# Patient Record
Sex: MALE | Race: WHITE | NOT HISPANIC OR LATINO | Employment: OTHER | URBAN - METROPOLITAN AREA
[De-identification: names, ages, dates, MRNs, and addresses within clinical notes are randomized per-mention and may not be internally consistent; named-entity substitution may affect disease eponyms.]

---

## 2014-11-04 LAB — SEE NOTE: NORMAL

## 2017-02-06 ENCOUNTER — COMMUNICATION - HEALTHEAST (OUTPATIENT)
Dept: FAMILY MEDICINE | Facility: CLINIC | Age: 62
End: 2017-02-06

## 2017-03-07 ENCOUNTER — OFFICE VISIT - HEALTHEAST (OUTPATIENT)
Dept: FAMILY MEDICINE | Facility: CLINIC | Age: 62
End: 2017-03-07

## 2017-03-07 DIAGNOSIS — Z82.49 FAMILY HISTORY OF HEART DISEASE: ICD-10-CM

## 2017-03-07 DIAGNOSIS — E66.01 MORBID OBESITY WITH BMI OF 40.0-44.9, ADULT (H): ICD-10-CM

## 2017-03-07 DIAGNOSIS — M54.9 BACK PAIN: ICD-10-CM

## 2017-03-07 DIAGNOSIS — Z00.00 ROUTINE GENERAL MEDICAL EXAMINATION AT A HEALTH CARE FACILITY: ICD-10-CM

## 2017-03-07 DIAGNOSIS — E78.00 HYPERCHOLESTEREMIA: ICD-10-CM

## 2017-03-07 DIAGNOSIS — I10 ESSENTIAL HYPERTENSION WITH GOAL BLOOD PRESSURE LESS THAN 140/90: ICD-10-CM

## 2017-03-07 LAB
CHOLEST SERPL-MCNC: 153 MG/DL
FASTING STATUS PATIENT QL REPORTED: NO
HBA1C MFR BLD: 5.6 % (ref 3.5–6)
HDLC SERPL-MCNC: 40 MG/DL
LDLC SERPL CALC-MCNC: 82 MG/DL
PSA SERPL-MCNC: 1.2 NG/ML (ref 0–4.5)
TRIGL SERPL-MCNC: 156 MG/DL

## 2017-03-07 ASSESSMENT — MIFFLIN-ST. JEOR: SCORE: 2240.03

## 2017-03-23 ENCOUNTER — RECORDS - HEALTHEAST (OUTPATIENT)
Dept: ADMINISTRATIVE | Facility: OTHER | Age: 62
End: 2017-03-23

## 2017-03-28 ENCOUNTER — RECORDS - HEALTHEAST (OUTPATIENT)
Dept: ADMINISTRATIVE | Facility: OTHER | Age: 62
End: 2017-03-28

## 2017-03-28 ENCOUNTER — TRANSFERRED RECORDS (OUTPATIENT)
Dept: HEALTH INFORMATION MANAGEMENT | Facility: CLINIC | Age: 62
End: 2017-03-28

## 2017-03-29 ENCOUNTER — TRANSFERRED RECORDS (OUTPATIENT)
Dept: HEALTH INFORMATION MANAGEMENT | Facility: CLINIC | Age: 62
End: 2017-03-29

## 2017-03-29 ENCOUNTER — HOSPITAL ENCOUNTER (OUTPATIENT)
Dept: MRI IMAGING | Facility: CLINIC | Age: 62
Discharge: HOME | End: 2017-03-29
Attending: PHYSICIAN ASSISTANT

## 2017-03-29 ENCOUNTER — COMMUNICATION - HEALTHEAST (OUTPATIENT)
Dept: TELEHEALTH | Facility: CLINIC | Age: 62
End: 2017-03-29

## 2017-03-29 DIAGNOSIS — R20.0 LOWER EXTREMITY NUMBNESS: ICD-10-CM

## 2017-03-29 DIAGNOSIS — M54.50 LUMBAGO: ICD-10-CM

## 2017-03-29 DIAGNOSIS — M79.606 PAIN OF LOWER EXTREMITY: ICD-10-CM

## 2017-04-03 ENCOUNTER — COMMUNICATION - HEALTHEAST (OUTPATIENT)
Dept: FAMILY MEDICINE | Facility: CLINIC | Age: 62
End: 2017-04-03

## 2017-04-03 DIAGNOSIS — E78.5 HYPERLIPEMIA: ICD-10-CM

## 2017-04-03 DIAGNOSIS — I10 ESSENTIAL HYPERTENSION: ICD-10-CM

## 2017-04-06 ENCOUNTER — RECORDS - HEALTHEAST (OUTPATIENT)
Dept: ADMINISTRATIVE | Facility: OTHER | Age: 62
End: 2017-04-06

## 2017-04-07 ENCOUNTER — HOSPITAL ENCOUNTER (OUTPATIENT)
Dept: PHYSICAL THERAPY | Facility: CLINIC | Age: 62
Setting detail: THERAPIES SERIES
End: 2017-04-07
Attending: ORTHOPAEDIC SURGERY
Payer: MEDICARE

## 2017-04-07 PROCEDURE — 97140 MANUAL THERAPY 1/> REGIONS: CPT | Mod: GP | Performed by: PHYSICAL THERAPIST

## 2017-04-07 PROCEDURE — 97162 PT EVAL MOD COMPLEX 30 MIN: CPT | Mod: GP | Performed by: PHYSICAL THERAPIST

## 2017-04-07 PROCEDURE — 40000718 ZZHC STATISTIC PT DEPARTMENT ORTHO VISIT: Performed by: PHYSICAL THERAPIST

## 2017-04-07 PROCEDURE — G8979 MOBILITY GOAL STATUS: HCPCS | Mod: GP,CI | Performed by: PHYSICAL THERAPIST

## 2017-04-07 PROCEDURE — 97535 SELF CARE MNGMENT TRAINING: CPT | Mod: GP | Performed by: PHYSICAL THERAPIST

## 2017-04-07 PROCEDURE — 97110 THERAPEUTIC EXERCISES: CPT | Mod: GP | Performed by: PHYSICAL THERAPIST

## 2017-04-07 PROCEDURE — G8978 MOBILITY CURRENT STATUS: HCPCS | Mod: GP,CI | Performed by: PHYSICAL THERAPIST

## 2017-04-07 NOTE — PROGRESS NOTES
[facility]          OUTPATIENT PHYSICAL THERAPY ORTHOPEDIC EVALUATION  PLAN OF TREATMENT FOR OUTPATIENT REHABILITATION  (COMPLETE FOR INITIAL CLAIMS ONLY)  Patient's Last Name, First Name, M.I.  YOB: 1955  Tommy De La O       Provider s Name:  [facility]   Medical Record No.  [id number]   Start of Care Date:  04/07/17   Onset Date:  01/01/13   Type:     _X__PT   ___OT   ___SLP Medical Diagnosis:  lumbago     PT Diagnosis:  LBP associated w mechanical dysfunction   Visits from SOC:  1      _________________________________________________________________________________  Plan of Treatment/Functional Goals:  balance training, gait training, joint mobilization, manual therapy, neuromuscular re-education, ROM, strengthening, stretching     Cryotherapy, Electrical stimulation, Hot packs, TENS, Traction     Goals  Goal Identifier: ROM  Goal Description: Pt will be able to demonstrate full lumbar ROM in order to be able to  object off of the ground without pain or radicular symptoms.  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   Target Date: 05/19/17          Therapy Frequency:  1 time/week  Predicted Duration of Therapy Intervention:  6 weeks    Venus Shin, PT                 I CERTIFY THE NEED FOR THESE SERVICES FURNISHED UNDER        THIS PLAN OF TREATMENT AND WHILE UNDER MY CARE .              Physician Signature               Date    X_____________________________________________________                             Certification Date From:  04/07/17   Certification Date To:  05/19/17    Referring Provider:  MD Cortney Hatfield    Initial Assessment        See Epic Evaluation Start of Care Date: 04/07/17

## 2017-04-07 NOTE — PROGRESS NOTES
04/07/17 1000   General Information   Type of Visit Initial OP Ortho PT Evaluation   Start of Care Date 04/07/17   Referring Physician Aurelio Delgadillo MD - [place]   Patient/Family Goals Statement reduce pain   Orders Evaluate and Treat   Orders Comment trunk stabilization conditioning modalities, home exercise program   Date of Order 04/06/17   Insurance Type Medicare   Medical Diagnosis lumbago    Surgical/Medical history reviewed Yes   Precautions/Limitations no known precautions/limitations   Body Part(s)   Body Part(s) Lumbar Spine/SI   Presentation and Etiology   Pertinent history of current problem (include personal factors and/or comorbidities that impact the POC) Pt reports back pain started when he fell down the steps in 2013. From that fall he needed bi TKA. Then due to being less active his back started to give him more problems. . Pt reports he has numbness in both feet and will.  Pt had an MRI, he has DDD and arthritis in the back. Pt reports standing and sitting on the couch is bad at the end of the day. Pt reports he does have swelling in knees from bi TKA. He has pain going up to mid pain. Pt denies changes in bowel and bladder function.  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 PMH: depression, high BP, arthritis, smoking, 2 TKA in 2015, RTC 2003, Achilles 1982 Pt reports he may need an injection but would not need surgery.   Impairments A. Pain;L. Tingling;K. Numbness   Functional Limitations perform activities of daily living;perform required work activities;perform desired leisure / sports activities   Symptom Location back and R leg   How/Where did it occur From insidious onset   Onset date of current episode/exacerbation 01/01/13   Chronicity Chronic   Pain rating (0-10 point scale) Best (/10);Worst (/10)   Best (/10) 1   Worst (/10) 6   Pain quality A. Sharp;C. Aching   Frequency of pain/symptoms B. Intermittent   Pain/symptoms exacerbated by A. Sitting;C. Lifting;D. Carrying;G.  Certain positions;M. Other   Pain exacerbation comment standing long periods   Pain/symptoms eased by C. Rest;E. Changing positions;K. Other   Pain eased by comment stretching   Progression of symptoms since onset: Worsened   Current Level of Function   Current Community Support Family/friend caregiver   Patient role/employment history F. Retired   Living environment [place]   Fall Risk Screen   Fall screen completed by PT   Per patient - Fall 2 or more times in past year? No   Per patient - Fall with injury in past year? No   Is patient a fall risk? No   System Outcome Measures   Outcome Measures Low Back Pain (see Oswestry and Denys)   Lumbar Spine/SI Objective Findings   Posture increased lumbar lordosis   Gait/Locomotion [place]    Balance/Proprioception (Single Leg Stance) WNL    Flexion ROM to floor - min pain   Extension ROM 25% w pain in middle   Right Side Bending ROM to knee - pain on R    Left Side Bending ROM to knee - pain on R    Pelvic Screen L post innominate rotation    Hip Flexion (L2) Strength 5/5   Hip Abduction Strength R: 3/5    Knee Flexion Strength 5/5   Knee Extension (L3) Strength 5/5   Ankle Dorsiflexion (L4) Strength 5/5   Ankle Plantar Flexion (S1) Strength 5/5   SLR R: 70 L: 80    Slump Test neg   Palpation numbness in bi knees from TKA, R flex: 108, L flex: 104  - denies TTP to pirifdormis, TFL, TTP: bi SI    Planned Therapy Interventions   Planned Therapy Interventions balance training;gait training;joint mobilization;manual therapy;neuromuscular re-education;ROM;strengthening;stretching   Planned Modality Interventions   Planned Modality Interventions Cryotherapy;Electrical stimulation;Hot packs;TENS;Traction   Clinical Impression   Criteria for Skilled Therapeutic Interventions Met yes, treatment indicated   PT Diagnosis LBP associated w mechanical dysfunction   Influenced by the following impairments pain, weakness, ROM, gait, balance   Functional limitations due to  impairments standing, sitting   Clinical Presentation Evolving/Changing   Clinical Presentation Rationale Pt is pleasant 62 yr old man w c/o of LBP w bi feet numbness. Pt is mod complexity due to incosnsitent neuro exam findings and TKA  may limit rehab   Clinical Decision Making (Complexity) Moderate complexity   Therapy Frequency 1 time/week   Predicted Duration of Therapy Intervention (days/wks) 6 weeks   Risk & Benefits of therapy have been explained Yes   Patient, Family & other staff in agreement with plan of care Yes   Education Assessment   Preferred Learning Style Listening;Reading;Pictures/video;Demonstration   Barriers to Learning No barriers   ORTHO GOALS   PT Ortho Eval Goals 1;2;3;4   Ortho Goal 1   Goal Identifier ROM   Goal Description Pt will be able to demonstrate full lumbar ROM in order to be able to  object off of the ground without pain or radicular symptoms.   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    Target Date 05/19/17   Total Evaluation Time   Total Evaluation Time 70 min ( 30 eval, 15 TE, 15 MT, 10 ADL)    Therapy Certification   Certification date from 04/07/17   Certification date to 05/19/17   Medical Diagnosis lumbago       Venus Shin  Physical Therapist  [address] [address]  [email or web address]   [email or web address]   Office:  [phone] Fax: [phone]

## 2017-04-12 ENCOUNTER — HOSPITAL ENCOUNTER (OUTPATIENT)
Dept: PHYSICAL THERAPY | Facility: CLINIC | Age: 62
Setting detail: THERAPIES SERIES
End: 2017-04-12
Attending: ORTHOPAEDIC SURGERY
Payer: MEDICARE

## 2017-04-12 PROCEDURE — 97535 SELF CARE MNGMENT TRAINING: CPT | Mod: GP | Performed by: PHYSICAL THERAPIST

## 2017-04-12 PROCEDURE — 97140 MANUAL THERAPY 1/> REGIONS: CPT | Mod: GP | Performed by: PHYSICAL THERAPIST

## 2017-04-12 PROCEDURE — 40000718 ZZHC STATISTIC PT DEPARTMENT ORTHO VISIT: Performed by: PHYSICAL THERAPIST

## 2017-04-19 ENCOUNTER — HOSPITAL ENCOUNTER (OUTPATIENT)
Dept: PHYSICAL THERAPY | Facility: CLINIC | Age: 62
Setting detail: THERAPIES SERIES
End: 2017-04-19
Attending: ORTHOPAEDIC SURGERY
Payer: MEDICARE

## 2017-04-19 PROCEDURE — 97140 MANUAL THERAPY 1/> REGIONS: CPT | Mod: GP | Performed by: PHYSICAL THERAPIST

## 2017-04-19 PROCEDURE — 40000718 ZZHC STATISTIC PT DEPARTMENT ORTHO VISIT: Performed by: PHYSICAL THERAPIST

## 2017-04-19 PROCEDURE — 97110 THERAPEUTIC EXERCISES: CPT | Mod: GP | Performed by: PHYSICAL THERAPIST

## 2017-04-28 ENCOUNTER — HOSPITAL ENCOUNTER (OUTPATIENT)
Dept: PHYSICAL THERAPY | Facility: CLINIC | Age: 62
Setting detail: THERAPIES SERIES
End: 2017-04-28
Attending: ORTHOPAEDIC SURGERY
Payer: MEDICARE

## 2017-04-28 PROCEDURE — 40000718 ZZHC STATISTIC PT DEPARTMENT ORTHO VISIT: Performed by: PHYSICAL THERAPIST

## 2017-04-28 PROCEDURE — 97140 MANUAL THERAPY 1/> REGIONS: CPT | Mod: GP | Performed by: PHYSICAL THERAPIST

## 2017-04-28 PROCEDURE — 97110 THERAPEUTIC EXERCISES: CPT | Mod: GP | Performed by: PHYSICAL THERAPIST

## 2017-05-02 ENCOUNTER — COMMUNICATION - HEALTHEAST (OUTPATIENT)
Dept: FAMILY MEDICINE | Facility: CLINIC | Age: 62
End: 2017-05-02

## 2017-05-03 ENCOUNTER — HOSPITAL ENCOUNTER (OUTPATIENT)
Dept: PHYSICAL THERAPY | Facility: CLINIC | Age: 62
Setting detail: THERAPIES SERIES
End: 2017-05-03
Attending: ORTHOPAEDIC SURGERY
Payer: MEDICARE

## 2017-05-03 PROCEDURE — 40000718 ZZHC STATISTIC PT DEPARTMENT ORTHO VISIT: Performed by: PHYSICAL THERAPIST

## 2017-05-03 PROCEDURE — 97110 THERAPEUTIC EXERCISES: CPT | Mod: GP | Performed by: PHYSICAL THERAPIST

## 2017-05-08 ENCOUNTER — AMBULATORY - HEALTHEAST (OUTPATIENT)
Dept: VASCULAR SURGERY | Facility: CLINIC | Age: 62
End: 2017-05-08

## 2017-05-08 ENCOUNTER — APPOINTMENT (OUTPATIENT)
Dept: GENERAL RADIOLOGY | Facility: CLINIC | Age: 62
End: 2017-05-08
Attending: FAMILY MEDICINE
Payer: MEDICARE

## 2017-05-08 ENCOUNTER — APPOINTMENT (OUTPATIENT)
Dept: CT IMAGING | Facility: CLINIC | Age: 62
End: 2017-05-08
Attending: FAMILY MEDICINE
Payer: MEDICARE

## 2017-05-08 ENCOUNTER — HOSPITAL ENCOUNTER (EMERGENCY)
Facility: CLINIC | Age: 62
Discharge: HOME | End: 2017-05-08
Attending: FAMILY MEDICINE | Admitting: FAMILY MEDICINE
Payer: MEDICARE

## 2017-05-08 VITALS — OXYGEN SATURATION: 97 % | DIASTOLIC BLOOD PRESSURE: 96 MMHG | TEMPERATURE: 98 F | SYSTOLIC BLOOD PRESSURE: 155 MMHG

## 2017-05-08 DIAGNOSIS — R31.0 GROSS HEMATURIA: ICD-10-CM

## 2017-05-08 DIAGNOSIS — R42 LIGHT HEADEDNESS: ICD-10-CM

## 2017-05-08 DIAGNOSIS — E87.6 HYPOKALEMIA: ICD-10-CM

## 2017-05-08 DIAGNOSIS — I72.3 ILIAC ARTERY ANEURYSM, LEFT (H): ICD-10-CM

## 2017-05-08 LAB
ALBUMIN SERPL-MCNC: 3.6 G/DL (ref 3.4–5)
ALBUMIN UR-MCNC: NEGATIVE MG/DL
ALP SERPL-CCNC: 86 U/L (ref 40–150)
ALT SERPL W P-5'-P-CCNC: 43 U/L (ref 0–70)
ANION GAP SERPL CALCULATED.3IONS-SCNC: 9 MMOL/L (ref 3–14)
APPEARANCE UR: CLEAR
AST SERPL W P-5'-P-CCNC: 28 U/L (ref 0–45)
BASOPHILS # BLD AUTO: 0 10E9/L (ref 0–0.2)
BASOPHILS NFR BLD AUTO: 0.6 %
BILIRUB SERPL-MCNC: 0.6 MG/DL (ref 0.2–1.3)
BILIRUB UR QL STRIP: NEGATIVE
BUN SERPL-MCNC: 19 MG/DL (ref 7–30)
CALCIUM SERPL-MCNC: 9.2 MG/DL (ref 8.5–10.1)
CHLORIDE SERPL-SCNC: 103 MMOL/L (ref 94–109)
CO2 SERPL-SCNC: 27 MMOL/L (ref 20–32)
COLOR UR AUTO: YELLOW
CREAT SERPL-MCNC: 0.89 MG/DL (ref 0.66–1.25)
DIFFERENTIAL METHOD BLD: NORMAL
EOSINOPHIL # BLD AUTO: 0.3 10E9/L (ref 0–0.7)
EOSINOPHIL NFR BLD AUTO: 4.7 %
ERYTHROCYTE [DISTWIDTH] IN BLOOD BY AUTOMATED COUNT: 13 % (ref 10–15)
GFR SERPL CREATININE-BSD FRML MDRD: 86 ML/MIN/1.7M2
GLUCOSE SERPL-MCNC: 97 MG/DL (ref 70–99)
GLUCOSE UR STRIP-MCNC: NEGATIVE MG/DL
HCT VFR BLD AUTO: 43.1 % (ref 40–53)
HGB BLD-MCNC: 14.9 G/DL (ref 13.3–17.7)
HGB UR QL STRIP: NEGATIVE
IMM GRANULOCYTES # BLD: 0 10E9/L (ref 0–0.4)
IMM GRANULOCYTES NFR BLD: 0.3 %
INR PPP: 0.96 (ref 0.86–1.14)
KETONES UR STRIP-MCNC: NEGATIVE MG/DL
LEUKOCYTE ESTERASE UR QL STRIP: NEGATIVE
LYMPHOCYTES # BLD AUTO: 1.5 10E9/L (ref 0.8–5.3)
LYMPHOCYTES NFR BLD AUTO: 23.2 %
MCH RBC QN AUTO: 30 PG (ref 26.5–33)
MCHC RBC AUTO-ENTMCNC: 34.6 G/DL (ref 31.5–36.5)
MCV RBC AUTO: 87 FL (ref 78–100)
MONOCYTES # BLD AUTO: 0.4 10E9/L (ref 0–1.3)
MONOCYTES NFR BLD AUTO: 5.6 %
NEUTROPHILS # BLD AUTO: 4.2 10E9/L (ref 1.6–8.3)
NEUTROPHILS NFR BLD AUTO: 65.6 %
NITRATE UR QL: NEGATIVE
PH UR STRIP: 6.5 PH (ref 5–7)
PLATELET # BLD AUTO: 175 10E9/L (ref 150–450)
POTASSIUM SERPL-SCNC: 3.5 MMOL/L (ref 3.4–5.3)
PROT SERPL-MCNC: 7.4 G/DL (ref 6.8–8.8)
RBC # BLD AUTO: 4.97 10E12/L (ref 4.4–5.9)
SODIUM SERPL-SCNC: 139 MMOL/L (ref 133–144)
SP GR UR STRIP: 1.01 (ref 1–1.03)
TROPONIN I SERPL-MCNC: NORMAL UG/L (ref 0–0.04)
URN SPEC COLLECT METH UR: NORMAL
UROBILINOGEN UR STRIP-MCNC: NORMAL MG/DL (ref 0–2)
WBC # BLD AUTO: 6.4 10E9/L (ref 4–11)

## 2017-05-08 PROCEDURE — 81003 URINALYSIS AUTO W/O SCOPE: CPT | Performed by: FAMILY MEDICINE

## 2017-05-08 PROCEDURE — 99285 EMERGENCY DEPT VISIT HI MDM: CPT | Mod: 25 | Performed by: FAMILY MEDICINE

## 2017-05-08 PROCEDURE — 85610 PROTHROMBIN TIME: CPT | Performed by: FAMILY MEDICINE

## 2017-05-08 PROCEDURE — 85025 COMPLETE CBC W/AUTO DIFF WBC: CPT | Performed by: FAMILY MEDICINE

## 2017-05-08 PROCEDURE — 84484 ASSAY OF TROPONIN QUANT: CPT | Performed by: FAMILY MEDICINE

## 2017-05-08 PROCEDURE — 74176 CT ABD & PELVIS W/O CONTRAST: CPT

## 2017-05-08 PROCEDURE — 80053 COMPREHEN METABOLIC PANEL: CPT | Performed by: FAMILY MEDICINE

## 2017-05-08 PROCEDURE — 99285 EMERGENCY DEPT VISIT HI MDM: CPT | Mod: 25

## 2017-05-08 PROCEDURE — 93010 ELECTROCARDIOGRAM REPORT: CPT | Performed by: FAMILY MEDICINE

## 2017-05-08 PROCEDURE — 36415 COLL VENOUS BLD VENIPUNCTURE: CPT

## 2017-05-08 PROCEDURE — 93005 ELECTROCARDIOGRAM TRACING: CPT

## 2017-05-08 PROCEDURE — 71020 XR CHEST 2 VW: CPT

## 2017-05-08 RX ORDER — CLOBETASOL PROPIONATE 0.5 MG/G
1 OINTMENT TOPICAL DAILY PRN
COMMUNITY
End: 2018-06-28

## 2017-05-08 RX ORDER — CLINDAMYCIN HCL 300 MG
600 CAPSULE ORAL
COMMUNITY
Start: 2016-05-10 | End: 2019-09-16

## 2017-05-08 NOTE — ED AVS SNAPSHOT
[facility] Emergency Department    [address] [address]    Phone:  [phone]    Fax:  [phone]                                       Tommy De La O   MRN: [id number]    Department:  [facility] Emergency Department   Date of Visit:  5/8/2017           After Visit Summary Signature Page     I have received my discharge instructions, and my questions have been answered. I have discussed any challenges I see with this plan with the nurse or doctor.    ..........................................................................................................................................  Patient/Patient Representative Signature      ..........................................................................................................................................  Patient Representative Print Name and Relationship to Patient    ..................................................               ................................................  Date                                            Time    ..........................................................................................................................................  Reviewed by Signature/Title    ...................................................              ..............................................  Date                                                            Time

## 2017-05-08 NOTE — DISCHARGE INSTRUCTIONS
ICD-10-CM    1. Hypokalemia E87.6     borderline potassium.  Keep the potassium high in your diet.  See NIH DASH diet.  For now supllement potassium witnh 1/2 to 1 banana every day.   2. Iliac artery aneurysm, left (H) I72.3     Aneurysmal dilatation of the left common iliac artery up to 2.3 cm in maximal diameter. Additionally, focally ectatic infrarenal abdominal aorta measuring up to 2.7 cm in diameter.  Follow-up vascular.   3. Gross hematuria R31.0 UROLOGY ADULT REFERRAL    This was not seen on urinalysis,  could be coming from the bladder or urethra and this is not well seen on CT.  follow-up urology.   4. Light headedness R42     No serious findings on testing. stay hydrated.  follow-up,.         Hematuria: Possible Causes     Many things can lead to blood in the urine (hematuria). The blood may be seen with the eye. Or it may only be seen when the urine is looked at under a microscope. Some of the most common causes of blood in the urine are listed below. Often, no cause for the blood can be found. This is called idiopathic hematuria.    Kidney or bladder stones are collections of crystals. They form in the urine. Stones may be found anywhere in the urinary tract. But they form most often in the kidneys or bladder. In addition to blood in the urine, they can cause severe pain.   BPH stands for benign prostatic hyperplasia. It is enlargement of the prostate gland. It happens as men age. BPH often causes problems with urination. It sometimes causes blood in the urine.   A urinary tract infection (UTI) is due to bacteria growing in the urinary tract. It can cause blood in the urine. Other symptoms include burning or pain with urination. You may need to urinate often or urgently. You may also have a fever.     Damage to the urinary tract may cause blood in the urine. This damage may be due to a blow or accident. It may also result from the use of a urinary catheter. Very hard exercise may sometimes irritate  the urinary tract and cause bleeding.   Cancer may occur anywhere in the urinary tract. A tumor may sometimes cause no symptoms other than bleeding. Other possible causes of bleeding include:    Prostatitis (infection of the prostate gland)    Taking anticoagulant medications    Blockage in the urinary tract    Disease or inflammation of the kidney    Cystic diseases of the kidneys    Sickle cell anemia    Tumors of the urinary tract     [id number] [employer]. [address] [place] [address]. All rights reserved. This information is not intended as a substitute for professional medical care. Always follow your healthcare professional's instructions.          Hypokalemia  Hypokalemia means a low level of potassium in the blood. This most often occurs in patients who take diuretics (water pills). It can also occur due to severe vomiting or diarrhea.  It is also seen in people who take laxatives for long periods of time.  A mild case usually causes no symptoms. It is only found with blood testing. More severe potassium loss causes generalized weakness, muscle or abdominal cramping, heart palpitations (rapid or irregular heartbeats) and low blood pressure.  Home care    Take any potassium supplements prescribed.    Eat foods rich in potassium. The highest amount is found in artichoke, baked potatoes, spinach, cantaloupe, honeydew melon, cod, halibut, salmon, and scallops. White, red, or elizondo beans are also very good sources. A modest amount is found in orange juice, bananas, carrots, and tomato juice.    If you take certain types of diuretics, you will also need to take potassium supplements. If take a diuretic, discuss potassium supplements with your doctor.  Follow-up care  Follow up with your healthcare provider for a repeat blood test within the next week or as advised by our staff.  When to seek medical advice  Call your healthcare provider if any of the following occur:    Increased  weakness, fatigue, muscle cramps    Dizziness  Call 911  Call 911 if any of the following occur:    Irregular heartbeat, extra beats or very fast heart rate    Loss of consciousness    [id number] [employer]. [address] [place] [address], [place] [address]. All rights reserved. This information is not intended as a substitute for professional medical care. Always follow your healthcare professional's instructions.

## 2017-05-08 NOTE — ED AVS SNAPSHOT
[facility]    [address] [facility] [address]    Phone:  [phone]    Fax:  [phone]                                       Tommy De La O   MRN: [id number]    Department:  [facility]   Date of Visit:  5/8/2017           Patient Information     Date Of Birth          1955        Your diagnoses for this visit were:     Hypokalemia borderline potassium.  Keep the potassium high in your diet.  See [facility] DASH diet.  For now supllement potassium witnh 1/2 to 1 banana every day.    Iliac artery aneurysm, left (H) Aneurysmal dilatation of the left common iliac artery up to 2.3 cm in maximal diameter. Additionally, focally ectatic infrarenal abdominal aorta measuring up to 2.7 cm in diameter.  Follow-up vascular.    Gross hematuria This was not seen on urinalysis,  could be coming from the bladder or urethra and this is not well seen on CT.  follow-up urology.    Light headedness No serious findings on testing. stay hydrated.  follow-up,.       You were seen by Abdulaziz Murrieta MD.      Follow-up Information     Follow up with [facility].    Specialty:  EMERGENCY MEDICINE    Why:  As needed, If symptoms worsen    Contact information:    [facility] [address] [phone]    Additional information:    The medical center is located at   [address] [place] [address]. (between [address] and   [address] in [place], [place] north   of [place]).        Schedule an appointment as soon as possible for a visit with primary care.        Discharge Instructions         ICD-10-CM    1. Hypokalemia E87.6     borderline potassium.  Keep the potassium high in your diet.  See [facility] DASH diet.  For now supllement potassium witnh 1/2 to 1 banana every day.   2. Iliac artery aneurysm, left (H) I72.3     Aneurysmal dilatation of the left common iliac artery up to 2.3 cm in maximal diameter. Additionally, focally ectatic infrarenal  abdominal aorta measuring up to 2.7 cm in diameter.  Follow-up vascular.   3. Gross hematuria R31.0 UROLOGY ADULT REFERRAL    This was not seen on urinalysis,  could be coming from the bladder or urethra and this is not well seen on CT.  follow-up urology.   4. Light headedness R42     No serious findings on testing. stay hydrated.  follow-up,.         Hematuria: Possible Causes     Many things can lead to blood in the urine (hematuria). The blood may be seen with the eye. Or it may only be seen when the urine is looked at under a microscope. Some of the most common causes of blood in the urine are listed below. Often, no cause for the blood can be found. This is called idiopathic hematuria.    Kidney or bladder stones are collections of crystals. They form in the urine. Stones may be found anywhere in the urinary tract. But they form most often in the kidneys or bladder. In addition to blood in the urine, they can cause severe pain.   BPH stands for benign prostatic hyperplasia. It is enlargement of the prostate gland. It happens as men age. BPH often causes problems with urination. It sometimes causes blood in the urine.   A urinary tract infection (UTI) is due to bacteria growing in the urinary tract. It can cause blood in the urine. Other symptoms include burning or pain with urination. You may need to urinate often or urgently. You may also have a fever.     Damage to the urinary tract may cause blood in the urine. This damage may be due to a blow or accident. It may also result from the use of a urinary catheter. Very hard exercise may sometimes irritate the urinary tract and cause bleeding.   Cancer may occur anywhere in the urinary tract. A tumor may sometimes cause no symptoms other than bleeding. Other possible causes of bleeding include:    Prostatitis (infection of the prostate gland)    Taking anticoagulant medications    Blockage in the urinary tract    Disease or inflammation of the kidney    Cystic  diseases of the kidneys    Sickle cell anemia    Tumors of the urinary tract     6497-4632 [employer]. [address] [place] [address]176. All rights reserved. This information is not intended as a substitute for professional medical care. Always follow your healthcare professional's instructions.          Hypokalemia  Hypokalemia means a low level of potassium in the blood. This most often occurs in patients who take diuretics (water pills). It can also occur due to severe vomiting or diarrhea.  It is also seen in people who take laxatives for long periods of time.  A mild case usually causes no symptoms. It is only found with blood testing. More severe potassium loss causes generalized weakness, muscle or abdominal cramping, heart palpitations (rapid or irregular heartbeats) and low blood pressure.  Home care    Take any potassium supplements prescribed.    Eat foods rich in potassium. The highest amount is found in artichoke, baked potatoes, spinach, cantaloupe, honeydew melon, cod, halibut, salmon, and scallops. White, red, or elizondo beans are also very good sources. A modest amount is found in orange juice, bananas, carrots, and tomato juice.    If you take certain types of diuretics, you will also need to take potassium supplements. If take a diuretic, discuss potassium supplements with your doctor.  Follow-up care  Follow up with your healthcare provider for a repeat blood test within the next week or as advised by our staff.  When to seek medical advice  Call your healthcare provider if any of the following occur:    Increased weakness, fatigue, muscle cramps    Dizziness  Call 911  Call 911 if any of the following occur:    Irregular heartbeat, extra beats or very fast heart rate    Loss of consciousness    2640-2820 [employer]. [address] [place] [address]. All rights reserved. This information is not intended as a substitute for professional medical care.  Always follow your healthcare professional's instructions.          Future Appointments        Provider Department Dept Phone Center    5/10/2017 10:00 AM Venus Shin, PT [facility] [phone] [place] [facility]    5/17/2017 10:00 AM Venus Shin PT [facility] [phone] [facility]      24 Hour Appointment Hotline       To make an appointment at any [facility], call [phone] ([phone]). If you don't have a family doctor or clinic, we will help you find one. [place] clinics are conveniently located to serve the needs of you and your family.          ED Discharge Orders     UROLOGY ADULT REFERRAL       Your provider has referred you to: [facility]: [facility] [place] [phone]   [email or web address]    Please be aware that coverage of these services is subject to the terms and limitations of your health insurance plan.  Call member services at your health plan with any benefit or coverage questions.      Please bring the following with you to your appointment:    (1) Any X-Rays, CTs or MRIs which have been performed.  Contact the facility where they were done to arrange for  prior to your scheduled appointment.    (2) List of current medications  (3) This referral request   (4) Any documents/labs given to you for this referral                     Review of your medicines      Our records show that you are taking the medicines listed below. If these are incorrect, please call your family doctor or clinic.        Dose / Directions Last dose taken    AMLODIPINE BESYLATE PO   Dose:  5 mg        Take 5 mg by mouth daily   Refills:  0        aspirin 81 MG EC tablet   Dose:  81 mg        Take 81 mg by mouth daily   Refills:  0        CHLORTHALIDONE PO   Dose:  25 mg        Take 25 mg by mouth daily   Refills:  0        clindamycin 300 MG capsule   Commonly known as:  CLEOCIN   Dose:  600 mg         Take 600 mg by mouth 1 HOUR PRIOR TO DENTAL APPOINTMENT   Refills:  0        clobetasol 0.05 % ointment   Commonly known as:  TEMOVATE   Dose:  1 Application        Apply 1 Application topically daily as needed For itching   Refills:  0        OMEPRAZOLE PO   Dose:  40 mg        Take 40 mg by mouth every morning   Refills:  0        SIMVASTATIN PO   Dose:  20 mg        Take 20 mg by mouth At Bedtime   Refills:  0                Procedures and tests performed during your visit     Abd/pelvis CT - no contrast - Stone Protocol    CBC with platelets differential    Comprehensive metabolic panel    EKG 12-lead, tracing only    INR    Orthostatic blood pressure and pulse    Troponin I    UA reflex to Microscopic    XR Chest 2 Views      Orders Needing Specimen Collection     None      Pending Results     No orders found from 5/6/2017 to 5/9/2017.            Pending Culture Results     No orders found from 5/6/2017 to 5/9/2017.            Pending Results Instructions     If you had any lab results that were not finalized at the time of your Discharge, you can call the ED Lab Result RN at [phone]. You will be contacted by this team for any positive Lab results or changes in treatment. The nurses are available 7 days a week from 10A to 6:30P.  You can leave a message 24 hours per day and they will return your call.        Test Results From Your Hospital Stay        5/8/2017  9:48 AM      Component Results     Component Value Ref Range & Units Status    Color Urine Yellow  Final    Appearance Urine Clear  Final    Glucose Urine Negative NEG mg/dL Final    Bilirubin Urine Negative NEG Final    Ketones Urine Negative NEG mg/dL Final    Specific Gravity Urine 1.009 1.003 - 1.035 Final    Blood Urine Negative NEG Final    pH Urine 6.5 5.0 - 7.0 pH Final    Protein Albumin Urine Negative NEG mg/dL Final    Urobilinogen mg/dL Normal 0.0 - 2.0 mg/dL Final    Nitrite Urine Negative NEG Final    Leukocyte Esterase Urine Negative  NEG Final    Source Midstream Urine  Final         5/8/2017 11:05 AM      Component Results     Component Value Ref Range & Units Status    WBC 6.4 4.0 - 11.0 10e9/L Final    RBC Count 4.97 4.4 - 5.9 10e12/L Final    Hemoglobin 14.9 13.3 - 17.7 g/dL Final    Hematocrit 43.1 40.0 - 53.0 % Final    MCV 87 78 - 100 fl Final    MCH 30.0 26.5 - 33.0 pg Final    MCHC 34.6 31.5 - 36.5 g/dL Final    RDW 13.0 10.0 - 15.0 % Final    Platelet Count 175 150 - 450 10e9/L Final    Diff Method Automated Method  Final    % Neutrophils 65.6 % Final    % Lymphocytes 23.2 % Final    % Monocytes 5.6 % Final    % Eosinophils 4.7 % Final    % Basophils 0.6 % Final    % Immature Granulocytes 0.3 % Final    Absolute Neutrophil 4.2 1.6 - 8.3 10e9/L Final    Absolute Lymphocytes 1.5 0.8 - 5.3 10e9/L Final    Absolute Monocytes 0.4 0.0 - 1.3 10e9/L Final    Absolute Eosinophils 0.3 0.0 - 0.7 10e9/L Final    Absolute Basophils 0.0 0.0 - 0.2 10e9/L Final    Abs Immature Granulocytes 0.0 0 - 0.4 10e9/L Final         5/8/2017 11:22 AM      Component Results     Component Value Ref Range & Units Status    Sodium 139 133 - 144 mmol/L Final    Potassium 3.5 3.4 - 5.3 mmol/L Final    Chloride 103 94 - 109 mmol/L Final    Carbon Dioxide 27 20 - 32 mmol/L Final    Anion Gap 9 3 - 14 mmol/L Final    Glucose 97 70 - 99 mg/dL Final    Urea Nitrogen 19 7 - 30 mg/dL Final    Creatinine 0.89 0.66 - 1.25 mg/dL Final    GFR Estimate 86 >60 mL/min/1.7m2 Final    Non  GFR Calc    GFR Estimate If Black >90   GFR Calc   >60 mL/min/1.7m2 Final    Calcium 9.2 8.5 - 10.1 mg/dL Final    Bilirubin Total 0.6 0.2 - 1.3 mg/dL Final    Albumin 3.6 3.4 - 5.0 g/dL Final    Protein Total 7.4 6.8 - 8.8 g/dL Final    Alkaline Phosphatase 86 40 - 150 U/L Final    ALT 43 0 - 70 U/L Final    AST 28 0 - 45 U/L Final         5/8/2017 11:12 AM      Component Results     Component Value Ref Range & Units Status    INR 0.96 0.86 - 1.14 Final          5/8/2017 11:22 AM      Component Results     Component Value Ref Range & Units Status    Troponin I ES  0.000 - 0.045 ug/L Final    <0.015  The 99th percentile for upper reference range is 0.045 ug/L.  Troponin values in   the range of 0.045 - 0.120 ug/L may be associated with risks of adverse   clinical events.           5/8/2017 11:42 AM      Narrative     XR CHEST 2 VW 5/8/2017 11:17 AM    HISTORY: Light headed with ambulation.    COMPARISON: None.    FINDINGS: No airspace consolidation, effusion or pneumothorax. Normal  heart size.        Impression     IMPRESSION: No acute cardiopulmonary abnormality.    DELANEY PETERS MD         5/8/2017 11:44 AM      Narrative     Exam: CT ABDOMEN PELVIS W/O CONTRAST  5/8/2017 11:11 AM    History: Hematuria    Comparison: None    Technique: Volumetric acquisition with reconstruction in the axial,  coronal planes through the abdomen and pelvis without contrast.  Radiation dose for this scan was reduced using automated exposure  control, adjustment of the mA and/or kV according to patient size, or  iterative reconstruction technique.    Contrast: None    Findings:   Lung Bases: Tiny hiatal hernia. Lung bases otherwise clear. No pleural  or pericardial effusion.    Abdomen: Hepatic steatosis. Unenhanced liver is otherwise normal.  Unenhanced spleen, gallbladder, pancreas and adrenal glands are  normal.    Normal appearance of the kidneys. Specifically, no renal or ureteral  calculi, no hydronephrosis or hydroureter.    No areas of bowel wall thickening or bowel dilatation. Normal  appendix. No abdominal or pelvic lymphadenopathy. No free fluid.    Focal ectasia of the infrarenal abdominal aorta, measuring 2.7 cm in  maximal diameter (series 2 image 52 and series 3 image 91).  Additionally, there is aneurysmal dilatation of the left common iliac  artery measuring 2.3 cm in maximal diameter (series 3 image 99 and  series 2 image 61).    Bones: No concerning lytic or sclerotic  "lesions.        Impression     Impression:   1. Normal appearance of the kidneys without hydronephrosis,  hydroureter or stones.  2. Hepatic steatosis.  3. Aneurysmal dilatation of the left common iliac artery measuring up  to 2.3 cm in maximal diameter. Additionally, focally ectatic  infrarenal abdominal aorta measuring up to 2.7 cm in diameter.  4. Small hiatal hernia.    STUART PRAKASH                Thank you for choosing [facility]       Thank you for choosing [facility] for your care. Our goal is always to provide you with excellent care. Hearing back from our patients is one way we can continue to improve our services. Please take a few minutes to complete the written survey that you may receive in the mail after you visit with us. Thank you!        [employer] Information     [employer] lets you send messages to your doctor, view your test results, renew your prescriptions, schedule appointments and more. To sign up, go to [email or web address] . Click on \"Log in\" on the left side of the screen, which will take you to the Welcome page. Then click on \"Sign up Now\" on the right side of the page.     You will be asked to enter the access code listed below, as well as some personal information. Please follow the directions to create your username and password.     Your access code is: [email or web address]  Expires: 2017 12:46 PM     Your access code will  in 90 days. If you need help or a new code, please call your [facility] clinic or [phone].        Care EveryWhere ID     This is your Care EveryWhere ID. This could be used by other organizations to access your [facility] medical records  [id number]        After Visit Summary       This is your record. Keep this with you and show to your community pharmacist(s) and doctor(s) at your next visit.                  "

## 2017-05-08 NOTE — ED PROVIDER NOTES
"  History     Chief Complaint   Patient presents with     Dizziness     since this AM, blood in urine and burning since Friday     HPI  Tommy De La O is a 62 year old male with a history of GERD and prior GI bleed who presents to the ED for concerns of hematuria and light headedness. The patient reports he first noticed hematuria 3 days ago, which appeared to be a single small clot. He passed another small clot this morning and a second one while in the ED. He feels some stinging at the very beginning of his stream but denies any other dysuria. No frequency or urgency. No testicular pain, scrotal pain, or groin pain. No history of kidney stones. The patient started to feel light headed this morning, so he came to the ED for assessment. He describes his light headedness as \"feeling woozy.\" No room-spinning dizziness. It was worse while he was at home this morning, but is milder now. He admits to feeling anxious as well. No associated chest pain, palpitations, or shortness of breath. No sweats, abdominal pain, nausea, vomiting, arm pain, jaw pain, or new back pain. He has felt a similar light headedness in the past, about 3 years ago, when he was having rectal bleeding. He was admitted to [facility] at that time and EGD and colonoscopy were completed. EGD was normal with exception of erythematous duodenopathy, which was biopsied. Colonoscopy showed mucosal ulceration at IC valve, inflamed mucosa in the rectum, and non bleeding internal hemorrhoids, which were banded. The patient does note occasional bright red blood when wiping after bowel movements, but no recent bloody stools or black tarry stools. No history of ulcers. He does not use ibuprofen. Patient does report heavy alcohol use, about 5 beers at a time two nights per week. No recent prolonged travel. No history of DVT or PE. Denies one-sided leg swelling. No recent injuries, surgeries, or hospitalizations. No history of cancer. No personal history of " bleeding disorders, heart disease, or lung disease. No fevers, chills, bowel abnormalities, headache, or vision change. Former smoker, quit 5 years ago.    There is no problem list on file for this patient.    No current outpatient prescriptions on file.     Allergies   Allergen Reactions     Cefzil [Cefprozil] Hives     Darvocet [Propoxyphene N-Apap] Hives       I have reviewed the Medications, Allergies, Past Medical and Surgical History, and Social History in the Epic system.    Review of Systems  ROS:  No fever chills or sweats  No cough, sore throat or ear pain  No chest pain or shortness of breath  No abdominal pain, nausea, vomiting, diarrhea, constipation or blood in the stool or black tarry stools  No frequency. Positive hematuria and mild dysuria.   No new rashes  No headaches or vision change  Positive light headedness. No dizziness.   All other systems are reviewed and are negative.    Physical Exam   BP: (!) 146/92  Heart Rate: 55  Temp: 98  F (36.7  C)  SpO2: 97 %  Physical Exam   Neck: No thyromegaly present.   Cardiovascular: Normal rate and regular rhythm.    No murmur heard.  Pulmonary/Chest: Effort normal and breath sounds normal. No respiratory distress.   Abdominal: Soft. Bowel sounds are normal. He exhibits no distension. There is no hepatosplenomegaly. There is tenderness (mild RUQ). There is no rebound and no guarding. Hernia confirmed negative in the right inguinal area and confirmed negative in the left inguinal area.   Genitourinary: Testes normal. Rectal exam shows guaiac negative stool. Right testis shows no mass and no tenderness. Left testis shows no mass and no tenderness.   Musculoskeletal: He exhibits no edema.   Lymphadenopathy:        Right: No inguinal adenopathy present.        Left: No inguinal adenopathy present.   Skin: No rash noted.     Eyes: Pupils are 2 mm  Back: No CVA tenderness  : No urethral lesions seen.   Neuro: Cranial nerves 2-12 intact. Coordination intact with  finger nose finger. Motor exam 5/5. Sensory intact to light touch.     ED Course     ED Course     Procedures        EKG done at 1030 hrs. demonstrates a sinus rhythm 51 bpm leftward axis no ST-T change.  No T wave changes.  Normal R progression and no Q waves.  Normal intervals.  Normal conduction.  No ectopy.  Impression sinus rhythm 51 bpm an old EKG to compare.      Critical Care time:  none               Labs Ordered and Resulted from Time of ED Arrival Up to the Time of Departure from the ED   URINE MACROSCOPIC WITH REFLEX TO MICRO     Results for orders placed or performed during the hospital encounter of 05/08/17 (from the past 24 hour(s))   UA reflex to Microscopic   Result Value Ref Range    Color Urine Yellow     Appearance Urine Clear     Glucose Urine Negative NEG mg/dL    Bilirubin Urine Negative NEG    Ketones Urine Negative NEG mg/dL    Specific Gravity Urine 1.009 1.003 - 1.035    Blood Urine Negative NEG    pH Urine 6.5 5.0 - 7.0 pH    Protein Albumin Urine Negative NEG mg/dL    Urobilinogen mg/dL Normal 0.0 - 2.0 mg/dL    Nitrite Urine Negative NEG    Leukocyte Esterase Urine Negative NEG    Source Midstream Urine    Orthostatic blood pressure and pulse    Narrative    Lying:  /82 HR 56  Standing:  /92 HR 58   CBC with platelets differential   Result Value Ref Range    WBC 6.4 4.0 - 11.0 10e9/L    RBC Count 4.97 4.4 - 5.9 10e12/L    Hemoglobin 14.9 13.3 - 17.7 g/dL    Hematocrit 43.1 40.0 - 53.0 %    MCV 87 78 - 100 fl    MCH 30.0 26.5 - 33.0 pg    MCHC 34.6 31.5 - 36.5 g/dL    RDW 13.0 10.0 - 15.0 %    Platelet Count 175 150 - 450 10e9/L    Diff Method Automated Method     % Neutrophils 65.6 %    % Lymphocytes 23.2 %    % Monocytes 5.6 %    % Eosinophils 4.7 %    % Basophils 0.6 %    % Immature Granulocytes 0.3 %    Absolute Neutrophil 4.2 1.6 - 8.3 10e9/L    Absolute Lymphocytes 1.5 0.8 - 5.3 10e9/L    Absolute Monocytes 0.4 0.0 - 1.3 10e9/L    Absolute Eosinophils 0.3 0.0 - 0.7  10e9/L    Absolute Basophils 0.0 0.0 - 0.2 10e9/L    Abs Immature Granulocytes 0.0 0 - 0.4 10e9/L   Comprehensive metabolic panel   Result Value Ref Range    Sodium 139 133 - 144 mmol/L    Potassium 3.5 3.4 - 5.3 mmol/L    Chloride 103 94 - 109 mmol/L    Carbon Dioxide 27 20 - 32 mmol/L    Anion Gap 9 3 - 14 mmol/L    Glucose 97 70 - 99 mg/dL    Urea Nitrogen 19 7 - 30 mg/dL    Creatinine 0.89 0.66 - 1.25 mg/dL    GFR Estimate 86 >60 mL/min/1.7m2    GFR Estimate If Black >90   GFR Calc   >60 mL/min/1.7m2    Calcium 9.2 8.5 - 10.1 mg/dL    Bilirubin Total 0.6 0.2 - 1.3 mg/dL    Albumin 3.6 3.4 - 5.0 g/dL    Protein Total 7.4 6.8 - 8.8 g/dL    Alkaline Phosphatase 86 40 - 150 U/L    ALT 43 0 - 70 U/L    AST 28 0 - 45 U/L   INR   Result Value Ref Range    INR 0.96 0.86 - 1.14   Troponin I   Result Value Ref Range    Troponin I ES  0.000 - 0.045 ug/L     <0.015  The 99th percentile for upper reference range is 0.045 ug/L.  Troponin values in   the range of 0.045 - 0.120 ug/L may be associated with risks of adverse   clinical events.     Abd/pelvis CT - no contrast - Stone Protocol    Narrative    Exam: CT ABDOMEN PELVIS W/O CONTRAST  5/8/2017 11:11 AM    History: Hematuria    Comparison: None    Technique: Volumetric acquisition with reconstruction in the axial,  coronal planes through the abdomen and pelvis without contrast.  Radiation dose for this scan was reduced using automated exposure  control, adjustment of the mA and/or kV according to patient size, or  iterative reconstruction technique.    Contrast: None    Findings:   Lung Bases: Tiny hiatal hernia. Lung bases otherwise clear. No pleural  or pericardial effusion.    Abdomen: Hepatic steatosis. Unenhanced liver is otherwise normal.  Unenhanced spleen, gallbladder, pancreas and adrenal glands are  normal.    Normal appearance of the kidneys. Specifically, no renal or ureteral  calculi, no hydronephrosis or hydroureter.    No areas of bowel wall  thickening or bowel dilatation. Normal  appendix. No abdominal or pelvic lymphadenopathy. No free fluid.    Focal ectasia of the infrarenal abdominal aorta, measuring 2.7 cm in  maximal diameter (series 2 image 52 and series 3 image 91).  Additionally, there is aneurysmal dilatation of the left common iliac  artery measuring 2.3 cm in maximal diameter (series 3 image 99 and  series 2 image 61).    Bones: No concerning lytic or sclerotic lesions.      Impression    Impression:   1. Normal appearance of the kidneys without hydronephrosis,  hydroureter or stones.  2. Hepatic steatosis.  3. Aneurysmal dilatation of the left common iliac artery measuring up  to 2.3 cm in maximal diameter. Additionally, focally ectatic  infrarenal abdominal aorta measuring up to 2.7 cm in diameter.  4. Small hiatal hernia.    STUART PRAKASH   XR Chest 2 Views    Narrative    XR CHEST 2 VW 5/8/2017 11:17 AM    HISTORY: Light headed with ambulation.    COMPARISON: None.    FINDINGS: No airspace consolidation, effusion or pneumothorax. Normal  heart size.      Impression    IMPRESSION: No acute cardiopulmonary abnormality.    DELANEY PETERS MD       Medications - No data to display    10:11 AM Patient assessed.    Assessments & Plan (with Medical Decision Making)     MDM: Tomym De La O is a 62 year old male Who presents with a constellation of symptoms of both gross hematuria - which appears to been more a urethral mucus with blood that he saw on urinating that retracted into the urethra but his urinalysis was negative in a CT of the abdomen pelvis without contrast demonstrated no obvious stone or  hydronephrosis. He does have a history of tobacco abuse. His genitourinary exam was normal and we discussed following up with urology for possible cystoscopy.  He also described a sense of lightheadedness earlier today and prior history of gastrointestinal bleeding.  His rectal exam was without blood, and his hemoglobin is normal.  He was not  orthostatic in his laboratory testing was normal. He does use chlorthalidone and although his potassium is theoretically within the normal range, it is running a 3.5 and that could potentially cause nonspecific symptoms. We did discuss supplementing this to get closer to 4.0.      On his CT imaging there was an incidental aneurysmal swelling of his iliac artery. He has seen vascular surgery in the past for venous disorders and asked him to set up a follow-up to discuss this incidental finding.  Distal pulses were normal.    I have reviewed the nursing notes.    I have reviewed the findings, diagnosis, plan and need for follow up with the patient.    New Prescriptions    No medications on file       Final diagnoses:   Hypokalemia - borderline potassium.  Keep the potassium high in your diet.  See NIH DASH diet.  For now supllement potassium witnh 1/2 to 1 banana every day.   Iliac artery aneurysm, left (H) - Aneurysmal dilatation of the left common iliac artery up to 2.3 cm in maximal diameter. Additionally, focally ectatic infrarenal abdominal aorta measuring up to 2.7 cm in diameter.  Follow-up vascular.   Gross hematuria - This was not seen on urinalysis,  could be coming from the bladder or urethra and this is not well seen on CT.  follow-up urology.   Light headedness - No serious findings on testing. stay hydrated.  follow-up,.     This document serves as a record of the services and decisions personally performed and made by Abdulaziz Murrieta MD. It was created on HIS/HER behalf by Brynn Sutton, a trained medical scribe. The creation of this document is based the provider's statements to the medical scribe.  Brynn Sutton 10:11 AM 5/8/2017    Provider:   The information in this document, created by the medical scribe for me, accurately reflects the services I personally performed and the decisions made by me. I have reviewed and approved this document for accuracy prior to leaving the patient care area.  Lit  Abdulaziz NG MD 10:11 AM 5/8/2017 5/8/2017   [facility] EMERGENCY DEPARTMENT     Abdulaziz Murrieta MD  05/08/17 2016

## 2017-05-08 NOTE — ED NOTES
Hematuria since Friday - no frequency but burning - lightheaded and weak today low back and pelvic pain,urine stream is weak  soa at times - uses c pap at night - denies dizziness

## 2017-05-16 ENCOUNTER — RECORDS - HEALTHEAST (OUTPATIENT)
Dept: ADMINISTRATIVE | Facility: OTHER | Age: 62
End: 2017-05-16

## 2017-05-18 ENCOUNTER — HOSPITAL ENCOUNTER (OUTPATIENT)
Dept: PHYSICAL THERAPY | Facility: CLINIC | Age: 62
Setting detail: THERAPIES SERIES
End: 2017-05-18
Attending: ORTHOPAEDIC SURGERY
Payer: MEDICARE

## 2017-05-18 PROCEDURE — 97110 THERAPEUTIC EXERCISES: CPT | Mod: GP | Performed by: PHYSICAL THERAPIST

## 2017-05-18 PROCEDURE — 97535 SELF CARE MNGMENT TRAINING: CPT | Mod: GP | Performed by: PHYSICAL THERAPIST

## 2017-05-18 PROCEDURE — 40000718 ZZHC STATISTIC PT DEPARTMENT ORTHO VISIT: Performed by: PHYSICAL THERAPIST

## 2017-05-18 NOTE — PROGRESS NOTES
Outpatient Physical Therapy Discharge Note     Patient: Tommy De La O  : 1955    Beginning/End Dates of Reporting Period:  17 to 2017    Referring Provider: Aurelio Delgadillo MD - [place]    Therapy Diagnosis: LBP associated w mechanical dysfunction     Client Self Report: Pt relates pain is based on what he does at home. However he still cannot sit for more than 30 mins. Pt reports he would like to d/c therapy at this time or at least try HEP on his own. He also may get an injection but he wants to think about it.     Objective Measurements:  Objective Measure: lumbar ROM  Details: Flexion=fingertips to toes without pain; extension=75% with T11 low back pain    Objective Measure: hip abd  Details: R: 45 L: 4/5         Outcome Measures (most recent score):  CECILY: 14%        Goals:  Goal Identifier ROM   Goal Description Pt will be able to demonstrate full lumbar ROM in order to be able to  object off of the ground without pain or radicular symptoms.   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    Target Date 17   Date Met      Progress:not met - however improved to 14% impaired       Progress Toward Goals:   Progress this reporting period: Pt has been seen for 6 visits thus far in his POC. In that time he has made small gains  in strengthening and ROM. CECILY score also decreased. 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 Due to pt request pt will trial HEP for 4 weeks to continue strengthening. If pt is unable to manage symptoms he is to return to PT within 4 weeks or return to MD for injection.      Update 6/30/17: Pt did not return thus is being d/c to HEP      Plan:  Discharge from therapy.    Discharge:    Reason for Discharge: Patient chooses to discontinue therapy.    Equipment Issued: NA    Discharge Plan: Patient to continue home program.    Venus Shin  Physical Therapist  [facility]  [address] [place] [id number]  [email or web address]   [email or web address]   Office: [phone] Fax: [phone]

## 2017-05-24 ENCOUNTER — RECORDS - HEALTHEAST (OUTPATIENT)
Dept: ADMINISTRATIVE | Facility: OTHER | Age: 62
End: 2017-05-24

## 2017-05-24 ENCOUNTER — TRANSFERRED RECORDS (OUTPATIENT)
Dept: HEALTH INFORMATION MANAGEMENT | Facility: CLINIC | Age: 62
End: 2017-05-24

## 2017-05-26 ENCOUNTER — OFFICE VISIT (OUTPATIENT)
Dept: UROLOGY | Facility: CLINIC | Age: 62
End: 2017-05-26
Payer: COMMERCIAL

## 2017-05-26 VITALS — HEART RATE: 52 BPM | RESPIRATION RATE: 16 BRPM | DIASTOLIC BLOOD PRESSURE: 77 MMHG | SYSTOLIC BLOOD PRESSURE: 126 MMHG

## 2017-05-26 DIAGNOSIS — R31.0 GROSS HEMATURIA: Primary | ICD-10-CM

## 2017-05-26 PROCEDURE — 88112 CYTOPATH CELL ENHANCE TECH: CPT | Performed by: UROLOGY

## 2017-05-26 PROCEDURE — 99203 OFFICE O/P NEW LOW 30 MIN: CPT | Performed by: UROLOGY

## 2017-05-26 NOTE — MR AVS SNAPSHOT
After Visit Summary   5/26/2017    Tommy De La O    MRN: [id number]           Patient Information     Date Of Birth          1955        Visit Information        Provider Department      5/26/2017 10:45 AM Patricio Domingo MD [facility]        Today's Diagnoses     Gross hematuria    -  1      Care Instructions    Your cystoscopy is scheduled 6/26/2017 @ 10:45am. No preparation necessary. Please call  if you need to reschedule this appointment. Please complete your CT Scan sometime prior to your appointment for cystoscopy.   Please call [facility] to scheduled your CT scan sometime prior to your return appointment.             Follow-ups after your visit        Your next 10 appointments already scheduled     Jun 26, 2017 10:45 AM CDT   Return Visit with Patricio Domingo MD, [facility] CYSTO PROC ROOM   [facility] ([address] [id number] [phone]              Who to contact     If you have questions or need follow up information about today's clinic visit or your schedule please contact [facility] directly at [phone].  Normal or non-critical lab and imaging results will be communicated to you by MyChart, letter or phone within 4 business days after the clinic has received the results. If you do not hear from us within 7 days, please contact the clinic through MyChart or phone. If you have a critical or abnormal lab result, we will notify you by phone as soon as possible.  Submit refill requests through [employer] or call your pharmacy and they will forward the refill request to us. Please allow 3 business days for your refill to be completed.          Additional Information About Your Visit        MyChart Information     [employer] lets you send messages to your doctor, view your test results, renew your prescriptions, schedule appointments and more. To sign up, go to  [email or web address] . Click on \"Log in\" on the left side of the screen, which will take you to the Welcome page. Then click on \"Sign up Now\" on the right side of the page.     You will be asked to enter the access code listed below, as well as some personal information. Please follow the directions to create your username and password.     Your access code is: [email or web address]  Expires: 2017 12:46 PM     Your access code will  in 90 days. If you need help or a new code, please call your [place] clinic or [phone].        Care EveryWhere ID     This is your Care EveryWhere ID. This could be used by other organizations to access your [place] medical records  [id number]        Your Vitals Were     Pulse Respirations                52 16           Blood Pressure from Last 3 Encounters:   17 126/77   17 (!) 155/96    Weight from Last 3 Encounters:   No data found for Wt              Today, you had the following     No orders found for display       Primary Care Provider    None Specified       No primary provider on file.        Thank you!     Thank you for choosing [facility]  for your care. Our goal is always to provide you with excellent care. Hearing back from our patients is one way we can continue to improve our services. Please take a few minutes to complete the written survey that you may receive in the mail after your visit with us. Thank you!             Your Updated Medication List - Protect others around you: Learn how to safely use, store and throw away your medicines at www.disposemymeds.org.          This list is accurate as of: 17 10:54 AM.  Always use your most recent med list.                   Brand Name Dispense Instructions for use    AMLODIPINE BESYLATE PO      Take 5 mg by mouth daily       aspirin 81 MG EC tablet      Take 81 mg by mouth daily       CHLORTHALIDONE PO      Take 25 mg by mouth daily       clindamycin 300 MG capsule    CLEOCIN     Take " 600 mg by mouth 1 HOUR PRIOR TO DENTAL APPOINTMENT       clobetasol 0.05 % ointment    TEMOVATE     Apply 1 Application topically daily as needed For itching       OMEPRAZOLE PO      Take 40 mg by mouth every morning       SIMVASTATIN PO      Take 20 mg by mouth At Bedtime

## 2017-05-26 NOTE — PATIENT INSTRUCTIONS
Your cystoscopy is scheduled 6/26/2017 @ 10:45am. No preparation necessary. Please call  if you need to reschedule this appointment. Please complete your CT Scan sometime prior to your appointment for cystoscopy.   Please call [facility] to scheduled your CT scan sometime prior to your return appointment.

## 2017-05-26 NOTE — PROGRESS NOTES
Urology Note            S:  Tommy De La O is a 62 year old male who was seen in a consultation at the request of Dr. Abdulaziz Simons for hematuria.   Patient has gross  Hematuria recent twice.  He has no other voiding symptoms in addition to hematuria.  He has no flank pain.  He has no history of kidney stone.  He denies any trauma.  Recent UA showed 0-2 rbc/hpf.  CT scan without contrast was normal.  He is a former smoker.  No past medical history on file.  Current Outpatient Prescriptions   Medication Sig Dispense Refill     SIMVASTATIN PO Take 20 mg by mouth At Bedtime        CHLORTHALIDONE PO Take 25 mg by mouth daily        AMLODIPINE BESYLATE PO Take 5 mg by mouth daily        aspirin 81 MG EC tablet Take 81 mg by mouth daily       OMEPRAZOLE PO Take 40 mg by mouth every morning       clindamycin (CLEOCIN) 300 MG capsule Take 600 mg by mouth 1 HOUR PRIOR TO DENTAL APPOINTMENT       clobetasol (TEMOVATE) 0.05 % ointment Apply 1 Application topically daily as needed For itching       No past surgical history on file.   Social History     Social History     Marital status: Single     Spouse name: N/A     Number of children: N/A     Years of education: N/A     Occupational History     Not on file.     Social History Main Topics     Smoking status: Former Smoker     Types: Cigarettes     Quit date: 2/1/2011     Smokeless tobacco: Never Used     Alcohol use Not on file     Drug use: Not on file     Sexual activity: Not on file     Other Topics Concern     Not on file     Social History Narrative     No narrative on file     No family history on file.       REVIEW OF SYSTEMS  =================  C: NEGATIVE for fever, chills, change in weight  I: NEGATIVE for worrisome rashes, moles or lesions  E/M: NEGATIVE for ear, mouth and throat problems  R: NEGATIVE for significant cough or SHORTNESS OF BREATH  CV:  NEGATIVE for chest pain, palpitations or peripheral edema  GI: NEGATIVE for nausea, abdominal pain, heartburn, or  change in bowel habits  NEURO: NEGATIVE numbness/weakness  : see HPI  PSYCH: NEGATIVE depression/anxiety  LYmph: no new enlarged lymph nodes  Ortho: no new trauma/movements           O: Exam:  Constitutional: healthy, alert and no distress  Head: Normocephalic. No masses, lesions, tenderness or abnormalities  ENT: ENT exam normal, no neck nodes or sinus tenderness  Cardiovascular: negative, PMI normal.   Respiratory: negative, no evidence of respiratory distress  Gastrointestinal: Abdomen soft, non-tender. BS normal. No masses, organomegaly  : penis no d/c.  Testis no masses.  No scrotal skin lesion.  No cva tenderness.  Musculoskeletal: extremities normal- no gross deformities noted, gait normal and normal muscle tone  Skin: no suspicious lesions or rashes  Neurologic: Alert and oriented  Psychiatric: mentation appears normal. and affect normal/bright  Hematologic/Lymphatic/Immunologic: normal ant/post cervical, axillary, supraclavicular and inguinal nodes    Assessment:  Hematuria, gross with h/o smoking    Recommendation: Schedule patient for CT with IV contrast, cysto, urine cytology next.

## 2017-05-26 NOTE — NURSING NOTE
Chief Complaint   Patient presents with     Consult     gross hematuria       Initial /77 (BP Location: Right arm, Patient Position: Chair, Cuff Size: Adult Large)  Pulse 52  Resp 16 There is no height or weight on file to calculate BMI.  Medication Reconciliation: complete   Tashia Jenkins, CMA

## 2017-05-30 ENCOUNTER — AMBULATORY - HEALTHEAST (OUTPATIENT)
Dept: VASCULAR SURGERY | Facility: CLINIC | Age: 62
End: 2017-05-30

## 2017-05-30 LAB — COPATH REPORT: NORMAL

## 2017-06-01 ENCOUNTER — OFFICE VISIT - HEALTHEAST (OUTPATIENT)
Dept: VASCULAR SURGERY | Facility: CLINIC | Age: 62
End: 2017-06-01

## 2017-06-01 DIAGNOSIS — I72.3 ILIAC ARTERY ANEURYSM (H): ICD-10-CM

## 2017-06-01 ASSESSMENT — MIFFLIN-ST. JEOR: SCORE: 2208.28

## 2017-06-14 RX ORDER — IOPAMIDOL 755 MG/ML
100 INJECTION, SOLUTION INTRAVASCULAR ONCE
Status: COMPLETED | OUTPATIENT
Start: 2017-06-15 | End: 2017-06-15

## 2017-06-15 ENCOUNTER — HOSPITAL ENCOUNTER (OUTPATIENT)
Dept: CT IMAGING | Facility: CLINIC | Age: 62
Discharge: HOME | End: 2017-06-15
Attending: UROLOGY | Admitting: UROLOGY
Payer: MEDICARE

## 2017-06-15 DIAGNOSIS — R31.0 GROSS HEMATURIA: ICD-10-CM

## 2017-06-15 PROCEDURE — 25000125 ZZHC RX 250: Performed by: UROLOGY

## 2017-06-15 PROCEDURE — 74177 CT ABD & PELVIS W/CONTRAST: CPT

## 2017-06-15 PROCEDURE — 25000128 H RX IP 250 OP 636: Performed by: UROLOGY

## 2017-06-15 RX ADMIN — SODIUM CHLORIDE 80 ML: 9 INJECTION, SOLUTION INTRAVENOUS at 13:31

## 2017-06-15 RX ADMIN — IOPAMIDOL 100 ML: 755 INJECTION, SOLUTION INTRAVENOUS at 13:31

## 2017-06-26 ENCOUNTER — OFFICE VISIT (OUTPATIENT)
Dept: UROLOGY | Facility: CLINIC | Age: 62
End: 2017-06-26
Payer: COMMERCIAL

## 2017-06-26 VITALS — HEART RATE: 56 BPM | OXYGEN SATURATION: 96 % | SYSTOLIC BLOOD PRESSURE: 135 MMHG | DIASTOLIC BLOOD PRESSURE: 80 MMHG

## 2017-06-26 DIAGNOSIS — R39.14 BENIGN PROSTATIC HYPERPLASIA WITH INCOMPLETE BLADDER EMPTYING: ICD-10-CM

## 2017-06-26 DIAGNOSIS — N40.1 BENIGN PROSTATIC HYPERPLASIA WITH INCOMPLETE BLADDER EMPTYING: ICD-10-CM

## 2017-06-26 DIAGNOSIS — R31.0 GROSS HEMATURIA: Primary | ICD-10-CM

## 2017-06-26 PROCEDURE — 99207 ZZC DROP WITH A PROCEDURE: CPT | Mod: 25 | Performed by: UROLOGY

## 2017-06-26 PROCEDURE — 52000 CYSTOURETHROSCOPY: CPT | Performed by: UROLOGY

## 2017-06-26 RX ORDER — TAMSULOSIN HYDROCHLORIDE 0.4 MG/1
0.4 CAPSULE ORAL DAILY
Qty: 30 CAPSULE | Refills: 1 | Status: SHIPPED | OUTPATIENT
Start: 2017-06-26 | End: 2017-06-26

## 2017-06-26 RX ORDER — TAMSULOSIN HYDROCHLORIDE 0.4 MG/1
0.4 CAPSULE ORAL DAILY
Qty: 30 CAPSULE | Refills: 1 | Status: SHIPPED | OUTPATIENT
Start: 2017-06-26 | End: 2017-07-25

## 2017-06-26 NOTE — NURSING NOTE
Chief Complaint   Patient presents with     Cystoscopy       Initial /80 (BP Location: Right arm, Patient Position: Chair, Cuff Size: Adult Regular)  Pulse 56  SpO2 96% There is no height or weight on file to calculate BMI.  Medication Reconciliation: complete   Kim Dempsey RN

## 2017-06-26 NOTE — PROGRESS NOTES
S: Tommy De La O is a 62 year old male returns for hematuria.    Patient is draped and prepped.  Flexible cystoscopy placed under direct vision.      The anterior urethra is normal   The prostatic urethra showed bilateral lobe enlargement.     The length is 2cm,  the coaptation is 2 cm.     In the bladder there is trabeculation grade 2.              Bladder looked stretched out.    Assessment/Plan:  (R31.0) Gross hematuria  (primary encounter diagnosis)  Comment: most likely prostatic bleeding  Plan: reassurance.  Recheck UA/cytology in 3-4 months    BPH: bladder scan 307 ml            Trial of flomax           See in one month           Check psa in near future

## 2017-06-26 NOTE — PROGRESS NOTES
Bladder Scan performed. 307 maximum residual urine detected after 3 scans. MD camelia Dempsey RN

## 2017-06-26 NOTE — MR AVS SNAPSHOT
"              After Visit Summary   6/26/2017    Tommy De La O    MRN: [id number]           Patient Information     Date Of Birth          1955        Visit Information        Provider Department      6/26/2017 10:45 AM Patricio Domingo MD; [facility] CYSTO PROC ROOM [facility]        Today's Diagnoses     Gross hematuria    -  1    Benign prostatic hyperplasia with incomplete bladder emptying          Care Instructions    Please follow up with Dr. Domingo in one month. Please arrange a lab appointment for a PSA test approx. 3-5 days prior to your return appointment. You may stop at the  to schedule the appointment.  Please call our office if you have questions, [phone].            Follow-ups after your visit        Future tests that were ordered for you today     Open Future Orders        Priority Expected Expires Ordered    PSA tumor marker Routine 7/12/2017 6/27/2018 6/26/2017            Who to contact     If you have questions or need follow up information about today's clinic visit or your schedule please contact [facility] directly at [phone].  Normal or non-critical lab and imaging results will be communicated to you by MyChart, letter or phone within 4 business days after the clinic has received the results. If you do not hear from us within 7 days, please contact the clinic through Hygloshart or phone. If you have a critical or abnormal lab result, we will notify you by phone as soon as possible.  Submit refill requests through Zwipe or call your pharmacy and they will forward the refill request to us. Please allow 3 business days for your refill to be completed.          Additional Information About Your Visit        Hygloshart Information     Zwipe lets you send messages to your doctor, view your test results, renew your prescriptions, schedule appointments and more. To sign up, go to [email or web address] . Click on \"Log in\" on the left side of the " "screen, which will take you to the Welcome page. Then click on \"Sign up Now\" on the right side of the page.     You will be asked to enter the access code listed below, as well as some personal information. Please follow the directions to create your username and password.     Your access code is: [email or web address]  Expires: 2017 12:46 PM     Your access code will  in 90 days. If you need help or a new code, please call your [facility] or [phone].        Care EveryWhere ID     This is your Care EveryWhere ID. This could be used by other organizations to access your [place] medical records  [id number]        Your Vitals Were     Pulse Pulse Oximetry                56 96%           Blood Pressure from Last 3 Encounters:   17 135/80   17 126/77   17 (!) 155/96    Weight from Last 3 Encounters:   No data found for Wt              We Performed the Following     CYSTOURETHROSCOPY          Today's Medication Changes          These changes are accurate as of: 17 11:09 AM.  If you have any questions, ask your nurse or doctor.               Start taking these medicines.        Dose/Directions    tamsulosin 0.4 MG capsule   Commonly known as:  FLOMAX   Used for:  Benign prostatic hyperplasia with incomplete bladder emptying   Started by:  Patricio Domingo MD        Dose:  0.4 mg   Take 1 capsule (0.4 mg) by mouth daily   Quantity:  30 capsule   Refills:  1            Where to get your medicines      These medications were sent to [facility] [address] [place] [address]     Phone:  [phone]     tamsulosin 0.4 MG capsule                Primary Care Provider Office Phone # Fax #    Venus COLÓN Bernardo [phone]       [facility] [address] [place] [address]        Equal Access to Services     JANETT ARAYA AH: Kym Mireles, wajessda cesia, qaybta kaaldavid stevens, bernarda jarvis " tony leyvastephnikolai haddadlexdee dee ah. So [facility] [phone].    ATENCIÓN: Si habla marcell, tiene a calix disposición servicios gratuitos de asistencia lingüística. Zina al [phone].    We comply with applicable federal civil rights laws and [place] laws. We do not discriminate on the basis of race, color, national origin, age, disability sex, sexual orientation or gender identity.            Thank you!     Thank you for choosing [facility]  for your care. Our goal is always to provide you with excellent care. Hearing back from our patients is one way we can continue to improve our services. Please take a few minutes to complete the written survey that you may receive in the mail after your visit with us. Thank you!             Your Updated Medication List - Protect others around you: Learn how to safely use, store and throw away your medicines at www.disposemymeds.org.          This list is accurate as of: 6/26/17 11:09 AM.  Always use your most recent med list.                   Brand Name Dispense Instructions for use Diagnosis    AMLODIPINE BESYLATE PO      Take 5 mg by mouth daily        aspirin 81 MG EC tablet      Take 81 mg by mouth daily        CHLORTHALIDONE PO      Take 25 mg by mouth daily        clindamycin 300 MG capsule    CLEOCIN     Take 600 mg by mouth 1 HOUR PRIOR TO DENTAL APPOINTMENT        clobetasol 0.05 % ointment    TEMOVATE     Apply 1 Application topically daily as needed For itching        OMEPRAZOLE PO      Take 40 mg by mouth every morning        SIMVASTATIN PO      Take 20 mg by mouth At Bedtime        tamsulosin 0.4 MG capsule    FLOMAX    30 capsule    Take 1 capsule (0.4 mg) by mouth daily    Benign prostatic hyperplasia with incomplete bladder emptying

## 2017-07-21 DIAGNOSIS — N40.1 BENIGN PROSTATIC HYPERPLASIA WITH INCOMPLETE BLADDER EMPTYING: ICD-10-CM

## 2017-07-21 DIAGNOSIS — R39.14 BENIGN PROSTATIC HYPERPLASIA WITH INCOMPLETE BLADDER EMPTYING: ICD-10-CM

## 2017-07-21 LAB — PSA SERPL-MCNC: 1.2 UG/L (ref 0–4)

## 2017-07-21 PROCEDURE — 36415 COLL VENOUS BLD VENIPUNCTURE: CPT | Performed by: UROLOGY

## 2017-07-21 PROCEDURE — 84153 ASSAY OF PSA TOTAL: CPT | Performed by: UROLOGY

## 2017-07-25 ENCOUNTER — OFFICE VISIT (OUTPATIENT)
Dept: UROLOGY | Facility: CLINIC | Age: 62
End: 2017-07-25
Payer: COMMERCIAL

## 2017-07-25 VITALS — HEART RATE: 62 BPM | SYSTOLIC BLOOD PRESSURE: 132 MMHG | DIASTOLIC BLOOD PRESSURE: 88 MMHG | RESPIRATION RATE: 16 BRPM

## 2017-07-25 DIAGNOSIS — R39.14 BENIGN PROSTATIC HYPERPLASIA WITH INCOMPLETE BLADDER EMPTYING: Primary | ICD-10-CM

## 2017-07-25 DIAGNOSIS — N40.1 BENIGN PROSTATIC HYPERPLASIA WITH INCOMPLETE BLADDER EMPTYING: Primary | ICD-10-CM

## 2017-07-25 PROCEDURE — 51798 US URINE CAPACITY MEASURE: CPT | Performed by: UROLOGY

## 2017-07-25 PROCEDURE — 99213 OFFICE O/P EST LOW 20 MIN: CPT | Mod: 25 | Performed by: UROLOGY

## 2017-07-25 RX ORDER — TAMSULOSIN HYDROCHLORIDE 0.4 MG/1
0.4 CAPSULE ORAL DAILY
Qty: 90 CAPSULE | Refills: 3 | Status: SHIPPED | OUTPATIENT
Start: 2017-07-25 | End: 2017-08-11

## 2017-07-25 NOTE — PROGRESS NOTES
Chief Complaint   Patient presents with     RECHECK     for PSA       Tommy De La O is a 62 year old male who presents today for follow up of   Chief Complaint   Patient presents with     RECHECK     for PSA    f/u for benign prostatic hyperplasia.  He is on flomax now.  He has not noticed any significant improvements.    Current Outpatient Prescriptions   Medication Sig Dispense Refill     tamsulosin (FLOMAX) 0.4 MG capsule Take 1 capsule (0.4 mg) by mouth daily 90 capsule 3     SIMVASTATIN PO Take 20 mg by mouth At Bedtime        CHLORTHALIDONE PO Take 25 mg by mouth daily        AMLODIPINE BESYLATE PO Take 5 mg by mouth daily        aspirin 81 MG EC tablet Take 81 mg by mouth daily       OMEPRAZOLE PO Take 40 mg by mouth every morning       clindamycin (CLEOCIN) 300 MG capsule Take 600 mg by mouth 1 HOUR PRIOR TO DENTAL APPOINTMENT       clobetasol (TEMOVATE) 0.05 % ointment Apply 1 Application topically daily as needed For itching       Allergies   Allergen Reactions     Cefzil [Cefprozil] Hives and Itching     Darvocet [Propoxyphene N-Apap] Hives      No past medical history on file.  No past surgical history on file.  No family history on file.  Social History     Social History     Marital status: Single     Spouse name: N/A     Number of children: N/A     Years of education: N/A     Social History Main Topics     Smoking status: Former Smoker     Types: Cigarettes     Quit date: 2/1/2011     Smokeless tobacco: Never Used     Alcohol use None     Drug use: None     Sexual activity: Not Asked     Other Topics Concern     None     Social History Narrative       REVIEW OF SYSTEMS  =================  C: NEGATIVE for fever, chills, change in weight  I: NEGATIVE for worrisome rashes, moles or lesions  E/M: NEGATIVE for ear, mouth and throat problems  R: NEGATIVE for significant cough or SHORTNESS OF BREATH,   CV: NEGATIVE for chest pain, palpitations or peripheral edema  GI: NEGATIVE for nausea, abdominal pain,  heartburn, or change in bowel habits  NEURO: NEGATIVE any motor/sensory changes  PSYCH: NEGATIVE for recent mood disorder    Physical Exam:  /88 (BP Location: Right arm, Patient Position: Chair, Cuff Size: Adult Regular)  Pulse 62  Resp 16   Patient is pleasant, in no acute distress, good general condition.  Lung: no evidence of respiratory distress    Abdomen: Soft, nondistended, non tender. No masses. No rebound or guarding.   Exam: bladder scan 161 ml.  No cva tenderness  Skin: Warm and dry.  No redness.  Psych: normal mood and affect  Neuro: alert and oriented    Assessment/Plan:   (N40.1,  R39.14) Benign prostatic hyperplasia with incomplete bladder emptying  (primary encounter diagnosis)  Comment: discussed TURP  Plan: he wants to wait six months and recheck           Cont with flomax           See in six months with PVR.

## 2017-07-25 NOTE — MR AVS SNAPSHOT
"              After Visit Summary   2017    Tommy De La O    MRN: [id number]           Patient Information     Date Of Birth          1955        Visit Information        Provider Department      2017 9:45 AM Patricio Domingo MD [facility]        Today's Diagnoses     Benign prostatic hyperplasia with incomplete bladder emptying    -  1       Follow-ups after your visit        Who to contact     If you have questions or need follow up information about today's clinic visit or your schedule please contact [facility] directly at [phone].  Normal or non-critical lab and imaging results will be communicated to you by Mayi Zhaopinhart, letter or phone within 4 business days after the clinic has received the results. If you do not hear from us within 7 days, please contact the clinic through Mayi Zhaopinhart or phone. If you have a critical or abnormal lab result, we will notify you by phone as soon as possible.  Submit refill requests through Impressto or call your pharmacy and they will forward the refill request to us. Please allow 3 business days for your refill to be completed.          Additional Information About Your Visit        MyChart Information     Impressto lets you send messages to your doctor, view your test results, renew your prescriptions, schedule appointments and more. To sign up, go to [email or web address] . Click on \"Log in\" on the left side of the screen, which will take you to the Welcome page. Then click on \"Sign up Now\" on the right side of the page.     You will be asked to enter the access code listed below, as well as some personal information. Please follow the directions to create your username and password.     Your access code is: [email or web address]  Expires: 2017 12:46 PM     Your access code will  in 90 days. If you need help or a new code, please call your [facility] or [phone].        Care EveryWhere ID     This is your Care EveryWhere ID. " This could be used by other organizations to access your [place] medical records  [id number]        Your Vitals Were     Pulse Respirations                62 16           Blood Pressure from Last 3 Encounters:   07/25/17 132/88   06/26/17 135/80   05/26/17 126/77    Weight from Last 3 Encounters:   No data found for Wt              We Performed the Following     MEASURE POST-VOID RESIDUAL URINE/BLADDER CAPACITY, US NON-IMAGING          Where to get your medicines      [facility] [address] [place] [address] - [address]   [address] [facility] [address]     Phone:  [phone]     tamsulosin 0.4 MG capsule          Primary Care Provider Office Phone # Fax #    Venus COLÓN Bernardo [phone]       [facility] [address] [address] [facility] [address]  [facility] [address]        Equal Access to Services     JANETT ARAYA : Hadii imani gamboa hadasho Soomaali, waaxda luqadaha, qaybta kaalmada adeegyada, bernarda childers. So [facility] [phone].    ATENCIÓN: Si habla español, tiene a calix disposición servicios gratuitos de asistencia lingüística. Zina al [phone].    We comply with applicable federal civil rights laws and [place] laws. We do not discriminate on the basis of race, color, national origin, age, disability sex, sexual orientation or gender identity.            Thank you!     Thank you for choosing [facility]  for your care. Our goal is always to provide you with excellent care. Hearing back from our patients is one way we can continue to improve our services. Please take a few minutes to complete the written survey that you may receive in the mail after your visit with us. Thank you!             Your Updated Medication List - Protect others around you: Learn how to safely use, store and throw away your medicines at www.disposemymeds.org.          This list is accurate as of: 7/25/17 10:01 AM.  Always use your most recent med  list.                   Brand Name Dispense Instructions for use Diagnosis    AMLODIPINE BESYLATE PO      Take 5 mg by mouth daily        aspirin 81 MG EC tablet      Take 81 mg by mouth daily        CHLORTHALIDONE PO      Take 25 mg by mouth daily        clindamycin 300 MG capsule    CLEOCIN     Take 600 mg by mouth 1 HOUR PRIOR TO DENTAL APPOINTMENT        clobetasol 0.05 % ointment    TEMOVATE     Apply 1 Application topically daily as needed For itching        OMEPRAZOLE PO      Take 40 mg by mouth every morning        SIMVASTATIN PO      Take 20 mg by mouth At Bedtime        tamsulosin 0.4 MG capsule    FLOMAX    90 capsule    Take 1 capsule (0.4 mg) by mouth daily    Benign prostatic hyperplasia with incomplete bladder emptying

## 2017-07-25 NOTE — PROGRESS NOTES
RU performed. 161 ml was detected. Patient states had used bathroom 5 minutes before scan.   Jacklyn Bejarano CMA 7/25/2017 9:51 AM

## 2017-08-09 ENCOUNTER — COMMUNICATION - HEALTHEAST (OUTPATIENT)
Dept: FAMILY MEDICINE | Facility: CLINIC | Age: 62
End: 2017-08-09

## 2017-08-11 DIAGNOSIS — R39.14 BENIGN PROSTATIC HYPERPLASIA WITH INCOMPLETE BLADDER EMPTYING: ICD-10-CM

## 2017-08-11 DIAGNOSIS — N40.1 BENIGN PROSTATIC HYPERPLASIA WITH INCOMPLETE BLADDER EMPTYING: ICD-10-CM

## 2017-08-11 RX ORDER — TAMSULOSIN HYDROCHLORIDE 0.4 MG/1
0.4 CAPSULE ORAL DAILY
Qty: 90 CAPSULE | Refills: 3 | Status: SHIPPED | OUTPATIENT
Start: 2017-08-11 | End: 2018-06-15

## 2017-08-21 ENCOUNTER — TELEPHONE (OUTPATIENT)
Dept: UROLOGY | Facility: CLINIC | Age: 62
End: 2017-08-21

## 2017-08-21 NOTE — TELEPHONE ENCOUNTER
Reason for Call:  Other appointment and returning call    Detailed comments:  Patient returning the call. Please call him back.     Phone Number Patient can be reached at: Cell number on file:    Telephone Information:   Mobile [phone]       Best Time:  Any     Can we leave a detailed message on this number? YES    Call taken on 8/21/2017 at 4:35 PM by Betzy Vaca

## 2017-08-21 NOTE — TELEPHONE ENCOUNTER
Reason for call:  Patient reporting a symptom    Symptom or request: patient calling and stating he started taking the medication Tamsulosin two months ago. Patient states that he gets nausea, diarrhea, and headaches. Patient states its not constant but it has happened a couple of times since he started taking the medication. Patient would also like a call back in regards to the procedure patient is supposed to have. he has a couple of questions answered. Please contact patient    Duration (how long have symptoms been present): over the time frame of 2 months, it has happened twice in a 4 day span    Have you been treated for this before? No    Additional comments: NA    Phone Number patient can be reached at:  Cell number on file:    Telephone Information:   Mobile [phone]       Best Time:  Any time    Can we leave a detailed message on this number:  YES    Call taken on 8/21/2017 at 10:52 AM by Netta Goyal

## 2017-08-22 NOTE — TELEPHONE ENCOUNTER
Patient would like call back to discuss surgery dates/locations for TURP procedure. Would like it done end of October.    Patient reports that he has had 2 episodes of stomach cramps/diarrhea, chills over the past couple of weeks and wondering if it could be due to Flomax.  Advised him that these are listed side effect of Flomax but usually side effects would be constant and not so sporadic.     Diane Mccoy RN

## 2017-08-23 NOTE — TELEPHONE ENCOUNTER
Attempted to contact patient to discuss surgery dates. No answer, no machine x 15 rings. Will try again later.  Tashia Jenkins, CMA

## 2017-08-25 NOTE — TELEPHONE ENCOUNTER
Patient given possible dates at the end of October, early November. He will call back to schedule if he decides to go ahead. Otherwise he will see Dr. Domingo in January. Per Dr. Domingo patient advised the his stomach issue is not likely related to Flomax, but that he can stop it if he wants to. Patient agrees. He will call our office if he has any more or worsening symptoms with his urination.  Tashia Jenkins, CMA

## 2017-09-19 DIAGNOSIS — R39.14 BENIGN PROSTATIC HYPERPLASIA WITH INCOMPLETE BLADDER EMPTYING: ICD-10-CM

## 2017-09-19 DIAGNOSIS — N40.1 BENIGN PROSTATIC HYPERPLASIA WITH INCOMPLETE BLADDER EMPTYING: ICD-10-CM

## 2017-11-13 ENCOUNTER — COMMUNICATION - HEALTHEAST (OUTPATIENT)
Dept: FAMILY MEDICINE | Facility: CLINIC | Age: 62
End: 2017-11-13

## 2017-11-13 DIAGNOSIS — E78.5 HYPERLIPEMIA: ICD-10-CM

## 2017-11-13 DIAGNOSIS — K21.9 GERD (GASTROESOPHAGEAL REFLUX DISEASE): ICD-10-CM

## 2018-02-01 ENCOUNTER — RADIANT APPOINTMENT (OUTPATIENT)
Dept: GENERAL RADIOLOGY | Facility: CLINIC | Age: 63
End: 2018-02-01
Attending: NURSE PRACTITIONER
Payer: COMMERCIAL

## 2018-02-01 ENCOUNTER — OFFICE VISIT (OUTPATIENT)
Dept: FAMILY MEDICINE | Facility: CLINIC | Age: 63
End: 2018-02-01
Payer: COMMERCIAL

## 2018-02-01 VITALS
SYSTOLIC BLOOD PRESSURE: 118 MMHG | TEMPERATURE: 97.5 F | OXYGEN SATURATION: 96 % | DIASTOLIC BLOOD PRESSURE: 70 MMHG | HEART RATE: 60 BPM | WEIGHT: 309 LBS

## 2018-02-01 DIAGNOSIS — R05.9 COUGH: ICD-10-CM

## 2018-02-01 DIAGNOSIS — J20.9 ACUTE BRONCHITIS, UNSPECIFIED ORGANISM: Primary | ICD-10-CM

## 2018-02-01 PROCEDURE — 71046 X-RAY EXAM CHEST 2 VIEWS: CPT | Mod: FY

## 2018-02-01 PROCEDURE — 99213 OFFICE O/P EST LOW 20 MIN: CPT | Performed by: NURSE PRACTITIONER

## 2018-02-01 RX ORDER — BENZONATATE 100 MG/1
100 CAPSULE ORAL 3 TIMES DAILY PRN
Qty: 42 CAPSULE | Refills: 0 | Status: SHIPPED | OUTPATIENT
Start: 2018-02-01 | End: 2018-06-15

## 2018-02-01 RX ORDER — CODEINE PHOSPHATE AND GUAIFENESIN 10; 100 MG/5ML; MG/5ML
1-2 SOLUTION ORAL EVERY 4 HOURS PRN
Qty: 120 ML | Refills: 0 | Status: SHIPPED | OUTPATIENT
Start: 2018-02-01 | End: 2018-02-01

## 2018-02-01 RX ORDER — CODEINE PHOSPHATE AND GUAIFENESIN 10; 100 MG/5ML; MG/5ML
1 SOLUTION ORAL EVERY 6 HOURS PRN
Qty: 120 ML | Refills: 0 | Status: SHIPPED | OUTPATIENT
Start: 2018-02-01 | End: 2018-06-15

## 2018-02-01 NOTE — PATIENT INSTRUCTIONS
Use Medication as directed    Hydrate with fluids, rest, cool humidifier.  May use acetaminophen, ibuprofen prn.    For your Cough   The Buckwheat Honey Dose: Given   hour Prior to Bedtime  For children age under 1 year -Do not use due to botulism risk     2-5 years -  tsp (2.5 mL)    6-11 years -1 tsp (5 mL)    12-18 years -2 tsp (10 mL)     Guaifenesin     Adult dose -Not to exceed 2.4 g (2400mg) per day    Child age 6-12 years -100 mg every 4 hr, not to exceed 1.2 g (1200mg) per day     Pediatric, 2-6 years -50 mg every 4 hr as needed, not to exceed 600 mg per day    Cough medications is not recommended in children under 2 years.  With use of cough medications have combination medications be aware of products in the cough medication you are using to avoid overdose    Follow up with PCP in 1 weeks.    Go to Emergency Room if sx worsen or change, Shortness of breath, chest pain, persistent fevers, or painful breathing occur.     Patient voiced understanding of instructions given.          Bronchitis, Viral (Adult)    You have a viral bronchitis. Bronchitis is inflammation and swelling of the lining of the lungs. This is often caused by an infection. Symptoms include a dry, hacking cough that is worse at night. The cough may bring up yellow-green mucus. You may also feel short of breath or wheeze. Other symptoms may include tiredness, chest discomfort, and chills.  Bronchitis that is caused by a virus is not treated with antibiotics. Instead, medicines may be given to help relieve symptoms. Symptoms can last up to 2 weeks, although the cough may last much longer.  This illness is contagious during the first few days and is spread through the air by coughing and sneezing, or by direct contact (touching the sick person and then touching your own eyes, nose, or mouth).  Most viral illnesses resolve within 10 to 14 days with rest and simple home remedies, although they may sometimes last for several weeks.  Home  care    If symptoms are severe, rest at home for the first 2 to 3 days. When you go back to your usual activities, don't let yourself get too tired.    Do not smoke. Also avoid being exposed to secondhand smoke.    You may use over-the-counter medicine to control fever or pain, unless another pain medicine was prescribed. (Note: If you have chronic liver or kidney disease or have ever had a stomach ulcer or gastrointestinal bleeding, talk with your healthcare provider before using these medicines. Also talk to your provider if you are taking medicine to prevent blood clots.) Aspirin should never be given to anyone younger than 18 years of age who is ill with a viral infection or fever. It may cause severe liver or brain damage.    Your appetite may be poor, so a light diet is fine. Avoid dehydration by drinking 6 to 8 glasses of fluids per day (such as water, soft drinks, sports drinks, juices, tea, or soup). Extra fluids will help loosen secretions in the nose and lungs.    Over-the-counter cough, cold, and sore-throat medicines will not shorten the length of the illness, but they may help to reduce symptoms. (Note: Do not use decongestants if you have high blood pressure.)  Follow-up care  Follow up with your healthcare provider, or as advised. If you had an X-ray or ECG (electrocardiogram), a specialist will review it. You will be notified of any new findings that may affect your care.  Note: If you are age 65 or older, or if you have a chronic lung disease or condition that affects your immune system, or you smoke, talk to your healthcare provider about having pneumococcal vaccinations and a yearly influenza vaccination (flu shot).  When to seek medical advice  Call your healthcare provider right away if any of these occur:    Fever of 100.4 F (38 C) or higher    Coughing up increased amounts of colored sputum    Weakness, drowsiness, headache, facial pain, ear pain, or a stiff neck  Call 911, or get immediate  medical care  Contact emergency services right away if any of these occur:    Coughing up blood    Worsening weakness, drowsiness, headache, or stiff neck    Trouble breathing, wheezing, or pain with breathing  Date Last Reviewed: 9/13/2015 2000-2017 [employer]. [address] [place] [address]. All rights reserved. This information is not intended as a substitute for professional medical care. Always follow your healthcare professional's instructions.

## 2018-02-01 NOTE — MR AVS SNAPSHOT
After Visit Summary   2/1/2018    Tommy De La O    MRN: [id number]           Patient Information     Date Of Birth          1955        Visit Information        Provider Department      2/1/2018 10:00 AM Georgina Dodson APRN [facility]        Today's Diagnoses     Cough    -  1    Acute bronchitis, unspecified organism          Care Instructions    Use Medication as directed    Hydrate with fluids, rest, cool humidifier.  May use acetaminophen, ibuprofen prn.    For your Cough   The Buckwheat Honey Dose: Given   hour Prior to Bedtime  For children age under 1 year -Do not use due to botulism risk     2-5 years -  tsp (2.5 mL)    6-11 years -1 tsp (5 mL)    12-18 years -2 tsp (10 mL)     Guaifenesin     Adult dose -Not to exceed 2.4 g (2400mg) per day    Child age 6-12 years -100 mg every 4 hr, not to exceed 1.2 g (1200mg) per day     Pediatric, 2-6 years -50 mg every 4 hr as needed, not to exceed 600 mg per day    Cough medications is not recommended in children under 2 years.  With use of cough medications have combination medications be aware of products in the cough medication you are using to avoid overdose    Follow up with PCP in 1 weeks.    Go to Emergency Room if sx worsen or change, Shortness of breath, chest pain, persistent fevers, or painful breathing occur.     Patient voiced understanding of instructions given.          Bronchitis, Viral (Adult)    You have a viral bronchitis. Bronchitis is inflammation and swelling of the lining of the lungs. This is often caused by an infection. Symptoms include a dry, hacking cough that is worse at night. The cough may bring up yellow-green mucus. You may also feel short of breath or wheeze. Other symptoms may include tiredness, chest discomfort, and chills.  Bronchitis that is caused by a virus is not treated with antibiotics. Instead, medicines may be given to help relieve symptoms. Symptoms can last up to 2 weeks,  although the cough may last much longer.  This illness is contagious during the first few days and is spread through the air by coughing and sneezing, or by direct contact (touching the sick person and then touching your own eyes, nose, or mouth).  Most viral illnesses resolve within 10 to 14 days with rest and simple home remedies, although they may sometimes last for several weeks.  Home care    If symptoms are severe, rest at home for the first 2 to 3 days. When you go back to your usual activities, don't let yourself get too tired.    Do not smoke. Also avoid being exposed to secondhand smoke.    You may use over-the-counter medicine to control fever or pain, unless another pain medicine was prescribed. (Note: If you have chronic liver or kidney disease or have ever had a stomach ulcer or gastrointestinal bleeding, talk with your healthcare provider before using these medicines. Also talk to your provider if you are taking medicine to prevent blood clots.) Aspirin should never be given to anyone younger than 18 years of age who is ill with a viral infection or fever. It may cause severe liver or brain damage.    Your appetite may be poor, so a light diet is fine. Avoid dehydration by drinking 6 to 8 glasses of fluids per day (such as water, soft drinks, sports drinks, juices, tea, or soup). Extra fluids will help loosen secretions in the nose and lungs.    Over-the-counter cough, cold, and sore-throat medicines will not shorten the length of the illness, but they may help to reduce symptoms. (Note: Do not use decongestants if you have high blood pressure.)  Follow-up care  Follow up with your healthcare provider, or as advised. If you had an X-ray or ECG (electrocardiogram), a specialist will review it. You will be notified of any new findings that may affect your care.  Note: If you are age 65 or older, or if you have a chronic lung disease or condition that affects your immune system, or you smoke, talk to  "your healthcare provider about having pneumococcal vaccinations and a yearly influenza vaccination (flu shot).  When to seek medical advice  Call your healthcare provider right away if any of these occur:    Fever of 100.4 F (38 C) or higher    Coughing up increased amounts of colored sputum    Weakness, drowsiness, headache, facial pain, ear pain, or a stiff neck  Call 911, or get immediate medical care  Contact emergency services right away if any of these occur:    Coughing up blood    Worsening weakness, drowsiness, headache, or stiff neck    Trouble breathing, wheezing, or pain with breathing  Date Last Reviewed: 9/13/2015 2000-2017 [employer]. [address] [place] [address]. All rights reserved. This information is not intended as a substitute for professional medical care. Always follow your healthcare professional's instructions.                Follow-ups after your visit        Who to contact     If you have questions or need follow up information about today's clinic visit or your schedule please contact [facility] directly at [phone].  Normal or non-critical lab and imaging results will be communicated to you by [employer], letter or phone within 4 business days after the clinic has received the results. If you do not hear from us within 7 days, please contact the clinic through [employer] or phone. If you have a critical or abnormal lab result, we will notify you by phone as soon as possible.  Submit refill requests through [employer] or call your pharmacy and they will forward the refill request to us. Please allow 3 business days for your refill to be completed.          Additional Information About Your Visit        [employer] Information     [employer] lets you send messages to your doctor, view your test results, renew your prescriptions, schedule appointments and more. To sign up, go to [email or web address] . Click on \"Log in\" on the left side of the " "screen, which will take you to the Welcome page. Then click on \"Sign up Now\" on the right side of the page.     You will be asked to enter the access code listed below, as well as some personal information. Please follow the directions to create your username and password.     Your access code is: [email or web address]  Expires: 2018 10:50 AM     Your access code will  in 90 days. If you need help or a new code, please call your [facility] clinic or [phone].        Care EveryWhere ID     This is your Care EveryWhere ID. This could be used by other organizations to access your [facility] medical records  [id number]        Your Vitals Were     Pulse Temperature Pulse Oximetry             60 97.5  F (36.4  C) (Tympanic) 96%          Blood Pressure from Last 3 Encounters:   18 118/70   17 132/88   17 135/80    Weight from Last 3 Encounters:   18 (!) 309 lb (140.2 kg)                 Today's Medication Changes          These changes are accurate as of 18 10:50 AM.  If you have any questions, ask your nurse or doctor.               Start taking these medicines.        Dose/Directions    benzonatate 100 MG capsule   Commonly known as:  TESSALON PERLFRANTZ   Used for:  Acute bronchitis, unspecified organism   Started by:  Georgina Dodson APRN CNP        Dose:  100 mg   Take 1 capsule (100 mg) by mouth 3 times daily as needed for cough   Quantity:  42 capsule   Refills:  0       guaiFENesin-codeine 100-10 MG/5ML Soln solution   Commonly known as:  ROBITUSSIN AC   Used for:  Acute bronchitis, unspecified organism   Started by:  Georgina Dodson APRN CNP        Dose:  1 tsp.   Take 5 mLs by mouth every 6 hours as needed for cough   Quantity:  120 mL   Refills:  0            Where to get your medicines      These medications were sent to [facility] [address] [place] [address] - [address] [facility]  [address] [facility] [address]     Phone:  [phone]     benzonatate 100 MG capsule    "      Some of these will need a paper prescription and others can be bought over the counter.  Ask your nurse if you have questions.     Bring a paper prescription for each of these medications     guaiFENesin-codeine 100-10 MG/5ML Soln solution                Primary Care Provider Office Phone # Fax #    Venus Chang [phone]       [facility] [address] [address] [address]        Equal Access to Services     JANETT ARAYA : Hadii aad ku hadasho Soomaali, waaxda luqadaha, qaybta kaalmada adeegyada, waxay idiin hayaan adeeg khstephsh lacameron . So [facility] [phone].    ATENCIÓN: Si habla español, tiene a calix disposición servicios gratuitos de asistencia lingüística. Marquiseame al [phone].    We comply with applicable federal civil rights laws and [place] laws. We do not discriminate on the basis of race, color, national origin, age, disability, sex, sexual orientation, or gender identity.            Thank you!     Thank you for choosing [facility]  for your care. Our goal is always to provide you with excellent care. Hearing back from our patients is one way we can continue to improve our services. Please take a few minutes to complete the written survey that you may receive in the mail after your visit with us. Thank you!             Your Updated Medication List - Protect others around you: Learn how to safely use, store and throw away your medicines at www.disposemymeds.org.          This list is accurate as of 2/1/18 10:50 AM.  Always use your most recent med list.                   Brand Name Dispense Instructions for use Diagnosis    AMLODIPINE BESYLATE PO      Take 5 mg by mouth daily        aspirin 81 MG EC tablet      Take 81 mg by mouth daily        benzonatate 100 MG capsule    TESSALON PERLES    42 capsule    Take 1 capsule (100 mg) by mouth 3 times daily as needed for cough    Acute bronchitis, unspecified organism       CHLORTHALIDONE PO      Take 25 mg by  mouth daily        clindamycin 300 MG capsule    CLEOCIN     Take 600 mg by mouth 1 HOUR PRIOR TO DENTAL APPOINTMENT        clobetasol 0.05 % ointment    TEMOVATE     Apply 1 Application topically daily as needed For itching        guaiFENesin-codeine 100-10 MG/5ML Soln solution    ROBITUSSIN AC    120 mL    Take 5 mLs by mouth every 6 hours as needed for cough    Acute bronchitis, unspecified organism       OMEPRAZOLE PO      Take 40 mg by mouth every morning        SIMVASTATIN PO      Take 20 mg by mouth At Bedtime        tamsulosin 0.4 MG capsule    FLOMAX    90 capsule    Take 1 capsule (0.4 mg) by mouth daily    Benign prostatic hyperplasia with incomplete bladder emptying

## 2018-02-01 NOTE — NURSING NOTE
Chief Complaint   Patient presents with     Cold Symptoms       Initial /70 (BP Location: Right arm, Cuff Size: Adult Large)  Pulse 60  Temp 97.5  F (36.4  C) (Tympanic)  Wt (!) 309 lb (140.2 kg)  SpO2 96% There is no height or weight on file to calculate BMI.  Medication Reconciliation: complete    Health Maintenance that is potentially due pending provider review:  Colonoscopy/FIT    Patient will discuss colonoscopy with his PCP.    Is there anyone who you would like to be able to receive your results? No  If yes have patient fill out DEREK

## 2018-02-01 NOTE — PROGRESS NOTES
SUBJECTIVE:   Tommy De La O is a 62 year old male who presents to clinic today for the following health issues:    ENT Symptoms             Symptoms: cc Present Absent Comment   Fever/Chills   x    Fatigue   x    Muscle Aches  x     Eye Irritation   x    Sneezing  x     Nasal Adriel/Drg  x     Sinus Pressure/Pain  x     Loss of smell   x    Dental pain   x    Sore Throat  x     Swollen Glands   x    Ear Pain/Fullness   x    Cough  x     Wheeze   x    Chest Pain  x  Chest congestion   Shortness of breath  x  Patient has COPD   Rash   x    Other  x  Vomiting, headache     Symptom duration:  five days   Symptom severity:  moderate   Treatments tried:  Vicks, tylenol, advil   Contacts: grandson has had RSV, influenza A, pnuemonia, and stomach flu over the last five weeks or so         Problem list and histories reviewed & adjusted, as indicated.  Additional history: as documented    Patient Active Problem List   Diagnosis     Osteoarthritis of knee     Gastrointestinal hemorrhage     Gastroesophageal reflux disease     Hyperlipidemia     Obstructive sleep apnea syndrome     History reviewed. No pertinent surgical history.    Social History   Substance Use Topics     Smoking status: Former Smoker     Types: Cigarettes     Quit date: 2/1/2011     Smokeless tobacco: Never Used     Alcohol use Yes     History reviewed. No pertinent family history.      Current Outpatient Prescriptions   Medication Sig Dispense Refill     tamsulosin (FLOMAX) 0.4 MG capsule Take 1 capsule (0.4 mg) by mouth daily 90 capsule 3     SIMVASTATIN PO Take 20 mg by mouth At Bedtime        CHLORTHALIDONE PO Take 25 mg by mouth daily        AMLODIPINE BESYLATE PO Take 5 mg by mouth daily        aspirin 81 MG EC tablet Take 81 mg by mouth daily       OMEPRAZOLE PO Take 40 mg by mouth every morning       clindamycin (CLEOCIN) 300 MG capsule Take 600 mg by mouth 1 HOUR PRIOR TO DENTAL APPOINTMENT       clobetasol (TEMOVATE) 0.05 % ointment Apply 1  Application topically daily as needed For itching       Allergies   Allergen Reactions     Cefzil [Cefprozil] Hives and Itching     Darvocet [Propoxyphene N-Apap] Hives       Reviewed and updated as needed this visit by clinical staff       Reviewed and updated as needed this visit by Provider         ROS:  Constitutional, HEENT, cardiovascular, pulmonary, gi and gu systems are negative, except as otherwise noted.    OBJECTIVE:     /70 (BP Location: Right arm, Cuff Size: Adult Large)  Pulse 60  Temp 97.5  F (36.4  C) (Tympanic)  Wt (!) 309 lb (140.2 kg)  SpO2 96%  There is no height or weight on file to calculate BMI.  GENERAL: healthy, alert and no distress  EYES: Eyes grossly normal to inspection, PERRL and conjunctivae and sclerae normal  HENT: ear canals and TM's normal, nose and mouth without ulcers or lesions  NECK: no adenopathy, no asymmetry, masses, or scars and thyroid normal to palpation  RESP: lungs clear to auscultation - no rales, rhonchi or wheezes  CV: regular rate and rhythm, normal S1 S2, no S3 or S4, no murmur, click or rub, no peripheral edema and peripheral pulses strong  MS: no gross musculoskeletal defects noted, no edema  SKIN: no suspicious lesions or rashes  NEURO: Normal strength and tone, mentation intact and speech normal  PSYCH: mentation appears normal, affect normal/bright    XR CHEST 2 VW 2/1/2018 10:43 AM     HISTORY: Cough.     COMPARISON: May 8, 2017.         IMPRESSION: The lungs are clear. No focal pulmonary opacities. Heart  and mediastinum are unremarkable. No acute cardiopulmonary  abnormalities.     ASSESSMENT/PLAN:       ICD-10-CM    1. Acute bronchitis, unspecified organism J20.9 benzonatate (TESSALON PERLES) 100 MG capsule     guaiFENesin-codeine (ROBITUSSIN AC) 100-10 MG/5ML SOLN solution     DISCONTINUED: guaiFENesin-codeine (ROBITUSSIN AC) 100-10 MG/5ML SOLN solution   2. Cough R05 XR Chest 2 Views     CANCELED: XR Chest 2 Views     Patient Instructions   Use  Medication as directed    Hydrate with fluids, rest, cool humidifier.  May use acetaminophen, ibuprofen prn.    For your Cough   The Buckwheat Honey Dose: Given   hour Prior to Bedtime  For children age under 1 year -Do not use due to botulism risk     2-5 years -  tsp (2.5 mL)    6-11 years -1 tsp (5 mL)    12-18 years -2 tsp (10 mL)     Guaifenesin     Adult dose -Not to exceed 2.4 g (2400mg) per day    Child age 6-12 years -100 mg every 4 hr, not to exceed 1.2 g (1200mg) per day     Pediatric, 2-6 years -50 mg every 4 hr as needed, not to exceed 600 mg per day    Cough medications is not recommended in children under 2 years.  With use of cough medications have combination medications be aware of products in the cough medication you are using to avoid overdose    Follow up with PCP in 1 weeks.    Go to Emergency Room if sx worsen or change, Shortness of breath, chest pain, persistent fevers, or painful breathing occur.     Patient voiced understanding of instructions given.          Bronchitis, Viral (Adult)    You have a viral bronchitis. Bronchitis is inflammation and swelling of the lining of the lungs. This is often caused by an infection. Symptoms include a dry, hacking cough that is worse at night. The cough may bring up yellow-green mucus. You may also feel short of breath or wheeze. Other symptoms may include tiredness, chest discomfort, and chills.  Bronchitis that is caused by a virus is not treated with antibiotics. Instead, medicines may be given to help relieve symptoms. Symptoms can last up to 2 weeks, although the cough may last much longer.  This illness is contagious during the first few days and is spread through the air by coughing and sneezing, or by direct contact (touching the sick person and then touching your own eyes, nose, or mouth).  Most viral illnesses resolve within 10 to 14 days with rest and simple home remedies, although they may sometimes last for several weeks.  Home care    If  symptoms are severe, rest at home for the first 2 to 3 days. When you go back to your usual activities, don't let yourself get too tired.    Do not smoke. Also avoid being exposed to secondhand smoke.    You may use over-the-counter medicine to control fever or pain, unless another pain medicine was prescribed. (Note: If you have chronic liver or kidney disease or have ever had a stomach ulcer or gastrointestinal bleeding, talk with your healthcare provider before using these medicines. Also talk to your provider if you are taking medicine to prevent blood clots.) Aspirin should never be given to anyone younger than 18 years of age who is ill with a viral infection or fever. It may cause severe liver or brain damage.    Your appetite may be poor, so a light diet is fine. Avoid dehydration by drinking 6 to 8 glasses of fluids per day (such as water, soft drinks, sports drinks, juices, tea, or soup). Extra fluids will help loosen secretions in the nose and lungs.    Over-the-counter cough, cold, and sore-throat medicines will not shorten the length of the illness, but they may help to reduce symptoms. (Note: Do not use decongestants if you have high blood pressure.)  Follow-up care  Follow up with your healthcare provider, or as advised. If you had an X-ray or ECG (electrocardiogram), a specialist will review it. You will be notified of any new findings that may affect your care.  Note: If you are age 65 or older, or if you have a chronic lung disease or condition that affects your immune system, or you smoke, talk to your healthcare provider about having pneumococcal vaccinations and a yearly influenza vaccination (flu shot).  When to seek medical advice  Call your healthcare provider right away if any of these occur:    Fever of 100.4 F (38 C) or higher    Coughing up increased amounts of colored sputum    Weakness, drowsiness, headache, facial pain, ear pain, or a stiff neck  Call 911, or get immediate medical  care  Contact emergency services right away if any of these occur:    Coughing up blood    Worsening weakness, drowsiness, headache, or stiff neck    Trouble breathing, wheezing, or pain with breathing  Date Last Reviewed: 9/13/2015 2000-2017 [employer]. [address] [place] [address]. All rights reserved. This information is not intended as a substitute for professional medical care. Always follow your healthcare professional's instructions.            JUAN LUIS [facility]

## 2018-02-16 ENCOUNTER — OFFICE VISIT (OUTPATIENT)
Dept: FAMILY MEDICINE | Facility: CLINIC | Age: 63
End: 2018-02-16
Payer: COMMERCIAL

## 2018-02-16 VITALS
RESPIRATION RATE: 16 BRPM | DIASTOLIC BLOOD PRESSURE: 73 MMHG | OXYGEN SATURATION: 96 % | SYSTOLIC BLOOD PRESSURE: 125 MMHG | WEIGHT: 313.2 LBS | HEART RATE: 53 BPM | TEMPERATURE: 96.6 F

## 2018-02-16 DIAGNOSIS — J20.9 ACUTE BRONCHITIS WITH COEXISTING CONDITION REQUIRING PROPHYLACTIC TREATMENT: Primary | ICD-10-CM

## 2018-02-16 PROCEDURE — 99213 OFFICE O/P EST LOW 20 MIN: CPT | Performed by: NURSE PRACTITIONER

## 2018-02-16 RX ORDER — AZITHROMYCIN 250 MG/1
TABLET, FILM COATED ORAL
Qty: 6 TABLET | Refills: 0 | Status: SHIPPED | OUTPATIENT
Start: 2018-02-16 | End: 2018-06-15

## 2018-02-16 NOTE — PROGRESS NOTES
SUBJECTIVE:   Tommy De La O is a 62 year old male who presents to clinic today for the following health issues:    ENT Symptoms             Symptoms: cc Present Absent Comment   Fever/Chills       Fatigue  x     Muscle Aches  x     Eye Irritation       Sneezing       Nasal Adriel/Drg       Sinus Pressure/Pain       Loss of smell       Dental pain       Sore Throat       Swollen Glands       Ear Pain/Fullness       Cough  x  Slightly maybe 6-15 times a day   Wheeze  x     Chest Pain  x  Tightness, rib pain    Shortness of breath  x     Rash       Other         Symptom duration:  about 2 weeks    Symptom severity:  worse since Tuesday    Treatments tried:  Tessalon capsules, and Codeine cough syrup, and aspirin last night and advil    Contacts: NA              Problem list and histories reviewed & adjusted, as indicated.  Additional history: as documented    Patient Active Problem List   Diagnosis     Osteoarthritis of knee     Gastrointestinal hemorrhage     Gastroesophageal reflux disease     Hyperlipidemia     Obstructive sleep apnea syndrome     History reviewed. No pertinent surgical history.    Social History   Substance Use Topics     Smoking status: Former Smoker     Types: Cigarettes     Quit date: 2/1/2011     Smokeless tobacco: Never Used     Alcohol use Yes     History reviewed. No pertinent family history.      Current Outpatient Prescriptions   Medication Sig Dispense Refill     azithromycin (ZITHROMAX Z-LEIA) 250 MG tablet Take 2 tablets on day 1 and then take 1 tablet days 2-5 6 tablet 0     benzonatate (TESSALON PERLES) 100 MG capsule Take 1 capsule (100 mg) by mouth 3 times daily as needed for cough 42 capsule 0     guaiFENesin-codeine (ROBITUSSIN AC) 100-10 MG/5ML SOLN solution Take 5 mLs by mouth every 6 hours as needed for cough 120 mL 0     tamsulosin (FLOMAX) 0.4 MG capsule Take 1 capsule (0.4 mg) by mouth daily 90 capsule 3     SIMVASTATIN PO Take 20 mg by mouth At Bedtime        CHLORTHALIDONE  PO Take 25 mg by mouth daily        AMLODIPINE BESYLATE PO Take 5 mg by mouth daily        aspirin 81 MG EC tablet Take 81 mg by mouth daily       OMEPRAZOLE PO Take 40 mg by mouth every morning       clindamycin (CLEOCIN) 300 MG capsule Take 600 mg by mouth 1 HOUR PRIOR TO DENTAL APPOINTMENT       clobetasol (TEMOVATE) 0.05 % ointment Apply 1 Application topically daily as needed For itching       Allergies   Allergen Reactions     Cefzil [Cefprozil] Hives and Itching     Darvocet [Propoxyphene N-Apap] Hives     Labs reviewed in EPIC    Reviewed and updated as needed this visit by clinical staff  Tobacco  Allergies  Meds  Problems  Med Hx  Surg Hx  Fam Hx  Soc Hx        Reviewed and updated as needed this visit by Provider  Allergies  Meds  Problems         ROS:  Constitutional, HEENT, cardiovascular, pulmonary, GI, , musculoskeletal, neuro, skin, endocrine and psych systems are negative, except as otherwise noted.    OBJECTIVE:     /73 (BP Location: Right arm, Cuff Size: Adult Large)  Pulse 53  Temp 96.6  F (35.9  C) (Tympanic)  Resp 16  Wt (!) 313 lb 3.2 oz (142.1 kg)  SpO2 96%  There is no height or weight on file to calculate BMI.   GENERAL: healthy, alert and no distress, nontoxic in appearance  EYES: Eyes grossly normal to inspection, PERRL and conjunctivae and sclerae normal  HENT: ear canals and TM's normal, nose and mouth without ulcers or lesions  NECK: no adenopathy, supple with full ROM  RESP: lungs clear to auscultation - no rales, rhonchi or wheezes  CV: regular rate and rhythm, normal S1 S2, no S3 or S4, no murmur, click or rub, no peripheral edema   ABDOMEN: soft, nontender, no hepatosplenomegaly, no masses and bowel sounds normal  No rash    Diagnostic Test Results:  No results found for this or any previous visit (from the past 24 hour(s)).    ASSESSMENT/PLAN:     Problem List Items Addressed This Visit     None      Visit Diagnoses     Acute bronchitis with coexisting  condition requiring prophylactic treatment    -  Primary    Relevant Medications    azithromycin (ZITHROMAX Z-LEIA) 250 MG tablet               Patient Instructions   Increase rest and fluids. Tylenol and/or Ibuprofen for comfort. Cool mist vaporizer. If your symptoms worsen or do not resolve follow up with your primary care provider in 1 week and sooner if needed.     Mucinex 600 mg 12 hour formula for ear, head and chest congestion.  It can also thin post nasal drip which can cause a cough and sore throat.       Indications for emergent return to emergency department discussed with patient, who verbalized good understanding and agreement.  Patient understands the limitations of today's evaluation.           Bronchitis, Antibiotic Treatment (Adult)    Bronchitis is an infection of the air passages (bronchial tubes) in your lungs. It often occurs when you have a cold. This illness is contagious during the first few days and is spread through the air by coughing and sneezing, or by direct contact (touching the sick person and then touching your own eyes, nose, or mouth).  Symptoms of bronchitis include cough with mucus (phlegm) and low-grade fever. Bronchitis usually lasts 7 to 14 days. Mild cases can be treated with simple home remedies. More severe infection is treated with an antibiotic.  Home care  Follow these guidelines when caring for yourself at home:    If your symptoms are severe, rest at home for the first 2 to 3 days. When you go back to your usual activities, don't let yourself get too tired.    Do not smoke. Also avoid being exposed to secondhand smoke.    You may use over-the-counter medicines to control fever or pain, unless another medicine was prescribed. (Note: If you have chronic liver or kidney disease or have ever had a stomach ulcer or gastrointestinal bleeding, talk with your healthcare provider before using these medicines. Also talk to your provider if you are taking medicine to prevent blood  clots.) Aspirin should never be given to anyone younger than 18 years of age who is ill with a viral infection or fever. It may cause severe liver or brain damage.    Your appetite may be poor, so a light diet is fine. Avoid dehydration by drinking 6 to 8 glasses of fluids per day (such as water, soft drinks, sports drinks, juices, tea, or soup). Extra fluids will help loosen secretions in the nose and lungs.    Over-the-counter cough, cold, and sore-throat medicines will not shorten the length of the illness, but they may be helpful to reduce symptoms. (Note: Do not use decongestants if you have high blood pressure.)    Finish all antibiotic medicine. Do this even if you are feeling better after only a few days.  Follow-up care  Follow up with your healthcare provider, or as advised. If you had an X-ray or ECG (electrocardiogram), a specialist will review it. You will be notified of any new findings that may affect your care.  Note: If you are age 65 or older, or if you have a chronic lung disease or condition that affects your immune system, or you smoke, talk to your healthcare provider about having pneumococcal vaccinations and a yearly influenza vaccination (flu shot).  When to seek medical advice  Call your healthcare provider right away if any of these occur:    Fever of 100.4 F (38 C) or higher    Coughing up increased amounts of colored sputum    Weakness, drowsiness, headache, facial pain, ear pain, or a stiff neck  Call 911, or get immediate medical care  Contact emergency services right away if any of these occur.    Coughing up blood    Worsening weakness, drowsiness, headache, or stiff neck    Trouble breathing, wheezing, or pain with breathing  Date Last Reviewed: 9/13/2015 2000-2017 [employer]. [address] [place] [address], [place] [address]. All rights reserved. This information is not intended as a substitute for professional medical care. Always follow your healthcare professional's  instructions.            JUAN LUIS Mckeon CNP  [facility]

## 2018-02-16 NOTE — MR AVS SNAPSHOT
After Visit Summary   2/16/2018    Tommy De La O    MRN: [id number]           Patient Information     Date Of Birth          1955        Visit Information        Provider Department      2/16/2018 1:40 PM Scarlett Upton APRN [facility]        Today's Diagnoses     Acute bronchitis with coexisting condition requiring prophylactic treatment    -  1      Care Instructions    Increase rest and fluids. Tylenol and/or Ibuprofen for comfort. Cool mist vaporizer. If your symptoms worsen or do not resolve follow up with your primary care provider in 1 week and sooner if needed.     Mucinex 600 mg 12 hour formula for ear, head and chest congestion.  It can also thin post nasal drip which can cause a cough and sore throat.       Indications for emergent return to emergency department discussed with patient, who verbalized good understanding and agreement.  Patient understands the limitations of today's evaluation.           Bronchitis, Antibiotic Treatment (Adult)    Bronchitis is an infection of the air passages (bronchial tubes) in your lungs. It often occurs when you have a cold. This illness is contagious during the first few days and is spread through the air by coughing and sneezing, or by direct contact (touching the sick person and then touching your own eyes, nose, or mouth).  Symptoms of bronchitis include cough with mucus (phlegm) and low-grade fever. Bronchitis usually lasts 7 to 14 days. Mild cases can be treated with simple home remedies. More severe infection is treated with an antibiotic.  Home care  Follow these guidelines when caring for yourself at home:    If your symptoms are severe, rest at home for the first 2 to 3 days. When you go back to your usual activities, don't let yourself get too tired.    Do not smoke. Also avoid being exposed to secondhand smoke.    You may use over-the-counter medicines to control fever or pain, unless another medicine was  prescribed. (Note: If you have chronic liver or kidney disease or have ever had a stomach ulcer or gastrointestinal bleeding, talk with your healthcare provider before using these medicines. Also talk to your provider if you are taking medicine to prevent blood clots.) Aspirin should never be given to anyone younger than 18 years of age who is ill with a viral infection or fever. It may cause severe liver or brain damage.    Your appetite may be poor, so a light diet is fine. Avoid dehydration by drinking 6 to 8 glasses of fluids per day (such as water, soft drinks, sports drinks, juices, tea, or soup). Extra fluids will help loosen secretions in the nose and lungs.    Over-the-counter cough, cold, and sore-throat medicines will not shorten the length of the illness, but they may be helpful to reduce symptoms. (Note: Do not use decongestants if you have high blood pressure.)    Finish all antibiotic medicine. Do this even if you are feeling better after only a few days.  Follow-up care  Follow up with your healthcare provider, or as advised. If you had an X-ray or ECG (electrocardiogram), a specialist will review it. You will be notified of any new findings that may affect your care.  Note: If you are age 65 or older, or if you have a chronic lung disease or condition that affects your immune system, or you smoke, talk to your healthcare provider about having pneumococcal vaccinations and a yearly influenza vaccination (flu shot).  When to seek medical advice  Call your healthcare provider right away if any of these occur:    Fever of 100.4 F (38 C) or higher    Coughing up increased amounts of colored sputum    Weakness, drowsiness, headache, facial pain, ear pain, or a stiff neck  Call 911, or get immediate medical care  Contact emergency services right away if any of these occur.    Coughing up blood    Worsening weakness, drowsiness, headache, or stiff neck    Trouble breathing, wheezing, or pain with  "breathing  Date Last Reviewed: 2015-2017 [employer]. [address] [place] [address], [place] [address]. All rights reserved. This information is not intended as a substitute for professional medical care. Always follow your healthcare professional's instructions.                Follow-ups after your visit        Follow-up notes from your care team     See patient instructions section of the AVS Return if symptoms worsen or fail to improve, for Follow up with your primary care provider.      Who to contact     If you have questions or need follow up information about today's clinic visit or your schedule please contact [facility] directly at [phone].  Normal or non-critical lab and imaging results will be communicated to you by MyChart, letter or phone within 4 business days after the clinic has received the results. If you do not hear from us within 7 days, please contact the clinic through [employer] or phone. If you have a critical or abnormal lab result, we will notify you by phone as soon as possible.  Submit refill requests through [employer] or call your pharmacy and they will forward the refill request to us. Please allow 3 business days for your refill to be completed.          Additional Information About Your Visit        [employer] Information     [employer] lets you send messages to your doctor, view your test results, renew your prescriptions, schedule appointments and more. To sign up, go to [email or web address] . Click on \"Log in\" on the left side of the screen, which will take you to the Welcome page. Then click on \"Sign up Now\" on the right side of the page.     You will be asked to enter the access code listed below, as well as some personal information. Please follow the directions to create your username and password.     Your access code is: [email or web address]  Expires: 2018 10:50 AM     Your access code will  in 90 days. If you need help or a new code, " please call your [facility] clinic or [phone].        Care EveryWhere ID     This is your Care EveryWhere ID. This could be used by other organizations to access your [facility] medical records  [id number]        Your Vitals Were     Pulse Temperature Respirations Pulse Oximetry          53 96.6  F (35.9  C) (Tympanic) 16 96%         Blood Pressure from Last 3 Encounters:   02/16/18 125/73   02/01/18 118/70   07/25/17 132/88    Weight from Last 3 Encounters:   02/16/18 (!) 313 lb 3.2 oz (142.1 kg)   02/01/18 (!) 309 lb (140.2 kg)              Today, you had the following     No orders found for display         Today's Medication Changes          These changes are accurate as of 2/16/18  2:41 PM.  If you have any questions, ask your nurse or doctor.               Start taking these medicines.        Dose/Directions    azithromycin 250 MG tablet   Commonly known as:  ZITHROMAX Z-LEIA   Used for:  Acute bronchitis with coexisting condition requiring prophylactic treatment   Started by:  Scarlett Upton APRN CNP        Take 2 tablets on day 1 and then take 1 tablet days 2-5   Quantity:  6 tablet   Refills:  0            Where to get your medicines      These medications were sent to [facility] [address] [place] [address]  [address] [facility] [address]     Phone:  [phone]     azithromycin 250 MG tablet                Primary Care Provider Office Phone # Fax #    Venus COLÓN Bernardo [phone]       [address] [address]        Equal Access to Services     [facility] CenterRIZWANA AH: Hadii imani ku hadasho Sosdali, waaxda luqadaha, qaybta kaalmada adeegjerzyda, bernarda childers. So [facility] [phone].    ATENCIÓN: Si habla español, tiene a calix disposición servicios gratuitos de asistencia lingüística. Zina al [phone].    We comply with applicable federal civil rights laws and [place] laws. We do not discriminate on  the basis of race, color, national origin, age, disability, sex, sexual orientation, or gender identity.            Thank you!     Thank you for choosing [facility]  for your care. Our goal is always to provide you with excellent care. Hearing back from our patients is one way we can continue to improve our services. Please take a few minutes to complete the written survey that you may receive in the mail after your visit with us. Thank you!             Your Updated Medication List - Protect others around you: Learn how to safely use, store and throw away your medicines at www.disposemymeds.org.          This list is accurate as of 2/16/18  2:41 PM.  Always use your most recent med list.                   Brand Name Dispense Instructions for use Diagnosis    AMLODIPINE BESYLATE PO      Take 5 mg by mouth daily        aspirin 81 MG EC tablet      Take 81 mg by mouth daily        azithromycin 250 MG tablet    ZITHROMAX Z-LEIA    6 tablet    Take 2 tablets on day 1 and then take 1 tablet days 2-5    Acute bronchitis with coexisting condition requiring prophylactic treatment       benzonatate 100 MG capsule    TESSALON PERLES    42 capsule    Take 1 capsule (100 mg) by mouth 3 times daily as needed for cough    Acute bronchitis, unspecified organism       CHLORTHALIDONE PO      Take 25 mg by mouth daily        clindamycin 300 MG capsule    CLEOCIN     Take 600 mg by mouth 1 HOUR PRIOR TO DENTAL APPOINTMENT        clobetasol 0.05 % ointment    TEMOVATE     Apply 1 Application topically daily as needed For itching        guaiFENesin-codeine 100-10 MG/5ML Soln solution    ROBITUSSIN AC    120 mL    Take 5 mLs by mouth every 6 hours as needed for cough    Acute bronchitis, unspecified organism       OMEPRAZOLE PO      Take 40 mg by mouth every morning        SIMVASTATIN PO      Take 20 mg by mouth At Bedtime        tamsulosin 0.4 MG capsule    FLOMAX    90 capsule    Take 1 capsule (0.4 mg) by mouth daily     Benign prostatic hyperplasia with incomplete bladder emptying

## 2018-02-16 NOTE — NURSING NOTE
Chief Complaint   Patient presents with     Cough       Initial /73 (BP Location: Right arm, Cuff Size: Adult Large)  Pulse 53  Temp 96.6  F (35.9  C) (Tympanic)  Resp 16  Wt (!) 313 lb 3.2 oz (142.1 kg)  SpO2 96% There is no height or weight on file to calculate BMI.      Health Maintenance that is potentially due pending provider review:  NONE    n/a    Is there anyone who you would like to be able to receive your results? Not Applicable  If yes have patient fill out DEREK  Madi Grewal M.A.

## 2018-02-16 NOTE — PATIENT INSTRUCTIONS
Increase rest and fluids. Tylenol and/or Ibuprofen for comfort. Cool mist vaporizer. If your symptoms worsen or do not resolve follow up with your primary care provider in 1 week and sooner if needed.     Mucinex 600 mg 12 hour formula for ear, head and chest congestion.  It can also thin post nasal drip which can cause a cough and sore throat.       Indications for emergent return to emergency department discussed with patient, who verbalized good understanding and agreement.  Patient understands the limitations of today's evaluation.           Bronchitis, Antibiotic Treatment (Adult)    Bronchitis is an infection of the air passages (bronchial tubes) in your lungs. It often occurs when you have a cold. This illness is contagious during the first few days and is spread through the air by coughing and sneezing, or by direct contact (touching the sick person and then touching your own eyes, nose, or mouth).  Symptoms of bronchitis include cough with mucus (phlegm) and low-grade fever. Bronchitis usually lasts 7 to 14 days. Mild cases can be treated with simple home remedies. More severe infection is treated with an antibiotic.  Home care  Follow these guidelines when caring for yourself at home:    If your symptoms are severe, rest at home for the first 2 to 3 days. When you go back to your usual activities, don't let yourself get too tired.    Do not smoke. Also avoid being exposed to secondhand smoke.    You may use over-the-counter medicines to control fever or pain, unless another medicine was prescribed. (Note: If you have chronic liver or kidney disease or have ever had a stomach ulcer or gastrointestinal bleeding, talk with your healthcare provider before using these medicines. Also talk to your provider if you are taking medicine to prevent blood clots.) Aspirin should never be given to anyone younger than 18 years of age who is ill with a viral infection or fever. It may cause severe liver or brain  damage.    Your appetite may be poor, so a light diet is fine. Avoid dehydration by drinking 6 to 8 glasses of fluids per day (such as water, soft drinks, sports drinks, juices, tea, or soup). Extra fluids will help loosen secretions in the nose and lungs.    Over-the-counter cough, cold, and sore-throat medicines will not shorten the length of the illness, but they may be helpful to reduce symptoms. (Note: Do not use decongestants if you have high blood pressure.)    Finish all antibiotic medicine. Do this even if you are feeling better after only a few days.  Follow-up care  Follow up with your healthcare provider, or as advised. If you had an X-ray or ECG (electrocardiogram), a specialist will review it. You will be notified of any new findings that may affect your care.  Note: If you are age 65 or older, or if you have a chronic lung disease or condition that affects your immune system, or you smoke, talk to your healthcare provider about having pneumococcal vaccinations and a yearly influenza vaccination (flu shot).  When to seek medical advice  Call your healthcare provider right away if any of these occur:    Fever of 100.4 F (38 C) or higher    Coughing up increased amounts of colored sputum    Weakness, drowsiness, headache, facial pain, ear pain, or a stiff neck  Call 911, or get immediate medical care  Contact emergency services right away if any of these occur.    Coughing up blood    Worsening weakness, drowsiness, headache, or stiff neck    Trouble breathing, wheezing, or pain with breathing  Date Last Reviewed: 9/13/2015 2000-2017 [employer]. [address] [place] [address], [place] [address]. All rights reserved. This information is not intended as a substitute for professional medical care. Always follow your healthcare professional's instructions.

## 2018-03-25 ENCOUNTER — COMMUNICATION - HEALTHEAST (OUTPATIENT)
Dept: FAMILY MEDICINE | Facility: CLINIC | Age: 63
End: 2018-03-25

## 2018-03-25 DIAGNOSIS — E78.5 HYPERLIPEMIA: ICD-10-CM

## 2018-04-02 ENCOUNTER — COMMUNICATION - HEALTHEAST (OUTPATIENT)
Dept: FAMILY MEDICINE | Facility: CLINIC | Age: 63
End: 2018-04-02

## 2018-04-02 DIAGNOSIS — K21.9 GERD (GASTROESOPHAGEAL REFLUX DISEASE): ICD-10-CM

## 2018-04-20 ENCOUNTER — COMMUNICATION - HEALTHEAST (OUTPATIENT)
Dept: FAMILY MEDICINE | Facility: CLINIC | Age: 63
End: 2018-04-20

## 2018-04-20 DIAGNOSIS — I10 ESSENTIAL HYPERTENSION: ICD-10-CM

## 2018-05-17 ENCOUNTER — RECORDS - HEALTHEAST (OUTPATIENT)
Dept: ADMINISTRATIVE | Facility: OTHER | Age: 63
End: 2018-05-17

## 2018-06-04 ENCOUNTER — TELEPHONE (OUTPATIENT)
Dept: UROLOGY | Facility: CLINIC | Age: 63
End: 2018-06-04

## 2018-06-04 DIAGNOSIS — N40.0 BPH (BENIGN PROSTATIC HYPERPLASIA): Primary | ICD-10-CM

## 2018-06-04 NOTE — TELEPHONE ENCOUNTER
Called and spoke to patient, follow up scheduled.   Patient agrees to complete PSA lab prior to appointment.   Kim Dempsey RN

## 2018-06-04 NOTE — TELEPHONE ENCOUNTER
Reason for call:  Other   Patient called regarding Needs to make an appt but was wondering if should have labs done before hand would like a call to let him know will have labs done in [place] clinic lives far away does not want to make extra trips. Please call patient and let him know and schedule appts.    Additional comments:     Phone number to reach patient:  [phone]    Best Time:  any    Can we leave a detailed message on this number?  YES     Maria Guadalupe Reeder,

## 2018-06-07 ENCOUNTER — TELEPHONE (OUTPATIENT)
Dept: FAMILY MEDICINE | Facility: CLINIC | Age: 63
End: 2018-06-07

## 2018-06-07 DIAGNOSIS — Z11.4 SCREENING FOR HIV (HUMAN IMMUNODEFICIENCY VIRUS): ICD-10-CM

## 2018-06-07 DIAGNOSIS — E78.5 HYPERLIPIDEMIA, UNSPECIFIED HYPERLIPIDEMIA TYPE: ICD-10-CM

## 2018-06-07 DIAGNOSIS — K21.9 GASTROESOPHAGEAL REFLUX DISEASE: ICD-10-CM

## 2018-06-07 DIAGNOSIS — Z11.59 ENCOUNTER FOR HEPATITIS C SCREENING TEST FOR LOW RISK PATIENT: Primary | ICD-10-CM

## 2018-06-07 NOTE — TELEPHONE ENCOUNTER
Reason for Call: Request for an order or referral:    Order or referral being requested: Pt will see Dr Crawley 6/15 for physical. He is coming tomorrow to have a PSA drawn for Dr Domingo and is bringing an order for some labs from his Rheumatologist. He would like Dr Crawley to also order tests he would need for his PE since he has not had one for 2 yrs. He is on cholesterol meds and BP med and wants to be checked for diabetes because he says he has numbness in his feet.   Please put in orders.     Date needed: as soon as possible  Apt is tomorrow at 9:00 AM for labs and he will be fasting    Has the patient been seen by the PCP for this problem? NO  He will be new to Dr Crawley but has had his records transferred from [facility]    Additional comments: none    Phone number Patient can be reached at:  Home number on file [phone] (home)    Best Time:  anytime    Can we leave a detailed message on this number?  YES    Call taken on 6/7/2018 at 1:54 PM by Cynthia Cast

## 2018-06-08 DIAGNOSIS — E78.5 HYPERLIPIDEMIA, UNSPECIFIED HYPERLIPIDEMIA TYPE: ICD-10-CM

## 2018-06-08 DIAGNOSIS — Z11.59 ENCOUNTER FOR HEPATITIS C SCREENING TEST FOR LOW RISK PATIENT: ICD-10-CM

## 2018-06-08 DIAGNOSIS — Z11.4 SCREENING FOR HIV (HUMAN IMMUNODEFICIENCY VIRUS): ICD-10-CM

## 2018-06-08 DIAGNOSIS — R76.8 ANA POSITIVE: Primary | ICD-10-CM

## 2018-06-08 DIAGNOSIS — N40.0 BPH (BENIGN PROSTATIC HYPERPLASIA): ICD-10-CM

## 2018-06-08 LAB
ALBUMIN SERPL-MCNC: 3.9 G/DL (ref 3.4–5)
ALP SERPL-CCNC: 77 U/L (ref 40–150)
ALT SERPL W P-5'-P-CCNC: 45 U/L (ref 0–70)
ANION GAP SERPL CALCULATED.3IONS-SCNC: 5 MMOL/L (ref 3–14)
AST SERPL W P-5'-P-CCNC: 28 U/L (ref 0–45)
BILIRUB SERPL-MCNC: 0.7 MG/DL (ref 0.2–1.3)
BUN SERPL-MCNC: 15 MG/DL (ref 7–30)
CALCIUM SERPL-MCNC: 9.1 MG/DL (ref 8.5–10.1)
CHLORIDE SERPL-SCNC: 101 MMOL/L (ref 94–109)
CHOLEST SERPL-MCNC: 140 MG/DL
CO2 SERPL-SCNC: 32 MMOL/L (ref 20–32)
CREAT SERPL-MCNC: 0.87 MG/DL (ref 0.66–1.25)
GFR SERPL CREATININE-BSD FRML MDRD: 89 ML/MIN/1.7M2
GLUCOSE SERPL-MCNC: 103 MG/DL (ref 70–99)
HDLC SERPL-MCNC: 42 MG/DL
LDLC SERPL CALC-MCNC: 70 MG/DL
NONHDLC SERPL-MCNC: 98 MG/DL
POTASSIUM SERPL-SCNC: 3.6 MMOL/L (ref 3.4–5.3)
PROT SERPL-MCNC: 7.5 G/DL (ref 6.8–8.8)
PSA SERPL-MCNC: 1.18 UG/L (ref 0–4)
SODIUM SERPL-SCNC: 138 MMOL/L (ref 133–144)
TRIGL SERPL-MCNC: 142 MG/DL

## 2018-06-08 PROCEDURE — 80061 LIPID PANEL: CPT | Performed by: FAMILY MEDICINE

## 2018-06-08 PROCEDURE — 86038 ANTINUCLEAR ANTIBODIES: CPT | Performed by: INTERNAL MEDICINE

## 2018-06-08 PROCEDURE — 80053 COMPREHEN METABOLIC PANEL: CPT | Performed by: FAMILY MEDICINE

## 2018-06-08 PROCEDURE — 36415 COLL VENOUS BLD VENIPUNCTURE: CPT | Performed by: UROLOGY

## 2018-06-08 PROCEDURE — 86039 ANTINUCLEAR ANTIBODIES (ANA): CPT | Performed by: INTERNAL MEDICINE

## 2018-06-08 PROCEDURE — 84153 ASSAY OF PSA TOTAL: CPT | Performed by: UROLOGY

## 2018-06-08 PROCEDURE — 86803 HEPATITIS C AB TEST: CPT | Performed by: FAMILY MEDICINE

## 2018-06-08 PROCEDURE — 86235 NUCLEAR ANTIGEN ANTIBODY: CPT | Performed by: INTERNAL MEDICINE

## 2018-06-08 PROCEDURE — 87389 HIV-1 AG W/HIV-1&-2 AB AG IA: CPT | Performed by: FAMILY MEDICINE

## 2018-06-08 PROCEDURE — 86225 DNA ANTIBODY NATIVE: CPT | Performed by: INTERNAL MEDICINE

## 2018-06-11 LAB
ANA PAT SER IF-IMP: ABNORMAL
ANA SER QL IF: ABNORMAL
ANA TITR SER IF: ABNORMAL {TITER}
DSDNA AB SER-ACNC: 5 IU/ML
ENA RNP IGG SER IA-ACNC: <0.2 AI (ref 0–0.9)
ENA SCL70 IGG SER IA-ACNC: <0.2 AI (ref 0–0.9)
ENA SM IGG SER-ACNC: 0.2 AI (ref 0–0.9)
ENA SS-A IGG SER IA-ACNC: 0.2 AI (ref 0–0.9)
ENA SS-B IGG SER IA-ACNC: <0.2 AI (ref 0–0.9)
HCV AB SERPL QL IA: NONREACTIVE
HIV 1+2 AB+HIV1 P24 AG SERPL QL IA: NONREACTIVE

## 2018-06-15 ENCOUNTER — OFFICE VISIT (OUTPATIENT)
Dept: FAMILY MEDICINE | Facility: CLINIC | Age: 63
End: 2018-06-15
Payer: COMMERCIAL

## 2018-06-15 VITALS
RESPIRATION RATE: 16 BRPM | SYSTOLIC BLOOD PRESSURE: 125 MMHG | WEIGHT: 306 LBS | HEIGHT: 72 IN | DIASTOLIC BLOOD PRESSURE: 74 MMHG | BODY MASS INDEX: 41.45 KG/M2 | HEART RATE: 56 BPM

## 2018-06-15 DIAGNOSIS — E78.5 HYPERLIPIDEMIA, UNSPECIFIED HYPERLIPIDEMIA TYPE: ICD-10-CM

## 2018-06-15 DIAGNOSIS — N40.1 BENIGN PROSTATIC HYPERPLASIA WITH INCOMPLETE BLADDER EMPTYING: ICD-10-CM

## 2018-06-15 DIAGNOSIS — R20.0 LEG NUMBNESS: ICD-10-CM

## 2018-06-15 DIAGNOSIS — Z00.00 ROUTINE GENERAL MEDICAL EXAMINATION AT A HEALTH CARE FACILITY: Primary | ICD-10-CM

## 2018-06-15 DIAGNOSIS — I10 ESSENTIAL HYPERTENSION WITH GOAL BLOOD PRESSURE LESS THAN 140/90: ICD-10-CM

## 2018-06-15 DIAGNOSIS — R39.14 BENIGN PROSTATIC HYPERPLASIA WITH INCOMPLETE BLADDER EMPTYING: ICD-10-CM

## 2018-06-15 DIAGNOSIS — K21.9 GASTROESOPHAGEAL REFLUX DISEASE WITHOUT ESOPHAGITIS: ICD-10-CM

## 2018-06-15 PROCEDURE — 99396 PREV VISIT EST AGE 40-64: CPT | Performed by: FAMILY MEDICINE

## 2018-06-15 RX ORDER — OMEPRAZOLE 40 MG/1
40 CAPSULE, DELAYED RELEASE ORAL DAILY
Qty: 90 CAPSULE | Refills: 3 | Status: SHIPPED | OUTPATIENT
Start: 2018-06-15 | End: 2019-08-05

## 2018-06-15 RX ORDER — SIMVASTATIN 20 MG
20 TABLET ORAL AT BEDTIME
Qty: 90 TABLET | Refills: 3 | Status: SHIPPED | OUTPATIENT
Start: 2018-06-15 | End: 2019-05-18

## 2018-06-15 RX ORDER — TAMSULOSIN HYDROCHLORIDE 0.4 MG/1
0.4 CAPSULE ORAL DAILY
Qty: 90 CAPSULE | Refills: 3 | Status: SHIPPED | OUTPATIENT
Start: 2018-06-15 | End: 2019-01-14

## 2018-06-15 RX ORDER — CHLORTHALIDONE 25 MG/1
25 TABLET ORAL DAILY
Qty: 90 TABLET | Refills: 3 | Status: SHIPPED | OUTPATIENT
Start: 2018-06-15 | End: 2019-08-19

## 2018-06-15 RX ORDER — CLINDAMYCIN HCL 300 MG
600 CAPSULE ORAL
Status: CANCELLED | OUTPATIENT
Start: 2018-06-15

## 2018-06-15 RX ORDER — AMLODIPINE BESYLATE 5 MG/1
5 TABLET ORAL DAILY
Qty: 90 TABLET | Refills: 3 | Status: SHIPPED | OUTPATIENT
Start: 2018-06-15 | End: 2019-08-19

## 2018-06-15 ASSESSMENT — ANXIETY QUESTIONNAIRES
6. BECOMING EASILY ANNOYED OR IRRITABLE: NOT AT ALL
2. NOT BEING ABLE TO STOP OR CONTROL WORRYING: NOT AT ALL
7. FEELING AFRAID AS IF SOMETHING AWFUL MIGHT HAPPEN: NOT AT ALL
1. FEELING NERVOUS, ANXIOUS, OR ON EDGE: NOT AT ALL
GAD7 TOTAL SCORE: 0
5. BEING SO RESTLESS THAT IT IS HARD TO SIT STILL: NOT AT ALL
3. WORRYING TOO MUCH ABOUT DIFFERENT THINGS: NOT AT ALL

## 2018-06-15 ASSESSMENT — PATIENT HEALTH QUESTIONNAIRE - PHQ9: 5. POOR APPETITE OR OVEREATING: NOT AT ALL

## 2018-06-15 NOTE — PATIENT INSTRUCTIONS
Preventive Health Recommendations  Male Ages 50   64    Yearly exam:             See your health care provider every year in order to  o   Review health changes.   o   Discuss preventive care.    o   Review your medicines if your doctor has prescribed any.     Have a cholesterol test every 5 years, or more frequently if you are at risk for high cholesterol/heart disease.     Have a diabetes test (fasting glucose) every three years. If you are at risk for diabetes, you should have this test more often.     Have a colonoscopy at age 50, or have a yearly FIT test (stool test). These exams will check for colon cancer.      Talk with your health care provider about whether or not a prostate cancer screening test (PSA) is right for you.    You should be tested each year for STDs (sexually transmitted diseases), if you re at risk.     Shots: Get a flu shot each year. Get a tetanus shot every 10 years.     Nutrition:    Eat at least 5 servings of fruits and vegetables daily.     Eat whole-grain bread, whole-wheat pasta and brown rice instead of white grains and rice.     Talk to your provider about Calcium and Vitamin D.     Lifestyle    Exercise for at least 150 minutes a week (30 minutes a day, 5 days a week). This will help you control your weight and prevent disease.     Limit alcohol to one drink per day.     No smoking.     Wear sunscreen to prevent skin cancer.     See your dentist every six months for an exam and cleaning.     See your eye doctor every 1 to 2 years.  ASSESSMENT/PLAN:                                                    Lifestyle recommendations:regular exercise, at least 150 minutes per week (average 30 minutes 5 times a week)  keep working on losing weight (ideal BMI-body mass index is <25)  The following exams/tests were recommended and discussed for health maintenance:  Colon cancer screening recommended next year.     (Z00.00) Routine general medical examination at a health care facility   (primary encounter diagnosis)    (N40.1,  R39.14) Benign prostatic hyperplasia with incomplete bladder emptying  Comment: He has been seeing urology  Plan: tamsulosin (FLOMAX) 0.4 MG capsule        follow-up with urology-Dr. Domingo as you have been.     (K21.9) Gastroesophageal reflux disease without esophagitis  Comment: stable on omeprazole   Plan: omeprazole (PRILOSEC) 40 MG capsule        REfills    (R20.0) Leg numbness  Comment: Uncertain cause.  You have seen a lot of specialists.  I need to review some of the testing and evaluations you have had.     (E78.5) Hyperlipidemia, unspecified hyperlipidemia type  Comment: doing well on current medication  Plan: simvastatin (ZOCOR) 20 MG tablet        No change in current treatment plan.  Refills for a year.     (I10) Essential hypertension with goal blood pressure less than 140/90  Comment: doing well at this time  Plan: chlorthalidone (HYGROTON) 25 MG tablet,         amLODIPine (NORVASC) 5 MG tablet        No change in current treatment plan.  Refills for a year.    Monitor BP periodically.  This can be done at home, in clinic, at our pharmacy, at a store or by neighbor or relative with a blood pressure cuff.  You should be sitting and relaxed for several minutes when taking blood pressure.   Goal BP (most readings) should be less than 140/90    Normal blood pressure is 120/80.    If your blood pressure is consistently at or above the goal, some changes should be made with lifestyle or medication to keep blood pressure under good control.    High blood pressure over time can lead to eye and kidney damage and increase risk for heart attacks and strokes.   Let us know if readings are often high, so we can help get your blood pressure under good control.

## 2018-06-15 NOTE — PROGRESS NOTES
SUBJECTIVE:   CC: Tommy De La O is an 63 year old male who presents for preventative health visit.   Chief Complaint   Patient presents with     Establish [facility]     Physical      Planning to switch clinics.    Needs refills.   Will be seeing Dr. Domingo next week regarding hematuria.  Has been advised he has BPH.  Suggested surgery for BPH.   NocturiaX1 which has improved.  Stream similar and decreased.    * Establish Care    * Had two knee replacements 3-4 months apart.  Can't lose weight, now has numbness/tingling in feet.  Has tried compression stockings, can't wear due to the zapping sensations. Has seen a rheumatologist and neurologist.  He saw vascular surgery.   Numbness in feet and legs.      * Feels is depressed.    * Would like a referral for a nutritionist/dietician    BP has been doing well on current medications.     Takes omeprazole for GERD.     Annual:     Getting at least 3 servings of Calcium per day::  Yes    Bi-annual eye exam::  Yes    Dental care twice a year::  NO    Sleep apnea or symptoms of sleep apnea::  Sleep apnea    Diet::  Regular (no restrictions)    Frequency of exercise::  None    Taking medications regularly::  Yes    Medication side effects::  Other    Additional concerns today::  No      Today's PHQ-2 Score:   PHQ-2 ( 1999 [employer]) 6/15/2018   Q1: Little interest or pleasure in doing things 0   Q2: Feeling down, depressed or hopeless 1   PHQ-2 Score 1   Q1: Little interest or pleasure in doing things Not at all   Q2: Feeling down, depressed or hopeless Several days   PHQ-2 Score 1     Abuse: Current or Past(Physical, Sexual or Emotional)- No  Do you feel safe in your environment - Yes    Social History   Substance Use Topics     Smoking status: Former Smoker     Types: Cigarettes     Quit date: 2/1/2011     Smokeless tobacco: Never Used     Alcohol use Yes      If you drink alcohol do you typically have >3 drinks per day or >7 drinks per week? No  Drinks 4-6 beers a couple days a  week.                       Last PSA:   PSA   Date Value Ref Range Status   2018 1.18 0 - 4 ug/L Final     Comment:     Assay Method:  Chemiluminescence using Siemens Vista analyzer     Patient Active Problem List    Diagnosis Date Noted     Osteoarthritis of knee 2014     Priority: Medium     Gastrointestinal hemorrhage 2014     Priority: Medium     Gastroesophageal reflux disease 2014     Priority: Medium     Hyperlipidemia 2014     Priority: Medium     Obstructive sleep apnea syndrome 2014     Priority: Medium      Family history:  CV disease: father  Prostate cancer: no  Colon cancer: None known    Multivitamin or Vit D use: fish oil    Vaccines:current tetanus     Past Colon cancer screenin years ago at [place].  He has had two colonoscopy in the past.  Advised 5 year follow-up.     ROS:  General: POSITIVE for:, weight loss, has lost a little weight.    Uses CPAP at night.    Eyes: Negative for vision changes or eye problems  ENT: No problems with ears, nose or throat.  No difficulty swallowing.  Resp: No coughing, wheezing or shortness of breath  CV: No chest pains or palpitations  GI: No nausea, vomiting,  heartburn, abdominal pain, diarrhea, constipation or change in bowel habits  : POSITIVE for:, BPH symptoms, see above   Musculoskeletal: POSITIVE  for:, pain in shoulders.  He has had rotator cuff surgery on one side.   Neurologic: POSITIVE for:, numbness or tingling in legs.   Psychiatric: POSITIVE for:, depression, since divorce 10 years.   PHQ9 score today= 4  generalized anxiety disorder scale score today= 2  Heme/immune/allergy: No history of bleeding or clotting problems or anemia.  No allergies or immune system problems  Endocrine: No history of thyroid disease, diabetes or other endocrine disorders  Skin: No rashes,worrisome lesions or skin problems    OBJECTIVE:                                                    OBJECTIVE:Blood pressure 125/74, pulse 56,  "resp. rate 16, height 5' 11.5\" (1.816 m), weight 306 lb (138.8 kg). BMI=Body mass index is 42.08 kg/(m^2).  GENERAL APPEARANCE ADULT: Alert, no acute distress, morbidly obese  EYES: PERRL, EOM normal, conjunctiva and lids normal  HENT: Ears and TMs normal, oral mucosa and posterior oropharynx normal  NECK: No adenopathy,masses or thyromegaly  RESP: lungs clear to auscultation   CV: normal rate, regular rhythm, no murmur or gallop  ABDOMEN: soft, no organomegaly, masses or tenderness  MS: extremities normal, no peripheral edema    ASSESSMENT/PLAN:                                                    Lifestyle recommendations:regular exercise, at least 150 minutes per week (average 30 minutes 5 times a week)  keep working on losing weight (ideal BMI-body mass index is <25)  The following exams/tests were recommended and discussed for health maintenance:  Colon cancer screening recommended next year.     (Z00.00) Routine general medical examination at a health care facility  (primary encounter diagnosis)    (N40.1,  R39.14) Benign prostatic hyperplasia with incomplete bladder emptying  Comment: He has been seeing urology  Plan: tamsulosin (FLOMAX) 0.4 MG capsule        follow-up with urology-Dr. Domingo as you have been.     (K21.9) Gastroesophageal reflux disease without esophagitis  Comment: stable on omeprazole   Plan: omeprazole (PRILOSEC) 40 MG capsule        REfills    (R20.0) Leg numbness  Comment: Uncertain cause.  You have seen a lot of specialists.  I need to review some of the testing and evaluations you have had.     (E78.5) Hyperlipidemia, unspecified hyperlipidemia type  Comment: doing well on current medication  Plan: simvastatin (ZOCOR) 20 MG tablet        No change in current treatment plan.  Refills for a year.     (I10) Essential hypertension with goal blood pressure less than 140/90  Comment: doing well at this time  Plan: chlorthalidone (HYGROTON) 25 MG tablet,         amLODIPine (NORVASC) 5 MG tablet   "      No change in current treatment plan.  Refills for a year.    Monitor BP periodically.  This can be done at home, in clinic, at our pharmacy, at a store or by neighbor or relative with a blood pressure cuff.  You should be sitting and relaxed for several minutes when taking blood pressure.   Goal BP (most readings) should be less than 140/90    Normal blood pressure is 120/80.    If your blood pressure is consistently at or above the goal, some changes should be made with lifestyle or medication to keep blood pressure under good control.    High blood pressure over time can lead to eye and kidney damage and increase risk for heart attacks and strokes.   Let us know if readings are often high, so we can help get your blood pressure under good control.        COUNSELING:  Reviewed preventive health counseling, as reflected in patient instructions  Special attention given to:        Colon cancer screening       reports that he quit smoking about 7 years ago. His smoking use included Cigarettes. He has never used smokeless tobacco.    There is no height or weight on file to calculate BMI.   Weight management plan: Discussed healthy diet and exercise guidelines and patient will follow up in 12 months in clinic to re-evaluate.    Counseling Resources:  ATP IV Guidelines  Pooled Cohorts Equation Calculator  FRAX Risk Assessment  ICSI Preventive Guidelines  Dietary Guidelines for Americans, 2010  USDA's MyPlate  ASA Prophylaxis  Lung CA Screening    Tim Crawley MD  [facility]

## 2018-06-15 NOTE — NURSING NOTE
"Chief Complaint   Patient presents with     Physical       Initial /74 (BP Location: Right arm, Patient Position: Chair, Cuff Size: Adult Large)  Pulse 56  Resp 16  Ht 5' 11.5\" (1.816 m)  Wt 306 lb (138.8 kg)  BMI 42.08 kg/m2 Estimated body mass index is 42.08 kg/(m^2) as calculated from the following:    Height as of this encounter: 5' 11.5\" (1.816 m).    Weight as of this encounter: 306 lb (138.8 kg).      Health Maintenance that is potentially due pending provider review:  NONE        Is there anyone who you would like to be able to receive your results? No  If yes have patient fill out DEREK      "

## 2018-06-15 NOTE — MR AVS SNAPSHOT
After Visit Summary   6/15/2018    Tommy De La O    MRN: [id number]           Patient Information     Date Of Birth          1955        Visit Information        Provider Department      6/15/2018 1:00 PM Tim Crawley MD [facility]        Today's Diagnoses     Routine general medical examination at a health care facility    -  1    Benign prostatic hyperplasia with incomplete bladder emptying        Gastroesophageal reflux disease without esophagitis        Leg numbness        Hyperlipidemia, unspecified hyperlipidemia type        Essential hypertension with goal blood pressure less than 140/90          Care Instructions      Preventive Health Recommendations  Male Ages 50 - 64    Yearly exam:             See your health care provider every year in order to  o   Review health changes.   o   Discuss preventive care.    o   Review your medicines if your doctor has prescribed any.     Have a cholesterol test every 5 years, or more frequently if you are at risk for high cholesterol/heart disease.     Have a diabetes test (fasting glucose) every three years. If you are at risk for diabetes, you should have this test more often.     Have a colonoscopy at age 50, or have a yearly FIT test (stool test). These exams will check for colon cancer.      Talk with your health care provider about whether or not a prostate cancer screening test (PSA) is right for you.    You should be tested each year for STDs (sexually transmitted diseases), if you re at risk.     Shots: Get a flu shot each year. Get a tetanus shot every 10 years.     Nutrition:    Eat at least 5 servings of fruits and vegetables daily.     Eat whole-grain bread, whole-wheat pasta and brown rice instead of white grains and rice.     Talk to your provider about Calcium and Vitamin D.     Lifestyle    Exercise for at least 150 minutes a week (30 minutes a day, 5 days a week). This will help you control your weight and  prevent disease.     Limit alcohol to one drink per day.     No smoking.     Wear sunscreen to prevent skin cancer.     See your dentist every six months for an exam and cleaning.     See your eye doctor every 1 to 2 years.  ASSESSMENT/PLAN:                                                    Lifestyle recommendations:regular exercise, at least 150 minutes per week (average 30 minutes 5 times a week)  keep working on losing weight (ideal BMI-body mass index is <25)  The following exams/tests were recommended and discussed for health maintenance:  Colon cancer screening recommended next year.     (Z00.00) Routine general medical examination at a health care facility  (primary encounter diagnosis)    (N40.1,  R39.14) Benign prostatic hyperplasia with incomplete bladder emptying  Comment: He has been seeing urology  Plan: tamsulosin (FLOMAX) 0.4 MG capsule        follow-up with urology-Dr. Domingo as you have been.     (K21.9) Gastroesophageal reflux disease without esophagitis  Comment: stable on omeprazole   Plan: omeprazole (PRILOSEC) 40 MG capsule        REfills    (R20.0) Leg numbness  Comment: Uncertain cause.  You have seen a lot of specialists.  I need to review some of the testing and evaluations you have had.     (E78.5) Hyperlipidemia, unspecified hyperlipidemia type  Comment: doing well on current medication  Plan: simvastatin (ZOCOR) 20 MG tablet        No change in current treatment plan.  Refills for a year.     (I10) Essential hypertension with goal blood pressure less than 140/90  Comment: doing well at this time  Plan: chlorthalidone (HYGROTON) 25 MG tablet,         amLODIPine (NORVASC) 5 MG tablet        No change in current treatment plan.  Refills for a year.    Monitor BP periodically.  This can be done at home, in clinic, at our pharmacy, at a store or by neighbor or relative with a blood pressure cuff.  You should be sitting and relaxed for several minutes when taking blood pressure.   Goal BP (most  readings) should be less than 140/90    Normal blood pressure is 120/80.    If your blood pressure is consistently at or above the goal, some changes should be made with lifestyle or medication to keep blood pressure under good control.    High blood pressure over time can lead to eye and kidney damage and increase risk for heart attacks and strokes.   Let us know if readings are often high, so we can help get your blood pressure under good control.            Follow-ups after your visit        Your next 10 appointments already scheduled     Jun 26, 2018 10:00 AM CDT   Return Visit with Patricio Domingo MD   [facility]    [address] [place] [address]  [place] [address] [phone]              Who to contact     If you have questions or need follow up information about today's clinic visit or your schedule please contact [facility] directly at [phone].  Normal or non-critical lab and imaging results will be communicated to you by MyChart, letter or phone within 4 business days after the clinic has received the results. If you do not hear from us within 7 days, please contact the clinic through [employer] or phone. If you have a critical or abnormal lab result, we will notify you by phone as soon as possible.  Submit refill requests through [employer] or call your pharmacy and they will forward the refill request to us. Please allow 3 business days for your refill to be completed.          Additional Information About Your Visit        MyChart Information     [employer] gives you secure access to your electronic health record. If you see a primary care provider, you can also send messages to your care team and make appointments. If you have questions, please call your primary care clinic.  If you do not have a primary care provider, please call [phone] and they will assist you.        Care EveryWhere ID     This is your Care EveryWhere ID. This could be  "used by other organizations to access your [facility] medical records  [id number]        Your Vitals Were     Pulse Respirations Height BMI (Body Mass Index)          56 16 5' 11.5\" (1.816 m) 42.08 kg/m2         Blood Pressure from Last 3 Encounters:   06/15/18 125/74   02/16/18 125/73   02/01/18 118/70    Weight from Last 3 Encounters:   06/15/18 306 lb (138.8 kg)   02/16/18 (!) 313 lb 3.2 oz (142.1 kg)   02/01/18 (!) 309 lb (140.2 kg)              Today, you had the following     No orders found for display         Today's Medication Changes          These changes are accurate as of 6/15/18  2:17 PM.  If you have any questions, ask your nurse or doctor.               These medicines have changed or have updated prescriptions.        Dose/Directions    * AMLODIPINE BESYLATE PO   This may have changed:  Another medication with the same name was added. Make sure you understand how and when to take each.   Changed by:  Tim Crawley MD        Dose:  5 mg   Take 5 mg by mouth daily   Refills:  0       * amLODIPine 5 MG tablet   Commonly known as:  NORVASC   This may have changed:  You were already taking a medication with the same name, and this prescription was added. Make sure you understand how and when to take each.   Used for:  Essential hypertension with goal blood pressure less than 140/90   Changed by:  Tim Crawley MD        Dose:  5 mg   Take 1 tablet (5 mg) by mouth daily   Quantity:  90 tablet   Refills:  3       chlorthalidone 25 MG tablet   Commonly known as:  HYGROTON   This may have changed:  medication strength   Used for:  Essential hypertension with goal blood pressure less than 140/90   Changed by:  Tim Crawley MD        Dose:  25 mg   Take 1 tablet (25 mg) by mouth daily   Quantity:  90 tablet   Refills:  3       * OMEPRAZOLE PO   This may have changed:  Another medication with the same name was added. Make sure you understand how and when to take each.   Changed by:  Tim Crawley" MD [facility]        Dose:  40 mg   Take 40 mg by mouth every morning   Refills:  0       * omeprazole 40 MG capsule   Commonly known as:  priLOSEC   This may have changed:  You were already taking a medication with the same name, and this prescription was added. Make sure you understand how and when to take each.   Used for:  Gastroesophageal reflux disease without esophagitis   Changed by:  Tim Crawley MD        Dose:  40 mg   Take 1 capsule (40 mg) by mouth daily Take 30-60 minutes before a meal.   Quantity:  90 capsule   Refills:  3       * SIMVASTATIN PO   This may have changed:  Another medication with the same name was added. Make sure you understand how and when to take each.   Changed by:  Tim Crawley MD        Dose:  20 mg   Take 20 mg by mouth At Bedtime   Refills:  0       * simvastatin 20 MG tablet   Commonly known as:  ZOCOR   This may have changed:  You were already taking a medication with the same name, and this prescription was added. Make sure you understand how and when to take each.   Used for:  Hyperlipidemia, unspecified hyperlipidemia type   Changed by:  Tim Crawley MD        Dose:  20 mg   Take 1 tablet (20 mg) by mouth At Bedtime   Quantity:  90 tablet   Refills:  3       * Notice:  This list has 6 medication(s) that are the same as other medications prescribed for you. Read the directions carefully, and ask your doctor or other care provider to review them with you.      Stop taking these medicines if you haven't already. Please contact your care team if you have questions.     azithromycin 250 MG tablet   Commonly known as:  ZITHROMAX Z-LEIA   Stopped by:  Tim Crawley MD           benzonatate 100 MG capsule   Commonly known as:  TESSALON PERLES   Stopped by:  Tim Crawley MD           guaiFENesin-codeine 100-10 MG/5ML Soln solution   Commonly known as:  ROBITUSSIN AC   Stopped by:  Tim Crawley MD                Where to get your medicines      These  medications were sent to [facility] - [place], [place] - [address]  [address] [facility] [address]     Phone:  [phone]     amLODIPine 5 MG tablet    chlorthalidone 25 MG tablet    omeprazole 40 MG capsule    simvastatin 20 MG tablet    tamsulosin 0.4 MG capsule                Primary Care Provider Office Phone # Fax #    Tim Crawley [phone] [address] [place] [address]        Equal Access to Services     JANETT ARAYA : Hadii aad ku hadasho Soomaali, waaxda luqadaha, qaybta kaalmada adeegyada, waxay idiin hayaan adeeg autumnaranikolai hull . So [facility] [phone].    ATENCIÓN: Si habla español, tiene a calix disposición servicios gratuitos de asistencia lingüística. [facility] [phone].    We comply with applicable federal civil rights laws and [place] laws. We do not discriminate on the basis of race, color, national origin, age, disability, sex, sexual orientation, or gender identity.            Thank you!     Thank you for choosing [facility]  for your care. Our goal is always to provide you with excellent care. Hearing back from our patients is one way we can continue to improve our services. Please take a few minutes to complete the written survey that you may receive in the mail after your visit with us. Thank you!             Your Updated Medication List - Protect others around you: Learn how to safely use, store and throw away your medicines at www.disposemymeds.org.          This list is accurate as of 6/15/18  2:17 PM.  Always use your most recent med list.                   Brand Name Dispense Instructions for use Diagnosis    * AMLODIPINE BESYLATE PO      Take 5 mg by mouth daily        * amLODIPine 5 MG tablet    NORVASC    90 tablet    Take 1 tablet (5 mg) by mouth daily    Essential hypertension with goal blood pressure less than 140/90       aspirin 81 MG EC tablet      Take 81 mg by mouth daily         chlorthalidone 25 MG tablet    HYGROTON    90 tablet    Take 1 tablet (25 mg) by mouth daily    Essential hypertension with goal blood pressure less than 140/90       clindamycin 300 MG capsule    CLEOCIN     Take 600 mg by mouth 1 HOUR PRIOR TO DENTAL APPOINTMENT        clobetasol 0.05 % ointment    TEMOVATE     Apply 1 Application topically daily as needed For itching        * OMEPRAZOLE PO      Take 40 mg by mouth every morning        * omeprazole 40 MG capsule    priLOSEC    90 capsule    Take 1 capsule (40 mg) by mouth daily Take 30-60 minutes before a meal.    Gastroesophageal reflux disease without esophagitis       * SIMVASTATIN PO      Take 20 mg by mouth At Bedtime        * simvastatin 20 MG tablet    ZOCOR    90 tablet    Take 1 tablet (20 mg) by mouth At Bedtime    Hyperlipidemia, unspecified hyperlipidemia type       tamsulosin 0.4 MG capsule    FLOMAX    90 capsule    Take 1 capsule (0.4 mg) by mouth daily    Benign prostatic hyperplasia with incomplete bladder emptying       * Notice:  This list has 6 medication(s) that are the same as other medications prescribed for you. Read the directions carefully, and ask your doctor or other care provider to review them with you.

## 2018-06-16 ASSESSMENT — PATIENT HEALTH QUESTIONNAIRE - PHQ9: SUM OF ALL RESPONSES TO PHQ QUESTIONS 1-9: 4

## 2018-06-16 ASSESSMENT — ANXIETY QUESTIONNAIRES: GAD7 TOTAL SCORE: 0

## 2018-06-26 ENCOUNTER — OFFICE VISIT (OUTPATIENT)
Dept: UROLOGY | Facility: CLINIC | Age: 63
End: 2018-06-26
Payer: COMMERCIAL

## 2018-06-26 ENCOUNTER — HOSPITAL ENCOUNTER (OUTPATIENT)
Dept: MRI IMAGING | Facility: CLINIC | Age: 63
Discharge: HOME | End: 2018-06-26
Attending: ORTHOPAEDIC SURGERY | Admitting: ORTHOPAEDIC SURGERY
Payer: MEDICARE

## 2018-06-26 VITALS — OXYGEN SATURATION: 98 % | SYSTOLIC BLOOD PRESSURE: 125 MMHG | DIASTOLIC BLOOD PRESSURE: 81 MMHG | HEART RATE: 54 BPM

## 2018-06-26 DIAGNOSIS — R39.14 BENIGN PROSTATIC HYPERPLASIA WITH INCOMPLETE BLADDER EMPTYING: Primary | ICD-10-CM

## 2018-06-26 DIAGNOSIS — N40.1 BENIGN PROSTATIC HYPERPLASIA WITH INCOMPLETE BLADDER EMPTYING: Primary | ICD-10-CM

## 2018-06-26 DIAGNOSIS — M25.539 WRIST PAIN: ICD-10-CM

## 2018-06-26 PROCEDURE — 51798 US URINE CAPACITY MEASURE: CPT | Performed by: UROLOGY

## 2018-06-26 PROCEDURE — 73221 MRI JOINT UPR EXTREM W/O DYE: CPT | Mod: LT

## 2018-06-26 PROCEDURE — 99213 OFFICE O/P EST LOW 20 MIN: CPT | Mod: 25 | Performed by: UROLOGY

## 2018-06-26 NOTE — PATIENT INSTRUCTIONS
Laser Prostatectomy TURP (Transurethral resection of prostate)   You have an enlarged prostate gland. This is also called benign prostatic hyperplasia (BPH). BPH is not cancer, but it can cause problems with urination. To relieve your symptoms, your healthcare provider may recommend laser prostatectomy. This procedure uses a laser (concentrated light energy). The laser removes prostate tissue from around the urethra. The urethra is the tube that carries urine from the bladder out of the body. The surgery lets urine flow more freely. The laser destroys the prostate tissue. This means it can t be looked at for signs of cancer.     Types of prostate laser surgery  The type of laser surgery your healthcare provider will do may depend on your health, the size of your prostate, and the type of tools available. The different types of prostate laser surgery are:    Photoselective vaporization of the prostate (PVP). The laser is used to vaporize or melt away the extra prostate tissue.    Holmium laser ablation of the prostate (HoLAP). This type of surgery is similar to PVP, but uses a different kind of laser.    Holmium laser enucleation of the prostate (HoLEP). In this procedure the laser cuts and removes extra tissue. A tool cuts the tissue into small pieces. This makes them easier to remove.   Possible risks and side effects of laser prostatectomy    Bleeding    Infection    Pneumonia    Blood clots    Scarring or narrowing of the urethra. This can cause trouble urinating.    Only some relief of symptoms    Erectile dysfunction (rare)    Loss of bladder control (very rare)    Loss of ejaculation  Getting ready for the procedure  Your healthcare team will give you detailed instructions on how to get ready for the procedure. Be sure to follow them.    Tell your healthcare team about all medicines you take. This includes prescription and over-the-counter medicines, vitamins, herbs, and other supplements. It also includes  any blood thinners such as warfarin, clopidogrel, or daily aspirin. You may need to stop taking some or all of them before surgery.    You will probably be told not to eat or drink anything after midnight on the night before your procedure.    When you arrive for the procedure, you will have an IV (intravenous) line placed. This gives you fluids and medicines during the procedure.    You will be given medicine to keep you from feeling pain (anesthesia) before the procedure. You may have 1 or more of the following:  ? Local anesthesia. Your bladder and urethra are numbed.  ? Regional anesthesia. Your body below the waist is numbed.  ? General anesthesia. You are in a state like deep sleep.  During the procedure  Your healthcare provider can help explain the details of your surgery. These details depend on the type of laser surgery that will be done. In general, you can expect the following:    The procedure itself usually takes less than an hour.    A thin, tube-like telescope (cystoscope) is put through the opening in your penis and into your urethra. This tool lets your provider see your urethra and your prostate, either through the cystoscope or on a video screen.    The laser is put through the cystoscope. The laser is then used to destroy the extra prostate tissue.    You may get a urinary catheter. This helps your bladder drain for a short time after the procedure. It s removed when it s no longer needed.  After the procedure  You may go home the same day after your surgery. Or you may stay a night in the hospital. An adult friend or family member should drive you home. To get the best results from your laser prostatectomy, follow your healthcare provider's instructions. Keep your follow-up appointments.    Your prostate will likely be sore at first. This will get better as you heal. Here are some things you can expect:    You may be sent home with a catheter to drain urine from your bladder. If so, you may  wear a leg bag until it's no longer needed. The catheter will allow the area to heal and help you avoid painful urination.    Your provider may also prescribe antibiotics to prevent infection and pain medicine to ease any discomfort.    In about a week, you ll visit your provider to have your catheter removed. If swelling still makes urination difficult, the catheter may be left in longer. After the catheter is removed, you may need to urinate more often. This is normal and should get better with time.    For the first few weeks after your procedure, you may notice that your urine is cloudy. Or you may have blood or blood clots in your urine. This is normal while your body rids itself of the treated tissue. These symptoms may begin to get better during the first few weeks. But it may take a few months before they go away. Your provider can tell you when you can have sex again and when you can go back to work.    You also may be told to avoid lifting anything over 10 pounds or bending over to lift things from the ground.    You should drink plenty of fluids to help flush out your bladder.  Getting back to sex  You may be glad to know that BPH and its treatments rarely cause problems with sex. You may find that you have retrograde ejaculation. This happens when semen goes into the bladder instead of the urethra during ejaculation. Retrograde ejaculation is common after procedures for BPH. But even if this does occur, orgasm shouldn t feel any different than it did before the procedure. And it should not cause any health problems or affect your sexual function. If you notice any problems with sex, talk with your healthcare provider. Help may be available.  When to call your healthcare provider  Contact your healthcare provider right away if:    You have a fever of 100.4 F (38 C) or higher, or as directed by your provider    You have excessive bleeding    You have pain not relieved by medicines    You notice that no  urine is draining from the catheter or the catheter falls out    You have a frequent or very strong urge to urinate    You re not able to urinate, or notice a decrease in urine flow   Date Last Reviewed: 2/1/2017 2000-2017 [employer]. [address] [place] [address]. All rights reserved. This information is not intended as a substitute for professional medical care. Always follow your healthcare professional's instructions.  Surgery Preparation    You will receive a phone call from the [facility] within 1-2 days. If you do not receive a call within 2 days, contact [phone] to schedule the procedure. You can write the location/date/time of surgery in the space provided below for your records.    Location of Surgery:                                          Date of Surgery:                                                 Time of Arrival:                                                   Time of Surgery:                                                   Please arrange an appointment with a primary care provider for a pre-op physical. This is a mandatory appointment that needs to be completed within the two weeks prior to your surgery date. This gives clearance for you to receive anesthesia during your procedure. If you have had a pre-op physical within 30 days of your surgery date, you may not need another one. Check with your provider.    If you are having a Same Day Surgery, you must have a  to and from the procedure. Someone needs to stay with you post-operatively for 12 hours.     Do not eat or drink any solids, milk products, or pulpy juices after midnight the night before your surgery. You may have clear liquids up to 8 hours prior to the surgery. This would include water, soda, coffee (without cream), tea, broth, and jello.    Please stop all over the counter blood thinners such as Aspirin, Advil, Aleve, Ibuprofen, Motrin, and Excedrin one week prior to  surgery. Please discontinue prescription blood thinners 5-7 days prior to surgery, per your primary physician's orders.     Please check with your insurance company to confirm that your procedure is covered, and that the facility is in-network.     Call the Urology Department @ [phone] if you have questions about your surgery, or if you need to reschedule your procedure.

## 2018-06-26 NOTE — MR AVS SNAPSHOT
After Visit Summary   6/26/2018    Tommy De La O    MRN: [id number]           Patient Information     Date Of Birth          1955        Visit Information        Provider Department      6/26/2018 10:00 AM Patricio Domingo MD [facility]        Today's Diagnoses     Benign prostatic hyperplasia with incomplete bladder emptying    -  1      Care Instructions      Laser Prostatectomy TURP (Transurethral resection of prostate)   You have an enlarged prostate gland. This is also called benign prostatic hyperplasia (BPH). BPH is not cancer, but it can cause problems with urination. To relieve your symptoms, your healthcare provider may recommend laser prostatectomy. This procedure uses a laser (concentrated light energy). The laser removes prostate tissue from around the urethra. The urethra is the tube that carries urine from the bladder out of the body. The surgery lets urine flow more freely. The laser destroys the prostate tissue. This means it can t be looked at for signs of cancer.     Types of prostate laser surgery  The type of laser surgery your healthcare provider will do may depend on your health, the size of your prostate, and the type of tools available. The different types of prostate laser surgery are:    Photoselective vaporization of the prostate (PVP). The laser is used to vaporize or melt away the extra prostate tissue.    Holmium laser ablation of the prostate (HoLAP). This type of surgery is similar to PVP, but uses a different kind of laser.    Holmium laser enucleation of the prostate (HoLEP). In this procedure the laser cuts and removes extra tissue. A tool cuts the tissue into small pieces. This makes them easier to remove.   Possible risks and side effects of laser prostatectomy    Bleeding    Infection    Pneumonia    Blood clots    Scarring or narrowing of the urethra. This can cause trouble urinating.    Only some relief of symptoms    Erectile dysfunction  (rare)    Loss of bladder control (very rare)    Loss of ejaculation  Getting ready for the procedure  Your healthcare team will give you detailed instructions on how to get ready for the procedure. Be sure to follow them.    Tell your healthcare team about all medicines you take. This includes prescription and over-the-counter medicines, vitamins, herbs, and other supplements. It also includes any blood thinners such as warfarin, clopidogrel, or daily aspirin. You may need to stop taking some or all of them before surgery.    You will probably be told not to eat or drink anything after midnight on the night before your procedure.    When you arrive for the procedure, you will have an IV (intravenous) line placed. This gives you fluids and medicines during the procedure.    You will be given medicine to keep you from feeling pain (anesthesia) before the procedure. You may have 1 or more of the following:  ? Local anesthesia. Your bladder and urethra are numbed.  ? Regional anesthesia. Your body below the waist is numbed.  ? General anesthesia. You are in a state like deep sleep.  During the procedure  Your healthcare provider can help explain the details of your surgery. These details depend on the type of laser surgery that will be done. In general, you can expect the following:    The procedure itself usually takes less than an hour.    A thin, tube-like telescope (cystoscope) is put through the opening in your penis and into your urethra. This tool lets your provider see your urethra and your prostate, either through the cystoscope or on a video screen.    The laser is put through the cystoscope. The laser is then used to destroy the extra prostate tissue.    You may get a urinary catheter. This helps your bladder drain for a short time after the procedure. It s removed when it s no longer needed.  After the procedure  You may go home the same day after your surgery. Or you may stay a night in the hospital. An  adult friend or family member should drive you home. To get the best results from your laser prostatectomy, follow your healthcare provider's instructions. Keep your follow-up appointments.    Your prostate will likely be sore at first. This will get better as you heal. Here are some things you can expect:    You may be sent home with a catheter to drain urine from your bladder. If so, you may wear a leg bag until it's no longer needed. The catheter will allow the area to heal and help you avoid painful urination.    Your provider may also prescribe antibiotics to prevent infection and pain medicine to ease any discomfort.    In about a week, you ll visit your provider to have your catheter removed. If swelling still makes urination difficult, the catheter may be left in longer. After the catheter is removed, you may need to urinate more often. This is normal and should get better with time.    For the first few weeks after your procedure, you may notice that your urine is cloudy. Or you may have blood or blood clots in your urine. This is normal while your body rids itself of the treated tissue. These symptoms may begin to get better during the first few weeks. But it may take a few months before they go away. Your provider can tell you when you can have sex again and when you can go back to work.    You also may be told to avoid lifting anything over 10 pounds or bending over to lift things from the ground.    You should drink plenty of fluids to help flush out your bladder.  Getting back to sex  You may be glad to know that BPH and its treatments rarely cause problems with sex. You may find that you have retrograde ejaculation. This happens when semen goes into the bladder instead of the urethra during ejaculation. Retrograde ejaculation is common after procedures for BPH. But even if this does occur, orgasm shouldn t feel any different than it did before the procedure. And it should not cause any health problems  or affect your sexual function. If you notice any problems with sex, talk with your healthcare provider. Help may be available.  When to call your healthcare provider  Contact your healthcare provider right away if:    You have a fever of 100.4 F (38 C) or higher, or as directed by your provider    You have excessive bleeding    You have pain not relieved by medicines    You notice that no urine is draining from the catheter or the catheter falls out    You have a frequent or very strong urge to urinate    You re not able to urinate, or notice a decrease in urine flow   Date Last Reviewed: 2/1/2017 2000-2017 [employer]. [address] [place], [place] [address]. All rights reserved. This information is not intended as a substitute for professional medical care. Always follow your healthcare professional's instructions.  Surgery Preparation    You will receive a phone call from the [facility] within 1-2 days. If you do not receive a call within 2 days, contact [phone] to schedule the procedure. You can write the location/date/time of surgery in the space provided below for your records.    Location of Surgery:                                          Date of Surgery:                                                 Time of Arrival:                                                   Time of Surgery:                                                   Please arrange an appointment with a primary care provider for a pre-op physical. This is a mandatory appointment that needs to be completed within the two weeks prior to your surgery date. This gives clearance for you to receive anesthesia during your procedure. If you have had a pre-op physical within 30 days of your surgery date, you may not need another one. Check with your provider.    If you are having a Same Day Surgery, you must have a  to and from the procedure. Someone needs to stay with you post-operatively for 12  hours.     Do not eat or drink any solids, milk products, or pulpy juices after midnight the night before your surgery. You may have clear liquids up to 8 hours prior to the surgery. This would include water, soda, coffee (without cream), tea, broth, and jello.    Please stop all over the counter blood thinners such as Aspirin, Advil, Aleve, Ibuprofen, Motrin, and Excedrin one week prior to surgery. Please discontinue prescription blood thinners 5-7 days prior to surgery, per your primary physician's orders.     Please check with your insurance company to confirm that your procedure is covered, and that the facility is in-network.     Call the Urology Department @ [phone] if you have questions about your surgery, or if you need to reschedule your procedure.                 Follow-ups after your visit        Your next 10 appointments already scheduled     Jun 26, 2018  7:30 PM CDT   MR WRIST LEFT W/O CONTRAST with [address] MRI ([facility])    [address] [id number] [phone]           Take your medicines as usual, unless your doctor tells you not to. Bring a list of your current medicines to your exam (including vitamins, minerals and over-the-counter drugs). Also bring the results of similar scans you may have had.  Please remove any body piercings and hair extensions before you arrive.  Follow your doctor s orders. If you do not, we may have to postpone your exam.  You may or may not receive IV contrast for this exam pending the discretion of the Radiologist.  You do not need to do anything special to prepare.  The MRI machine uses a strong magnet. Please wear clothes without metal (snaps, zippers). A sweatsuit works well, or we may give you a hospital gown.   **IMPORTANT** THE INSTRUCTIONS BELOW ARE ONLY FOR THOSE PATIENTS WHO HAVE BEEN PRESCRIBED SEDATION OR GENERAL ANESTHESIA DURING THEIR MRI PROCEDURE:  IF YOUR DOCTOR PRESCRIBED ORAL SEDATION (take  medicine to help you relax during your exam):   You must get the medicine from your doctor (oral medication) before you arrive. Bring the medicine to the exam. Do not take it at home. You ll be told when to take it upon arriving for your exam.   Arrive one hour early. Bring someone who can take you home after the test. Your medicine will make you sleepy. After the exam, you may not drive, take a bus or take a taxi by yourself.  IF YOUR DOCTOR PRESCRIBED IV SEDATION:   Arrive one hour early. Bring someone who can take you home after the test. Your medicine will make you sleepy. After the exam, you may not drive, take a bus or take a taxi by yourself.   No eating 6 hours before your exam. You may have clear liquids up until 4 hours before your exam. (Clear liquids include water, clear tea, black coffee and fruit juice without pulp.)  IF YOUR DOCTOR PRESCRIBED ANESTHESIA (be asleep for your exam):   Arrive 1 1/2 hours early. Bring someone who can take you home after the test. You may not drive, take a bus or take a taxi by yourself.   No eating 8 hours before your exam. You may have clear liquids up until 4 hours before your exam. (Clear liquids include water, clear tea, black coffee and fruit juice without pulp.)   You will spend four to five hours in the recovery room.  Please call the Imaging Department at your exam site with any questions.            Jun 28, 2018 10:20 AM CDT   SHORT with Tim Crawley MD   [facility] ([facility])    [address] [place] [address] [phone]              Who to contact     If you have questions or need follow up information about today's clinic visit or your schedule please contact [facility] directly at [phone].  Normal or non-critical lab and imaging results will be communicated to you by MyChart, letter or phone within 4 business days after the clinic has received the results. If you do not hear from  us within 7 days, please contact the clinic through [employer] or phone. If you have a critical or abnormal lab result, we will notify you by phone as soon as possible.  Submit refill requests through [employer] or call your pharmacy and they will forward the refill request to us. Please allow 3 business days for your refill to be completed.          Additional Information About Your Visit        [employer] Information     [employer] gives you secure access to your electronic health record. If you see a primary care provider, you can also send messages to your care team and make appointments. If you have questions, please call your primary care clinic.  If you do not have a primary care provider, please call [phone] and they will assist you.        Care EveryWhere ID     This is your Care EveryWhere ID. This could be used by other organizations to access your [facility] medical records  [id number]        Your Vitals Were     Pulse Pulse Oximetry                54 98%           Blood Pressure from Last 3 Encounters:   06/26/18 125/81   06/15/18 125/74   02/16/18 125/73    Weight from Last 3 Encounters:   06/15/18 138.8 kg (306 lb)   02/16/18 (!) 142.1 kg (313 lb 3.2 oz)   02/01/18 (!) 140.2 kg (309 lb)              We Performed the Following     MEASURE POST-VOID RESIDUAL URINE/BLADDER CAPACITY, US NON-IMAGING (20970)        Primary Care Provider Office Phone # Fax #    Tim Crawley [phone] [address] [place] [address]        Equal Access to Services     JANETT ARAYA : Hadii aad ku hadasho Soomaali, waaxda luqadaha, qaybta kaalmada adeegyada, bernarda hull . So [facility] [phone].    ATENCIÓN: Si habla español, tiene a calix disposición servicios gratuitos de asistencia lingüística. Llame al [phone].    We comply with applicable federal civil rights laws and [place] laws. We do not discriminate on the basis of race, color, national origin, age,  disability, sex, sexual orientation, or gender identity.            Thank you!     Thank you for choosing [facility]  for your care. Our goal is always to provide you with excellent care. Hearing back from our patients is one way we can continue to improve our services. Please take a few minutes to complete the written survey that you may receive in the mail after your visit with us. Thank you!             Your Updated Medication List - Protect others around you: Learn how to safely use, store and throw away your medicines at www.disposemymeds.org.          This list is accurate as of 6/26/18 10:31 AM.  Always use your most recent med list.                   Brand Name Dispense Instructions for use Diagnosis    * AMLODIPINE BESYLATE PO      Take 5 mg by mouth daily        * amLODIPine 5 MG tablet    NORVASC    90 tablet    Take 1 tablet (5 mg) by mouth daily    Essential hypertension with goal blood pressure less than 140/90       aspirin 81 MG EC tablet      Take 81 mg by mouth daily        chlorthalidone 25 MG tablet    HYGROTON    90 tablet    Take 1 tablet (25 mg) by mouth daily    Essential hypertension with goal blood pressure less than 140/90       clindamycin 300 MG capsule    CLEOCIN     Take 600 mg by mouth 1 HOUR PRIOR TO DENTAL APPOINTMENT        clobetasol 0.05 % ointment    TEMOVATE     Apply 1 Application topically daily as needed For itching        * OMEPRAZOLE PO      Take 40 mg by mouth every morning        * omeprazole 40 MG capsule    priLOSEC    90 capsule    Take 1 capsule (40 mg) by mouth daily Take 30-60 minutes before a meal.    Gastroesophageal reflux disease without esophagitis       * SIMVASTATIN PO      Take 20 mg by mouth At Bedtime        * simvastatin 20 MG tablet    ZOCOR    90 tablet    Take 1 tablet (20 mg) by mouth At Bedtime    Hyperlipidemia, unspecified hyperlipidemia type       tamsulosin 0.4 MG capsule    FLOMAX    90 capsule    Take 1 capsule (0.4 mg) by mouth  daily    Benign prostatic hyperplasia with incomplete bladder emptying       * Notice:  This list has 6 medication(s) that are the same as other medications prescribed for you. Read the directions carefully, and ask your doctor or other care provider to review them with you.

## 2018-06-26 NOTE — PROGRESS NOTES
Chief Complaint   Patient presents with     RECHECK     PSA follow up       Tommy De La O is a 63 year old male who presents today for follow up of   Chief Complaint   Patient presents with     RECHECK     PSA follow up    f/u for benign prostatic hyperplasia.  He is on flomax now.  He has h/o partial urinary retention.  His AUA score is 9.      Current Outpatient Prescriptions   Medication Sig Dispense Refill     amLODIPine (NORVASC) 5 MG tablet Take 1 tablet (5 mg) by mouth daily 90 tablet 3     AMLODIPINE BESYLATE PO Take 5 mg by mouth daily        aspirin 81 MG EC tablet Take 81 mg by mouth daily       chlorthalidone (HYGROTON) 25 MG tablet Take 1 tablet (25 mg) by mouth daily 90 tablet 3     clobetasol (TEMOVATE) 0.05 % ointment Apply 1 Application topically daily as needed For itching       omeprazole (PRILOSEC) 40 MG capsule Take 1 capsule (40 mg) by mouth daily Take 30-60 minutes before a meal. 90 capsule 3     OMEPRAZOLE PO Take 40 mg by mouth every morning       simvastatin (ZOCOR) 20 MG tablet Take 1 tablet (20 mg) by mouth At Bedtime 90 tablet 3     SIMVASTATIN PO Take 20 mg by mouth At Bedtime        tamsulosin (FLOMAX) 0.4 MG capsule Take 1 capsule (0.4 mg) by mouth daily 90 capsule 3     clindamycin (CLEOCIN) 300 MG capsule Take 600 mg by mouth 1 HOUR PRIOR TO DENTAL APPOINTMENT       Allergies   Allergen Reactions     Cefzil [Cefprozil] Hives and Itching     Darvocet [Propoxyphene N-Apap] Hives      No past medical history on file.  No past surgical history on file.  Family History   Problem Relation Age of Onset     Coronary Artery Disease Father 56      at 56 MI     Prostate Cancer No family hx of      Social History     Social History     Marital status: Single     Spouse name: N/A     Number of children: N/A     Years of education: N/A     Social History Main Topics     Smoking status: Former Smoker     Types: Cigarettes     Quit date: 2011     Smokeless tobacco: Never Used     Alcohol use  None     Drug use: None     Sexual activity: Not Asked     Other Topics Concern     None     Social History Narrative       REVIEW OF SYSTEMS  =================  C: NEGATIVE for fever, chills, change in weight  I: NEGATIVE for worrisome rashes, moles or lesions  E/M: NEGATIVE for ear, mouth and throat problems  R: NEGATIVE for significant cough or SHORTNESS OF BREATH,   CV: NEGATIVE for chest pain, palpitations or peripheral edema  GI: NEGATIVE for nausea, abdominal pain, heartburn, or change in bowel habits  NEURO: NEGATIVE any motor/sensory changes  PSYCH: NEGATIVE for recent mood disorder    Physical Exam:  /81 (BP Location: Right arm, Patient Position: Sitting, Cuff Size: Adult Large)  Pulse 54  SpO2 98%   Patient is pleasant, in no acute distress, good general condition.  Lung: no evidence of respiratory distress    Abdomen: Soft, nondistended, non tender. No masses. No rebound or guarding.   Exam: bladder scan  Over 300 ml.  No cva tenderness  Skin: Warm and dry.  No redness.  Psych: normal mood and affect  Neuro: alert and oriented    Assessment/Plan:   (N40.1,  R39.14) Benign prostatic hyperplasia with incomplete bladder emptying  (primary encounter diagnosis)  Comment: discussed TURP  Plan: he wants to schedule this on November           Risks and benefits discussed

## 2018-06-26 NOTE — PROGRESS NOTES
Bladder Scan performed. 310 mL maximum residual urine detected after 3 scans. MD informed.    Festus Avila RN....6/26/2018 10:07 AM

## 2018-06-28 ENCOUNTER — OFFICE VISIT (OUTPATIENT)
Dept: FAMILY MEDICINE | Facility: CLINIC | Age: 63
End: 2018-06-28
Payer: COMMERCIAL

## 2018-06-28 VITALS
RESPIRATION RATE: 24 BRPM | HEART RATE: 56 BPM | SYSTOLIC BLOOD PRESSURE: 118 MMHG | DIASTOLIC BLOOD PRESSURE: 68 MMHG | OXYGEN SATURATION: 97 % | BODY MASS INDEX: 42.08 KG/M2 | TEMPERATURE: 98.6 F | WEIGHT: 306 LBS

## 2018-06-28 DIAGNOSIS — G62.9 PERIPHERAL POLYNEUROPATHY: Primary | ICD-10-CM

## 2018-06-28 DIAGNOSIS — L30.9 ECZEMA, UNSPECIFIED TYPE: ICD-10-CM

## 2018-06-28 PROCEDURE — 99214 OFFICE O/P EST MOD 30 MIN: CPT | Performed by: FAMILY MEDICINE

## 2018-06-28 RX ORDER — NEOMYCIN SULFATE, POLYMYXIN B SULFATE AND HYDROCORTISONE 10; 3.5; 1 MG/ML; MG/ML; [USP'U]/ML
3 SUSPENSION/ DROPS AURICULAR (OTIC) 4 TIMES DAILY PRN
Qty: 1 BOTTLE | Refills: 5 | Status: SHIPPED | OUTPATIENT
Start: 2018-06-28 | End: 2018-10-11

## 2018-06-28 RX ORDER — CLOBETASOL PROPIONATE 0.5 MG/G
OINTMENT TOPICAL DAILY PRN
Qty: 45 G | Refills: 1 | Status: SHIPPED | OUTPATIENT
Start: 2018-06-28 | End: 2019-12-02

## 2018-06-28 NOTE — NURSING NOTE
"Chief Complaint   Patient presents with     Numbness       Initial There were no vitals taken for this visit. Estimated body mass index is 42.08 kg/(m^2) as calculated from the following:    Height as of 6/15/18: 5' 11.5\" (1.816 m).    Weight as of 6/15/18: 306 lb (138.8 kg).      Health Maintenance that is potentially due pending provider review:  Colonoscopy/FIT            "

## 2018-06-28 NOTE — MR AVS SNAPSHOT
After Visit Summary   6/28/2018    Tommy De La O    MRN: [id number]           Patient Information     Date Of Birth          1955        Visit Information        Provider Department      6/28/2018 10:20 AM Tim Crawley MD [facility]        Today's Diagnoses     Peripheral polyneuropathy    -  1    Eczema, unspecified type          Care Instructions    ASSESSMENT:   (G62.9) Peripheral polyneuropathy  (primary encounter diagnosis)  Comment: This is causing numbness and the leg pains.  No cause has been identified.  There is no cure that is available.  I will send for the neurology consultations and EMG reports.   Plan: If pain or numbness more troublesome, there are medications that can help for the nerve symptoms if needed.     (L30.9) Eczema, unspecified type  Comment: You have sensitive skin.   Plan: neomycin-polymyxin-hydrocortisone (CORTISPORIN)        3.5-14327-8 otic suspension, clobetasol         (TEMOVATE) 0.05 % ointment        Eczema is a chronic skin sensitivity problem.  Some individuals seem to have more sensitive skin.  The skin gets irritated easily from various irritants like being dry, soaps, chemicals or frequent water exposure.    Treatment for eczema includes the following.    Limit water and chemical exposure.  Use mild soap for bathing like Ivory or Neutragena, and limit areas the soap is used for example groin and armpits.    Frequent use of moisturizing lotion or cream is important. There are lotions which are thin and work in quickly like Vaseline Intensive care or Erika lotion.  Creams are a litte thicker like Christina.  Some skin lubricants hold in moisture even better like Eucerin or Aquafor but these are also greasier.   Topical steroid medication can be applied to irritated areas twice daily.    1% hydrocortisone cream is mild and available over the counter.    There are stronger cortisone creams available medium and very potent preparations.      Longer tem use (weeks or months) of topical cortisone/steroid medications can cause skin atrophy or lighter skin color.  The more potent the preparation, the more risk for damaging the skin.      You can try Cortisporin solution for ear.  Use 3 drops four times daily as needed for the ear itching.  On the outer ear, you can use 1% hydrocortisone cream twice daily.     For hand rash, OK to use the Temovate cream.  Use sparingly as needed.           Follow-ups after your visit        Who to contact     If you have questions or need follow up information about today's clinic visit or your schedule please contact [facility] directly at [phone].  Normal or non-critical lab and imaging results will be communicated to you by Graduwayhart, letter or phone within 4 business days after the clinic has received the results. If you do not hear from us within 7 days, please contact the clinic through [employer] or phone. If you have a critical or abnormal lab result, we will notify you by phone as soon as possible.  Submit refill requests through [employer] or call your pharmacy and they will forward the refill request to us. Please allow 3 business days for your refill to be completed.          Additional Information About Your Visit        Graduwayhar"Shahab P. Tabatabai, Broker" Information     [employer] gives you secure access to your electronic health record. If you see a primary care provider, you can also send messages to your care team and make appointments. If you have questions, please call your primary care clinic.  If you do not have a primary care provider, please call [phone] and they will assist you.        Care EveryWhere ID     This is your Care EveryWhere ID. This could be used by other organizations to access your [facility] medical records  [id number]        Your Vitals Were     Pulse Temperature Respirations Pulse Oximetry BMI (Body Mass Index)       56 98.6  F (37  C) (Tympanic) 24 97% 42.08 kg/m2        Blood Pressure  from Last 3 Encounters:   06/28/18 118/68   06/26/18 125/81   06/15/18 125/74    Weight from Last 3 Encounters:   06/28/18 306 lb (138.8 kg)   06/15/18 306 lb (138.8 kg)   02/16/18 (!) 313 lb 3.2 oz (142.1 kg)              We Performed the Following     Colonoscopy - HIM Scan          Today's Medication Changes          These changes are accurate as of 6/28/18 11:31 AM.  If you have any questions, ask your nurse or doctor.               Start taking these medicines.        Dose/Directions    neomycin-polymyxin-hydrocortisone 3.5-88906-6 otic suspension   Commonly known as:  CORTISPORIN   Used for:  Eczema, unspecified type   Started by:  Tim Crawley MD        Dose:  3 drop   Place 3 drops Into the left ear 4 times daily as needed   Quantity:  1 Bottle   Refills:  5         These medicines have changed or have updated prescriptions.        Dose/Directions    clobetasol 0.05 % ointment   Commonly known as:  TEMOVATE   This may have changed:  how much to take   Used for:  Eczema, unspecified type   Changed by:  Tim Crawley MD        Apply topically daily as needed For itching   Quantity:  45 g   Refills:  1            Where to get your medicines      These medications were sent to [facility] - [facility] [address] [facility]  [address] [facility] [facility] [address]     Phone:  [phone]     clobetasol 0.05 % ointment         Some of these will need a paper prescription and others can be bought over the counter.  Ask your nurse if you have questions.     Bring a paper prescription for each of these medications     neomycin-polymyxin-hydrocortisone 3.5-31625-7 otic suspension                Primary Care Provider Office Phone # Fax #    Tim Crawley [phone] [address] [place] [address]        Equal Access to Services     JANETT ARAYA AH: Kym Mireles, anna callaway, qabernarda villanueva  lacameron childers. So [facility] [phone].    ATENCIÓN: Si habla marcell, tiene a calix disposición servicios gratuitos de asistencia lingüística. Zina ludwig [phone].    We comply with applicable federal civil rights laws and [place] laws. We do not discriminate on the basis of race, color, national origin, age, disability, sex, sexual orientation, or gender identity.            Thank you!     Thank you for choosing [facility]  for your care. Our goal is always to provide you with excellent care. Hearing back from our patients is one way we can continue to improve our services. Please take a few minutes to complete the written survey that you may receive in the mail after your visit with us. Thank you!             Your Updated Medication List - Protect others around you: Learn how to safely use, store and throw away your medicines at www.disposemymeds.org.          This list is accurate as of 6/28/18 11:32 AM.  Always use your most recent med list.                   Brand Name Dispense Instructions for use Diagnosis    * AMLODIPINE BESYLATE PO      Take 5 mg by mouth daily        * amLODIPine 5 MG tablet    NORVASC    90 tablet    Take 1 tablet (5 mg) by mouth daily    Essential hypertension with goal blood pressure less than 140/90       aspirin 81 MG EC tablet      Take 81 mg by mouth daily        chlorthalidone 25 MG tablet    HYGROTON    90 tablet    Take 1 tablet (25 mg) by mouth daily    Essential hypertension with goal blood pressure less than 140/90       clindamycin 300 MG capsule    CLEOCIN     Take 600 mg by mouth 1 HOUR PRIOR TO DENTAL APPOINTMENT        clobetasol 0.05 % ointment    TEMOVATE    45 g    Apply topically daily as needed For itching    Eczema, unspecified type       neomycin-polymyxin-hydrocortisone 3.5-14014-5 otic suspension    CORTISPORIN    1 Bottle    Place 3 drops Into the left ear 4 times daily as needed    Eczema, unspecified type       * OMEPRAZOLE PO      Take 40 mg by  mouth every morning        * omeprazole 40 MG capsule    priLOSEC    90 capsule    Take 1 capsule (40 mg) by mouth daily Take 30-60 minutes before a meal.    Gastroesophageal reflux disease without esophagitis       * SIMVASTATIN PO      Take 20 mg by mouth At Bedtime        * simvastatin 20 MG tablet    ZOCOR    90 tablet    Take 1 tablet (20 mg) by mouth At Bedtime    Hyperlipidemia, unspecified hyperlipidemia type       tamsulosin 0.4 MG capsule    FLOMAX    90 capsule    Take 1 capsule (0.4 mg) by mouth daily    Benign prostatic hyperplasia with incomplete bladder emptying       * Notice:  This list has 6 medication(s) that are the same as other medications prescribed for you. Read the directions carefully, and ask your doctor or other care provider to review them with you.

## 2018-06-28 NOTE — PROGRESS NOTES
SUBJECTIVE:   Tommy De La O is a 63 year old male who presents to clinic today for the following health issues:  Chief Complaint   Patient presents with     Numbness     Derm Problem     Ear Problem      Ear problem      Duration: with in 1 year    Description (location/character/radiation): left ear itching    Intensity:  mild    Accompanying signs and symptoms: ringing in ear but that came separate after the itching already started from a gun firing when hunting    History (similar episodes/previous evaluation): None    Precipitating or alleviating factors: None    Therapies tried and outcome: None   He has itching left ear.  Feels like inside ear. He has had wax in the past.       numbness      Duration: since 2014 prior to total knee replacements    Description (location/character/radiation): bilateral lower extremity - knee down    Intensity:  moderate    Accompanying signs and symptoms: 0    History (similar episodes/previous evaluation): history of knee replacement, has been seen by specialty, rheum, neuro, vacular    Precipitating or alleviating factors: None    Therapies tried and outcome: seeing Rheum at end of July    Vascular surgery 2016    Tried compression stockings made it worse    Has not tried medication for numbness   He reports seeing many physicians.   He has seen neurology at [facility].  He has seen vascular surgery  He has had several EMGs.  Dr. Jack.   ? 3/23/2017  He has seen ortho and had both knee replacements.   Saw Rheumatology.  Was told he needed to lose weight or he would be having legs cut off.     He has numbness around knees anterior bilateral at site of knee surgeries.  A little numbness lateral legs.  Both feet are numb.   Numbness in toes.  Can get throbbing at times, not that painful.   Can get annoying.   Tingling in toes worse when legs are up.  No weakness noted in legs.     EMG upper extremities 5/24/2017:Evidence for bilateral mild median neuropathy across the wrist as can  be seen with carpal tunnel syndrome.  Mild right ulnar sensory neuropathy.      Gets pains in legs. Lateral right leg is worse than left leg.  Worse at night.  No leg pain with walking.  Numbness is better when walking.   Not taking any medication for leg pain.     Some chronic pain right lower back.  He has seen spine surgeon, Dr. Aurelio Delgadillo.  Was told there is not a surgical fix.     Medication Followup of Temovate for rash on hand    Taking Medication as prescribed: yes prn    Side Effects:  None    Medication Helping Symptoms:  It is some help, uses prn only but would like to see if anything stronger   He gets itchy rash on hands.   Very itchy.  Uses cream in the winter more than summer.   Has been using potent steroid creams.  This helps but does not go away.     Patient Active Problem List   Diagnosis     Osteoarthritis of knee     Gastrointestinal hemorrhage     Gastroesophageal reflux disease     Hyperlipidemia     Obstructive sleep apnea syndrome     Essential hypertension with goal blood pressure less than 140/90      He has had surgery on leg veins in the past.     Occupation: retired.  [employer].   Has worked in [place] [employer].     OBJECTIVE:Blood pressure 118/68, pulse 56, temperature 98.6  F (37  C), temperature source Tympanic, resp. rate 24, weight 306 lb (138.8 kg), SpO2 97 %. BMI=Body mass index is 42.08 kg/(m^2).  GENERAL APPEARANCE ADULT: Alert, no acute distress, morbidly obese  MS: He has bilateral anterior knee scars from past total knees.   Some decreased flexion in both knees to 90 degrees left and 110 degrees right.  Monofilament he feels is decreased bilateral in plantar feet but only a couple toes left foot which he cannot feel.  He has intact monofilament in both legs.    I am unable to elicit reflexes and knees and ankles.   Good strength in both lower extremities.   Heel, toe, tandem gait all normal.   Normal rhomberg test.      ASSESSMENT:   (G62.9) Peripheral  polyneuropathy  (primary encounter diagnosis)  Comment: This is causing numbness and the leg pains.  No cause has been identified.  There is no cure that is available.  I will send for the neurology consultations and EMG reports.   Plan: If pain or numbness more troublesome, there are medications that can help for the nerve symptoms if needed.     (L30.9) Eczema, unspecified type  Comment: You have sensitive skin.   Plan: neomycin-polymyxin-hydrocortisone (CORTISPORIN)        3.5-63105-1 otic suspension, clobetasol         (TEMOVATE) 0.05 % ointment        Eczema is a chronic skin sensitivity problem.  Some individuals seem to have more sensitive skin.  The skin gets irritated easily from various irritants like being dry, soaps, chemicals or frequent water exposure.    Treatment for eczema includes the following.    Limit water and chemical exposure.  Use mild soap for bathing like Ivory or Neutragena, and limit areas the soap is used for example groin and armpits.    Frequent use of moisturizing lotion or cream is important. There are lotions which are thin and work in quickly like Vaseline Intensive care or Erika lotion.  Creams are a litte thicker like Christina.  Some skin lubricants hold in moisture even better like Eucerin or Aquafor but these are also greasier.   Topical steroid medication can be applied to irritated areas twice daily.    1% hydrocortisone cream is mild and available over the counter.    There are stronger cortisone creams available medium and very potent preparations.     Longer tem use (weeks or months) of topical cortisone/steroid medications can cause skin atrophy or lighter skin color.  The more potent the preparation, the more risk for damaging the skin.      You can try Cortisporin solution for ear.  Use 3 drops four times daily as needed for the ear itching.  On the outer ear, you can use 1% hydrocortisone cream twice daily.     For hand rash, OK to use the Temovate cream.  Use sparingly  as needed.           Problem list from [employer].  Active Problems  Reconcile with Patient's Chart    Problem Noted Date   Venous insufficiency of both lower extremities 04/20/2016   Leg pain, bilateral 04/20/2016   Bilateral leg paresthesia 03/31/2016   Venous hypertension of both lower extremities 03/31/2016   Morbid obesity with BMI of 40.0-44.9, adult (H) 03/31/2016   Weight gain 03/31/2016   Acute blood loss anemia 03/18/2015   S/P total knee arthroplasty 03/17/2015   Hypertension 03/17/2015   Hemorrhoids With Bleeding 01/25/2015   Red Blood In Bowel Movement (Hematochezia)  01/25/2015   Sebaceous cyst 12/04/2014   Pharyngeal pain 12/04/2014   Gastrointestinal hemorrhage 11/03/2014   Esophageal reflux     Overview:     Created by Conversion       Eczema     Overview:     Created by Conversion       Obstructive Sleep Apnea     Overview:     Created by Conversion       Chronic Obstructive Pulmonary Disease     Overview:     Created by Conversion       Lethargy     Overview:     Created by Conversion       Hyperglycemia     Overview:     Created by Conversion       Early Lyme Disease     Overview:     Created by Conversion       Onychomycosis Of The Toenails     Overview:     Created by Conversion       Hyperlipidemia     Overview:     Created by Conversion       Osteoarthrosis, unspecified whether generalized or localized, lower leg     Overview:     Created by Conversion    Replacement Utility updated for latest IMO load

## 2018-06-28 NOTE — PATIENT INSTRUCTIONS
ASSESSMENT:   (G62.9) Peripheral polyneuropathy  (primary encounter diagnosis)  Comment: This is causing numbness and the leg pains.  No cause has been identified.  There is no cure that is available.  I will send for the neurology consultations and EMG reports.   Plan: If pain or numbness more troublesome, there are medications that can help for the nerve symptoms if needed.     (L30.9) Eczema, unspecified type  Comment: You have sensitive skin.   Plan: neomycin-polymyxin-hydrocortisone (CORTISPORIN)        3.5-73961-7 otic suspension, clobetasol         (TEMOVATE) 0.05 % ointment        Eczema is a chronic skin sensitivity problem.  Some individuals seem to have more sensitive skin.  The skin gets irritated easily from various irritants like being dry, soaps, chemicals or frequent water exposure.    Treatment for eczema includes the following.    Limit water and chemical exposure.  Use mild soap for bathing like Ivory or Neutragena, and limit areas the soap is used for example groin and armpits.    Frequent use of moisturizing lotion or cream is important. There are lotions which are thin and work in quickly like Vaseline Intensive care or Erika lotion.  Creams are a litte thicker like Christina.  Some skin lubricants hold in moisture even better like Eucerin or Aquafor but these are also greasier.   Topical steroid medication can be applied to irritated areas twice daily.    1% hydrocortisone cream is mild and available over the counter.    There are stronger cortisone creams available medium and very potent preparations.     Longer tem use (weeks or months) of topical cortisone/steroid medications can cause skin atrophy or lighter skin color.  The more potent the preparation, the more risk for damaging the skin.      You can try Cortisporin solution for ear.  Use 3 drops four times daily as needed for the ear itching.  On the outer ear, you can use 1% hydrocortisone cream twice daily.     For hand rash, OK to use the  Temovate cream.  Use sparingly as needed.

## 2018-07-23 ENCOUNTER — RECORDS - HEALTHEAST (OUTPATIENT)
Dept: ADMINISTRATIVE | Facility: OTHER | Age: 63
End: 2018-07-23

## 2018-07-27 ENCOUNTER — TRANSFERRED RECORDS (OUTPATIENT)
Dept: HEALTH INFORMATION MANAGEMENT | Facility: CLINIC | Age: 63
End: 2018-07-27

## 2018-08-10 ENCOUNTER — TRANSFERRED RECORDS (OUTPATIENT)
Dept: HEALTH INFORMATION MANAGEMENT | Facility: CLINIC | Age: 63
End: 2018-08-10

## 2018-08-20 ENCOUNTER — TRANSFERRED RECORDS (OUTPATIENT)
Dept: HEALTH INFORMATION MANAGEMENT | Facility: CLINIC | Age: 63
End: 2018-08-20

## 2018-09-05 ENCOUNTER — OFFICE VISIT (OUTPATIENT)
Dept: FAMILY MEDICINE | Facility: CLINIC | Age: 63
End: 2018-09-05
Payer: COMMERCIAL

## 2018-09-05 VITALS
HEIGHT: 72 IN | OXYGEN SATURATION: 97 % | DIASTOLIC BLOOD PRESSURE: 76 MMHG | BODY MASS INDEX: 42.53 KG/M2 | SYSTOLIC BLOOD PRESSURE: 118 MMHG | HEART RATE: 66 BPM | WEIGHT: 314 LBS | TEMPERATURE: 97.8 F

## 2018-09-05 DIAGNOSIS — H66.90 ACUTE OTITIS MEDIA, UNSPECIFIED OTITIS MEDIA TYPE: ICD-10-CM

## 2018-09-05 DIAGNOSIS — R07.0 THROAT PAIN: ICD-10-CM

## 2018-09-05 DIAGNOSIS — B34.9 VIRAL SYNDROME: Primary | ICD-10-CM

## 2018-09-05 DIAGNOSIS — H69.91 DYSFUNCTION OF RIGHT EUSTACHIAN TUBE: ICD-10-CM

## 2018-09-05 LAB
DEPRECATED S PYO AG THROAT QL EIA: NORMAL
SPECIMEN SOURCE: NORMAL

## 2018-09-05 PROCEDURE — 99213 OFFICE O/P EST LOW 20 MIN: CPT | Performed by: NURSE PRACTITIONER

## 2018-09-05 PROCEDURE — 87880 STREP A ASSAY W/OPTIC: CPT | Performed by: NURSE PRACTITIONER

## 2018-09-05 PROCEDURE — 87081 CULTURE SCREEN ONLY: CPT | Performed by: NURSE PRACTITIONER

## 2018-09-05 NOTE — PATIENT INSTRUCTIONS
"Increase rest and fluids. Tylenol and/or Ibuprofen for comfort. Cool mist vaporizer. If your symptoms worsen or do not resolve follow up with your primary care provider in 1 week and sooner if needed.      Strep culture is pending will result in 24-48 hours.  If it is positive and change in treatment plan will contact you.      Mucinex 600 mg 12 hour formula for ear, head and chest congestion.  It can also thin post nasal drip which can cause a cough and sore throat.      Indications for emergent return to emergency department discussed with patient, who verbalized good understanding and agreement.  Patient understands the limitations of today's evaluation.           Viral Syndrome (Adult)  A viral illness may cause a number of symptoms. The symptoms depend on the part of the body that the virus affects. If it settles in your nose, throat, and lungs, it may cause cough, sore throat, congestion, and sometimes headache. If it settles in your stomach and intestinal tract, it may cause vomiting and diarrhea. Sometimes it causes vague symptoms like \"aching all over,\" feeling tired, loss of appetite, or fever.  A viral illness usually lasts 1 to 2 weeks, but sometimes it lasts longer. In some cases, a more serious infection can look like a viral syndrome in the first few days of the illness. You may need another exam and additional tests to know the difference. Watch for the warning signs listed below.  Home care  Follow these guidelines for taking care of yourself at home:    If symptoms are severe, rest at home for the first 2 to 3 days.    Stay away from cigarette smoke - both your smoke and the smoke from others.    You may use over-the-counter acetaminophen or ibuprofen for fever, muscle aching, and headache, unless another medicine was prescribed for this. If you have chronic liver or kidney disease or ever had a stomach ulcer or GI bleeding, talk with your doctor before using these medicines. No one who is younger " than 18 and ill with a fever should take aspirin. It may cause severe disease or death.    Your appetite may be poor, so a light diet is fine. Avoid dehydration by drinking 8 to 12 8-ounce glasses of fluids each day. This may include water; orange juice; lemonade; apple, grape, and cranberry juice; clear fruit drinks; electrolyte replacement and sports drinks; and decaffeinated teas and coffee. If you have been diagnosed with a kidney disease, ask your doctor how much and what types of fluids you should drink to prevent dehydration. If you have kidney disease, drinking too much fluid can cause it build up in the your body and be dangerous to your health.    Over-the-counter remedies won't shorten the length of the illness but may be helpful for cough, sore throat; and nasal and sinus congestion. Don't use decongestants if you have high blood pressure.  Follow-up care  Follow up with your healthcare provider if you do not improve over the next week.  Call 911  Call 911 if any of the following occur:    Convulsion    Feeling weak, dizzy, or like you are going to faint    Chest pain, shortness of breath, wheezing, or difficulty breathing  When to seek medical advice  Call your healthcare provider right away if any of these occur:    Cough with lots of colored sputum (mucus) or blood in your sputum    Chest pain, shortness of breath, wheezing, or difficulty breathing    Severe headache; face, neck, or ear pain    Severe, constant pain in the lower right side of your belly (abdominal)    Continued vomiting (can t keep liquids down)    Frequent diarrhea (more than 5 times a day); blood (red or black color) or mucus in diarrhea    Feeling weak, dizzy, or like you are going to faint    Extreme thirst    Fever of 100.4 F (38 C) or higher, or as directed by your healthcare provider  Date Last Reviewed: 9/25/2015 2000-2017 [employer]. [address] [facility], [place] [address]. All rights reserved. This  information is not intended as a substitute for professional medical care. Always follow your healthcare professional's instructions.        Self-Care for Sore Throats    Sore throats happen for many reasons, such as colds, allergies, and infections caused by viruses or bacteria. In any case, your throat becomes red and sore. Your goal for self-care is to reduce your discomfort while giving your throat a chance to heal.  Moisten and soothe your throat  Tips include the following:    Try a sip of water first thing after waking up.    Keep your throat moist by drinking 6 or more glasses of clear liquids every day.    Run a cool-air humidifier in your room overnight.    Avoid cigarette smoke.     Suck on throat lozenges, cough drops, hard candy, ice chips, or frozen fruit-juice bars. Use the sugar-free versions if your diet or medical condition requires them.  Gargle to ease irritation  Gargling every hour or 2 can ease irritation. Try gargling with 1 of these solutions:    1/4 teaspoon of salt in 1/2 cup of warm water    An over-the-counter anesthetic gargle  Use medicine for more relief  Over-the-counter medicine can reduce sore throat symptoms. Ask your pharmacist if you have questions about which medicine to use:    Ease pain with anesthetic sprays. Aspirin or an aspirin substitute also helps. Remember, never give aspirin to anyone 18 or younger, or if you are already taking blood thinners.     For sore throats caused by allergies, try antihistamines to block the allergic reaction.    Remember: unless a sore throat is caused by a bacterial infection, antibiotics won t help you.  Prevent future sore throats  Prevention tips include the following:    Stop smoking or reduce contact with secondhand smoke. Smoke irritates the tender throat lining.    Limit contact with pets and with allergy-causing substances, such as pollen and mold.    When you re around someone with a sore throat or cold, wash your hands often to  keep viruses or bacteria from spreading.    Don t strain your vocal cords.  Call your healthcare provider  Contact your healthcare provider if you have:    A temperature over 101 F (38.3 C)    White spots on the throat    Great difficulty swallowing    Trouble breathing    A skin rash    Recent exposure to someone else with strep bacteria    Severe hoarseness and swollen glands in the neck or jaw   Date Last Reviewed: 8/1/2016 2000-2017 [employer]. [address] [place] [address], [place] [address]. All rights reserved. This information is not intended as a substitute for professional medical care. Always follow your healthcare professional's instructions.        Earache, No Infection (Adult)  Earaches can happen without an infection. This occurs when air and fluid build up behind the eardrum causing a feeling of fullness and discomfort and reduced hearing. This is called otitis media with effusion (OME) or serous otitis media. It means there is fluid in the middle ear. It is not the same as acute otitis media, which is typically from infection.  OME can happen when you have a cold if congestion blocks the passage that drains the middle ear. This passage is called the eustachian tube. OME may also occur with nasal allergies or after a bacterial middle ear infection.    The pain or discomfort may come and go. You may hear clicking or popping sounds when you chew or swallow. You may feel that your balance is off. Or you may hear ringing in the ear.  It often takes from several weeks up to 3 months for the fluid to clear on its own. Oral pain relievers and ear drops help if there is pain. Decongestants and antihistamines sometimes help. Antibiotics don't help since there is no infection. Your doctor may prescribe a nasal spray to help reduce swelling in the nose and eustachian tube. This can allow the ear to drain.  If your OME doesn't improve after 3 months, surgery may be used to drain the fluid and insert a  small tube in the eardrum to allow continued drainage.  Because the middle ear fluid can become infected, it is important to watch for signs of an ear infection which may develop later. These signs include increased ear pain, fever, or drainage from the ear.  Home care  The following guidelines will help you care for yourself at home:    You may use over-the-counter medicine as directed to control pain, unless another medicine was prescribed. If you have chronic liver or kidney disease or ever had a stomach ulcer or GI bleeding, talk with your doctor before using these medicines. Aspirin should never be used in anyone under 18 years of age who is ill with a fever. It may cause severe liver damage.    You may use over-the-counter decongestants such as phenylephrine or pseudoephedrine. But they are not always helpful. Don't use nasal spray decongestants more than 3 days. Longer use can make congestion worse. Prescription nasal sprays from your doctor don't typically have those restrictions.    Antihistamines may help if you are also having allergy symptoms.    You may use medicines such as guaifenesin to thin mucus and promote drainage.  Follow-up care  Follow up with your healthcare provider or as advised if you are not feeling better after 3 days.  When to seek medical advice  Call your healthcare provider right away if any of the following occur:    Your ear pain gets worse or does not start to improve     Fever of 100.4 F (38 C) or higher, or as directed by your healthcare provider    Fluid or blood draining from the ear    Headache or sinus pain    Stiff neck    Unusual drowsiness or confusion  Date Last Reviewed: 10/1/2016    6325-9672 [employer]. [address] [place] [address], [place] [address]. All rights reserved. This information is not intended as a substitute for professional medical care. Always follow your healthcare professional's instructions.

## 2018-09-05 NOTE — LETTER
September 6, 2018      Tommy De La O  [address] [facility] [id number]        Dear Tommy,         This letter is to inform you that the results of your recent throat culture are negative.  If you have any questions please call or make an appointment.          Sincerely,        JUAN LUIS Mckeon CNP

## 2018-09-05 NOTE — PROGRESS NOTES
SUBJECTIVE:   Tommy De La O is a 63 year old male who presents to clinic today for the following health issues:      Sore throat       Duration:      Description (location/character/radiation): sore throat     Intensity:  moderate, severe    Accompanying signs and symptoms: severe sore throat, right ear pain     History (similar episodes/previous evaluation): exposed to hand, foot and mouth by grandson.     Precipitating or alleviating factors: None    Therapies tried and outcome: tylenol            Problem list and histories reviewed & adjusted, as indicated.  Additional history: as documented    Patient Active Problem List   Diagnosis     Osteoarthritis of knee     Gastrointestinal hemorrhage     Gastroesophageal reflux disease     Hyperlipidemia     Obstructive sleep apnea syndrome     Essential hypertension with goal blood pressure less than 140/90     History reviewed. No pertinent surgical history.    Social History   Substance Use Topics     Smoking status: Former Smoker     Packs/day: 1.00     Years: 30.00     Types: Cigarettes     Quit date: 2011     Smokeless tobacco: Never Used      Comment: started at age 22     Alcohol use Yes     Family History   Problem Relation Age of Onset     Coronary Artery Disease Father 56      at 56 MI     Prostate Cancer No family hx of          Current Outpatient Prescriptions   Medication Sig Dispense Refill     amLODIPine (NORVASC) 5 MG tablet Take 1 tablet (5 mg) by mouth daily 90 tablet 3     aspirin 81 MG EC tablet Take 81 mg by mouth daily       azithromycin (ZITHROMAX Z-LEIA) 250 MG tablet Take 2 tablets on day 1 and then take 1 tablet days 2-5 6 tablet 0     chlorthalidone (HYGROTON) 25 MG tablet Take 1 tablet (25 mg) by mouth daily 90 tablet 3     clobetasol (TEMOVATE) 0.05 % ointment Apply topically daily as needed For itching 45 g 1     GABAPENTIN PO Take by mouth 2 times daily       omeprazole (PRILOSEC) 40 MG capsule Take 1 capsule (40 mg) by mouth  "daily Take 30-60 minutes before a meal. 90 capsule 3     simvastatin (ZOCOR) 20 MG tablet Take 1 tablet (20 mg) by mouth At Bedtime 90 tablet 3     tamsulosin (FLOMAX) 0.4 MG capsule Take 1 capsule (0.4 mg) by mouth daily 90 capsule 3     AMLODIPINE BESYLATE PO Take 5 mg by mouth daily        clindamycin (CLEOCIN) 300 MG capsule Take 600 mg by mouth 1 HOUR PRIOR TO DENTAL APPOINTMENT       neomycin-polymyxin-hydrocortisone (CORTISPORIN) 3.5-84068-9 otic suspension Place 3 drops Into the left ear 4 times daily as needed (Patient not taking: Reported on 9/5/2018) 1 Bottle 5     OMEPRAZOLE PO Take 40 mg by mouth every morning       SIMVASTATIN PO Take 20 mg by mouth At Bedtime        Allergies   Allergen Reactions     Cefzil [Cefprozil] Hives and Itching     Darvocet [Propoxyphene N-Apap] Hives     Labs reviewed in EPIC    Reviewed and updated as needed this visit by clinical staff  Tobacco  Allergies  Meds  Problems  Med Hx  Surg Hx  Fam Hx  Soc Hx        Reviewed and updated as needed this visit by Provider  Allergies  Meds  Problems         ROS:  Constitutional, HEENT, cardiovascular, pulmonary, GI, , musculoskeletal, neuro, skin, endocrine and psych systems are negative, except as otherwise noted.    OBJECTIVE:     /76  Pulse 66  Temp 97.8  F (36.6  C) (Tympanic)  Ht 5' 11.5\" (1.816 m)  Wt 314 lb (142.4 kg)  SpO2 97%  BMI 43.18 kg/m2  Body mass index is 43.18 kg/(m^2).   GENERAL: healthy, alert and no distress, nontoxic in appearance  EYES: Eyes grossly normal to inspection, PERRL and conjunctivae and sclerae normal  HENT: ear canals and TM's mildly pink, nose and mouth without ulcers or lesions  NECK: no adenopathy, supple with full ROM  RESP: lungs clear to auscultation - no rales, rhonchi or wheezes  CV: regular rate and rhythm, normal S1 S2, no S3 or S4, no murmur, click or rub, no peripheral edema  ABDOMEN: soft, nontender, no hepatosplenomegaly, no masses and bowel sounds normal  MS: " "no gross musculoskeletal defects noted, no edema  No rash    Diagnostic Test Results:  No results found for this or any previous visit (from the past 24 hour(s)).    ASSESSMENT/PLAN:     Problem List Items Addressed This Visit     None      Visit Diagnoses     Viral syndrome    -  Primary    Throat pain        Relevant Orders    Strep, Rapid Screen (Completed)    Beta strep group A culture (Completed)    Dysfunction of right eustachian tube        Acute otitis media, unspecified otitis media type        Relevant Medications    azithromycin (ZITHROMAX Z-LEIA) 250 MG tablet               Patient Instructions     Increase rest and fluids. Tylenol and/or Ibuprofen for comfort. Cool mist vaporizer. If your symptoms worsen or do not resolve follow up with your primary care provider in 1 week and sooner if needed.      Strep culture is pending will result in 24-48 hours.  If it is positive and change in treatment plan will contact you.      Mucinex 600 mg 12 hour formula for ear, head and chest congestion.  It can also thin post nasal drip which can cause a cough and sore throat.      Indications for emergent return to emergency department discussed with patient, who verbalized good understanding and agreement.  Patient understands the limitations of today's evaluation.           Viral Syndrome (Adult)  A viral illness may cause a number of symptoms. The symptoms depend on the part of the body that the virus affects. If it settles in your nose, throat, and lungs, it may cause cough, sore throat, congestion, and sometimes headache. If it settles in your stomach and intestinal tract, it may cause vomiting and diarrhea. Sometimes it causes vague symptoms like \"aching all over,\" feeling tired, loss of appetite, or fever.  A viral illness usually lasts 1 to 2 weeks, but sometimes it lasts longer. In some cases, a more serious infection can look like a viral syndrome in the first few days of the illness. You may need another exam " and additional tests to know the difference. Watch for the warning signs listed below.  Home care  Follow these guidelines for taking care of yourself at home:    If symptoms are severe, rest at home for the first 2 to 3 days.    Stay away from cigarette smoke - both your smoke and the smoke from others.    You may use over-the-counter acetaminophen or ibuprofen for fever, muscle aching, and headache, unless another medicine was prescribed for this. If you have chronic liver or kidney disease or ever had a stomach ulcer or GI bleeding, talk with your doctor before using these medicines. No one who is younger than 18 and ill with a fever should take aspirin. It may cause severe disease or death.    Your appetite may be poor, so a light diet is fine. Avoid dehydration by drinking 8 to 12 8-ounce glasses of fluids each day. This may include water; orange juice; lemonade; apple, grape, and cranberry juice; clear fruit drinks; electrolyte replacement and sports drinks; and decaffeinated teas and coffee. If you have been diagnosed with a kidney disease, ask your doctor how much and what types of fluids you should drink to prevent dehydration. If you have kidney disease, drinking too much fluid can cause it build up in the your body and be dangerous to your health.    Over-the-counter remedies won't shorten the length of the illness but may be helpful for cough, sore throat; and nasal and sinus congestion. Don't use decongestants if you have high blood pressure.  Follow-up care  Follow up with your healthcare provider if you do not improve over the next week.  Call 911  Call 911 if any of the following occur:    Convulsion    Feeling weak, dizzy, or like you are going to faint    Chest pain, shortness of breath, wheezing, or difficulty breathing  When to seek medical advice  Call your healthcare provider right away if any of these occur:    Cough with lots of colored sputum (mucus) or blood in your sputum    Chest pain,  shortness of breath, wheezing, or difficulty breathing    Severe headache; face, neck, or ear pain    Severe, constant pain in the lower right side of your belly (abdominal)    Continued vomiting (can t keep liquids down)    Frequent diarrhea (more than 5 times a day); blood (red or black color) or mucus in diarrhea    Feeling weak, dizzy, or like you are going to faint    Extreme thirst    Fever of 100.4 F (38 C) or higher, or as directed by your healthcare provider  Date Last Reviewed: 9/25/2015 2000-2017 [employer]. [address] [place] [id number]. All rights reserved. This information is not intended as a substitute for professional medical care. Always follow your healthcare professional's instructions.        Self-Care for Sore Throats    Sore throats happen for many reasons, such as colds, allergies, and infections caused by viruses or bacteria. In any case, your throat becomes red and sore. Your goal for self-care is to reduce your discomfort while giving your throat a chance to heal.  Moisten and soothe your throat  Tips include the following:    Try a sip of water first thing after waking up.    Keep your throat moist by drinking 6 or more glasses of clear liquids every day.    Run a cool-air humidifier in your room overnight.    Avoid cigarette smoke.     Suck on throat lozenges, cough drops, hard candy, ice chips, or frozen fruit-juice bars. Use the sugar-free versions if your diet or medical condition requires them.  Gargle to ease irritation  Gargling every hour or 2 can ease irritation. Try gargling with 1 of these solutions:    1/4 teaspoon of salt in 1/2 cup of warm water    An over-the-counter anesthetic gargle  Use medicine for more relief  Over-the-counter medicine can reduce sore throat symptoms. Ask your pharmacist if you have questions about which medicine to use:    Ease pain with anesthetic sprays. Aspirin or an aspirin substitute also helps. Remember, never give  aspirin to anyone 18 or younger, or if you are already taking blood thinners.     For sore throats caused by allergies, try antihistamines to block the allergic reaction.    Remember: unless a sore throat is caused by a bacterial infection, antibiotics won t help you.  Prevent future sore throats  Prevention tips include the following:    Stop smoking or reduce contact with secondhand smoke. Smoke irritates the tender throat lining.    Limit contact with pets and with allergy-causing substances, such as pollen and mold.    When you re around someone with a sore throat or cold, wash your hands often to keep viruses or bacteria from spreading.    Don t strain your vocal cords.  Call your healthcare provider  Contact your healthcare provider if you have:    A temperature over 101 F (38.3 C)    White spots on the throat    Great difficulty swallowing    Trouble breathing    A skin rash    Recent exposure to someone else with strep bacteria    Severe hoarseness and swollen glands in the neck or jaw   Date Last Reviewed: 8/1/2016 2000-2017 [employer]. [address] [place] [address]. All rights reserved. This information is not intended as a substitute for professional medical care. Always follow your healthcare professional's instructions.        Earache, No Infection (Adult)  Earaches can happen without an infection. This occurs when air and fluid build up behind the eardrum causing a feeling of fullness and discomfort and reduced hearing. This is called otitis media with effusion (OME) or serous otitis media. It means there is fluid in the middle ear. It is not the same as acute otitis media, which is typically from infection.  OME can happen when you have a cold if congestion blocks the passage that drains the middle ear. This passage is called the eustachian tube. OME may also occur with nasal allergies or after a bacterial middle ear infection.    The pain or discomfort may come and go.  You may hear clicking or popping sounds when you chew or swallow. You may feel that your balance is off. Or you may hear ringing in the ear.  It often takes from several weeks up to 3 months for the fluid to clear on its own. Oral pain relievers and ear drops help if there is pain. Decongestants and antihistamines sometimes help. Antibiotics don't help since there is no infection. Your doctor may prescribe a nasal spray to help reduce swelling in the nose and eustachian tube. This can allow the ear to drain.  If your OME doesn't improve after 3 months, surgery may be used to drain the fluid and insert a small tube in the eardrum to allow continued drainage.  Because the middle ear fluid can become infected, it is important to watch for signs of an ear infection which may develop later. These signs include increased ear pain, fever, or drainage from the ear.  Home care  The following guidelines will help you care for yourself at home:    You may use over-the-counter medicine as directed to control pain, unless another medicine was prescribed. If you have chronic liver or kidney disease or ever had a stomach ulcer or GI bleeding, talk with your doctor before using these medicines. Aspirin should never be used in anyone under 18 years of age who is ill with a fever. It may cause severe liver damage.    You may use over-the-counter decongestants such as phenylephrine or pseudoephedrine. But they are not always helpful. Don't use nasal spray decongestants more than 3 days. Longer use can make congestion worse. Prescription nasal sprays from your doctor don't typically have those restrictions.    Antihistamines may help if you are also having allergy symptoms.    You may use medicines such as guaifenesin to thin mucus and promote drainage.  Follow-up care  Follow up with your healthcare provider or as advised if you are not feeling better after 3 days.  When to seek medical advice  Call your healthcare provider right away  if any of the following occur:    Your ear pain gets worse or does not start to improve     Fever of 100.4 F (38 C) or higher, or as directed by your healthcare provider    Fluid or blood draining from the ear    Headache or sinus pain    Stiff neck    Unusual drowsiness or confusion  Date Last Reviewed: 10/1/2016    [id number] [employer]. [address] [place] [address]. All rights reserved. This information is not intended as a substitute for professional medical care. Always follow your healthcare professional's instructions.            JUAN LUIS Mcke[facility]      9-6-2018  Pt called as directed at yesterday's visit and throat is worse and ears are worse. Will send Zithromax to pharmacy ([facility]) as we discussed after trying mucinex.    DUY Peters

## 2018-09-05 NOTE — NURSING NOTE
"Chief Complaint   Patient presents with     Pharyngitis       Initial /76  Pulse 66  Temp 97.8  F (36.6  C) (Tympanic)  Ht 5' 11.5\" (1.816 m)  Wt 314 lb (142.4 kg)  SpO2 97%  BMI 43.18 kg/m2 Estimated body mass index is 43.18 kg/(m^2) as calculated from the following:    Height as of this encounter: 5' 11.5\" (1.816 m).    Weight as of this encounter: 314 lb (142.4 kg).      Health Maintenance that is potentially due pending provider review:  NONE    n/a    Is there anyone who you would like to be able to receive your results? No  If yes have patient fill out DEREK      "

## 2018-09-05 NOTE — MR AVS SNAPSHOT
"              After Visit Summary   9/5/2018    Tommy De La O    MRN: [id number]           Patient Information     Date Of Birth          1955        Visit Information        Provider Department      9/5/2018 11:20 AM Scarlett Upton APRN [facility]        Today's Diagnoses     Viral syndrome    -  1    Throat pain        Dysfunction of right eustachian tube          Care Instructions    Increase rest and fluids. Tylenol and/or Ibuprofen for comfort. Cool mist vaporizer. If your symptoms worsen or do not resolve follow up with your primary care provider in 1 week and sooner if needed.      Strep culture is pending will result in 24-48 hours.  If it is positive and change in treatment plan will contact you.      Mucinex 600 mg 12 hour formula for ear, head and chest congestion.  It can also thin post nasal drip which can cause a cough and sore throat.      Indications for emergent return to emergency department discussed with patient, who verbalized good understanding and agreement.  Patient understands the limitations of today's evaluation.           Viral Syndrome (Adult)  A viral illness may cause a number of symptoms. The symptoms depend on the part of the body that the virus affects. If it settles in your nose, throat, and lungs, it may cause cough, sore throat, congestion, and sometimes headache. If it settles in your stomach and intestinal tract, it may cause vomiting and diarrhea. Sometimes it causes vague symptoms like \"aching all over,\" feeling tired, loss of appetite, or fever.  A viral illness usually lasts 1 to 2 weeks, but sometimes it lasts longer. In some cases, a more serious infection can look like a viral syndrome in the first few days of the illness. You may need another exam and additional tests to know the difference. Watch for the warning signs listed below.  Home care  Follow these guidelines for taking care of yourself at home:    If symptoms are severe, rest " at home for the first 2 to 3 days.    Stay away from cigarette smoke - both your smoke and the smoke from others.    You may use over-the-counter acetaminophen or ibuprofen for fever, muscle aching, and headache, unless another medicine was prescribed for this. If you have chronic liver or kidney disease or ever had a stomach ulcer or GI bleeding, talk with your doctor before using these medicines. No one who is younger than 18 and ill with a fever should take aspirin. It may cause severe disease or death.    Your appetite may be poor, so a light diet is fine. Avoid dehydration by drinking 8 to 12 8-ounce glasses of fluids each day. This may include water; orange juice; lemonade; apple, grape, and cranberry juice; clear fruit drinks; electrolyte replacement and sports drinks; and decaffeinated teas and coffee. If you have been diagnosed with a kidney disease, ask your doctor how much and what types of fluids you should drink to prevent dehydration. If you have kidney disease, drinking too much fluid can cause it build up in the your body and be dangerous to your health.    Over-the-counter remedies won't shorten the length of the illness but may be helpful for cough, sore throat; and nasal and sinus congestion. Don't use decongestants if you have high blood pressure.  Follow-up care  Follow up with your healthcare provider if you do not improve over the next week.  Call 911  Call 911 if any of the following occur:    Convulsion    Feeling weak, dizzy, or like you are going to faint    Chest pain, shortness of breath, wheezing, or difficulty breathing  When to seek medical advice  Call your healthcare provider right away if any of these occur:    Cough with lots of colored sputum (mucus) or blood in your sputum    Chest pain, shortness of breath, wheezing, or difficulty breathing    Severe headache; face, neck, or ear pain    Severe, constant pain in the lower right side of your belly (abdominal)    Continued  vomiting (can t keep liquids down)    Frequent diarrhea (more than 5 times a day); blood (red or black color) or mucus in diarrhea    Feeling weak, dizzy, or like you are going to faint    Extreme thirst    Fever of 100.4 F (38 C) or higher, or as directed by your healthcare provider  Date Last Reviewed: 9/25/2015 2000-2017 [employer]. [address] [place] [address], [place] [address]. All rights reserved. This information is not intended as a substitute for professional medical care. Always follow your healthcare professional's instructions.        Self-Care for Sore Throats    Sore throats happen for many reasons, such as colds, allergies, and infections caused by viruses or bacteria. In any case, your throat becomes red and sore. Your goal for self-care is to reduce your discomfort while giving your throat a chance to heal.  Moisten and soothe your throat  Tips include the following:    Try a sip of water first thing after waking up.    Keep your throat moist by drinking 6 or more glasses of clear liquids every day.    Run a cool-air humidifier in your room overnight.    Avoid cigarette smoke.     Suck on throat lozenges, cough drops, hard candy, ice chips, or frozen fruit-juice bars. Use the sugar-free versions if your diet or medical condition requires them.  Gargle to ease irritation  Gargling every hour or 2 can ease irritation. Try gargling with 1 of these solutions:    1/4 teaspoon of salt in 1/2 cup of warm water    An over-the-counter anesthetic gargle  Use medicine for more relief  Over-the-counter medicine can reduce sore throat symptoms. Ask your pharmacist if you have questions about which medicine to use:    Ease pain with anesthetic sprays. Aspirin or an aspirin substitute also helps. Remember, never give aspirin to anyone 18 or younger, or if you are already taking blood thinners.     For sore throats caused by allergies, try antihistamines to block the allergic reaction.    Remember:  unless a sore throat is caused by a bacterial infection, antibiotics won t help you.  Prevent future sore throats  Prevention tips include the following:    Stop smoking or reduce contact with secondhand smoke. Smoke irritates the tender throat lining.    Limit contact with pets and with allergy-causing substances, such as pollen and mold.    When you re around someone with a sore throat or cold, wash your hands often to keep viruses or bacteria from spreading.    Don t strain your vocal cords.  Call your healthcare provider  Contact your healthcare provider if you have:    A temperature over 101 F (38.3 C)    White spots on the throat    Great difficulty swallowing    Trouble breathing    A skin rash    Recent exposure to someone else with strep bacteria    Severe hoarseness and swollen glands in the neck or jaw   Date Last Reviewed: 8/1/2016 2000-2017 [employer]. [address] [place], [place] [id number]. All rights reserved. This information is not intended as a substitute for professional medical care. Always follow your healthcare professional's instructions.        Earache, No Infection (Adult)  Earaches can happen without an infection. This occurs when air and fluid build up behind the eardrum causing a feeling of fullness and discomfort and reduced hearing. This is called otitis media with effusion (OME) or serous otitis media. It means there is fluid in the middle ear. It is not the same as acute otitis media, which is typically from infection.  OME can happen when you have a cold if congestion blocks the passage that drains the middle ear. This passage is called the eustachian tube. OME may also occur with nasal allergies or after a bacterial middle ear infection.    The pain or discomfort may come and go. You may hear clicking or popping sounds when you chew or swallow. You may feel that your balance is off. Or you may hear ringing in the ear.  It often takes from several weeks up to 3  months for the fluid to clear on its own. Oral pain relievers and ear drops help if there is pain. Decongestants and antihistamines sometimes help. Antibiotics don't help since there is no infection. Your doctor may prescribe a nasal spray to help reduce swelling in the nose and eustachian tube. This can allow the ear to drain.  If your OME doesn't improve after 3 months, surgery may be used to drain the fluid and insert a small tube in the eardrum to allow continued drainage.  Because the middle ear fluid can become infected, it is important to watch for signs of an ear infection which may develop later. These signs include increased ear pain, fever, or drainage from the ear.  Home care  The following guidelines will help you care for yourself at home:    You may use over-the-counter medicine as directed to control pain, unless another medicine was prescribed. If you have chronic liver or kidney disease or ever had a stomach ulcer or GI bleeding, talk with your doctor before using these medicines. Aspirin should never be used in anyone under 18 years of age who is ill with a fever. It may cause severe liver damage.    You may use over-the-counter decongestants such as phenylephrine or pseudoephedrine. But they are not always helpful. Don't use nasal spray decongestants more than 3 days. Longer use can make congestion worse. Prescription nasal sprays from your doctor don't typically have those restrictions.    Antihistamines may help if you are also having allergy symptoms.    You may use medicines such as guaifenesin to thin mucus and promote drainage.  Follow-up care  Follow up with your healthcare provider or as advised if you are not feeling better after 3 days.  When to seek medical advice  Call your healthcare provider right away if any of the following occur:    Your ear pain gets worse or does not start to improve     Fever of 100.4 F (38 C) or higher, or as directed by your healthcare provider    Fluid or  blood draining from the ear    Headache or sinus pain    Stiff neck    Unusual drowsiness or confusion  Date Last Reviewed: 10/1/2016    [id number] [employer]. [address] [place] [address], [place] [address]. All rights reserved. This information is not intended as a substitute for professional medical care. Always follow your healthcare professional's instructions.                Follow-ups after your visit        Follow-up notes from your care team     See patient instructions section of the AVS Return if symptoms worsen or fail to improve, for Follow up with your primary care provider.      Who to contact     If you have questions or need follow up information about today's clinic visit or your schedule please contact [facility] directly at [phone].  Normal or non-critical lab and imaging results will be communicated to you by MyChart, letter or phone within 4 business days after the clinic has received the results. If you do not hear from us within 7 days, please contact the clinic through [employer] or phone. If you have a critical or abnormal lab result, we will notify you by phone as soon as possible.  Submit refill requests through [employer] or call your pharmacy and they will forward the refill request to us. Please allow 3 business days for your refill to be completed.          Additional Information About Your Visit        [employer] Information     [employer] gives you secure access to your electronic health record. If you see a primary care provider, you can also send messages to your care team and make appointments. If you have questions, please call your primary care clinic.  If you do not have a primary care provider, please call [phone] and they will assist you.        Care EveryWhere ID     This is your Care EveryWhere ID. This could be used by other organizations to access your [place] medical records  [id number]        Your Vitals Were     Pulse Temperature Height  "Pulse Oximetry BMI (Body Mass Index)       66 97.8  F (36.6  C) (Tympanic) 5' 11.5\" (1.816 m) 97% 43.18 kg/m2        Blood Pressure from Last 3 Encounters:   09/05/18 118/76   06/28/18 118/68   06/26/18 125/81    Weight from Last 3 Encounters:   09/05/18 314 lb (142.4 kg)   06/28/18 306 lb (138.8 kg)   06/15/18 306 lb (138.8 kg)              We Performed the Following     Beta strep group A culture     Strep, Rapid Screen        Primary Care Provider Office Phone # Fax #    Tim Crawley [phone] [address] [place] [id number]        Equal Access to Services     JANETT ARAYA : Hadbita elizaldeo Sozach, waaxda luqadaha, qaybta kaalmada adeegyada, bernarda hull . So [facility] [phone].    ATENCIÓN: Si habla español, tiene a calix disposición servicios gratuitos de asistencia lingüística. Llame al [phone].    We comply with applicable federal civil rights laws and [place] laws. We do not discriminate on the basis of race, color, national origin, age, disability, sex, sexual orientation, or gender identity.            Thank you!     Thank you for choosing [facility]  for your care. Our goal is always to provide you with excellent care. Hearing back from our patients is one way we can continue to improve our services. Please take a few minutes to complete the written survey that you may receive in the mail after your visit with us. Thank you!             Your Updated Medication List - Protect others around you: Learn how to safely use, store and throw away your medicines at www.disposemymeds.org.          This list is accurate as of 9/5/18 11:54 AM.  Always use your most recent med list.                   Brand Name Dispense Instructions for use Diagnosis    * AMLODIPINE BESYLATE PO      Take 5 mg by mouth daily        * amLODIPine 5 MG tablet    NORVASC    90 tablet    Take 1 tablet (5 mg) by mouth daily    Essential hypertension " with goal blood pressure less than 140/90       aspirin 81 MG EC tablet      Take 81 mg by mouth daily        chlorthalidone 25 MG tablet    HYGROTON    90 tablet    Take 1 tablet (25 mg) by mouth daily    Essential hypertension with goal blood pressure less than 140/90       clindamycin 300 MG capsule    CLEOCIN     Take 600 mg by mouth 1 HOUR PRIOR TO DENTAL APPOINTMENT        clobetasol 0.05 % ointment    TEMOVATE    45 g    Apply topically daily as needed For itching    Eczema, unspecified type       GABAPENTIN PO      Take by mouth 2 times daily        neomycin-polymyxin-hydrocortisone [id number] otic suspension    CORTISPORIN    1 Bottle    Place 3 drops Into the left ear 4 times daily as needed    Eczema, unspecified type       * OMEPRAZOLE PO      Take 40 mg by mouth every morning        * omeprazole 40 MG capsule    priLOSEC    90 capsule    Take 1 capsule (40 mg) by mouth daily Take 30-60 minutes before a meal.    Gastroesophageal reflux disease without esophagitis       * SIMVASTATIN PO      Take 20 mg by mouth At Bedtime        * simvastatin 20 MG tablet    ZOCOR    90 tablet    Take 1 tablet (20 mg) by mouth At Bedtime    Hyperlipidemia, unspecified hyperlipidemia type       tamsulosin 0.4 MG capsule    FLOMAX    90 capsule    Take 1 capsule (0.4 mg) by mouth daily    Benign prostatic hyperplasia with incomplete bladder emptying       * Notice:  This list has 6 medication(s) that are the same as other medications prescribed for you. Read the directions carefully, and ask your doctor or other care provider to review them with you.

## 2018-09-06 LAB
BACTERIA SPEC CULT: NORMAL
SPECIMEN SOURCE: NORMAL

## 2018-09-06 RX ORDER — AZITHROMYCIN 250 MG/1
TABLET, FILM COATED ORAL
Qty: 6 TABLET | Refills: 0 | Status: SHIPPED | OUTPATIENT
Start: 2018-09-06 | End: 2018-09-19

## 2018-09-10 ENCOUNTER — TELEPHONE (OUTPATIENT)
Dept: UROLOGY | Facility: CLINIC | Age: 63
End: 2018-09-10

## 2018-09-10 NOTE — TELEPHONE ENCOUNTER
Type of surgery: xps laser turp CPT code 91891  Benign prostatic hyperplasia with incomplete bladder emptying [N40.1, R39.14]  - Primary   Location of surgery: [facility]  Date and time of surgery: 11-28-18  12:30pm  Surgeon: Dr Domingo  Pre-Op Appt Date: 11-15-18  Post-Op Appt Date: 12-28-18   Packet sent out: Yes  Pre-cert/Authorization completed:  No prior auth required per [employer] website.   Date: 09/10/2018    Sent to  and was received.     Insurance valid.

## 2018-09-19 ENCOUNTER — OFFICE VISIT (OUTPATIENT)
Dept: FAMILY MEDICINE | Facility: CLINIC | Age: 63
End: 2018-09-19
Payer: COMMERCIAL

## 2018-09-19 VITALS
RESPIRATION RATE: 12 BRPM | BODY MASS INDEX: 42.29 KG/M2 | DIASTOLIC BLOOD PRESSURE: 76 MMHG | OXYGEN SATURATION: 94 % | WEIGHT: 312.2 LBS | SYSTOLIC BLOOD PRESSURE: 132 MMHG | HEIGHT: 72 IN | HEART RATE: 67 BPM | TEMPERATURE: 97.6 F

## 2018-09-19 DIAGNOSIS — E66.01 MORBID OBESITY (H): ICD-10-CM

## 2018-09-19 DIAGNOSIS — B08.4 HAND, FOOT AND MOUTH DISEASE: Primary | ICD-10-CM

## 2018-09-19 PROCEDURE — 99213 OFFICE O/P EST LOW 20 MIN: CPT | Performed by: NURSE PRACTITIONER

## 2018-09-19 NOTE — MR AVS SNAPSHOT
After Visit Summary   9/19/2018    Tommy De La O    MRN: [id number]           Patient Information     Date Of Birth          1955        Visit Information        Provider Department      9/19/2018 1:20 PM Alvina Burkett APRN [facility]        Care Instructions      When Your Child Has Hand, Foot, and Mouth Disease  Hand, foot, and mouth disease (HFMD) is a common viral infection in children. It can cause mouth sores and a painless rash on the hands, feet, or buttocks. HFMD can be easily spread from one person to another. It occurs more often in children younger than 10 years old, but anyone can get it. HFMD is often mistaken for strep throat because the symptoms of both conditions are similar. HFMD can cause some discomfort, but it s not a serious problem. Most cases can easily be managed and treated at home.  What causes hand, foot, and mouth disease?  HFMD is usually caused by the coxsackievirus. It can also be caused by other viruses in the same family as coxsackievirus. Your child may have caught HFMD in one of the following ways:    Breathing infected air (the virus can enter the air when an infected person coughs, sneezes, or talks).    Contact with items contaminated with stool from an infected person. Contamination can occur when an infected person doesn t wash his or her hands after having a bowel movement or changing a diaper.    Contact with fluid from the blisters that are part of the rash (this type of transmission is rare).  What are the symptoms of hand, foot, and mouth disease?  Symptoms usually appear 24 to 72 hours after exposure. They include:    Rash (small, red bumps or blisters on the hands, feet, or buttocks)    Mouth sores that often occur on the gums, tongue, inside the cheeks, and in the back of the throat (mouth sores may not occur in some children)    Sore throat    A nonspecific rash over the rest of the body    Fever    Loss of  appetite    Pain when swallowing    Drooling  How is hand, foot, and mouth disease diagnosed?  HFMD is diagnosed by how the rash and mouth sores look. To get more information, the healthcare provider will ask about your child s symptoms and health history. He or she will also examine your child. You will be told if any tests are needed to rule out other infections.  How is hand, foot, and mouth disease treated?  There is no specific treatment for HFMD, but there are things you can do at home to help relieve some symptoms. The illness generally lasts about 7 to 10 days. Your child is no longer contagious 24 hours after the fever is gone.  Mouth pain    Unless your child s healthcare provider has prescribed another medicine for mouth pain, give your child ibuprofen or acetaminophen to treat pain or discomfort. Talk with your child's provider about dosing instructions and when to give the medicine (schedule). Do not give ibuprofen to an infant age 6 months or younger. Do not give aspirin to a child with a fever. This can put your child at risk of a serious illness called Reye syndrome.    Liquid antacid can be used 4 times per day to coat the mouth sores for pain relief. Talk with your child's provider about how much and when to give the medicine to your child:  ? Children over age 4 can use 1 teaspoon (5ml) as a mouth rinse after meals.  ? For children under age 4, a parent can place 1/2 teaspoon (2.5ml) in the front of the mouth after meals. Avoid regular mouth rinses because they may sting.  Diet    Follow a soft diet with plenty of fluids to prevent fluid loss (dehydration). If your child doesn't want to eat solid foods, it's OK for a few days, as long as he or she drinks plenty of fluids.    Cool drinks and frozen treats (such as sherbet) are soothing and easier to take.    Avoid citrus juices (such as orange juice or lemonade) and salty or spicy foods. These may cause more pain in the mouth sores.  When to seek  medical care  Call the child's provider if your otherwise healthy child has any of the following:    A mouth sore that doesn t go away within 14 days    Increased mouth pain    Trouble swallowing    Neck pain    Chest pain    Trouble breathing    Weakness    Lack of energy    Signs of infection around the rash or mouth sores (pus, drainage, or swelling)    Signs of dehydration (very dark or little urine, excessive thirst, dry mouth, dizziness)    A fever ((see fever and children section below)    A seizure  Fever and children  Always use a digital thermometer when checking your child s temperature. Never use mercury thermometers.  For infants and toddlers, be sure to use a rectal thermometer correctly. A rectal thermometer may accidentally poke a hole in (perforate) the rectum. It may also pass on germs from the stool. Always follow the product maker s instructions for proper use. If you don t feel comfortable taking a rectal temperature, use a different method. When you talk to your child s healthcare provider, tell him or her which type of method you used to take your child s temperature.  Here are guidelines for fever temperature. Ear temperatures aren t accurate before 6 months of age. Don t take an oral temperature until your child is at least 4 years old.  Infant under 3 months old:    Ask your child s healthcare provider how you should take the temperature.    Rectal or forehead (temporal artery) temperature of 100.4 F (38 C) or higher, or as directed by the provider.    Armpit (axillary) temperature of 99 F (37.2 C) or higher, or as directed by the provider.  Child age 3 to 36 months:    Rectal, forehead (temporal artery), or ear temperature of 102 F (38.9 C) or higher, or as directed by the provider.    Armpit temperature of 101 F (38.3 C) or higher, or as directed by the provider.  Child of any age:    Repeated temperature of 104 F (40 C) or higher, or as directed by the provider.    Fever that lasts more  than 24 hours in a child under 2 years old, or for 3 days in a child 2 years or older.   How can hand, foot, and mouth disease be prevented?    Follow these steps to keep your child from passing HFMD on to others:    Teach your child to wash his or her hands with soap and warm water often. Handwashing is especially important before eating or handling food, after using the bathroom, and after touching the rash. A child is very contagious during the first week of the illness and he or she can still be contagious for days to weeks after the illness resolves.    Your child should remain at home while he or she is sick with hand, foot, and mouth disease. Discuss with your child's health care provider how long you should keep your child from attending school or  or playing with others.    Do not allow your child to share cups, utensils, napkins, or personal items such as towels and toothbrushes with others.  Date Last Reviewed: 1/1/2017 2000-2017 [employer]. [address] [place] [address]. All rights reserved. This information is not intended as a substitute for professional medical care. Always follow your healthcare professional's instructions.                Follow-ups after your visit        Your next 10 appointments already scheduled     Sep 20, 2018  9:00 AM CDT   Ortho Eval with Jeny Saaevdra PT   [facility] ([facility])    [address] [place] [id number]   [phone]            Nov 15, 2018  9:40 AM CST   Pre-Op physical with Tim Crawley MD   [facility]    [address] [place] [id number]   [phone]            Dec 28, 2018 10:00 AM CST   Return Visit with Patricio Domingo MD   [facility] ([address]  [place] [id number] [phone]              Who to contact     If you have questions or  "need follow up information about today's clinic visit or your schedule please contact [facility] directly at [phone].  Normal or non-critical lab and imaging results will be communicated to you by MyChart, letter or phone within 4 business days after the clinic has received the results. If you do not hear from us within 7 days, please contact the clinic through [email or web address] or phone. If you have a critical or abnormal lab result, we will notify you by phone as soon as possible.  Submit refill requests through ViaView or call your pharmacy and they will forward the refill request to us. Please allow 3 business days for your refill to be completed.          Additional Information About Your Visit        [email or web address] Information     ViaView gives you secure access to your electronic health record. If you see a primary care provider, you can also send messages to your care team and make appointments. If you have questions, please call your primary care clinic.  If you do not have a primary care provider, please call [phone] and they will assist you.        Care EveryWhere ID     This is your Care EveryWhere ID. This could be used by other organizations to access your [facility] medical records  [id number]        Your Vitals Were     Pulse Temperature Respirations Height Pulse Oximetry BMI (Body Mass Index)    67 97.6  F (36.4  C) (Tympanic) 12 5' 11.5\" (1.816 m) 94% 42.94 kg/m2       Blood Pressure from Last 3 Encounters:   09/19/18 132/76   09/05/18 118/76   06/28/18 118/68    Weight from Last 3 Encounters:   09/19/18 312 lb 3.2 oz (141.6 kg)   09/05/18 314 lb (142.4 kg)   06/28/18 306 lb (138.8 kg)              Today, you had the following     No orders found for display       Primary Care Provider Office Phone # Fax #    Tim Crawley [phone] [address] [place] [address]        Equal Access to Services     JANETT ARAYA AH: Hadii anna Jha " cesia sonya kalyanbernarda godinez ah. [facility] [phone].    ATENCIÓN: Si satya faith, tiene a calix disposición servicios gratuitos de asistencia lingüística. Zina al [phone].    We comply with applicable federal civil rights laws and [place] laws. We do not discriminate on the basis of race, color, national origin, age, disability, sex, sexual orientation, or gender identity.            Thank you!     Thank you for choosing [facility]  for your care. Our goal is always to provide you with excellent care. Hearing back from our patients is one way we can continue to improve our services. Please take a few minutes to complete the written survey that you may receive in the mail after your visit with us. Thank you!             Your Updated Medication List - Protect others around you: Learn how to safely use, store and throw away your medicines at www.disposemymeds.org.          This list is accurate as of 9/19/18  1:55 PM.  Always use your most recent med list.                   Brand Name Dispense Instructions for use Diagnosis    * AMLODIPINE BESYLATE PO      Take 5 mg by mouth daily        * amLODIPine 5 MG tablet    NORVASC    90 tablet    Take 1 tablet (5 mg) by mouth daily    Essential hypertension with goal blood pressure less than 140/90       aspirin 81 MG EC tablet      Take 81 mg by mouth daily        chlorthalidone 25 MG tablet    HYGROTON    90 tablet    Take 1 tablet (25 mg) by mouth daily    Essential hypertension with goal blood pressure less than 140/90       clindamycin 300 MG capsule    CLEOCIN     Take 600 mg by mouth 1 HOUR PRIOR TO DENTAL APPOINTMENT        clobetasol 0.05 % ointment    TEMOVATE    45 g    Apply topically daily as needed For itching    Eczema, unspecified type       GABAPENTIN PO      Take by mouth 2 times daily        neomycin-polymyxin-hydrocortisone [id number] otic suspension    CORTISPORIN    1 Bottle     Place 3 drops Into the left ear 4 times daily as needed    Eczema, unspecified type       * OMEPRAZOLE PO      Take 40 mg by mouth every morning        * omeprazole 40 MG capsule    priLOSEC    90 capsule    Take 1 capsule (40 mg) by mouth daily Take 30-60 minutes before a meal.    Gastroesophageal reflux disease without esophagitis       * SIMVASTATIN PO      Take 20 mg by mouth At Bedtime        * simvastatin 20 MG tablet    ZOCOR    90 tablet    Take 1 tablet (20 mg) by mouth At Bedtime    Hyperlipidemia, unspecified hyperlipidemia type       tamsulosin 0.4 MG capsule    FLOMAX    90 capsule    Take 1 capsule (0.4 mg) by mouth daily    Benign prostatic hyperplasia with incomplete bladder emptying       * Notice:  This list has 6 medication(s) that are the same as other medications prescribed for you. Read the directions carefully, and ask your doctor or other care provider to review them with you.

## 2018-09-19 NOTE — PROGRESS NOTES
SUBJECTIVE:   Tommy De La O is a 63 year old male who presents to clinic today for the following health issues:      Mouth Sores      Duration: 7-10 days    Description (location/character/radiation): Sores on back and sides of tongue, possibly on the back of his throat    Intensity:  moderate    Accompanying signs and symptoms: none. Have improved the last 2 days with no new lesions.    History (similar episodes/previous evaluation): patient was seen in clinic 18 for sore throat, had no lesions at that time. That started 2 days after watching grandson.    Precipitating or alleviating factors: grandchild dx with hand foot and mouth 2.5 weeks ago    Therapies tried and outcome: z pack, salt water gargle, listerine-mild relief.        Problem list and histories reviewed & adjusted, as indicated.  Additional history: as documented    Patient Active Problem List   Diagnosis     Osteoarthritis of knee     Gastrointestinal hemorrhage     Gastroesophageal reflux disease     Hyperlipidemia     Obstructive sleep apnea syndrome     Essential hypertension with goal blood pressure less than 140/90     History reviewed. No pertinent surgical history.    Social History   Substance Use Topics     Smoking status: Former Smoker     Packs/day: 1.00     Years: 30.00     Types: Cigarettes     Quit date: 2011     Smokeless tobacco: Never Used      Comment: started at age 22     Alcohol use Yes     Family History   Problem Relation Age of Onset     Coronary Artery Disease Father 56      at 56 MI     Prostate Cancer No family hx of            Reviewed and updated as needed this visit by clinical staff       Reviewed and updated as needed this visit by Provider         ROS:  Constitutional, HEENT, cardiovascular, pulmonary, gi and gu systems are negative, except as otherwise noted.    OBJECTIVE:     /76 (BP Location: Right arm, Patient Position: Sitting, Cuff Size: Adult Large)  Pulse 67  Temp 97.6  F (36.4  C)  "(Tympanic)  Resp 12  Ht 5' 11.5\" (1.816 m)  Wt 312 lb 3.2 oz (141.6 kg)  SpO2 94%  BMI 42.94 kg/m2  Body mass index is 42.94 kg/(m^2).  GENERAL: healthy, alert and no distress  EYES: Eyes grossly normal to inspection, PERRL and conjunctivae and sclerae normal  HENT: normal cephalic/atraumatic, both ears: clear effusion, nose and mouth without ulcers or lesions, oropharynx clear, oral mucous membranes moist and vesicular lesions on erythematous base to tonsillar pillars and tongue.   NECK: no adenopathy, no asymmetry, masses, or scars and thyroid normal to palpation  RESP: lungs clear to auscultation - no rales, rhonchi or wheezes  CV: regular rates and rhythm, normal S1 S2, no S3 or S4 and no murmur, click or rub  MS: no gross musculoskeletal defects noted, no edema  SKIN: no suspicious lesions or rashes  NEURO: Normal strength and tone, mentation intact and speech normal    Diagnostic Test Results:  none     ASSESSMENT/PLAN:     1. Hand, foot and mouth disease  - Symptomatic cares, tolerating solids and fluids. Ibuprofen as needed. Oral lesions improving over the last 2 days, likely to resolve in next few days. Discussed sore throat from previous visit is likely a different viral illness as 2 days would be rather quick to develop HFMD symptoms, incubation is usually 5-7 days.    2. Morbid obesity (H)        FUTURE APPOINTMENTS:       - Follow up in 1 week for persistent symptoms, sooner for new or worsening symptoms.     Patient Instructions       When Your Child Has Hand, Foot, and Mouth Disease  Hand, foot, and mouth disease (HFMD) is a common viral infection in children. It can cause mouth sores and a painless rash on the hands, feet, or buttocks. HFMD can be easily spread from one person to another. It occurs more often in children younger than 10 years old, but anyone can get it. HFMD is often mistaken for strep throat because the symptoms of both conditions are similar. HFMD can cause some discomfort, " but it s not a serious problem. Most cases can easily be managed and treated at home.  What causes hand, foot, and mouth disease?  HFMD is usually caused by the coxsackievirus. It can also be caused by other viruses in the same family as coxsackievirus. Your child may have caught HFMD in one of the following ways:    Breathing infected air (the virus can enter the air when an infected person coughs, sneezes, or talks).    Contact with items contaminated with stool from an infected person. Contamination can occur when an infected person doesn t wash his or her hands after having a bowel movement or changing a diaper.    Contact with fluid from the blisters that are part of the rash (this type of transmission is rare).  What are the symptoms of hand, foot, and mouth disease?  Symptoms usually appear 24 to 72 hours after exposure. They include:    Rash (small, red bumps or blisters on the hands, feet, or buttocks)    Mouth sores that often occur on the gums, tongue, inside the cheeks, and in the back of the throat (mouth sores may not occur in some children)    Sore throat    A nonspecific rash over the rest of the body    Fever    Loss of appetite    Pain when swallowing    Drooling  How is hand, foot, and mouth disease diagnosed?  HFMD is diagnosed by how the rash and mouth sores look. To get more information, the healthcare provider will ask about your child s symptoms and health history. He or she will also examine your child. You will be told if any tests are needed to rule out other infections.  How is hand, foot, and mouth disease treated?  There is no specific treatment for HFMD, but there are things you can do at home to help relieve some symptoms. The illness generally lasts about 7 to 10 days. Your child is no longer contagious 24 hours after the fever is gone.  Mouth pain    Unless your child s healthcare provider has prescribed another medicine for mouth pain, give your child ibuprofen or acetaminophen to  treat pain or discomfort. Talk with your child's provider about dosing instructions and when to give the medicine (schedule). Do not give ibuprofen to an infant age 6 months or younger. Do not give aspirin to a child with a fever. This can put your child at risk of a serious illness called Reye syndrome.    Liquid antacid can be used 4 times per day to coat the mouth sores for pain relief. Talk with your child's provider about how much and when to give the medicine to your child:  ? Children over age 4 can use 1 teaspoon (5ml) as a mouth rinse after meals.  ? For children under age 4, a parent can place 1/2 teaspoon (2.5ml) in the front of the mouth after meals. Avoid regular mouth rinses because they may sting.  Diet    Follow a soft diet with plenty of fluids to prevent fluid loss (dehydration). If your child doesn't want to eat solid foods, it's OK for a few days, as long as he or she drinks plenty of fluids.    Cool drinks and frozen treats (such as sherbet) are soothing and easier to take.    Avoid citrus juices (such as orange juice or lemonade) and salty or spicy foods. These may cause more pain in the mouth sores.  When to seek medical care  Call the child's provider if your otherwise healthy child has any of the following:    A mouth sore that doesn t go away within 14 days    Increased mouth pain    Trouble swallowing    Neck pain    Chest pain    Trouble breathing    Weakness    Lack of energy    Signs of infection around the rash or mouth sores (pus, drainage, or swelling)    Signs of dehydration (very dark or little urine, excessive thirst, dry mouth, dizziness)    A fever ((see fever and children section below)    A seizure  Fever and children  Always use a digital thermometer when checking your child s temperature. Never use mercury thermometers.  For infants and toddlers, be sure to use a rectal thermometer correctly. A rectal thermometer may accidentally poke a hole in (perforate) the rectum. It may  also pass on germs from the stool. Always follow the product maker s instructions for proper use. If you don t feel comfortable taking a rectal temperature, use a different method. When you talk to your child s healthcare provider, tell him or her which type of method you used to take your child s temperature.  Here are guidelines for fever temperature. Ear temperatures aren t accurate before 6 months of age. Don t take an oral temperature until your child is at least 4 years old.  Infant under 3 months old:    Ask your child s healthcare provider how you should take the temperature.    Rectal or forehead (temporal artery) temperature of 100.4 F (38 C) or higher, or as directed by the provider.    Armpit (axillary) temperature of 99 F (37.2 C) or higher, or as directed by the provider.  Child age 3 to 36 months:    Rectal, forehead (temporal artery), or ear temperature of 102 F (38.9 C) or higher, or as directed by the provider.    Armpit temperature of 101 F (38.3 C) or higher, or as directed by the provider.  Child of any age:    Repeated temperature of 104 F (40 C) or higher, or as directed by the provider.    Fever that lasts more than 24 hours in a child under 2 years old, or for 3 days in a child 2 years or older.   How can hand, foot, and mouth disease be prevented?    Follow these steps to keep your child from passing HFMD on to others:    Teach your child to wash his or her hands with soap and warm water often. Handwashing is especially important before eating or handling food, after using the bathroom, and after touching the rash. A child is very contagious during the first week of the illness and he or she can still be contagious for days to weeks after the illness resolves.    Your child should remain at home while he or she is sick with hand, foot, and mouth disease. Discuss with your child's health care provider how long you should keep your child from attending school or  or playing with  others.    Do not allow your child to share cups, utensils, napkins, or personal items such as towels and toothbrushes with others.  Date Last Reviewed: 1/1/2017 2000-2017 [employer]. [address] [place] [address], [place] [address]. All rights reserved. This information is not intended as a substitute for professional medical care. Always follow your healthcare professional's instructions.            JUAN LUIS [facility]

## 2018-09-19 NOTE — PATIENT INSTRUCTIONS
When Your Child Has Hand, Foot, and Mouth Disease  Hand, foot, and mouth disease (HFMD) is a common viral infection in children. It can cause mouth sores and a painless rash on the hands, feet, or buttocks. HFMD can be easily spread from one person to another. It occurs more often in children younger than 10 years old, but anyone can get it. HFMD is often mistaken for strep throat because the symptoms of both conditions are similar. HFMD can cause some discomfort, but it s not a serious problem. Most cases can easily be managed and treated at home.  What causes hand, foot, and mouth disease?  HFMD is usually caused by the coxsackievirus. It can also be caused by other viruses in the same family as coxsackievirus. Your child may have caught HFMD in one of the following ways:    Breathing infected air (the virus can enter the air when an infected person coughs, sneezes, or talks).    Contact with items contaminated with stool from an infected person. Contamination can occur when an infected person doesn t wash his or her hands after having a bowel movement or changing a diaper.    Contact with fluid from the blisters that are part of the rash (this type of transmission is rare).  What are the symptoms of hand, foot, and mouth disease?  Symptoms usually appear 24 to 72 hours after exposure. They include:    Rash (small, red bumps or blisters on the hands, feet, or buttocks)    Mouth sores that often occur on the gums, tongue, inside the cheeks, and in the back of the throat (mouth sores may not occur in some children)    Sore throat    A nonspecific rash over the rest of the body    Fever    Loss of appetite    Pain when swallowing    Drooling  How is hand, foot, and mouth disease diagnosed?  HFMD is diagnosed by how the rash and mouth sores look. To get more information, the healthcare provider will ask about your child s symptoms and health history. He or she will also examine your child. You will be told if any  tests are needed to rule out other infections.  How is hand, foot, and mouth disease treated?  There is no specific treatment for HFMD, but there are things you can do at home to help relieve some symptoms. The illness generally lasts about 7 to 10 days. Your child is no longer contagious 24 hours after the fever is gone.  Mouth pain    Unless your child s healthcare provider has prescribed another medicine for mouth pain, give your child ibuprofen or acetaminophen to treat pain or discomfort. Talk with your child's provider about dosing instructions and when to give the medicine (schedule). Do not give ibuprofen to an infant age 6 months or younger. Do not give aspirin to a child with a fever. This can put your child at risk of a serious illness called Reye syndrome.    Liquid antacid can be used 4 times per day to coat the mouth sores for pain relief. Talk with your child's provider about how much and when to give the medicine to your child:  ? Children over age 4 can use 1 teaspoon (5ml) as a mouth rinse after meals.  ? For children under age 4, a parent can place 1/2 teaspoon (2.5ml) in the front of the mouth after meals. Avoid regular mouth rinses because they may sting.  Diet    Follow a soft diet with plenty of fluids to prevent fluid loss (dehydration). If your child doesn't want to eat solid foods, it's OK for a few days, as long as he or she drinks plenty of fluids.    Cool drinks and frozen treats (such as sherbet) are soothing and easier to take.    Avoid citrus juices (such as orange juice or lemonade) and salty or spicy foods. These may cause more pain in the mouth sores.  When to seek medical care  Call the child's provider if your otherwise healthy child has any of the following:    A mouth sore that doesn t go away within 14 days    Increased mouth pain    Trouble swallowing    Neck pain    Chest pain    Trouble breathing    Weakness    Lack of energy    Signs of infection around the rash or mouth  sores (pus, drainage, or swelling)    Signs of dehydration (very dark or little urine, excessive thirst, dry mouth, dizziness)    A fever ((see fever and children section below)    A seizure  Fever and children  Always use a digital thermometer when checking your child s temperature. Never use mercury thermometers.  For infants and toddlers, be sure to use a rectal thermometer correctly. A rectal thermometer may accidentally poke a hole in (perforate) the rectum. It may also pass on germs from the stool. Always follow the product maker s instructions for proper use. If you don t feel comfortable taking a rectal temperature, use a different method. When you talk to your child s healthcare provider, tell him or her which type of method you used to take your child s temperature.  Here are guidelines for fever temperature. Ear temperatures aren t accurate before 6 months of age. Don t take an oral temperature until your child is at least 4 years old.  Infant under 3 months old:    Ask your child s healthcare provider how you should take the temperature.    Rectal or forehead (temporal artery) temperature of 100.4 F (38 C) or higher, or as directed by the provider.    Armpit (axillary) temperature of 99 F (37.2 C) or higher, or as directed by the provider.  Child age 3 to 36 months:    Rectal, forehead (temporal artery), or ear temperature of 102 F (38.9 C) or higher, or as directed by the provider.    Armpit temperature of 101 F (38.3 C) or higher, or as directed by the provider.  Child of any age:    Repeated temperature of 104 F (40 C) or higher, or as directed by the provider.    Fever that lasts more than 24 hours in a child under 2 years old, or for 3 days in a child 2 years or older.   How can hand, foot, and mouth disease be prevented?    Follow these steps to keep your child from passing HFMD on to others:    Teach your child to wash his or her hands with soap and warm water often. Handwashing is especially  important before eating or handling food, after using the bathroom, and after touching the rash. A child is very contagious during the first week of the illness and he or she can still be contagious for days to weeks after the illness resolves.    Your child should remain at home while he or she is sick with hand, foot, and mouth disease. Discuss with your child's health care provider how long you should keep your child from attending school or  or playing with others.    Do not allow your child to share cups, utensils, napkins, or personal items such as towels and toothbrushes with others.  Date Last Reviewed: 1/1/2017 2000-2017 [employer]. [address] [place] [address], [place] [address]. All rights reserved. This information is not intended as a substitute for professional medical care. Always follow your healthcare professional's instructions.

## 2018-09-20 ENCOUNTER — HOSPITAL ENCOUNTER (OUTPATIENT)
Dept: PHYSICAL THERAPY | Facility: CLINIC | Age: 63
Setting detail: THERAPIES SERIES
End: 2018-09-20
Attending: ORTHOPAEDIC SURGERY
Payer: MEDICARE

## 2018-09-20 PROCEDURE — 97110 THERAPEUTIC EXERCISES: CPT | Mod: GP

## 2018-09-20 PROCEDURE — 97161 PT EVAL LOW COMPLEX 20 MIN: CPT | Mod: GP

## 2018-09-20 PROCEDURE — 97140 MANUAL THERAPY 1/> REGIONS: CPT | Mod: GP

## 2018-09-20 PROCEDURE — 97535 SELF CARE MNGMENT TRAINING: CPT | Mod: GP

## 2018-09-20 PROCEDURE — 40000718 ZZHC STATISTIC PT DEPARTMENT ORTHO VISIT

## 2018-09-20 PROCEDURE — G8982 BODY POS GOAL STATUS: HCPCS | Mod: GP,CI

## 2018-09-20 PROCEDURE — G8981 BODY POS CURRENT STATUS: HCPCS | Mod: GP,CI

## 2018-09-20 NOTE — PROGRESS NOTES
09/20/18 0900   General Information   Type of Visit Initial OP Ortho PT Evaluation   Start of Care Date 09/20/18   Referring Physician Dr Monsivais   Patient/Family Goals Statement does not want to come 2-3 x/week for 10 weeks. Feels he should be able to do exs at home. Wondering if there is anything to do about the numbness in his feet   Orders Evaluate and Treat   Date of Order 09/07/18   Insurance Type Medicare;[employer]   Medical Diagnosis LBP and neuropathy   Surgical/Medical history reviewed Yes  (HTN, bladder control, arthritis, RA, smoking)   Body Part(s)   Body Part(s) Lumbar Spine/SI   Presentation and Etiology   Pertinent history of current problem (include personal factors and/or comorbidities that impact the POC) Pt started having LBP and numbness in his feet since 2013. It has gotten progressively worse. He did get an injection that helped one month ago. After 1.5 week, the pain returned. When he sits in his recliner at night, the tingling progresses in his calves. He will DF his feet a few times and the tingling will go away. He has also taken a board and pushes it hard into his calves and that also relieves his numbness/tingling.  Doesn't really notice the pain during the day when he is busy.    Impairments A. Pain;K. Numbness;L. Tingling   Functional Limitations perform activities of daily living;perform desired leisure / sports activities   Symptom Location lower R back, both feet and toes tingling   Onset date of current episode/exacerbation 09/07/18   Chronicity Chronic   Pain rating (0-10 point scale) Best (/10);Worst (/10)   Best (/10) 1   Worst (/10) 7   Pain quality C. Aching;E. Shooting   Frequency of pain/symptoms C. With activity   Pain/symptoms are: Worse during the night   Pain/symptoms exacerbated by A. Sitting;C. Lifting;D. Carrying;L. Work tasks   Pain/symptoms eased by C. Rest;B. Walking  (stretching)   Pain eased by comment stretching   Progression of symptoms since onset: Worsened  "  Prior Level of Function   Functional Level Prior Comment independent   Current Level of Function   Patient role/employment history F. Retired   Living environment House/townhome   Home/community accessibility split level   Current equipment-Gait/Locomotion None   Fall Risk Screen   Fall screen completed by PT   Have you fallen 2 or more times in the past year? No   Have you fallen and had an injury in the past year? No   Is patient a fall risk? No   System Outcome Measures   Outcome Measures Low Back Pain (see Oswestry and Denys)   Lumbar Spine/SI Objective Findings   Posture increased lumbar lordosis   Gait/Locomotion amb w/o AD, wide HUYEN, slightly fwd flexed   Balance/Proprioception (Single Leg Stance) L 10\", R 36\"   Flexion ROM fingers to the ankles ++ LBP   Extension ROM severely limited without pain   Right Side Bending ROM fingers to knee ++R LBP   Left Side Bending ROM fingers to knee +L LBP   Pelvic Screen - Gillet, ASIS and PSIS level, R PSIS deep   Hip Flexion (L2) Strength 5/5   Hip Abduction Strength 5/5   Knee Flexion Strength 5/5   Knee Extension (L3) Strength 5/5   Ankle Dorsiflexion (L4) Strength 5/5   Ankle Plantar Flexion (S1) Strength 5/5   SLR incr B buttock discomfort with SLR B   Slump Test -   Palpation tender psoas R>L, R SI jt   Lumbar ROM Comment R rot WFL ++R LBP, L rot WFL   Hip Screen -SHAMIR, - DINESHIR, ROM WFL   Hip Adduction Strength 5/5   Hip Extension Strength 5/5   Great Toe Extension (L5) Strength 5/5   Lumbar/Hip/Knee/Foot Strength Comments slightly weaker on R   Hamstring Flexibility R 80*, L 75*   Hip Flexor Flexibility tight R>L   Piriformis Flexibility normal   Spring Test -   Segmental Mobility incr pain with PA glide to R>L TP at L5   Planned Therapy Interventions   Planned Therapy Interventions joint mobilization;manual therapy;ROM;strengthening;stretching  (posture and body mechanics ed)   Clinical Impression   Criteria for Skilled Therapeutic Interventions Met yes, " treatment indicated   PT Diagnosis LBP and B feet numbness/tingling. Signs and symptoms suggest radiculopathy.   Influenced by the following impairments pain, weakness   Functional limitations due to impairments sitting, lifting, bending   Clinical Presentation Stable/Uncomplicated   Clinical Presentation Rationale clinical judgement   Clinical Decision Making (Complexity) Low complexity   Therapy Frequency 1 time/week   Predicted Duration of Therapy Intervention (days/wks) 6 weeks   Risk & Benefits of therapy have been explained Yes   Patient, Family & other staff in agreement with plan of care Yes   Education Assessment   Preferred Learning Style Listening;Demonstration;Pictures/video   Barriers to Learning No barriers   ORTHO GOALS   PT Ortho Eval Goals 1;2;3;4   Ortho Goal 1   Goal Identifier 1   Goal Description Pt will be able to sleep through the night without waking from pain.   Target Date 10/11/18   Ortho Goal 2   Goal Identifier 2   Goal Description Pt will be able to stand 1 hour with < 2/10 pain.   Target Date 10/25/18   Ortho Goal 3   Goal Identifier 3   Goal Description Pt will be able to sit in recliner at night with < 2/10 pain.    Target Date 11/01/18   Ortho Goal 4   Goal Identifier 4   Goal Description Pt will be independent with HEP for optimal functional recovery.   Target Date 11/01/18   Total Evaluation Time   Total Evaluation Time 30   Therapy Certification   Certification date from 09/20/18   Certification date to 12/18/18   Medical Diagnosis LBP and neuropathy     Jeny Saavedra PT

## 2018-09-20 NOTE — PROGRESS NOTES
[facility]          OUTPATIENT PHYSICAL THERAPY ORTHOPEDIC EVALUATION  PLAN OF TREATMENT FOR OUTPATIENT REHABILITATION  (COMPLETE FOR INITIAL CLAIMS ONLY)  Patient's Last Name, First Name, M.I.  YOB: 1955  Tommy De La O       Provider s Name:  [facility]   Medical Record No.  [id number]   Start of Care Date:  09/20/18   Onset Date:  09/07/18   Type:     _X__PT   ___OT   ___SLP Medical Diagnosis:  LBP and neuropathy     PT Diagnosis:  LBP and B feet numbness/tingling. Signs and symptoms suggest radiculopathy.   Visits from SOC:  1      _________________________________________________________________________________  Plan of Treatment/Functional Goals:  joint mobilization, manual therapy, ROM, strengthening, stretching (posture and body mechanics ed)           Goals  Goal Identifier: 1  Goal Description: Pt will be able to sleep through the night without waking from pain.  Target Date: 10/11/18    Goal Identifier: 2  Goal Description: Pt will be able to stand 1 hour with < 2/10 pain.  Target Date: 10/25/18    Goal Identifier: 3  Goal Description: Pt will be able to sit in recliner at night with < 2/10 pain.   Target Date: 11/01/18    Goal Identifier: 4  Goal Description: Pt will be independent with HEP for optimal functional recovery.  Target Date: 11/01/18                                                Therapy Frequency:  1 time/week  Predicted Duration of Therapy Intervention:  6 weeks    JOCELINE DE LA TORRE, PT                 I CERTIFY THE NEED FOR THESE SERVICES FURNISHED UNDER        THIS PLAN OF TREATMENT AND WHILE UNDER MY CARE .             Physician Signature               Date    X_____________________________________________________                             Certification Date From:  09/20/18   Certification Date To:  12/18/18    Referring Provider:  Dr Monsivais    Initial Assessment        See Epic Evaluation Start of Care Date: 09/20/18

## 2018-09-27 ENCOUNTER — HOSPITAL ENCOUNTER (OUTPATIENT)
Dept: PHYSICAL THERAPY | Facility: CLINIC | Age: 63
Setting detail: THERAPIES SERIES
End: 2018-09-27
Attending: PSYCHIATRY & NEUROLOGY
Payer: MEDICARE

## 2018-09-27 PROCEDURE — 40000718 ZZHC STATISTIC PT DEPARTMENT ORTHO VISIT

## 2018-09-27 PROCEDURE — 97110 THERAPEUTIC EXERCISES: CPT | Mod: GP

## 2018-09-27 PROCEDURE — 97140 MANUAL THERAPY 1/> REGIONS: CPT | Mod: GP

## 2018-09-29 ENCOUNTER — OFFICE VISIT (OUTPATIENT)
Dept: URGENT CARE | Facility: URGENT CARE | Age: 63
End: 2018-09-29
Payer: COMMERCIAL

## 2018-09-29 VITALS
RESPIRATION RATE: 22 BRPM | WEIGHT: 315 LBS | BODY MASS INDEX: 44.28 KG/M2 | HEART RATE: 60 BPM | DIASTOLIC BLOOD PRESSURE: 64 MMHG | SYSTOLIC BLOOD PRESSURE: 112 MMHG | TEMPERATURE: 97.6 F

## 2018-09-29 DIAGNOSIS — B08.4 HAND, FOOT AND MOUTH DISEASE: Primary | ICD-10-CM

## 2018-09-29 PROCEDURE — 99213 OFFICE O/P EST LOW 20 MIN: CPT | Performed by: PHYSICIAN ASSISTANT

## 2018-09-29 ASSESSMENT — ENCOUNTER SYMPTOMS
FEVER: 0
ARTHRALGIAS: 0
COUGH: 0
NAUSEA: 0
EYE REDNESS: 0
EYE DISCHARGE: 0
VOMITING: 0
RHINORRHEA: 0
WHEEZING: 0
PALPITATIONS: 0
SHORTNESS OF BREATH: 0
EYE ITCHING: 0
FATIGUE: 0
SINUS PAIN: 0
EYE PAIN: 0
MYALGIAS: 0
ABDOMINAL PAIN: 0
CONSTIPATION: 0
CHILLS: 0
DIARRHEA: 0
UNEXPECTED WEIGHT CHANGE: 0
SINUS PRESSURE: 0

## 2018-09-29 NOTE — MR AVS SNAPSHOT
After Visit Summary   9/29/2018    Tommy De La O    MRN: [id number]           Patient Information     Date Of Birth          1955        Visit Information        Provider Department      9/29/2018 1:15 PM Miriam Vicente PA-C [facility]        Today's Diagnoses     Hand, foot and mouth disease    -  1       Follow-ups after your visit        Follow-up notes from your care team     Return if symptoms worsen or fail to improve.      Your next 10 appointments already scheduled     Oct 03, 2018 10:00 AM CDT   Ortho Treatment with Jeny Saavedra PT   [facility] ([facility])    [address] [place] [id number]   [phone]            Oct 10, 2018 10:00 AM CDT   Ortho Treatment with Jeny Saavedra PT   [facility] ([facility])    [address] [place] [address]   [phone]            Nov 15, 2018  9:40 AM CST   Pre-Op physical with Tim Crawley MD   [facility]    [address] [place] [address]   [phone]            Dec 28, 2018 10:00 AM CST   Return Visit with Patricio Domingo MD   [facility] ([address] [address] [phone]              Who to contact     If you have questions or need follow up information about today's clinic visit or your schedule please contact [facility] directly at [phone].  Normal or non-critical lab and imaging results will be communicated to you by MyChart, letter or phone within 4 business days after the clinic has received the results. If you do not hear from us within 7 days, please contact the clinic through MyChart or phone. If you have a critical or abnormal lab result, we will notify you by phone as soon as possible.  Submit refill requests through  [employer] or call your pharmacy and they will forward the refill request to us. Please allow 3 business days for your refill to be completed.          Additional Information About Your Visit        MyChart Information     [employer] gives you secure access to your electronic health record. If you see a primary care provider, you can also send messages to your care team and make appointments. If you have questions, please call your primary care clinic.  If you do not have a primary care provider, please call [phone] and they will assist you.        Care EveryWhere ID     This is your Care EveryWhere ID. This could be used by other organizations to access your [facility] medical records  [id number]        Your Vitals Were     Pulse Temperature Respirations BMI (Body Mass Index)          60 97.6  F (36.4  C) (Tympanic) 22 44.28 kg/m2         Blood Pressure from Last 3 Encounters:   09/29/18 112/64   09/19/18 132/76   09/05/18 118/76    Weight from Last 3 Encounters:   09/29/18 322 lb (146.1 kg)   09/19/18 312 lb 3.2 oz (141.6 kg)   09/05/18 314 lb (142.4 kg)              Today, you had the following     No orders found for display       Primary Care Provider Office Phone # Fax #    Tim Crawley [phone] [address] [place] [address]        Equal Access to Services     JANETT ARAYA : Hadii imani ku hadasho Sozach, waaxda luqadaha, qaybta kaalmada rodney, bernarda childers. So [facility] [phone].    ATENCIÓN: Si habla español, tiene a calix disposición servicios gratuitos de asistencia lingüística. Llame al [phone].    We comply with applicable federal civil rights laws and [place] laws. We do not discriminate on the basis of race, color, national origin, age, disability, sex, sexual orientation, or gender identity.            Thank you!     Thank you for choosing [facility]  for your care. Our goal is always to provide  you with excellent care. Hearing back from our patients is one way we can continue to improve our services. Please take a few minutes to complete the written survey that you may receive in the mail after your visit with us. Thank you!             Your Updated Medication List - Protect others around you: Learn how to safely use, store and throw away your medicines at www.disposemymeds.org.          This list is accurate as of 9/29/18 11:59 PM.  Always use your most recent med list.                   Brand Name Dispense Instructions for use Diagnosis    * AMLODIPINE BESYLATE PO      Take 5 mg by mouth daily        * amLODIPine 5 MG tablet    NORVASC    90 tablet    Take 1 tablet (5 mg) by mouth daily    Essential hypertension with goal blood pressure less than 140/90       aspirin 81 MG EC tablet      Take 81 mg by mouth daily        chlorthalidone 25 MG tablet    HYGROTON    90 tablet    Take 1 tablet (25 mg) by mouth daily    Essential hypertension with goal blood pressure less than 140/90       clindamycin 300 MG capsule    CLEOCIN     Take 600 mg by mouth 1 HOUR PRIOR TO DENTAL APPOINTMENT        clobetasol 0.05 % ointment    TEMOVATE    45 g    Apply topically daily as needed For itching    Eczema, unspecified type       GABAPENTIN PO      Take by mouth 2 times daily        neomycin-polymyxin-hydrocortisone [id number] otic suspension    CORTISPORIN    1 Bottle    Place 3 drops Into the left ear 4 times daily as needed    Eczema, unspecified type       * OMEPRAZOLE PO      Take 40 mg by mouth every morning        * omeprazole 40 MG capsule    priLOSEC    90 capsule    Take 1 capsule (40 mg) by mouth daily Take 30-60 minutes before a meal.    Gastroesophageal reflux disease without esophagitis       * SIMVASTATIN PO      Take 20 mg by mouth At Bedtime        * simvastatin 20 MG tablet    ZOCOR    90 tablet    Take 1 tablet (20 mg) by mouth At Bedtime    Hyperlipidemia, unspecified hyperlipidemia type        tamsulosin 0.4 MG capsule    FLOMAX    90 capsule    Take 1 capsule (0.4 mg) by mouth daily    Benign prostatic hyperplasia with incomplete bladder emptying       * Notice:  This list has 6 medication(s) that are the same as other medications prescribed for you. Read the directions carefully, and ask your doctor or other care provider to review them with you.

## 2018-09-29 NOTE — PROGRESS NOTES
SUBJECTIVE:   Tommy De La O is a 63 year old male presenting with a chief complaint of   Chief Complaint   Patient presents with     Pharyngitis     Still having a sore throat and stuff on his tongue.  No fever.  Was treated with Z-Moo .  Also wondering if it is an allergic reaction to gabapentin.  Also took 6 pills of Clindamycin that finished Monday or Tuesday        He is an established patient of [facility].    URI Adult    Onset of symptoms was 1 month(s) ago.  Course of illness is same.    Severity moderate  Current and Associated symptoms: sore throat and mouth sores   Treatment measures tried include Tylenol/Ibuprofen.  Predisposing factors include None.  Was diagnosed with hand, foot, and mouth 10 days ago. His grandson was diagnosed with hand, foot, and mouth about 1 month ago. Pain is staying the same.     Review of Systems   Constitutional: Negative for chills, fatigue, fever and unexpected weight change.   HENT: Positive for sore throat. Negative for congestion, ear pain, rhinorrhea, sinus pain and sinus pressure.    Eyes: Negative for pain, discharge, redness and itching.   Respiratory: Negative for cough, shortness of breath and wheezing.    Cardiovascular: Negative for chest pain, palpitations and leg swelling.   Gastrointestinal: Negative for abdominal pain, constipation, diarrhea, nausea and vomiting.   Musculoskeletal: Negative for arthralgias and myalgias.   Skin: Negative for rash.       History reviewed. No pertinent past medical history.  Family History   Problem Relation Age of Onset     Coronary Artery Disease Father 56      at 56 MI     Prostate Cancer No family hx of      Current Outpatient Prescriptions   Medication Sig Dispense Refill     amLODIPine (NORVASC) 5 MG tablet Take 1 tablet (5 mg) by mouth daily 90 tablet 3     AMLODIPINE BESYLATE PO Take 5 mg by mouth daily        aspirin 81 MG EC tablet Take 81 mg by mouth daily       chlorthalidone (HYGROTON) 25 MG tablet Take 1 tablet  (25 mg) by mouth daily 90 tablet 3     clindamycin (CLEOCIN) 300 MG capsule Take 600 mg by mouth 1 HOUR PRIOR TO DENTAL APPOINTMENT       clobetasol (TEMOVATE) 0.05 % ointment Apply topically daily as needed For itching 45 g 1     GABAPENTIN PO Take by mouth 2 times daily       omeprazole (PRILOSEC) 40 MG capsule Take 1 capsule (40 mg) by mouth daily Take 30-60 minutes before a meal. 90 capsule 3     OMEPRAZOLE PO Take 40 mg by mouth every morning       simvastatin (ZOCOR) 20 MG tablet Take 1 tablet (20 mg) by mouth At Bedtime 90 tablet 3     SIMVASTATIN PO Take 20 mg by mouth At Bedtime        tamsulosin (FLOMAX) 0.4 MG capsule Take 1 capsule (0.4 mg) by mouth daily 90 capsule 3     neomycin-polymyxin-hydrocortisone (CORTISPORIN) 3.5-65344-1 otic suspension Place 3 drops Into the left ear 4 times daily as needed (Patient not taking: Reported on 9/29/2018) 1 Bottle 5     Social History   Substance Use Topics     Smoking status: Former Smoker     Packs/day: 1.00     Years: 30.00     Types: Cigarettes     Quit date: 2/1/2011     Smokeless tobacco: Never Used      Comment: started at age 22     Alcohol use Yes       OBJECTIVE  /64 (BP Location: Right arm, Cuff Size: Adult Large)  Pulse 60  Temp 97.6  F (36.4  C) (Tympanic)  Resp 22  Wt 322 lb (146.1 kg)  BMI 44.28 kg/m2    Physical Exam   Constitutional: He appears well-developed and well-nourished. No distress.   HENT:   Head: Normocephalic and atraumatic.   Right Ear: Tympanic membrane and ear canal normal.   Left Ear: Tympanic membrane and ear canal normal.   Mouth/Throat:       There are 3 small ulcer type lesions above uvula. No other lesions or exudates    Eyes: Conjunctivae are normal. Pupils are equal, round, and reactive to light.   Cardiovascular: Normal rate and regular rhythm.    Pulmonary/Chest: Effort normal and breath sounds normal.   Skin: Skin is warm and dry. No rash noted.   Psychiatric: He has a normal mood and affect. His behavior is  normal.       Labs:  No results found for this or any previous visit (from the past 24 hour(s)).    X-Ray was not done.    ASSESSMENT:      ICD-10-CM    1. Hand, foot and mouth disease B08.4         Medical Decision Making:    Differential Diagnosis:  URI Adult/Peds:  Hand, foot and mouth disease, Tonsilitis, Viral pharyngitis, Viral syndrome and Viral upper respiratory illness    Serious Comorbid Conditions:  Adult:  None    PLAN:    URI Adult: Reassurance, lesions consistent with hand, foot, and mouth. Continue with supportive care, Tylenol, Ibuprofen, Fluids and Rest. Follow up as needed    Followup:    If not improving or if condition worsens, follow up with your Primary Care Provider    There are no Patient Instructions on file for this visit.

## 2018-09-29 NOTE — NURSING NOTE
"Chief Complaint   Patient presents with     Pharyngitis     Still having a sore throat and stuff on his tongue.  No fever.  Was treated with Z-Moo 9/6.  Also wondering if it is an allergic reaction to gabapentin.  Also took 6 pills of Clindamycin that finished Monday or Tuesday        Initial /64 (BP Location: Right arm, Cuff Size: Adult Large)  Pulse 60  Temp 97.6  F (36.4  C) (Tympanic)  Resp 22  Wt 322 lb (146.1 kg)  BMI 44.28 kg/m2 Estimated body mass index is 44.28 kg/(m^2) as calculated from the following:    Height as of 9/19/18: 5' 11.5\" (1.816 m).    Weight as of this encounter: 322 lb (146.1 kg).    Patient presents to the clinic using No DME    Health Maintenance that is potentially due pending provider review:  NONE    n/a    Is there anyone who you would like to be able to receive your results? Not Applicable  If yes have patient fill out DEREK  Madi Grewal M.A.        "

## 2018-10-01 ASSESSMENT — ENCOUNTER SYMPTOMS: SORE THROAT: 1

## 2018-10-03 ENCOUNTER — HOSPITAL ENCOUNTER (OUTPATIENT)
Dept: PHYSICAL THERAPY | Facility: CLINIC | Age: 63
Setting detail: THERAPIES SERIES
End: 2018-10-03
Attending: PSYCHIATRY & NEUROLOGY
Payer: MEDICARE

## 2018-10-03 PROCEDURE — 40000718 ZZHC STATISTIC PT DEPARTMENT ORTHO VISIT

## 2018-10-03 PROCEDURE — 97110 THERAPEUTIC EXERCISES: CPT | Mod: GP

## 2018-10-03 PROCEDURE — 97140 MANUAL THERAPY 1/> REGIONS: CPT | Mod: GP

## 2018-10-10 ENCOUNTER — OFFICE VISIT (OUTPATIENT)
Dept: FAMILY MEDICINE | Facility: CLINIC | Age: 63
End: 2018-10-10
Payer: COMMERCIAL

## 2018-10-10 ENCOUNTER — HOSPITAL ENCOUNTER (OUTPATIENT)
Dept: PHYSICAL THERAPY | Facility: CLINIC | Age: 63
Setting detail: THERAPIES SERIES
End: 2018-10-10
Attending: PSYCHIATRY & NEUROLOGY
Payer: MEDICARE

## 2018-10-10 VITALS
SYSTOLIC BLOOD PRESSURE: 128 MMHG | WEIGHT: 315 LBS | RESPIRATION RATE: 20 BRPM | BODY MASS INDEX: 42.66 KG/M2 | DIASTOLIC BLOOD PRESSURE: 80 MMHG | TEMPERATURE: 97.9 F | OXYGEN SATURATION: 98 % | HEIGHT: 72 IN | HEART RATE: 55 BPM

## 2018-10-10 DIAGNOSIS — K13.70 ORAL LESION: Primary | ICD-10-CM

## 2018-10-10 PROCEDURE — 97110 THERAPEUTIC EXERCISES: CPT | Mod: GP

## 2018-10-10 PROCEDURE — 99213 OFFICE O/P EST LOW 20 MIN: CPT | Performed by: NURSE PRACTITIONER

## 2018-10-10 PROCEDURE — 40000718 ZZHC STATISTIC PT DEPARTMENT ORTHO VISIT

## 2018-10-10 PROCEDURE — 97140 MANUAL THERAPY 1/> REGIONS: CPT | Mod: GP

## 2018-10-10 RX ORDER — DIPHENHYDRAMINE HYDROCHLORIDE AND LIDOCAINE HYDROCHLORIDE AND ALUMINUM HYDROXIDE AND MAGNESIUM HYDRO
5-10 KIT EVERY 6 HOURS PRN
Qty: 237 ML | Refills: 1 | Status: SHIPPED | OUTPATIENT
Start: 2018-10-10 | End: 2018-11-19

## 2018-10-10 NOTE — PATIENT INSTRUCTIONS
Follow-up oral lesions       What Are Oral Lesions? (Precancerous and Cancerous)  Precancerous oral lesions are abnormal cell growths in or around the mouth. They may become cancer. Cancerous oral lesions are life-threatening cell changes in the mouth. These lesions need to be found early to give you a better chance for a cure.  Signs and symptoms  The signs and symptoms of precancerous and cancerous oral lesions may include:    A sore in the mouth that doesn`t heal within 2 weeks    White or red lesions or ulcers on the tongue, gums, or lining of the mouth that don`t go away    Soreness or pain in the mouth that persists    A lump or thickening in the cheek area    Numbness of tongue or other areas of the mouth  Your evaluation  See your dental professional about any sore or pain in the mouth that doesn't go away within 2 weeks. He or she will ask questions about your health history and dental history. Your entire mouth, including your lips and teeth, will be checked. A biopsy or other tests may also be done.  Biopsy  This is the best way to find out if a lesion is precancerous or cancerous. During a biopsy, the area around the lesion will be numbed. A part of the lesion will then be removed and sent to a lab and checked under a microscope.  Other tests  Some other tests may be helpful in making the diagnosis. They include:    Staining. The area in your mouth around the lesion may be stained with a special dye. The dye binds to precancerous and cancerous cells, staining only these cells. After a few hours, the color from the dye will disappear.    Cytology. Your dental professional may scrape the surface of the lesion to obtain cells. The cells are then sent to a lab and checked for cancer  Treatment  Your treatment will depend on the nature of the oral lesion. Your dental or medical professional can tell you about types of treatment. This may include surgery, radiation therapy, or chemotherapy. Or a combination of  surgery, radiation, and chemotherapy may be used to treat advanced cases of oral cancer.  Prevention  The best way to catch problems early is to have regular oral checkups. To help reduce your risk for oral cancer, follow the tips below:    Get oral checkups. Visit your dentist at least 2 times a year.    Don t use tobacco. Tobacco use increases the risk for oral cancer. It`s never too late to stop using tobacco.    Limit alcohol. If you drink a lot of alcohol, you may be at a higher risk for oral cancer.    Eat a healthy diet. A diet rich in fruits and vegetables may lower your risk for oral cancer.    Use good oral hygiene. Brush and floss your teeth each day. If you wear dentures, keep them clean.  Date Last Reviewed: 8/1/2017 2000-2017 [employer]. [address] [place] [address], [place] [address]. All rights reserved. This information is not intended as a substitute for professional medical care. Always follow your healthcare professional's instructions.

## 2018-10-10 NOTE — MR AVS SNAPSHOT
After Visit Summary   10/10/2018    Tommy De La O    MRN: [id number]           Patient Information     Date Of Birth          1955        Visit Information        Provider Department      10/10/2018 9:40 AM Georgina Dodson APRN [facility]        Today's Diagnoses     Oral lesion    -  1      Care Instructions    Follow-up oral lesions       What Are Oral Lesions? (Precancerous and Cancerous)  Precancerous oral lesions are abnormal cell growths in or around the mouth. They may become cancer. Cancerous oral lesions are life-threatening cell changes in the mouth. These lesions need to be found early to give you a better chance for a cure.  Signs and symptoms  The signs and symptoms of precancerous and cancerous oral lesions may include:    A sore in the mouth that doesn`t heal within 2 weeks    White or red lesions or ulcers on the tongue, gums, or lining of the mouth that don`t go away    Soreness or pain in the mouth that persists    A lump or thickening in the cheek area    Numbness of tongue or other areas of the mouth  Your evaluation  See your dental professional about any sore or pain in the mouth that doesn't go away within 2 weeks. He or she will ask questions about your health history and dental history. Your entire mouth, including your lips and teeth, will be checked. A biopsy or other tests may also be done.  Biopsy  This is the best way to find out if a lesion is precancerous or cancerous. During a biopsy, the area around the lesion will be numbed. A part of the lesion will then be removed and sent to a lab and checked under a microscope.  Other tests  Some other tests may be helpful in making the diagnosis. They include:    Staining. The area in your mouth around the lesion may be stained with a special dye. The dye binds to precancerous and cancerous cells, staining only these cells. After a few hours, the color from the dye will disappear.    Cytology. Your  dental professional may scrape the surface of the lesion to obtain cells. The cells are then sent to a lab and checked for cancer  Treatment  Your treatment will depend on the nature of the oral lesion. Your dental or medical professional can tell you about types of treatment. This may include surgery, radiation therapy, or chemotherapy. Or a combination of surgery, radiation, and chemotherapy may be used to treat advanced cases of oral cancer.  Prevention  The best way to catch problems early is to have regular oral checkups. To help reduce your risk for oral cancer, follow the tips below:    Get oral checkups. Visit your dentist at least 2 times a year.    Don t use tobacco. Tobacco use increases the risk for oral cancer. It`s never too late to stop using tobacco.    Limit alcohol. If you drink a lot of alcohol, you may be at a higher risk for oral cancer.    Eat a healthy diet. A diet rich in fruits and vegetables may lower your risk for oral cancer.    Use good oral hygiene. Brush and floss your teeth each day. If you wear dentures, keep them clean.  Date Last Reviewed: 8/1/2017 2000-2017 [employer]. [address] [place] [address]. All rights reserved. This information is not intended as a substitute for professional medical care. Always follow your healthcare professional's instructions.                Follow-ups after your visit        Additional Services     OTOLARYNGOLOGY REFERRAL       Your provider has referred you to: FMLEIAS: [facility] [phone]   [email or web address]    Please be aware that coverage of these services is subject to the terms and limitations of your health insurance plan.  Call member services at your health plan with any benefit or coverage questions.      Please bring the following with you to your appointment:    (1) Any X-Rays, CTs or MRIs which have been performed.  Contact the facility where they were done to  arrange for  prior to your scheduled appointment.   (2) List of current medications  (3) This referral request   (4) Any documents/labs given to you for this referral                  Your next 10 appointments already scheduled     Nov 15, 2018  9:40 AM CST   Pre-Op physical with Tim Crawley MD   [facility] ([facility])    [address] [place] [id number]   [phone]            Dec 21, 2018 11:00 AM CST   Return Visit with Patricio Domingo MD   [facility] ([facility])    [address] [place] [id number] [phone]              Who to contact     If you have questions or need follow up information about today's clinic visit or your schedule please contact [facility] directly at [phone].  Normal or non-critical lab and imaging results will be communicated to you by MyChart, letter or phone within 4 business days after the clinic has received the results. If you do not hear from us within 7 days, please contact the clinic through MyChart or phone. If you have a critical or abnormal lab result, we will notify you by phone as soon as possible.  Submit refill requests through [employer] or call your pharmacy and they will forward the refill request to us. Please allow 3 business days for your refill to be completed.          Additional Information About Your Visit        MyChart Information     [employer] gives you secure access to your electronic health record. If you see a primary care provider, you can also send messages to your care team and make appointments. If you have questions, please call your primary care clinic.  If you do not have a primary care provider, please call [phone] and they will assist you.        Care EveryWhere ID     This is your Care EveryWhere ID. This could be used by other organizations to access your [place] medical records  [id number]        Your Vitals  "Were     Pulse Temperature Respirations Height Pulse Oximetry BMI (Body Mass Index)    55 97.9  F (36.6  C) (Tympanic) 20 5' 11.5\" (1.816 m) 98% 44.28 kg/m2       Blood Pressure from Last 3 Encounters:   10/10/18 128/80   09/29/18 112/64   09/19/18 132/76    Weight from Last 3 Encounters:   10/10/18 322 lb (146.1 kg)   09/29/18 322 lb (146.1 kg)   09/19/18 312 lb 3.2 oz (141.6 kg)              We Performed the Following     OTOLARYNGOLOGY REFERRAL          Today's Medication Changes          These changes are accurate as of 10/10/18 10:16 AM.  If you have any questions, ask your nurse or doctor.               Start taking these medicines.        Dose/Directions    magic mouthwash suspension (diphenhydrAMINE, lidocaine, aluminum-magnesium & simethicone) Susp compounding kit   Used for:  Oral lesion   Started by:  Georgina Dodson APRN CNP        Dose:  5-10 mL   Swish and swallow 5-10 mLs in mouth every 6 hours as needed for mouth sores   Quantity:  237 mL   Refills:  1            Where to get your medicines      These medications were sent to [facility] [address] [place] [address]  [address] [place] [address]     Phone:  [phone]     magic mouthwash suspension (diphenhydrAMINE, lidocaine, aluminum-magnesium & simethicone) Susp compounding kit                Primary Care Provider Office Phone # Fax #    Tim Crawley [phone] [address] [place] [address]        Equal Access to Services     [facility]: Hadbita boss Sozach, waaxda luqadaha, qaybta kaalmada rodney, bernarda childers. So [facility] [phone].    ATENCIÓN: Si habla español, tiene a calix disposición servicios gratuitos de asistencia lingüística. Llame al [phone].    We comply with applicable federal civil rights laws and [place] laws. We do not discriminate on the basis of race, color, national origin, age, disability, sex, sexual " orientation, or gender identity.            Thank you!     Thank you for choosing [facility]  for your care. Our goal is always to provide you with excellent care. Hearing back from our patients is one way we can continue to improve our services. Please take a few minutes to complete the written survey that you may receive in the mail after your visit with us. Thank you!             Your Updated Medication List - Protect others around you: Learn how to safely use, store and throw away your medicines at www.disposemymeds.org.          This list is accurate as of 10/10/18 10:16 AM.  Always use your most recent med list.                   Brand Name Dispense Instructions for use Diagnosis    * AMLODIPINE BESYLATE PO      Take 5 mg by mouth daily        * amLODIPine 5 MG tablet    NORVASC    90 tablet    Take 1 tablet (5 mg) by mouth daily    Essential hypertension with goal blood pressure less than 140/90       aspirin 81 MG EC tablet      Take 81 mg by mouth daily        chlorthalidone 25 MG tablet    HYGROTON    90 tablet    Take 1 tablet (25 mg) by mouth daily    Essential hypertension with goal blood pressure less than 140/90       clindamycin 300 MG capsule    CLEOCIN     Take 600 mg by mouth 1 HOUR PRIOR TO DENTAL APPOINTMENT        clobetasol 0.05 % ointment    TEMOVATE    45 g    Apply topically daily as needed For itching    Eczema, unspecified type       GABAPENTIN PO      Take by mouth 2 times daily        magic mouthwash suspension (diphenhydrAMINE, lidocaine, aluminum-magnesium & simethicone) Susp compounding kit     237 mL    Swish and swallow 5-10 mLs in mouth every 6 hours as needed for mouth sores    Oral lesion       neomycin-polymyxin-hydrocortisone 3.5-16150-2 otic suspension    CORTISPORIN    1 Bottle    Place 3 drops Into the left ear 4 times daily as needed    Eczema, unspecified type       * OMEPRAZOLE PO      Take 40 mg by mouth every morning        * omeprazole 40 MG capsule     priLOSEC    90 capsule    Take 1 capsule (40 mg) by mouth daily Take 30-60 minutes before a meal.    Gastroesophageal reflux disease without esophagitis       * SIMVASTATIN PO      Take 20 mg by mouth At Bedtime        * simvastatin 20 MG tablet    ZOCOR    90 tablet    Take 1 tablet (20 mg) by mouth At Bedtime    Hyperlipidemia, unspecified hyperlipidemia type       tamsulosin 0.4 MG capsule    FLOMAX    90 capsule    Take 1 capsule (0.4 mg) by mouth daily    Benign prostatic hyperplasia with incomplete bladder emptying       * Notice:  This list has 6 medication(s) that are the same as other medications prescribed for you. Read the directions carefully, and ask your doctor or other care provider to review them with you.

## 2018-10-10 NOTE — PROGRESS NOTES
SUBJECTIVE:   Tommy De La O is a 63 year old male who presents to clinic today for the following health issues:      Concern - Mouth Lesions  Onset: 1 month    Description:   Pt states that he has been dealing with mouth lesions which was dx as Hand, foot and mouth disease that he possiblycontracted from his grandson. He states that the lesions are getting worse and he believes this could be thrush or something other than Hand Foot and Mouth. He states this started in late August.    Intensity: moderate    Progression of Symptoms:  worsening    Accompanying Signs & Symptoms:  Mouth lesions    Previous history of similar problem:   None    Precipitating factors:   Worsened by: CPAP dries  Out the mouth. Hot coffee makes them worse. Eating makes it worse.    Alleviating factors:  Improved by: Salt water rinse helps minimally    Therapies Tried and outcome: Z-Pack did not help.      Problem list and histories reviewed & adjusted, as indicated.  Additional history: as documented    Patient Active Problem List   Diagnosis     Osteoarthritis of knee     Gastrointestinal hemorrhage     Gastroesophageal reflux disease     Hyperlipidemia     Obstructive sleep apnea syndrome     Essential hypertension with goal blood pressure less than 140/90     Morbid obesity (H)     No past surgical history on file.    Social History   Substance Use Topics     Smoking status: Former Smoker     Packs/day: 1.00     Years: 30.00     Types: Cigarettes     Quit date: 2011     Smokeless tobacco: Never Used      Comment: started at age 22     Alcohol use Yes     Family History   Problem Relation Age of Onset     Coronary Artery Disease Father 56      at 56 MI     Prostate Cancer No family hx of          Current Outpatient Prescriptions   Medication Sig Dispense Refill     amLODIPine (NORVASC) 5 MG tablet Take 1 tablet (5 mg) by mouth daily 90 tablet 3     AMLODIPINE BESYLATE PO Take 5 mg by mouth daily        aspirin 81 MG EC tablet Take  "81 mg by mouth daily       chlorthalidone (HYGROTON) 25 MG tablet Take 1 tablet (25 mg) by mouth daily 90 tablet 3     clindamycin (CLEOCIN) 300 MG capsule Take 600 mg by mouth 1 HOUR PRIOR TO DENTAL APPOINTMENT       clobetasol (TEMOVATE) 0.05 % ointment Apply topically daily as needed For itching 45 g 1     GABAPENTIN PO Take by mouth 2 times daily       magic mouthwash (FIRST-MOUTHWASH BLM) suspension Swish and swallow 5-10 mLs in mouth every 6 hours as needed for mouth sores (Patient not taking: Reported on 10/11/2018) 237 mL 1     omeprazole (PRILOSEC) 40 MG capsule Take 1 capsule (40 mg) by mouth daily Take 30-60 minutes before a meal. 90 capsule 3     OMEPRAZOLE PO Take 40 mg by mouth every morning       simvastatin (ZOCOR) 20 MG tablet Take 1 tablet (20 mg) by mouth At Bedtime 90 tablet 3     SIMVASTATIN PO Take 20 mg by mouth At Bedtime        tamsulosin (FLOMAX) 0.4 MG capsule Take 1 capsule (0.4 mg) by mouth daily 90 capsule 3     Allergies   Allergen Reactions     Cefzil [Cefprozil] Hives and Itching     Darvocet [Propoxyphene N-Apap] Hives       Reviewed and updated as needed this visit by clinical staff       Reviewed and updated as needed this visit by Provider         ROS:  Constitutional, HEENT, cardiovascular, pulmonary, gi and gu systems are negative, except as otherwise noted.    OBJECTIVE:     /80  Pulse 55  Temp 97.9  F (36.6  C) (Tympanic)  Resp 20  Ht 5' 11.5\" (1.816 m)  Wt 322 lb (146.1 kg)  SpO2 98%  BMI 44.28 kg/m2  Body mass index is 44.28 kg/(m^2).  GENERAL: healthy, alert and no distress  EYES: Eyes grossly normal to inspection, PERRL and conjunctivae and sclerae normal  HENT: ear canals and TM's normal, nose and mouth without ulcers or lesions  HENT: Oral red lesions noted to the hard palate  NECK: no adenopathy, no asymmetry, masses, or scars and thyroid normal to palpation  RESP: lungs clear to auscultation - no rales, rhonchi or wheezes  CV: regular rate and rhythm, " normal S1 S2, no S3 or S4, no murmur, click or rub, no peripheral edema and peripheral pulses strong  MS: no gross musculoskeletal defects noted, no edema  SKIN: no suspicious lesions or rashes  NEURO: Normal strength and tone, mentation intact and speech normal  PSYCH: mentation appears normal, affect normal/bright      ASSESSMENT/PLAN:   (K13.70) Oral lesion  (primary encounter diagnosis)  Comment: Patient was seen over the last month twice and diagnosed with hand-foot-and-mouth but no rashes developed on his hands or his feet.  Oral lesions did not resolve at this point do not feel it is strep that was negative he does not have a fever.  Due to duration will have him follow-up with ENT for further evaluation of oral lesions did review with patient most likely benign lesions  will have him further evaluated by ENT since they are not resolving.  Plan: OTOLARYNGOLOGY REFERRAL, magic mouthwash         (FIRST-MOUTHWASH BLM) suspension          JUAN LUIS Art CNP  [facility]

## 2018-10-11 ENCOUNTER — OFFICE VISIT (OUTPATIENT)
Dept: OTOLARYNGOLOGY | Facility: CLINIC | Age: 63
End: 2018-10-11
Payer: COMMERCIAL

## 2018-10-11 VITALS
DIASTOLIC BLOOD PRESSURE: 88 MMHG | TEMPERATURE: 98.3 F | HEIGHT: 72 IN | WEIGHT: 315 LBS | HEART RATE: 65 BPM | SYSTOLIC BLOOD PRESSURE: 130 MMHG | BODY MASS INDEX: 42.66 KG/M2

## 2018-10-11 DIAGNOSIS — K13.79 MOUTH PAIN: Primary | ICD-10-CM

## 2018-10-11 PROCEDURE — 99203 OFFICE O/P NEW LOW 30 MIN: CPT | Performed by: OTOLARYNGOLOGY

## 2018-10-11 ASSESSMENT — PAIN SCALES - GENERAL: PAINLEVEL: MODERATE PAIN (4)

## 2018-10-11 NOTE — PROGRESS NOTES
History of Present Illness - Tommy De La O is a 63 year old male who presents in consultation at the request of Georgina Dodson for an oral lesion. He describes that his mouth is tender when drinking hot coffee or eating. He initially was diagnosed with hand, foot, mouth disease. However his tongue continued to be painful. He also had some open lesions initially, but they are not present today.    Past Medical History -   Patient Active Problem List   Diagnosis     Osteoarthritis of knee     Gastrointestinal hemorrhage     Gastroesophageal reflux disease     Hyperlipidemia     Obstructive sleep apnea syndrome     Essential hypertension with goal blood pressure less than 140/90     Morbid obesity (H)       Current Medications -   Current Outpatient Prescriptions:      amLODIPine (NORVASC) 5 MG tablet, Take 1 tablet (5 mg) by mouth daily, Disp: 90 tablet, Rfl: 3     AMLODIPINE BESYLATE PO, Take 5 mg by mouth daily , Disp: , Rfl:      aspirin 81 MG EC tablet, Take 81 mg by mouth daily, Disp: , Rfl:      chlorthalidone (HYGROTON) 25 MG tablet, Take 1 tablet (25 mg) by mouth daily, Disp: 90 tablet, Rfl: 3     clobetasol (TEMOVATE) 0.05 % ointment, Apply topically daily as needed For itching, Disp: 45 g, Rfl: 1     GABAPENTIN PO, Take by mouth 2 times daily, Disp: , Rfl:      omeprazole (PRILOSEC) 40 MG capsule, Take 1 capsule (40 mg) by mouth daily Take 30-60 minutes before a meal., Disp: 90 capsule, Rfl: 3     OMEPRAZOLE PO, Take 40 mg by mouth every morning, Disp: , Rfl:      simvastatin (ZOCOR) 20 MG tablet, Take 1 tablet (20 mg) by mouth At Bedtime, Disp: 90 tablet, Rfl: 3     SIMVASTATIN PO, Take 20 mg by mouth At Bedtime , Disp: , Rfl:      tamsulosin (FLOMAX) 0.4 MG capsule, Take 1 capsule (0.4 mg) by mouth daily, Disp: 90 capsule, Rfl: 3     clindamycin (CLEOCIN) 300 MG capsule, Take 600 mg by mouth 1 HOUR PRIOR TO DENTAL APPOINTMENT, Disp: , Rfl:      magic mouthwash (FIRST-MOUTHWASH BLM) suspension, Swish and  "swallow 5-10 mLs in mouth every 6 hours as needed for mouth sores (Patient not taking: Reported on 10/11/2018), Disp: 237 mL, Rfl: 1    Allergies -   Allergies   Allergen Reactions     Cefzil [Cefprozil] Hives and Itching     Darvocet [Propoxyphene N-Apap] Hives       Social History -   Social History     Social History     Marital status: Single     Spouse name: N/A     Number of children: N/A     Years of education: N/A     Social History Main Topics     Smoking status: Former Smoker     Packs/day: 1.00     Years: 30.00     Types: Cigarettes     Quit date: 2011     Smokeless tobacco: Never Used      Comment: started at age 22     Alcohol use Yes     Drug use: No     Sexual activity: Not on file     Other Topics Concern     Not on file     Social History Narrative       Family History -   Family History   Problem Relation Age of Onset     Coronary Artery Disease Father 56      at 56 MI     Prostate Cancer No family hx of        Review of Systems - As per HPI and PMHx, otherwise 7 system review of the head and neck negative. 10+ system review negative.    Physical Exam  /88 (BP Location: Left arm, Patient Position: Sitting, Cuff Size: Adult Large)  Pulse 65  Temp 98.3  F (36.8  C) (Oral)  Ht 1.816 m (5' 11.5\")  Wt 146.1 kg (322 lb)  BMI 44.28 kg/m2  General - The patient is well nourished and well developed, and appears to have good nutritional status.  Alert and oriented to person and place, answers questions and cooperates with examination appropriately.   Head and Face - Normocephalic and atraumatic, with no gross asymmetry noted of the contour of the facial features.  The facial nerve is intact, with strong symmetric movements.  Voice and Breathing - The patient was breathing comfortably without the use of accessory muscles. There was no wheezing, stridor, or stertor.  The patients voice was clear and strong, and had appropriate pitch and quality.  Ears - Bilateral pinna and EACs with normal " appearing overlying skin. Tympanic membrane intact with good mobility on pneumatic otoscopy bilaterally. Bony landmarks of the ossicular chain are normal. The tympanic membranes are normal in appearance. No retraction, perforation, or masses.  No fluid or purulence was seen in the external canal or the middle ear.   Eyes - Extraocular movements intact.  Sclera were not icteric or injected, conjunctiva were pink and moist.  Mouth - Examination of the oral cavity showed pink, healthy oral mucosa. No lesions or ulcerations noted.  The tongue was mobile and midline, and the dentition were in good condition.  No visible ulcerations or sores in the mouth today.  Throat - The walls of the oropharynx were smooth, pink, moist, symmetric, and had no lesions or ulcerations.  The tonsillar pillars and soft palate were symmetric.  The uvula was midline on elevation.  Neck - Normal midline excursion of the laryngotracheal complex during swallowing.  Full range of motion on passive movement.  Palpation of the occipital, submental, submandibular, internal jugular chain, and supraclavicular nodes did not demonstrate any abnormal lymph nodes or masses.  The carotid pulse was palpable bilaterally.  Palpation of the thyroid was soft and smooth, with no nodules or goiter appreciated.  The trachea was mobile and midline.  Nose - External contour is symmetric, no gross deflection or scars.  Nasal mucosa is pink and moist with no abnormal mucus.  The septum was midline and non-obstructive, turbinates of normal size and position.  No polyps, masses, or purulence noted on examination.          Assessment - Tommy De La O is a 63 year old male with likley resolving apthous stomatitis. There was no lesion for biopsy today. I reassured him that I do think this is related to any malignancy process, as he was very anxious based on some information he had been given that referenced cancer. He seemed greatly relieved. I recommended using topical  benzocaine ointment to help him tolerate PO better.       Dr. Rangel Tripp MD  Otolaryngology  [facility]

## 2018-10-11 NOTE — NURSING NOTE
"Initial /88 (BP Location: Left arm, Patient Position: Sitting, Cuff Size: Adult Large)  Pulse 65  Temp 98.3  F (36.8  C) (Oral)  Ht 1.816 m (5' 11.5\")  Wt 146.1 kg (322 lb)  BMI 44.28 kg/m2 Estimated body mass index is 44.28 kg/(m^2) as calculated from the following:    Height as of this encounter: 1.816 m (5' 11.5\").    Weight as of this encounter: 146.1 kg (322 lb). .    Lily De Jesus CMA    "

## 2018-10-11 NOTE — MR AVS SNAPSHOT
After Visit Summary   10/11/2018    Tommy De La O    MRN: [id number]           Patient Information     Date Of Birth          1955        Visit Information        Provider Department      10/11/2018 1:45 PM Rangel Tripp MD [facility]        Today's Diagnoses     Mouth pain    -  1      Care Instructions    Per Physician's instructions            Follow-ups after your visit        Your next 10 appointments already scheduled     Oct 19, 2018  2:00 PM CDT   Ortho Treatment with Jeny Saavedra PT   [facility] ([facility])    [address] [place] [id number]   [phone]            Nov 15, 2018  9:40 AM CST   Pre-Op physical with iTm Crawley MD   [facility]    [address] [place] [id number]   [phone]            Dec 21, 2018 11:00 AM CST   Return Visit with Patricio Domingo MD   [facility] ([address] [id number] [phone]              Who to contact     If you have questions or need follow up information about today's clinic visit or your schedule please contact [facility] directly at [phone].  Normal or non-critical lab and imaging results will be communicated to you by MyChart, letter or phone within 4 business days after the clinic has received the results. If you do not hear from us within 7 days, please contact the clinic through MyChart or phone. If you have a critical or abnormal lab result, we will notify you by phone as soon as possible.  Submit refill requests through MessageOne or call your pharmacy and they will forward the refill request to us. Please allow 3 business days for your refill to be completed.          Additional Information About Your Visit        [employer] Information     Message[employer] gives you secure access to your electronic health record. If you see  "a primary care provider, you can also send messages to your care team and make appointments. If you have questions, please call your primary care clinic.  If you do not have a primary care provider, please call [phone] and they will assist you.        Care EveryWhere ID     This is your Care EveryWhere ID. This could be used by other organizations to access your [place] medical records  [id number]        Your Vitals Were     Pulse Temperature Height BMI (Body Mass Index)          65 98.3  F (36.8  C) (Oral) 1.816 m (5' 11.5\") 44.28 kg/m2         Blood Pressure from Last 3 Encounters:   10/11/18 130/88   10/10/18 128/80   09/29/18 112/64    Weight from Last 3 Encounters:   10/11/18 146.1 kg (322 lb)   10/10/18 146.1 kg (322 lb)   09/29/18 146.1 kg (322 lb)              Today, you had the following     No orders found for display         Today's Medication Changes          These changes are accurate as of 10/11/18 11:59 PM.  If you have any questions, ask your nurse or doctor.               Stop taking these medicines if you haven't already. Please contact your care team if you have questions.     neomycin-polymyxin-hydrocortisone [id number] otic suspension   Commonly known as:  CORTISPORIN   Stopped by:  Rangel Tripp MD                    Primary Care Provider Office Phone # Fax #    Tim Crawley [phone] [address] [place], [place] [id number]        Equal Access to Services     [facility] AH: Hadii imani elizaldeo Sozach, waaxda luqadaha, qaybta kaalmada bernarda stevens. So [facility] [phone].    ATENCIÓN: Si brela marcell, tiene a calix disposición servicios gratuitos de asistencia lingüística. Llame al [phone].    We comply with applicable federal civil rights laws and [place] laws. We do not discriminate on the basis of race, color, national origin, age, disability, sex, sexual orientation, or gender identity.            Thank you!  "    Thank you for choosing [facility]  for your care. Our goal is always to provide you with excellent care. Hearing back from our patients is one way we can continue to improve our services. Please take a few minutes to complete the written survey that you may receive in the mail after your visit with us. Thank you!             Your Updated Medication List - Protect others around you: Learn how to safely use, store and throw away your medicines at www.disposemymeds.org.          This list is accurate as of 10/11/18 11:59 PM.  Always use your most recent med list.                   Brand Name Dispense Instructions for use Diagnosis    * AMLODIPINE BESYLATE PO      Take 5 mg by mouth daily        * amLODIPine 5 MG tablet    NORVASC    90 tablet    Take 1 tablet (5 mg) by mouth daily    Essential hypertension with goal blood pressure less than 140/90       aspirin 81 MG EC tablet      Take 81 mg by mouth daily        chlorthalidone 25 MG tablet    HYGROTON    90 tablet    Take 1 tablet (25 mg) by mouth daily    Essential hypertension with goal blood pressure less than 140/90       clindamycin 300 MG capsule    CLEOCIN     Take 600 mg by mouth 1 HOUR PRIOR TO DENTAL APPOINTMENT        clobetasol 0.05 % ointment    TEMOVATE    45 g    Apply topically daily as needed For itching    Eczema, unspecified type       GABAPENTIN PO      Take by mouth 2 times daily        magic mouthwash suspension (diphenhydrAMINE, lidocaine, aluminum-magnesium & simethicone) Susp compounding kit     237 mL    Swish and swallow 5-10 mLs in mouth every 6 hours as needed for mouth sores    Oral lesion       * OMEPRAZOLE PO      Take 40 mg by mouth every morning        * omeprazole 40 MG capsule    priLOSEC    90 capsule    Take 1 capsule (40 mg) by mouth daily Take 30-60 minutes before a meal.    Gastroesophageal reflux disease without esophagitis       * SIMVASTATIN PO      Take 20 mg by mouth At Bedtime        * simvastatin 20 MG  tablet    ZOCOR    90 tablet    Take 1 tablet (20 mg) by mouth At Bedtime    Hyperlipidemia, unspecified hyperlipidemia type       tamsulosin 0.4 MG capsule    FLOMAX    90 capsule    Take 1 capsule (0.4 mg) by mouth daily    Benign prostatic hyperplasia with incomplete bladder emptying       * Notice:  This list has 6 medication(s) that are the same as other medications prescribed for you. Read the directions carefully, and ask your doctor or other care provider to review them with you.

## 2018-10-11 NOTE — LETTER
10/11/2018         RE: Tommy De La O  1964 [address] [facility] [address]        Dear Colleague,    Thank you for referring your patient, Tommy De La O, to the [facility]. Please see a copy of my visit note below.        History of Present Illness - Tommy De La O is a 63 year old male who presents in consultation at the request of Georgina Dodson for an oral lesion. He describes that his mouth is tender when drinking hot coffee or eating. He initially was diagnosed with hand, foot, mouth disease. However his tongue continued to be painful. He also had some open lesions initially, but they are not present today.    Past Medical History -   Patient Active Problem List   Diagnosis     Osteoarthritis of knee     Gastrointestinal hemorrhage     Gastroesophageal reflux disease     Hyperlipidemia     Obstructive sleep apnea syndrome     Essential hypertension with goal blood pressure less than 140/90     Morbid obesity (H)       Current Medications -   Current Outpatient Prescriptions:      amLODIPine (NORVASC) 5 MG tablet, Take 1 tablet (5 mg) by mouth daily, Disp: 90 tablet, Rfl: 3     AMLODIPINE BESYLATE PO, Take 5 mg by mouth daily , Disp: , Rfl:      aspirin 81 MG EC tablet, Take 81 mg by mouth daily, Disp: , Rfl:      chlorthalidone (HYGROTON) 25 MG tablet, Take 1 tablet (25 mg) by mouth daily, Disp: 90 tablet, Rfl: 3     clobetasol (TEMOVATE) 0.05 % ointment, Apply topically daily as needed For itching, Disp: 45 g, Rfl: 1     GABAPENTIN PO, Take by mouth 2 times daily, Disp: , Rfl:      omeprazole (PRILOSEC) 40 MG capsule, Take 1 capsule (40 mg) by mouth daily Take 30-60 minutes before a meal., Disp: 90 capsule, Rfl: 3     OMEPRAZOLE PO, Take 40 mg by mouth every morning, Disp: , Rfl:      simvastatin (ZOCOR) 20 MG tablet, Take 1 tablet (20 mg) by mouth At Bedtime, Disp: 90 tablet, Rfl: 3     SIMVASTATIN PO, Take 20 mg by mouth At Bedtime , Disp: , Rfl:      tamsulosin (FLOMAX) 0.4 MG capsule, Take 1  "capsule (0.4 mg) by mouth daily, Disp: 90 capsule, Rfl: 3     clindamycin (CLEOCIN) 300 MG capsule, Take 600 mg by mouth 1 HOUR PRIOR TO DENTAL APPOINTMENT, Disp: , Rfl:      magic mouthwash (FIRST-MOUTHWASH BLM) suspension, Swish and swallow 5-10 mLs in mouth every 6 hours as needed for mouth sores (Patient not taking: Reported on 10/11/2018), Disp: 237 mL, Rfl: 1    Allergies -   Allergies   Allergen Reactions     Cefzil [Cefprozil] Hives and Itching     Darvocet [Propoxyphene N-Apap] Hives       Social History -   Social History     Social History     Marital status: Single     Spouse name: N/A     Number of children: N/A     Years of education: N/A     Social History Main Topics     Smoking status: Former Smoker     Packs/day: 1.00     Years: 30.00     Types: Cigarettes     Quit date: 2011     Smokeless tobacco: Never Used      Comment: started at age 22     Alcohol use Yes     Drug use: No     Sexual activity: Not on file     Other Topics Concern     Not on file     Social History Narrative       Family History -   Family History   Problem Relation Age of Onset     Coronary Artery Disease Father 56      at 56 MI     Prostate Cancer No family hx of        Review of Systems - As per HPI and PMHx, otherwise 7 system review of the head and neck negative. 10+ system review negative.    Physical Exam  /88 (BP Location: Left arm, Patient Position: Sitting, Cuff Size: Adult Large)  Pulse 65  Temp 98.3  F (36.8  C) (Oral)  Ht 1.816 m (5' 11.5\")  Wt 146.1 kg (322 lb)  BMI 44.28 kg/m2  General - The patient is well nourished and well developed, and appears to have good nutritional status.  Alert and oriented to person and place, answers questions and cooperates with examination appropriately.   Head and Face - Normocephalic and atraumatic, with no gross asymmetry noted of the contour of the facial features.  The facial nerve is intact, with strong symmetric movements.  Voice and Breathing - The patient " was breathing comfortably without the use of accessory muscles. There was no wheezing, stridor, or stertor.  The patients voice was clear and strong, and had appropriate pitch and quality.  Ears - Bilateral pinna and EACs with normal appearing overlying skin. Tympanic membrane intact with good mobility on pneumatic otoscopy bilaterally. Bony landmarks of the ossicular chain are normal. The tympanic membranes are normal in appearance. No retraction, perforation, or masses.  No fluid or purulence was seen in the external canal or the middle ear.   Eyes - Extraocular movements intact.  Sclera were not icteric or injected, conjunctiva were pink and moist.  Mouth - Examination of the oral cavity showed pink, healthy oral mucosa. No lesions or ulcerations noted.  The tongue was mobile and midline, and the dentition were in good condition.  No visible ulcerations or sores in the mouth today.  Throat - The walls of the oropharynx were smooth, pink, moist, symmetric, and had no lesions or ulcerations.  The tonsillar pillars and soft palate were symmetric.  The uvula was midline on elevation.  Neck - Normal midline excursion of the laryngotracheal complex during swallowing.  Full range of motion on passive movement.  Palpation of the occipital, submental, submandibular, internal jugular chain, and supraclavicular nodes did not demonstrate any abnormal lymph nodes or masses.  The carotid pulse was palpable bilaterally.  Palpation of the thyroid was soft and smooth, with no nodules or goiter appreciated.  The trachea was mobile and midline.  Nose - External contour is symmetric, no gross deflection or scars.  Nasal mucosa is pink and moist with no abnormal mucus.  The septum was midline and non-obstructive, turbinates of normal size and position.  No polyps, masses, or purulence noted on examination.          Assessment - Tommy De La O is a 63 year old male with likley resolving apthous stomatitis. There was no lesion for biopsy  today. I reassured him that I do think this is related to any malignancy process, as he was very anxious based on some information he had been given that referenced cancer. He seemed greatly relieved. I recommended using topical benzocaine ointment to help him tolerate PO better.       Dr. Rangel Tripp MD  Otolaryngology  [facility]        Again, thank you for allowing me to participate in the care of your patient.        Sincerely,        Rangel Tripp MD

## 2018-10-30 ENCOUNTER — HOSPITAL ENCOUNTER (OUTPATIENT)
Dept: PHYSICAL THERAPY | Facility: CLINIC | Age: 63
Setting detail: THERAPIES SERIES
End: 2018-10-30
Attending: PSYCHIATRY & NEUROLOGY
Payer: MEDICARE

## 2018-10-30 PROCEDURE — 97110 THERAPEUTIC EXERCISES: CPT | Mod: GP

## 2018-10-30 PROCEDURE — G8983 BODY POS D/C STATUS: HCPCS | Mod: GP,CI

## 2018-10-30 PROCEDURE — 97140 MANUAL THERAPY 1/> REGIONS: CPT | Mod: GP

## 2018-10-30 PROCEDURE — 40000718 ZZHC STATISTIC PT DEPARTMENT ORTHO VISIT

## 2018-10-30 PROCEDURE — G8982 BODY POS GOAL STATUS: HCPCS | Mod: GP,CI

## 2018-10-30 NOTE — PROGRESS NOTES
"Outpatient Physical Therapy Discharge Note     Patient: Tommy De La O  : 1955    Beginning/End Dates of Reporting Period:  2018 to 10/30/2018    Referring Provider: Dr Monsivais    Therapy Diagnosis: LBP and B feet numbness/tingling.      Client Self Report: Pt reports back is getting better, but it still gets irritated with his chores. Rates pain at 1.5/10.     Objective Measurements:  Objective Measure: Trunk ROM  Details: flex: fingers to toes with R LB pain when returning to stand, ext: mildly limited with mild R LBP, R SBing WFL with incr R LBP, L SBing WFL , R rot WFL , L rot WFL     Objective Measure: Core strength  Details: plank hold 40\"    Objective Measure: Spring test  Details: slight pain L 3    Objective Measure: Palpation  Details: tender R L3-5 paraspinals, L L4-5, nontender QL, gluts, piriformis           Goals:  Goal Identifier 1   Goal Description Pt will be able to sleep through the night without waking from pain.   Target Date 10/11/18   Date Met  18   Progress:     Goal Identifier 2   Goal Description Pt will be able to stand 1 hour with < 2/10 pain.   Target Date 10/25/18   Date Met  10/30/18   Progress:     Goal Identifier 3   Goal Description Pt will be able to sit in recliner at night with < 2/10 pain.    Target Date 18   Date Met  18   Progress:     Goal Identifier 4   Goal Description Pt will be independent with HEP for optimal functional recovery.   Target Date 18   Date Met  10/30/18   Progress:         Progress Toward Goals:   Progress this reporting period: Pt has made good progress towards decreased pain and increased function. Treatment consisted of posture/body mechanics training, LB ROM and strengthening, and core strengthening. Pt has met all goals and will continue with his HEP.           Plan:  Discharge from therapy.    Discharge:    Reason for Discharge: Patient has met all goals.      Discharge Plan: Patient to continue home program.    Jeny" Quentin PT

## 2018-11-19 ENCOUNTER — OFFICE VISIT (OUTPATIENT)
Dept: FAMILY MEDICINE | Facility: CLINIC | Age: 63
End: 2018-11-19
Payer: COMMERCIAL

## 2018-11-19 VITALS
HEIGHT: 72 IN | DIASTOLIC BLOOD PRESSURE: 80 MMHG | HEART RATE: 60 BPM | SYSTOLIC BLOOD PRESSURE: 126 MMHG | WEIGHT: 312 LBS | BODY MASS INDEX: 42.26 KG/M2 | TEMPERATURE: 98.8 F | RESPIRATION RATE: 20 BRPM

## 2018-11-19 DIAGNOSIS — G47.33 OBSTRUCTIVE SLEEP APNEA SYNDROME: ICD-10-CM

## 2018-11-19 DIAGNOSIS — Z23 NEED FOR PROPHYLACTIC VACCINATION AND INOCULATION AGAINST INFLUENZA: ICD-10-CM

## 2018-11-19 DIAGNOSIS — Z01.818 PREOP GENERAL PHYSICAL EXAM: Primary | ICD-10-CM

## 2018-11-19 DIAGNOSIS — E66.01 MORBID OBESITY (H): ICD-10-CM

## 2018-11-19 DIAGNOSIS — I10 ESSENTIAL HYPERTENSION WITH GOAL BLOOD PRESSURE LESS THAN 140/90: ICD-10-CM

## 2018-11-19 LAB
ANION GAP SERPL CALCULATED.3IONS-SCNC: 8 MMOL/L (ref 3–14)
BUN SERPL-MCNC: 12 MG/DL (ref 7–30)
CALCIUM SERPL-MCNC: 9 MG/DL (ref 8.5–10.1)
CHLORIDE SERPL-SCNC: 102 MMOL/L (ref 94–109)
CO2 SERPL-SCNC: 28 MMOL/L (ref 20–32)
CREAT SERPL-MCNC: 0.88 MG/DL (ref 0.66–1.25)
GFR SERPL CREATININE-BSD FRML MDRD: 88 ML/MIN/1.7M2
GLUCOSE SERPL-MCNC: 89 MG/DL (ref 70–99)
POTASSIUM SERPL-SCNC: 3.3 MMOL/L (ref 3.4–5.3)
SODIUM SERPL-SCNC: 138 MMOL/L (ref 133–144)

## 2018-11-19 PROCEDURE — 99215 OFFICE O/P EST HI 40 MIN: CPT | Mod: 25 | Performed by: FAMILY MEDICINE

## 2018-11-19 PROCEDURE — 80048 BASIC METABOLIC PNL TOTAL CA: CPT | Performed by: FAMILY MEDICINE

## 2018-11-19 PROCEDURE — G0008 ADMIN INFLUENZA VIRUS VAC: HCPCS | Performed by: FAMILY MEDICINE

## 2018-11-19 PROCEDURE — 93000 ELECTROCARDIOGRAM COMPLETE: CPT | Performed by: FAMILY MEDICINE

## 2018-11-19 PROCEDURE — 90682 RIV4 VACC RECOMBINANT DNA IM: CPT | Performed by: FAMILY MEDICINE

## 2018-11-19 PROCEDURE — 36415 COLL VENOUS BLD VENIPUNCTURE: CPT | Performed by: FAMILY MEDICINE

## 2018-11-19 ASSESSMENT — PAIN SCALES - GENERAL: PAINLEVEL: NO PAIN (0)

## 2018-11-19 NOTE — PROGRESS NOTES
[facility]  [address] [place] [address]  [phone]  Dept: [phone]    PRE-OP EVALUATION:    Today's date: 2018    Tommy De La O (: 1955) presents for pre-operative evaluation assessment as requested by Dr. Angeles.  He requires evaluation and anesthesia risk assessment prior to undergoing surgery/procedure for treatment of Urinary Retention     Fax number for surgical facility:   Primary Physician: Tim Crawley  Type of Anesthesia Anticipated: to be determined    Patient has a Health Care Directive or Living Will:  NO    Preop Questions 2018   Who is doing your surgery?    What are you having done? steven   Date of Surgery/Procedure:    1.  Do you have a history of Heart attack, stroke, stent, coronary bypass surgery, or other heart surgery? No   2.  Do you ever have any pain or discomfort in your chest? No   3.  Do you have a history of  Heart Failure? No   4.   Are you troubled by shortness of breath when:  walking on a level surface, or up a slight hill, or at night? YES - sometimes.  He can walk a flight of stairs without stopping.    5.  Do you currently have a cold, bronchitis or other respiratory infection? No   6.  Do you have a cough, shortness of breath, or wheezing? No   7.  Do you sometimes get pains in the calves of your legs when you walk? YES - he can have shin splints if walking a lot.    8. Do you or anyone in your family have previous history of blood clots? UNKNOWN    9.  Do you or does anyone in your family have a serious bleeding problem such as prolonged bleeding following surgeries or cuts? No   10. Have you ever had problems with anemia or been told to take iron pills? No   11. Have you had any abnormal blood loss such as black, tarry or bloody stools? No   12. Have you ever had a blood transfusion? No   13. Have you or any of your relatives ever had problems with anesthesia? No   14. Do you have sleep apnea,  excessive snoring or daytime drowsiness? YES - Uses CPAP   15. Do you have any prosthetic heart valves? No   16. Do you have prosthetic joints? YES - both total knee replacements.        HPI:   He has been seeing urology for the past couple years for nocturia.  He has noted decreased urine stream.   He has also had hematuria for a couple days a year ago.  A cysto was done 6/26/2017.  He has been using tamulosin but has been having continuing symptoms.   He is scheduled for a TURP later this month.     MEDICAL HISTORY:     Patient Active Problem List    Diagnosis Date Noted     Morbid obesity (H) 09/19/2018     Priority: Medium     Essential hypertension with goal blood pressure less than 140/90 06/15/2018     Priority: Medium     Osteoarthritis of knee 11/03/2014     Priority: Medium     Gastrointestinal hemorrhage 11/03/2014     Priority: Medium     Gastroesophageal reflux disease 11/03/2014     Priority: Medium     Hyperlipidemia 11/03/2014     Priority: Medium     Obstructive sleep apnea syndrome 11/03/2014     Priority: Medium        Past Surgical History:   Procedure Laterality Date     ARTHROSCOPY KNEE Bilateral     both knees with arthroscopy in the past.      AS TOTAL KNEE ARTHROPLASTY Bilateral     Both total knees.      REPAIR TENDON ACHILLES      1980s two separate surgeries.     ROTATOR CUFF REPAIR RT/LT Left      Current Outpatient Prescriptions   Medication Sig Dispense Refill     amLODIPine (NORVASC) 5 MG tablet Take 1 tablet (5 mg) by mouth daily 90 tablet 3     chlorthalidone (HYGROTON) 25 MG tablet Take 1 tablet (25 mg) by mouth daily 90 tablet 3     clindamycin (CLEOCIN) 300 MG capsule Take 600 mg by mouth 1 HOUR PRIOR TO DENTAL APPOINTMENT       clobetasol (TEMOVATE) 0.05 % ointment Apply topically daily as needed For itching 45 g 1     GABAPENTIN PO Take by mouth 2 times daily       omeprazole (PRILOSEC) 40 MG capsule Take 1 capsule (40 mg) by mouth daily Take 30-60 minutes before a meal. 90  capsule 3     simvastatin (ZOCOR) 20 MG tablet Take 1 tablet (20 mg) by mouth At Bedtime 90 tablet 3     tamsulosin (FLOMAX) 0.4 MG capsule Take 1 capsule (0.4 mg) by mouth daily 90 capsule 3     [DISCONTINUED] AMLODIPINE BESYLATE PO Take 5 mg by mouth daily        [DISCONTINUED] SIMVASTATIN PO Take 20 mg by mouth At Bedtime        OTC products: None, except as noted above    Allergies   Allergen Reactions     Cefzil [Cefprozil] Hives and Itching     Darvocet [Propoxyphene N-Apap] Hives      Latex Allergy: NO    Social History   Substance Use Topics     Smoking status: Former Smoker     Packs/day: 1.00     Years: 30.00     Types: Cigarettes     Quit date: 2/1/2011     Smokeless tobacco: Never Used      Comment: started at age 22     Alcohol use Yes     History   Drug Use No     Family History :  ============  No family history of bleeding or anesthesia problems.     REVIEW OF SYSTEMS:   ROS:  General: No change in weight, sleep or appetite.  Normal energy.  No fever or chills  Eyes: Negative for vision changes or eye problems  ENT: No problems with ears, nose or throat.  No difficulty swallowing.  Resp: No coughing, wheezing or shortness of breath  CV: No chest pains or palpitations.  Can get occasional sharp pain not related to exercise near left shoulder.   GI: POSITIVE for:, heartburn or reflux, has been taking omeprazole for a long time.   : see above   Musculoskeletal: POSITIVE  for:, pain in knees  Neurologic: No headaches, numbness, tingling, weakness, problems with balance or coordination  Psychiatric: No problems with anxiety, depression or mental health  Heme/immune/allergy: No history of bleeding or clotting problems or anemia.  No allergies or immune system problems  Endocrine: No history of thyroid disease, diabetes or other endocrine disorders  Skin: No rashes,worrisome lesions or skin problems     EXAM:   OBJECTIVE:Blood pressure 126/80, pulse 60, temperature 98.8  F (37.1  C), temperature source  "Tympanic, resp. rate 20, height 5' 11.5\" (1.816 m), weight 312 lb (141.5 kg). BMI=Body mass index is 42.91 kg/(m^2).  GENERAL APPEARANCE ADULT: Alert, no acute distress, morbidly obese  EYES: PERRL, EOM normal, conjunctiva and lids normal  HENT: Ears and TMs normal, oral mucosa and posterior oropharynx normal  NECK: No adenopathy,masses or thyromegaly  RESP: lungs clear to auscultation   CV: irregular rhythm-extrasystoles, bradycardia rate about 50, no murmur  ABDOMEN: soft, no organomegaly, masses or tenderness  MS: extremities normal, no peripheral edema     DIAGNOSTICS:   EKG: appears normal, NSR, sinus bradycardia, normal axis, normal intervals, no acute ST/T changes c/w ischemia, no LVH by voltage criteria, Premature Atrial Contractions (PAC) noted, unchanged from previous tracings  Labs done today: Chem 8    Recent Labs   Lab Test  06/08/18   0905  05/08/17   1045   HGB   --   14.9   PLT   --   175   INR   --   0.96   NA  138  139   POTASSIUM  3.6  3.5   CR  0.87  0.89     IMPRESSION:   Reason for surgery/procedure: prostate enlargement with urinary symptoms.    The proposed surgical procedure is considered INTERMEDIATE risk.    REVISED CARDIAC RISK INDEX  The patient has the following serious cardiovascular risks for perioperative complications such as (MI, PE, VFib and 3  AV Block):  No serious cardiac risks  INTERPRETATION: 0 risks: Class I (very low risk - 0.4% complication rate)    The patient has the following additional risks for perioperative complications:  No identified additional risks  Morbid obesity    RECOMMENDATIONS:       Obstructive Sleep Apnea (or suspected sleep apnea)  He will not be staying overnight.     --Patient is to take all scheduled medications on the day of surgery     APPROVAL GIVEN to proceed with proposed procedure, without further diagnostic evaluation    You could try stopping the omeprazole gradually.   I suggest cutting back to 20mg daily for 2-4 weeks, then every other day " for another couple weeks, then try stopping.        Signed Electronically by: Tim Crawley MD    Copy of this evaluation report is provided to requesting physician.    [facility] Preop Guidelines    Revised Cardiac Risk Index

## 2018-11-19 NOTE — PROGRESS NOTES

## 2018-11-19 NOTE — PATIENT INSTRUCTIONS
Before Your Surgery      Call your surgeon if there is any change in your health. This includes signs of a cold or flu (such as a sore throat, runny nose, cough, rash or fever).    Do not smoke, drink alcohol or take over the counter medicine (unless your surgeon or primary care doctor tells you to) for the 24 hours before and after surgery.    If you take prescribed drugs: Follow your doctor s orders about which medicines to take and which to stop until after surgery.    Eating and drinking prior to surgery: follow the instructions from your surgeon    Take a shower or bath the night before surgery. Use the soap your surgeon gave you to gently clean your skin. If you do not have soap from your surgeon, use your regular soap. Do not shave or scrub the surgery site.  Wear clean pajamas and have clean sheets on your bed.     RECOMMENDATIONS:       Obstructive Sleep Apnea (or suspected sleep apnea)  He will not be staying overnight.     --Patient is to take all scheduled medications on the day of surgery     APPROVAL GIVEN to proceed with proposed procedure, without further diagnostic evaluation    You could try stopping the omeprazole gradually.   I suggest cutting back to 20mg daily for 2-4 weeks, then every other day for another couple weeks, then try stopping.

## 2018-11-19 NOTE — MR AVS SNAPSHOT
After Visit Summary   11/19/2018    Tommy De La O    MRN: [id number]           Patient Information     Date Of Birth          1955        Visit Information        Provider Department      11/19/2018 10:00 AM Tim Crawley MD [facility]        Today's Diagnoses     Preop general physical exam    -  1    Obstructive sleep apnea syndrome        Essential hypertension with goal blood pressure less than 140/90        Morbid obesity (H)          Care Instructions      Before Your Surgery      Call your surgeon if there is any change in your health. This includes signs of a cold or flu (such as a sore throat, runny nose, cough, rash or fever).    Do not smoke, drink alcohol or take over the counter medicine (unless your surgeon or primary care doctor tells you to) for the 24 hours before and after surgery.    If you take prescribed drugs: Follow your doctor s orders about which medicines to take and which to stop until after surgery.    Eating and drinking prior to surgery: follow the instructions from your surgeon    Take a shower or bath the night before surgery. Use the soap your surgeon gave you to gently clean your skin. If you do not have soap from your surgeon, use your regular soap. Do not shave or scrub the surgery site.  Wear clean pajamas and have clean sheets on your bed.     RECOMMENDATIONS:       Obstructive Sleep Apnea (or suspected sleep apnea)  He will not be staying overnight.     --Patient is to take all scheduled medications on the day of surgery     APPROVAL GIVEN to proceed with proposed procedure, without further diagnostic evaluation    You could try stopping the omeprazole gradually.   I suggest cutting back to 20mg daily for 2-4 weeks, then every other day for another couple weeks, then try stopping.           Follow-ups after your visit        Follow-up notes from your care team     Return if symptoms worsen or fail to improve.      Your next 10  "appointments already scheduled     Dec 21, 2018 11:00 AM CST   Return Visit with Patricio Domingo MD   [facility] ([address]  [place] [id number] [phone]              Who to contact     If you have questions or need follow up information about today's clinic visit or your schedule please contact [facility] directly at [phone].  Normal or non-critical lab and imaging results will be communicated to you by [email or web address], letter or phone within 4 business days after the clinic has received the results. If you do not hear from us within 7 days, please contact the clinic through EnChromat or phone. If you have a critical or abnormal lab result, we will notify you by phone as soon as possible.  Submit refill requests through ChemiSense or call your pharmacy and they will forward the refill request to us. Please allow 3 business days for your refill to be completed.          Additional Information About Your Visit        [email or web address] Information     ChemiSense gives you secure access to your electronic health record. If you see a primary care provider, you can also send messages to your care team and make appointments. If you have questions, please call your primary care clinic.  If you do not have a primary care provider, please call [phone] and they will assist you.        Care EveryWhere ID     This is your Care EveryWhere ID. This could be used by other organizations to access your [place] medical records  [id number]        Your Vitals Were     Pulse Temperature Respirations Height BMI (Body Mass Index)       60 98.8  F (37.1  C) (Tympanic) 20 5' 11.5\" (1.816 m) 42.91 kg/m2        Blood Pressure from Last 3 Encounters:   11/19/18 126/80   10/11/18 130/88   10/10/18 128/80    Weight from Last 3 Encounters:   11/19/18 312 lb (141.5 kg)   10/11/18 322 lb (146.1 kg)   10/10/18 322 lb (146.1 kg)              We Performed the Following     Basic " metabolic panel     EKG 12-lead complete w/read - Clinics        Primary Care Provider Office Phone # Fax #    Tim Crawley [phone] [address] [place] [address]        Equal Access to Services     JANETT ARAYA : Hadbita imani gamboa fideo Soomaali, waaxda luqadaha, qaybta kaalmada adeelizabeth, bernarda martinezdee dee naqvidonnie martinez laArleykayla childers. So [facility] [phone].    ATENCIÓN: Si habla español, tiene a calix disposición servicios gratuitos de asistencia lingüística. Llame al [phone].    We comply with applicable federal civil rights laws and [place] laws. We do not discriminate on the basis of race, color, national origin, age, disability, sex, sexual orientation, or gender identity.            Thank you!     Thank you for choosing [facility]  for your care. Our goal is always to provide you with excellent care. Hearing back from our patients is one way we can continue to improve our services. Please take a few minutes to complete the written survey that you may receive in the mail after your visit with us. Thank you!             Your Updated Medication List - Protect others around you: Learn how to safely use, store and throw away your medicines at www.disposemymeds.org.          This list is accurate as of 11/19/18 11:18 AM.  Always use your most recent med list.                   Brand Name Dispense Instructions for use Diagnosis    amLODIPine 5 MG tablet    NORVASC    90 tablet    Take 1 tablet (5 mg) by mouth daily    Essential hypertension with goal blood pressure less than 140/90       chlorthalidone 25 MG tablet    HYGROTON    90 tablet    Take 1 tablet (25 mg) by mouth daily    Essential hypertension with goal blood pressure less than 140/90       clindamycin 300 MG capsule    CLEOCIN     Take 600 mg by mouth 1 HOUR PRIOR TO DENTAL APPOINTMENT        clobetasol 0.05 % ointment    TEMOVATE    45 g    Apply topically daily as needed For itching    Eczema,  unspecified type       GABAPENTIN PO      Take by mouth 2 times daily        omeprazole 40 MG capsule    priLOSEC    90 capsule    Take 1 capsule (40 mg) by mouth daily Take 30-60 minutes before a meal.    Gastroesophageal reflux disease without esophagitis       simvastatin 20 MG tablet    ZOCOR    90 tablet    Take 1 tablet (20 mg) by mouth At Bedtime    Hyperlipidemia, unspecified hyperlipidemia type       tamsulosin 0.4 MG capsule    FLOMAX    90 capsule    Take 1 capsule (0.4 mg) by mouth daily    Benign prostatic hyperplasia with incomplete bladder emptying

## 2018-11-20 NOTE — PROGRESS NOTES
Hi Cyril,  Your potassium is borderline low.   This is likely caused by your chlorthalidone blood pressure medication.   PLAN: eat foods higher in potassium.   See list below.  No changes in medications.   You are OK for upcoming surgery.   ELIAZAR ROY MD

## 2018-11-29 ENCOUNTER — TELEPHONE (OUTPATIENT)
Dept: UROLOGY | Facility: CLINIC | Age: 63
End: 2018-11-29

## 2018-11-29 NOTE — TELEPHONE ENCOUNTER
Reason for Call:  Other call back    Detailed comments: Patient had surgery yesterday. Patient needs clarification for aftercare of this surgery. Please call patient to discuss.    Phone Number Patient can be reached at: Cell number on file:    Telephone Information:   Mobile [phone]       Best Time: any    Can we leave a detailed message on this number? YES    Call taken on 11/29/2018 at 7:51 AM by Valeria Booker

## 2018-11-29 NOTE — TELEPHONE ENCOUNTER
Called and answered general questions about post-op healing after a TURP.   Patient is doing great no concerns.   Kim Dempsey RN

## 2018-12-11 ENCOUNTER — TELEPHONE (OUTPATIENT)
Dept: UROLOGY | Facility: CLINIC | Age: 63
End: 2018-12-11

## 2018-12-11 NOTE — TELEPHONE ENCOUNTER
Reason for call:  Other   Patient called regarding (reason for call): Patient had surgery 2 weeks ago and is having discomfort in his testicles and lower stomach area, a little nausea. foggy and a little blood in his urine would like a call back to discuss further  Additional comments: he is wondering if he has a bladder infection? Transferred him to talk to [place]  Phone number to reach patient:  [phone] or [phone]    Best Time: asap    Can we leave a detailed message on this number?  YES

## 2018-12-11 NOTE — TELEPHONE ENCOUNTER
Called and spoke to patient.   Patient states that he underwent a TURP about 2 weeks ago.   Patient is still drink about 32 oz of coffee in the morning.   Patient is concerned about hematuria.   Patient is also lifting things as well.   Huddled with provider.   Blood in urine is not uncommon after this surgery.   Also try and decrease amount of coffee consumed and increase water intake while healing.   Patient reassured and will call us back if no improvement.   Kim Dempsey RN

## 2018-12-17 ENCOUNTER — TELEPHONE (OUTPATIENT)
Dept: UROLOGY | Facility: CLINIC | Age: 63
End: 2018-12-17

## 2018-12-17 NOTE — TELEPHONE ENCOUNTER
Reason for Call:  Other concerns after surgery    Detailed comments: Patient had surgery in November. Patient is concerned as blood in urine for the first 2 weeks was very slight but it has gotten worse. Please call patient to advise.    Phone Number Patient can be reached at: Home number on file [phone] (home)    Best Time: any    Can we leave a detailed message on this number? YES    Call taken on 12/17/2018 at 8:49 AM by Valeria Booker

## 2018-12-17 NOTE — TELEPHONE ENCOUNTER
Attempted to reach patient.   No voicemail will attempt at another time.   Kim Dempsey RN   [phone]

## 2018-12-19 NOTE — TELEPHONE ENCOUNTER
Attempted to reach patient unable to leave voicemail.   Will attempt at a later time.   Kim Dempsey RN

## 2018-12-21 ENCOUNTER — OFFICE VISIT (OUTPATIENT)
Dept: UROLOGY | Facility: CLINIC | Age: 63
End: 2018-12-21
Payer: COMMERCIAL

## 2018-12-21 VITALS — RESPIRATION RATE: 18 BRPM | DIASTOLIC BLOOD PRESSURE: 78 MMHG | SYSTOLIC BLOOD PRESSURE: 116 MMHG | HEART RATE: 72 BPM

## 2018-12-21 DIAGNOSIS — N40.1 BENIGN PROSTATIC HYPERPLASIA WITH INCOMPLETE BLADDER EMPTYING: Primary | ICD-10-CM

## 2018-12-21 DIAGNOSIS — R39.14 BENIGN PROSTATIC HYPERPLASIA WITH INCOMPLETE BLADDER EMPTYING: Primary | ICD-10-CM

## 2018-12-21 PROCEDURE — 51798 US URINE CAPACITY MEASURE: CPT | Mod: 58 | Performed by: UROLOGY

## 2018-12-21 NOTE — PROGRESS NOTES
Chief Complaint   Patient presents with     RECHECK     post op       Tommy De La O is a 63 year old male who presents today for follow up of   Chief Complaint   Patient presents with     RECHECK     post op    f/u post TURP for benign prostatic hyperplasia with partial retention of urine.  He still has irritative voiding symptoms.  He drinks a lot of coffee daily.    Current Outpatient Medications   Medication Sig Dispense Refill     amLODIPine (NORVASC) 5 MG tablet Take 1 tablet (5 mg) by mouth daily 90 tablet 3     chlorthalidone (HYGROTON) 25 MG tablet Take 1 tablet (25 mg) by mouth daily 90 tablet 3     clindamycin (CLEOCIN) 300 MG capsule Take 600 mg by mouth 1 HOUR PRIOR TO DENTAL APPOINTMENT       clobetasol (TEMOVATE) 0.05 % ointment Apply topically daily as needed For itching 45 g 1     GABAPENTIN PO Take by mouth 2 times daily       omeprazole (PRILOSEC) 40 MG capsule Take 1 capsule (40 mg) by mouth daily Take 30-60 minutes before a meal. 90 capsule 3     simvastatin (ZOCOR) 20 MG tablet Take 1 tablet (20 mg) by mouth At Bedtime 90 tablet 3     tamsulosin (FLOMAX) 0.4 MG capsule Take 1 capsule (0.4 mg) by mouth daily (Patient not taking: Reported on 2018) 90 capsule 3     Allergies   Allergen Reactions     Cefzil [Cefprozil] Hives and Itching     Darvocet [Propoxyphene N-Apap] Hives      No past medical history on file.  Past Surgical History:   Procedure Laterality Date     ARTHROSCOPY KNEE Bilateral     both knees with arthroscopy in the past.      AS TOTAL KNEE ARTHROPLASTY Bilateral     Both total knees.      REPAIR TENDON ACHILLES      1980s two separate surgeries.     ROTATOR CUFF REPAIR RT/LT Left      Family History   Problem Relation Age of Onset     Coronary Artery Disease Father 56         at 56 MI     Hyperlipidemia Mother      Prostate Cancer No family hx of      Social History     Socioeconomic History     Marital status: Single     Spouse name: None     Number of children: None      Years of education: None     Highest education level: None   Social Needs     Financial resource strain: None     Food insecurity - worry: None     Food insecurity - inability: None     Transportation needs - medical: None     Transportation needs - non-medical: None   Occupational History     None   Tobacco Use     Smoking status: Former Smoker     Packs/day: 1.00     Years: 30.00     Pack years: 30.00     Types: Cigarettes     Last attempt to quit: 2011     Years since quittin.8     Smokeless tobacco: Never Used     Tobacco comment: started at age 22   Substance and Sexual Activity     Alcohol use: Yes     Drug use: No     Sexual activity: None   Other Topics Concern     Parent/sibling w/ CABG, MI or angioplasty before 65F 55M? Not Asked   Social History Narrative     None       REVIEW OF SYSTEMS  =================  C: NEGATIVE for fever, chills, change in weight  I: NEGATIVE for worrisome rashes, moles or lesions  E/M: NEGATIVE for ear, mouth and throat problems  R: NEGATIVE for significant cough or SHORTNESS OF BREATH,   CV: NEGATIVE for chest pain, palpitations or peripheral edema  GI: NEGATIVE for nausea, abdominal pain, heartburn, or change in bowel habits  NEURO: NEGATIVE any motor/sensory changes  PSYCH: NEGATIVE for recent mood disorder    Physical Exam:  /78 (BP Location: Right arm, Patient Position: Chair, Cuff Size: Adult Large)   Pulse 72   Resp 18    Patient is pleasant, in no acute distress, good general condition.  Lung: no evidence of respiratory distress    Abdomen: Soft, nondistended, non tender. No masses. No rebound or guarding.   Exam: bladder scan 45 ml (down from 300 ml pre op)  Skin: Warm and dry.  No redness.  Psych: normal mood and affect  Neuro: alert and oriented  Musculaskeletal: moving all extremities    Assessment/Plan:   (N40.1,  R39.14) Benign prostatic hyperplasia with incomplete bladder emptying  (primary encounter diagnosis)  Comment:  Doing better.  Plan:  reassurance           Better at emptying bladder           See in 2-3 months          Decrease coffee intake until tissues heals

## 2019-01-14 ENCOUNTER — OFFICE VISIT (OUTPATIENT)
Dept: FAMILY MEDICINE | Facility: CLINIC | Age: 64
End: 2019-01-14
Payer: MEDICARE

## 2019-01-14 VITALS
TEMPERATURE: 98.5 F | WEIGHT: 315 LBS | HEIGHT: 72 IN | HEART RATE: 58 BPM | SYSTOLIC BLOOD PRESSURE: 126 MMHG | RESPIRATION RATE: 18 BRPM | BODY MASS INDEX: 42.66 KG/M2 | DIASTOLIC BLOOD PRESSURE: 70 MMHG

## 2019-01-14 DIAGNOSIS — M67.971 DISORDER OF RIGHT ACHILLES TENDON: Primary | ICD-10-CM

## 2019-01-14 DIAGNOSIS — G47.33 OBSTRUCTIVE SLEEP APNEA SYNDROME: ICD-10-CM

## 2019-01-14 PROCEDURE — 99213 OFFICE O/P EST LOW 20 MIN: CPT | Performed by: FAMILY MEDICINE

## 2019-01-14 ASSESSMENT — PAIN SCALES - GENERAL: PAINLEVEL: NO PAIN (1)

## 2019-01-14 ASSESSMENT — MIFFLIN-ST. JEOR: SCORE: 2290.18

## 2019-01-14 NOTE — NURSING NOTE
"Chief Complaint   Patient presents with     Musculoskeletal Problem     Has appointment with .        Initial /70   Pulse 58   Temp 98.5  F (36.9  C) (Tympanic)   Resp 18   Ht 1.816 m (5' 11.5\")   Wt 146.5 kg (323 lb)   BMI 44.42 kg/m   Estimated body mass index is 44.42 kg/m  as calculated from the following:    Height as of this encounter: 1.816 m (5' 11.5\").    Weight as of this encounter: 146.5 kg (323 lb).    Patient presents to the clinic using     Health Maintenance that is potentially due pending provider review:          Is there anyone who you would like to be able to receive your results? No  If yes have patient fill out DEREK    "

## 2019-01-14 NOTE — PATIENT INSTRUCTIONS
ASSESSMENT:   (M67.762) Disorder of right Achilles tendon  (primary encounter diagnosis)  Comment: achillles tendon injury-tendonitis  Plan: Treatment for achilles tendonitis includes ice to the sore heel area and medications like ibuprofen and aleve for pain by mouth.  Take these medications with food and only one type at a time.  Recommend and demonstrated stretching exercises for achilles tendon.  Try a heel lift to lessen stretch on the tendon.       Eccentric calf muscle loading exercises can help for achilles tendonitis.   Stand on tip toes with the ball of both feet on a step.   Take the weight off of one leg (the non-painful one) and remove from the step keeping all your weight on the involved leg.  Slowly drop your heel to below the step level on the remaining leg with knee straight for several seconds.   (You can also do the stretch with the knee bent to strengthen different muscles.)  When heel is at low point, bring up good leg on the step so weight again is on both legs and then lift both heels up again to a tip toe position.   Start over again by slowly dropping the heel of affected leg.   Do 10-20 repetitions in a set once or preferably twice daily.    If this gets too easy, add a backpack to put more weight on the affected leg.     Recheck with persistent problems.     (G47.33) Obstructive sleep apnea syndrome  Comment:   Plan: Contact sleep clinic for renewal of supplies and new CPAP machine.

## 2019-01-14 NOTE — PROGRESS NOTES
"  SUBJECTIVE:   Tommy De La O is a 63 year old male who presents to clinic today for the following health issues:  Chief Complaint   Patient presents with     Musculoskeletal Problem     Has appointment with .       Rt ankle pain       Duration: 3 months worse over last 7-10 days    Description (location/character/radiation): rt ankle pain no swelling    Intensity:  1/10    With sitting    Walking  Makes it worse.    Accompanying signs and symptoms: pain    History (similar episodes/previous evaluation): None    Precipitating or alleviating factors:     Therapies tried and outcome: ice helped at first now ? Has used a wrap and boot helps some.  Uses a boot with high laces.   Pulled foot out of mud he was stuck in this fall-September.  Felt a snap.    Limping recently. Sore off and on since Sports physical musculoskeletal exam completely normal..    Worse in the past 1-2 weeks.   Location: lateral ankle.     Problem 2: sleep apnea.   Needs new equipment.   Smoked in the past.  Was told he is borderline COPD.     He can have shortness of breath with activity.     He has orthopedic appointment later this week.     Patient Active Problem List   Diagnosis     Osteoarthritis of knee     Gastrointestinal hemorrhage     Gastroesophageal reflux disease     Hyperlipidemia     Obstructive sleep apnea syndrome     Essential hypertension with goal blood pressure less than 140/90     Morbid obesity (H)      ROS:  He has had prostate surgery last fall.  Has had follow-up with urology.   Sometimes sore in rectal area with urination.     OBJECTIVE:Blood pressure 126/70, pulse 58, temperature 98.5  F (36.9  C), temperature source Tympanic, resp. rate 18, height 1.816 m (5' 11.5\"), weight 146.5 kg (323 lb). BMI=Body mass index is 44.42 kg/m .  GENERAL APPEARANCE ADULT: Alert, no acute distress, morbidly obese  MS: ankle exam: normal appearance, no swelling, non-tender at the medial malleolus, lateral malleolus, deltoid " ligament area, anterior talo fibular ligament area and heel, tenderness at the achilles tendon superior to insertion, range of motion: normal, strength normal     ASSESSMENT:   (M67.971) Disorder of right Achilles tendon  (primary encounter diagnosis)  Comment: achillles tendon injury-tendonitis  Plan: Treatment for achilles tendonitis includes ice to the sore heel area and medications like ibuprofen and aleve for pain by mouth.  Take these medications with food and only one type at a time.  Recommend and demonstrated stretching exercises for achilles tendon.  Try a heel lift to lessen stretch on the tendon.       Eccentric calf muscle loading exercises can help for achilles tendonitis.   Stand on tip toes with the ball of both feet on a step.   Take the weight off of one leg (the non-painful one) and remove from the step keeping all your weight on the involved leg.  Slowly drop your heel to below the step level on the remaining leg with knee straight for several seconds.   (You can also do the stretch with the knee bent to strengthen different muscles.)  When heel is at low point, bring up good leg on the step so weight again is on both legs and then lift both heels up again to a tip toe position.   Start over again by slowly dropping the heel of affected leg.   Do 10-20 repetitions in a set once or preferably twice daily.    If this gets too easy, add a backpack to put more weight on the affected leg.     Recheck with persistent problems.     (G47.33) Obstructive sleep apnea syndrome  Comment:   Plan: Contact sleep clinic for renewal of supplies and new CPAP machine.

## 2019-01-22 PROBLEM — E66.01 MORBID OBESITY (H): Chronic | Status: ACTIVE | Noted: 2018-09-19

## 2019-01-22 PROBLEM — I10 ESSENTIAL HYPERTENSION WITH GOAL BLOOD PRESSURE LESS THAN 140/90: Chronic | Status: ACTIVE | Noted: 2018-06-15

## 2019-01-31 ENCOUNTER — OFFICE VISIT (OUTPATIENT)
Dept: SLEEP MEDICINE | Facility: CLINIC | Age: 64
End: 2019-01-31
Payer: MEDICARE

## 2019-01-31 VITALS
WEIGHT: 315 LBS | DIASTOLIC BLOOD PRESSURE: 76 MMHG | HEIGHT: 71 IN | SYSTOLIC BLOOD PRESSURE: 121 MMHG | BODY MASS INDEX: 44.1 KG/M2 | OXYGEN SATURATION: 95 % | HEART RATE: 57 BPM

## 2019-01-31 DIAGNOSIS — G47.33 OBSTRUCTIVE SLEEP APNEA SYNDROME: ICD-10-CM

## 2019-01-31 PROCEDURE — 99205 OFFICE O/P NEW HI 60 MIN: CPT | Mod: 24 | Performed by: FAMILY MEDICINE

## 2019-01-31 RX ORDER — GABAPENTIN 300 MG/1
300 CAPSULE ORAL 3 TIMES DAILY
Qty: 30 CAPSULE | Refills: 0 | COMMUNITY
Start: 2019-01-31 | End: 2019-12-30

## 2019-01-31 RX ORDER — ASPIRIN 81 MG/1
81 TABLET ORAL
COMMUNITY
End: 2019-09-16

## 2019-01-31 RX ORDER — GABAPENTIN 300 MG/1
CAPSULE ORAL
COMMUNITY
Start: 2019-01-23 | End: 2019-01-31

## 2019-01-31 ASSESSMENT — MIFFLIN-ST. JEOR: SCORE: 2290.12

## 2019-01-31 NOTE — PATIENT INSTRUCTIONS
I printed out the prescription for the new CPAP machine.  We also adjusted your CPAP to let the pressure go slightly lower to help reduce mouth dryness.      Give us a phone call in about 2 weeks to let us know if any improvements or concerns with the change in the CPAP setting.  Our phone number is [phone].

## 2019-01-31 NOTE — NURSING NOTE
"Chief Complaint   Patient presents with     Consult For     Consult for continuation of care for his sleep apnea. He is looking for a replacement machine. He is not due until March       Initial /76   Pulse 57   Ht 1.816 m (5' 11.5\")   Wt 146.5 kg (323 lb)   SpO2 95%   BMI 44.43 kg/m   Estimated body mass index is 44.43 kg/m  as calculated from the following:    Height as of this encounter: 1.816 m (5' 11.5\").    Weight as of this encounter: 146.5 kg (323 lb).    Medication Reconciliation: complete    Neck circumference: 19.5  inches / 49 centimeters.    DME: [facility] in [place]  "

## 2019-01-31 NOTE — PROGRESS NOTES
Sleep Consultation:    Date on this visit: 1/31/2019    Tommy De La O is sent by Tim Crawley for a sleep consultation regarding MARTA.    Primary Physician: Tim Crawley     HPI:  Tommy De La O is a 62 yo male with a PMH of MARTA who presents for MARTA and CPAP adjustments. The patient uses his CPAP every night for the entire night and feels that it is working well for him. He generally feels rested during the day, although he occasionally feels drowsy in the evening and has been known to fall asleep while waiting at the doctor's office. He has no difficultly sleeping through the night, although he does wake up 1-2 times a night to use the restroom. He endorses drinking ~60 oz of coffee throughout the day, stopping around 8 PM at night.      The patient also notes that he has been waking up with a dry mouth and has noticed some small bumps on the lateral side of his tongue that are quite painful in the morning. These bumps have been previously examined by other practitioners and deemed to be non-cancerous.       Prior PSG at [facility] on 1/19/2014 with weight 302 lbs, AHI 56, RDI 56.  Pre-treatment mean SpO2 86%, chase SpO2 67% and suspicion for sustained hypoxemia in REM.  CPAP titrated to 20 cm H2O and was adequate, but continued concern for mild residual sustained hypoxemia in REM, though SpO2 normalized in NREM.    CPAP download from 1/1/2019 - 1/30/2019 on set pressure 20 cm H2O.  Used 30/30 days, average usage of 6 hours 40 minutes.  AHI 4.1.     Social History:  Tobacco: 30 pack year history, quit 10 years ago  Alcohol: Occasionally drinks 5+ drinks in a night socially, otherwise doesn't drink  Drugs: Occasional marijuana use     Family History:  Brother with MARTA. Patient believes father had MARTA before his death in the late 70s.    Allergies:    Allergies   Allergen Reactions     Cefzil [Cefprozil] Hives and Itching     Darvocet [Propoxyphene N-Apap] Hives     Penicillins Hives        Medications:    Current Outpatient Medications   Medication Sig Dispense Refill     amLODIPine (NORVASC) 5 MG tablet Take 1 tablet (5 mg) by mouth daily 90 tablet 3     aspirin 81 MG EC tablet Take 81 mg by mouth       chlorthalidone (HYGROTON) 25 MG tablet Take 1 tablet (25 mg) by mouth daily 90 tablet 3     clindamycin (CLEOCIN) 300 MG capsule Take 600 mg by mouth 1 HOUR PRIOR TO DENTAL APPOINTMENT       clobetasol (TEMOVATE) 0.05 % ointment Apply topically daily as needed For itching 45 g 1     gabapentin (NEURONTIN) 300 MG capsule Take 1 capsule (300 mg) by mouth 3 times daily 30 capsule 0     omeprazole (PRILOSEC) 40 MG capsule Take 1 capsule (40 mg) by mouth daily Take 30-60 minutes before a meal. 90 capsule 3     simvastatin (ZOCOR) 20 MG tablet Take 1 tablet (20 mg) by mouth At Bedtime 90 tablet 3       Problem List:  Patient Active Problem List    Diagnosis Date Noted     Morbid obesity (H) 09/19/2018     Priority: Medium     Essential hypertension with goal blood pressure less than 140/90 06/15/2018     Priority: Medium     Osteoarthritis of knee 11/03/2014     Priority: Medium     He has had bilateral knee replacements.        Gastrointestinal hemorrhage 11/03/2014     Priority: Medium     11/19/2018:He had colonoscopy.  Presumed from hemorrhoids.        Gastroesophageal reflux disease 11/03/2014     Priority: Medium     Hyperlipidemia 11/03/2014     Priority: Medium     Obstructive sleep apnea syndrome 11/03/2014     Priority: Medium     Uses CPAP nightly.           Past Medical/Surgical History:  No past medical history on file.  Past Surgical History:   Procedure Laterality Date     ARTHROSCOPY KNEE Bilateral     both knees with arthroscopy in the past.      AS TOTAL KNEE ARTHROPLASTY Bilateral     Both total knees.      REPAIR TENDON ACHILLES      1980s two separate surgeries.     ROTATOR CUFF REPAIR RT/LT Left        Social History:  Social History     Socioeconomic History     Marital status:  Single     Spouse name: Not on file     Number of children: Not on file     Years of education: Not on file     Highest education level: Not on file   Social Needs     Financial resource strain: Not on file     Food insecurity - worry: Not on file     Food insecurity - inability: Not on file     Transportation needs - medical: Not on file     Transportation needs - non-medical: Not on file   Occupational History     Not on file   Tobacco Use     Smoking status: Former Smoker     Packs/day: 1.00     Years: 30.00     Pack years: 30.00     Types: Cigarettes     Last attempt to quit: 2011     Years since quittin.0     Smokeless tobacco: Never Used     Tobacco comment: started at age 22   Substance and Sexual Activity     Alcohol use: Yes     Drug use: No     Sexual activity: Yes     Partners: Female   Other Topics Concern     Parent/sibling w/ CABG, MI or angioplasty before 65F 55M? Not Asked   Social History Narrative     Not on file       Family History:  Family History   Problem Relation Age of Onset     Coronary Artery Disease Father 56         at 56 MI     Hyperlipidemia Mother      Prostate Cancer No family hx of        Review of Systems:  A complete review of systems reviewed by me is negative with the exeption of what has been mentioned in the history of present illness.  CONSTITUTIONAL:  POSITIVE for  weight gain  EYES: NEGATIVE for changes in vision, blind spots, double vision.  ENT: NEGATIVE for ear pain, sore throat, sinus pain, post-nasal drip, runny nose, bloody nose  CARDIAC: NEGATIVE for fast heartbeats or fluttering in chest, chest pain or pressure, breathlessness when lying flat, swollen legs or swollen feet.  NEUROLOGIC: NEGATIVE headaches, weakness or numbness in the arms or legs.  DERMATOLOGIC: NEGATIVE for rashes, new moles or change in mole(s)  PULMONARY:  POSITIVE for  SOB with activity  GASTROINTESTINAL: NEGATIVE for nausea or vomitting, loose or watery stools, fat or grease in  "stools, constipation, abdominal pain, bowel movements black in color or blood noted.  GENITOURINARY:  POSITIVE for  urinating more frequently than usual  MUSCULOSKELETAL:  POSITIVE for  bone or joint pain  ENDOCRINE: NEGATIVE for increased thirst or urination, diabetes.  LYMPHATIC: NEGATIVE for swollen lymph nodes, lumps or bumps in the breasts or nipple discharge.    Physical Examination:  Vitals: /76   Pulse 57   Ht 1.816 m (5' 11.5\")   Wt 146.5 kg (323 lb)   SpO2 95%   BMI 44.43 kg/m    BMI= Body mass index is 44.43 kg/m .    Neck Cir (cm): 49 cm    [place] Total Score 1/31/2019   Total score - [place] 8          Vitals: BP: 121/76, Pulse: 87, SpO2: 95%, Neck circumference: 49 cm  General: Pleasant, obese gentleman who appears well rested  HEENT: Some areas of increased erythema noted on tongue  Pulm: LCTAB  CV: RRR, no m/r/g    Impression/Plan:    Tommy De La O is a 63-year old man with a history of severe MARTA treated with CPAP who presents with signs of oral dryness potentially due to CPAP usage.     1.)  Severe MARTA with concern for sustained hypoxemia in REM / possible REM-related hypoventilation  2.)  Oral Dryness / Tongue Pain   - Interim weight gain of ~20 lbs.   - AHI < 5 on CPAP 20 cm H2O per download.   - The patient has been bothered by dryness in the mornings and tongue pain for a while now which may be attributable to his CPAP. Given the high pressure of the CPAP there is a possibility of significant air leak causing oral dryness. The patient's CPAP settings were lowered to allow PAP to go as low as 16 to see if that would resolve the oral dryness.    - The patient's original sleep study also showed desats to the high 80%s even with PAP of 20, therefore we should get an overnight pulse oximetry reading after the patient has gotten his new CPAP.  -  CPAP reprogrammed to auto-titrate 16-20 cm H2O.  - Overnight oximetry 1 month after receiving new CPAP machine.      Scribe Disclosure:   I, " Black Bazan, MS3, am serving as a scribe; to document services personally performed by Dr. Tim Chappell, based on data collection and the provider's statements to me.     Provider Disclosure:  I,Tim Chappell, agree with above History, Review of Systems, Physical exam and Plan.  I have reviewed the content of the documentation and have edited it as needed. I have personally performed the services documented here and the documentation accurately represents those services and the decisions I have made.      I have spent 60 minutes with this patient today in which greater than 50% of this time was spent in the counseling / coordination of care.      Tim Chappell MD      CC: Tim Crawley

## 2019-02-15 ENCOUNTER — TELEPHONE (OUTPATIENT)
Dept: SLEEP MEDICINE | Facility: CLINIC | Age: 64
End: 2019-02-15

## 2019-02-15 NOTE — TELEPHONE ENCOUNTER
SUBJECTIVE:  No chief complaint on file.     OBJECTIVE:  Incoming call received by Tommy. He informed me that he was seen about two weeks ago. His pressures were adjusted at that time. He feels the pressures are great. He is sleeping better.   Tommy plans on getting a replacement machine in April through an outside vendor. He plans to call our clinic at that time to schedule an overnight pulse oximeter.     ASSESSMENT/PLAN:  Tommy plans to call our clinic after replacement c pap set up.

## 2019-02-25 ENCOUNTER — OFFICE VISIT (OUTPATIENT)
Dept: UROLOGY | Facility: CLINIC | Age: 64
End: 2019-02-25
Payer: MEDICARE

## 2019-02-25 VITALS — HEART RATE: 61 BPM | DIASTOLIC BLOOD PRESSURE: 78 MMHG | OXYGEN SATURATION: 96 % | SYSTOLIC BLOOD PRESSURE: 132 MMHG

## 2019-02-25 DIAGNOSIS — N40.1 BENIGN PROSTATIC HYPERPLASIA WITH INCOMPLETE BLADDER EMPTYING: Primary | ICD-10-CM

## 2019-02-25 DIAGNOSIS — R39.14 BENIGN PROSTATIC HYPERPLASIA WITH INCOMPLETE BLADDER EMPTYING: Primary | ICD-10-CM

## 2019-02-25 LAB
ALBUMIN UR-MCNC: NEGATIVE MG/DL
APPEARANCE UR: CLEAR
BILIRUB UR QL STRIP: NEGATIVE
COLOR UR AUTO: YELLOW
GLUCOSE UR STRIP-MCNC: NEGATIVE MG/DL
HGB UR QL STRIP: ABNORMAL
KETONES UR STRIP-MCNC: NEGATIVE MG/DL
LEUKOCYTE ESTERASE UR QL STRIP: ABNORMAL
NITRATE UR QL: NEGATIVE
PH UR STRIP: 7 PH (ref 5–7)
RBC #/AREA URNS AUTO: ABNORMAL /HPF
SOURCE: ABNORMAL
SP GR UR STRIP: 1.01 (ref 1–1.03)
UROBILINOGEN UR STRIP-ACNC: 0.2 EU/DL (ref 0.2–1)
WBC #/AREA URNS AUTO: ABNORMAL /HPF

## 2019-02-25 PROCEDURE — 51798 US URINE CAPACITY MEASURE: CPT | Mod: 58 | Performed by: UROLOGY

## 2019-02-25 PROCEDURE — 99024 POSTOP FOLLOW-UP VISIT: CPT | Performed by: UROLOGY

## 2019-02-25 PROCEDURE — 81001 URINALYSIS AUTO W/SCOPE: CPT | Performed by: UROLOGY

## 2019-02-25 NOTE — PROGRESS NOTES
Chief Complaint   Patient presents with     RECHECK     BPH; f/u incomplete empyting; has not decreased coffee intake; no major concerns today       Tommy De La O is a 63 year old male who presents today for follow up of   Chief Complaint   Patient presents with     RECHECK     BPH; f/u incomplete empyting; has not decreased coffee intake; no major concerns today    f/u post TURP for partial retention of urine.  He has mild urgency and frequency but symptoms are not bothersome.    Current Outpatient Medications   Medication Sig Dispense Refill     amLODIPine (NORVASC) 5 MG tablet Take 1 tablet (5 mg) by mouth daily 90 tablet 3     chlorthalidone (HYGROTON) 25 MG tablet Take 1 tablet (25 mg) by mouth daily 90 tablet 3     clobetasol (TEMOVATE) 0.05 % ointment Apply topically daily as needed For itching 45 g 1     gabapentin (NEURONTIN) 300 MG capsule Take 1 capsule (300 mg) by mouth 3 times daily 30 capsule 0     omeprazole (PRILOSEC) 40 MG capsule Take 1 capsule (40 mg) by mouth daily Take 30-60 minutes before a meal. 90 capsule 3     simvastatin (ZOCOR) 20 MG tablet Take 1 tablet (20 mg) by mouth At Bedtime 90 tablet 3     aspirin 81 MG EC tablet Take 81 mg by mouth       clindamycin (CLEOCIN) 300 MG capsule Take 600 mg by mouth 1 HOUR PRIOR TO DENTAL APPOINTMENT       Allergies   Allergen Reactions     Cefzil [Cefprozil] Hives and Itching     Darvocet [Propoxyphene N-Apap] Hives     Penicillins Hives      No past medical history on file.  Past Surgical History:   Procedure Laterality Date     ARTHROSCOPY KNEE Bilateral     both knees with arthroscopy in the past.      AS TOTAL KNEE ARTHROPLASTY Bilateral     Both total knees.      REPAIR TENDON ACHILLES      1980s two separate surgeries.     ROTATOR CUFF REPAIR RT/LT Left      Family History   Problem Relation Age of Onset     Coronary Artery Disease Father 56         at 56 MI     Hyperlipidemia Mother      Prostate Cancer No family hx of      Social History      Socioeconomic History     Marital status: Single     Spouse name: None     Number of children: None     Years of education: None     Highest education level: None   Occupational History     None   Social Needs     Financial resource strain: None     Food insecurity:     Worry: None     Inability: None     Transportation needs:     Medical: None     Non-medical: None   Tobacco Use     Smoking status: Former Smoker     Packs/day: 1.00     Years: 30.00     Pack years: 30.00     Types: Cigarettes     Last attempt to quit: 2011     Years since quittin.0     Smokeless tobacco: Never Used     Tobacco comment: started at age 22   Substance and Sexual Activity     Alcohol use: Yes     Drug use: No     Sexual activity: Yes     Partners: Female   Lifestyle     Physical activity:     Days per week: None     Minutes per session: None     Stress: None   Relationships     Social connections:     Talks on phone: None     Gets together: None     Attends Gnosticism service: None     Active member of club or organization: None     Attends meetings of clubs or organizations: None     Relationship status: None     Intimate partner violence:     Fear of current or ex partner: None     Emotionally abused: None     Physically abused: None     Forced sexual activity: None   Other Topics Concern     Parent/sibling w/ CABG, MI or angioplasty before 65F 55M? Not Asked   Social History Narrative     None       REVIEW OF SYSTEMS  =================  C: NEGATIVE for fever, chills, change in weight  I: NEGATIVE for worrisome rashes, moles or lesions  E/M: NEGATIVE for ear, mouth and throat problems  R: NEGATIVE for significant cough or SHORTNESS OF BREATH,   CV: NEGATIVE for chest pain, palpitations or peripheral edema  GI: NEGATIVE for nausea, abdominal pain, heartburn, or change in bowel habits  NEURO: NEGATIVE any motor/sensory changes  PSYCH: NEGATIVE for recent mood disorder    Physical Exam:  /78 (BP Location: Right arm,  Patient Position: Sitting, Cuff Size: Adult Large)   Pulse 61   SpO2 96%    Patient is pleasant, in no acute distress, good general condition.  Lung: no evidence of respiratory distress    Abdomen: Soft, nondistended, non tender. No masses. No rebound or guarding.   Exam: bladder scan 30 ml (down from 300 ml prior to surgery)  Skin: Warm and dry.  No redness.  Psych: normal mood and affect  Neuro: alert and oriented  Musculaskeletal: moving all extremities    Assessment/Plan:   (N40.1,  R39.14) Benign prostatic hyperplasia with incomplete bladder emptying  (primary encounter diagnosis)  Comment:    Plan: see in one year

## 2019-04-08 ENCOUNTER — TELEPHONE (OUTPATIENT)
Dept: SLEEP MEDICINE | Facility: CLINIC | Age: 64
End: 2019-04-08

## 2019-04-08 NOTE — TELEPHONE ENCOUNTER
"SUBJECTIVE:  Chief Complaint   Patient presents with     Sleep Problem     c pap supplies      OBJECTIVE:  Incoming call received by Tommy. He informed me that he saw the physician on 01/31/2019 and gets supplies from [facility], but [facility] will not dispense any supplies because his most recent notes need to specifically say: Tommy is using the machine and is benefiting from therapy.    Last office visit: 01/31/2019  There are notes stating the above requirement. \"HPI: The patient uses his CPAP every night for the entire night and feels that it is working well for him.\"      [facility] telephone: [phone]  Fax: [phone]    Contacted [employer] by telephone. Spoke with Piero. Was transferred to customer service. Spoke with [employer]. Because he went from Medicare and [employer] to just plain Medicare and [facility].     He has to go in and have a face to face appointment.   I informed Joyce that Tommy had a face to face appointment on 01/31/2019. Joyce requested I fax the face to face notes to [phone]. Attention: to whom it may concern.    ASSESSMENT/PLAN:  Notes faxed to number provided.    "

## 2019-04-11 ENCOUNTER — TELEPHONE (OUTPATIENT)
Dept: SLEEP MEDICINE | Facility: CLINIC | Age: 64
End: 2019-04-11

## 2019-04-11 NOTE — TELEPHONE ENCOUNTER
Reason for call:  Other   Patient called regarding (reason for call): Pt said needs notes, minutes, and notation he is benefiting from machine by provider sent to [facility].  Pt said they faxed requested info to us on Monday and we have not responded; Please send info to [employer] so pt can have coverage for machine via medicare.  [employer]'s phone is [phone], pt said did not have fx number but said we should have that from info [employer] has sent to us.  Additional comments: Please confirm with pt when done    Phone number to reach patient:  Cell number on file:    Telephone Information:   Mobile [phone]       Best Time:  any    Can we leave a detailed message on this number?  YES

## 2019-04-11 NOTE — TELEPHONE ENCOUNTER
Requested information was sent to FAX # [phone] on 4/8/19. Called [employer] and received a different fax number to send the information to ([phone]).    Information has been sent and the pt has been notified.     Taryn Meier [facility]

## 2019-04-15 ENCOUNTER — MEDICAL CORRESPONDENCE (OUTPATIENT)
Dept: HEALTH INFORMATION MANAGEMENT | Facility: CLINIC | Age: 64
End: 2019-04-15

## 2019-05-18 DIAGNOSIS — E78.5 HYPERLIPIDEMIA, UNSPECIFIED HYPERLIPIDEMIA TYPE: ICD-10-CM

## 2019-05-19 NOTE — TELEPHONE ENCOUNTER
"Requested Prescriptions   Pending Prescriptions Disp Refills     simvastatin (ZOCOR) 20 MG tablet   Last Written Prescription Date:  6/15/18  Last Fill Quantity: 90,  # refills: 3   Last office visit: 1/14/2019 with prescribing provider:  RASHARD Crawely   Future Office Visit:     90 tablet 0     Sig: TAKE ONE TABLET BY MOUTH AT BEDTIME       Statins Protocol Passed - 5/18/2019  7:32 AM        Passed - LDL on file in past 12 months     Recent Labs   Lab Test 06/08/18  0905   LDL 70             Passed - No abnormal creatine kinase in past 12 months     No lab results found.             Passed - Recent (12 mo) or future (30 days) visit within the authorizing provider's specialty     Patient had office visit in the last 12 months or has a visit in the next 30 days with authorizing provider or within the authorizing provider's specialty.  See \"Patient Info\" tab in inbasket, or \"Choose Columns\" in Meds & Orders section of the refill encounter.              Passed - Medication is active on med list        Passed - Patient is age 18 or older          "

## 2019-05-20 RX ORDER — SIMVASTATIN 20 MG
TABLET ORAL
Qty: 90 TABLET | Refills: 1 | Status: SHIPPED | OUTPATIENT
Start: 2019-05-20 | End: 2019-12-08

## 2019-05-20 NOTE — TELEPHONE ENCOUNTER
Routing refill request to provider for review/approval because:  Lipids due around 6/8/19  Mail order pharmacy requires x90 day supply. [facility] refill only allow for x30 day supply  Had pre-op exam done on 11/19/18  When should he return for Physical?    Jessica RYDER RN

## 2019-08-05 DIAGNOSIS — K21.9 GASTROESOPHAGEAL REFLUX DISEASE WITHOUT ESOPHAGITIS: ICD-10-CM

## 2019-08-05 RX ORDER — OMEPRAZOLE 40 MG/1
CAPSULE, DELAYED RELEASE ORAL
Qty: 90 CAPSULE | Refills: 0 | Status: SHIPPED | OUTPATIENT
Start: 2019-08-05 | End: 2019-10-26

## 2019-08-19 DIAGNOSIS — I10 ESSENTIAL HYPERTENSION WITH GOAL BLOOD PRESSURE LESS THAN 140/90: ICD-10-CM

## 2019-08-19 RX ORDER — AMLODIPINE BESYLATE 5 MG/1
TABLET ORAL
Qty: 90 TABLET | Refills: 0 | Status: SHIPPED | OUTPATIENT
Start: 2019-08-19 | End: 2019-11-16

## 2019-08-19 RX ORDER — CHLORTHALIDONE 25 MG/1
TABLET ORAL
Qty: 90 TABLET | Refills: 0 | Status: SHIPPED | OUTPATIENT
Start: 2019-08-19 | End: 2019-11-16

## 2019-09-16 ENCOUNTER — ALLIED HEALTH/NURSE VISIT (OUTPATIENT)
Dept: FAMILY MEDICINE | Facility: CLINIC | Age: 64
End: 2019-09-16
Payer: MEDICARE

## 2019-09-16 VITALS — RESPIRATION RATE: 16 BRPM | HEART RATE: 82 BPM | SYSTOLIC BLOOD PRESSURE: 124 MMHG | DIASTOLIC BLOOD PRESSURE: 72 MMHG

## 2019-09-16 DIAGNOSIS — I10 ESSENTIAL HYPERTENSION WITH GOAL BLOOD PRESSURE LESS THAN 140/90: Primary | ICD-10-CM

## 2019-09-16 DIAGNOSIS — Z01.30 BP CHECK: ICD-10-CM

## 2019-09-16 PROCEDURE — 99207 ZZC NO CHARGE NURSE ONLY: CPT

## 2019-09-16 NOTE — NURSING NOTE
Tommy De La O is a 64 year old year old patient who comes in today for a Blood Pressure check because is having slight headaches.  Vital Signs as repeated by /72  Patient is taking medication as prescribed  Patient is tolerating medications well.  Patient is not monitoring Blood Pressure at home.    Current complaints: headaches, at temple area.    Disposition:  Advised get eyes checked.  If eyes OK, follow up with Dr Denisa Arguello RN

## 2019-10-26 DIAGNOSIS — K21.9 GASTROESOPHAGEAL REFLUX DISEASE WITHOUT ESOPHAGITIS: ICD-10-CM

## 2019-10-26 NOTE — TELEPHONE ENCOUNTER
"Requested Prescriptions   Pending Prescriptions Disp Refills     omeprazole (PRILOSEC) 40 MG DR capsule [Pharmacy Med Name: OMEPRAZOLE DR 40 MG CAPSULE]  Last Written Prescription Date:  08/07/19  Last Fill Quantity: 90,  # refills: 0   Last office visit:01/14/2019 with prescribing provider:  JONES Crawley   Future Office Visit:     90 capsule 0     Sig: TAKE ONE CAPSULE BY MOUTH DAILY 30 TO 60 MINUTES BEFORE A MEAL       PPI Protocol Passed - 10/26/2019 10:47 AM        Passed - Not on Clopidogrel (unless Pantoprazole ordered)        Passed - No diagnosis of osteoporosis on record        Passed - Recent (12 mo) or future (30 days) visit within the authorizing provider's specialty     Patient has had an office visit with the authorizing provider or a provider within the authorizing providers department within the previous 12 mos or has a future within next 30 days. See \"Patient Info\" tab in inbasket, or \"Choose Columns\" in Meds & Orders section of the refill encounter.              Passed - Medication is active on med list        Passed - Patient is age 18 or older          "

## 2019-10-28 RX ORDER — OMEPRAZOLE 40 MG/1
CAPSULE, DELAYED RELEASE ORAL
Qty: 90 CAPSULE | Refills: 1 | Status: SHIPPED | OUTPATIENT
Start: 2019-10-28 | End: 2019-12-30

## 2019-11-16 DIAGNOSIS — I10 ESSENTIAL HYPERTENSION WITH GOAL BLOOD PRESSURE LESS THAN 140/90: ICD-10-CM

## 2019-11-16 NOTE — TELEPHONE ENCOUNTER
"Requested Prescriptions   Pending Prescriptions Disp Refills     chlorthalidone (HYGROTON) 25 MG tablet [Pharmacy Med Name: CHLORTHALIDONE 25 MG TABLET]  Last Written Prescription Date:  08/20/19  Last Fill Quantity: 90,  # refills: 0   Last office visit:11/19/18 with prescribing provider:   JONES Crawley  Future Office Visit:     90 tablet 0     Sig: TAKE ONE TABLET BY MOUTH DAILY       Diuretics (Including Combos) Protocol Failed - 11/16/2019 11:22 AM        Failed - Normal serum potassium on file in past 12 months     Recent Labs   Lab Test 11/19/18  1144   POTASSIUM 3.3*                    Passed - Blood pressure under 140/90 in past 12 months     BP Readings from Last 3 Encounters:   09/16/19 124/72   02/25/19 132/78   01/31/19 121/76                 Passed - Recent (12 mo) or future (30 days) visit within the authorizing provider's specialty     Patient has had an office visit with the authorizing provider or a provider within the authorizing providers department within the previous 12 mos or has a future within next 30 days. See \"Patient Info\" tab in inbasket, or \"Choose Columns\" in Meds & Orders section of the refill encounter.              Passed - Medication is active on med list        Passed - Patient is age 18 or older        Passed - Normal serum creatinine on file in past 12 months     Recent Labs   Lab Test 11/19/18  1144   CR 0.88              Passed - Normal serum sodium on file in past 12 months     Recent Labs   Lab Test 11/19/18  1144                 amLODIPine (NORVASC) 5 MG tablet [Pharmacy Med Name: amLODIPine BESYLATE 5 MG TAB]  Last Written Prescription Date:  08/20/19  Last Fill Quantity: 90,  # refills: 0   Last office visit: 11/19/2018 with prescribing provider:   JONES Crawley  Future Office Visit:     90 tablet 0     Sig: TAKE ONE TABLET BY MOUTH DAILY       Calcium Channel Blockers Protocol  Passed - 11/16/2019 11:22 AM        Passed - Blood pressure under 140/90 in past 12 months     BP " "Readings from Last 3 Encounters:   09/16/19 124/72   02/25/19 132/78   01/31/19 121/76                 Passed - Recent (12 mo) or future (30 days) visit within the authorizing provider's specialty     Patient has had an office visit with the authorizing provider or a provider within the authorizing providers department within the previous 12 mos or has a future within next 30 days. See \"Patient Info\" tab in inbasket, or \"Choose Columns\" in Meds & Orders section of the refill encounter.              Passed - Medication is active on med list        Passed - Patient is age 18 or older        Passed - Normal serum creatinine on file in past 12 months     Recent Labs   Lab Test 11/19/18  1144   CR 0.88               "

## 2019-11-18 RX ORDER — CHLORTHALIDONE 25 MG/1
TABLET ORAL
Qty: 90 TABLET | Refills: 0 | Status: SHIPPED | OUTPATIENT
Start: 2019-11-18 | End: 2019-12-30

## 2019-11-18 RX ORDER — AMLODIPINE BESYLATE 5 MG/1
TABLET ORAL
Qty: 90 TABLET | Refills: 0 | Status: SHIPPED | OUTPATIENT
Start: 2019-11-18 | End: 2019-12-30

## 2019-12-02 DIAGNOSIS — L30.9 ECZEMA, UNSPECIFIED TYPE: ICD-10-CM

## 2019-12-02 RX ORDER — CLOBETASOL PROPIONATE 0.5 MG/G
OINTMENT TOPICAL DAILY PRN
Qty: 45 G | Refills: 0 | Status: SHIPPED | OUTPATIENT
Start: 2019-12-02 | End: 2019-12-06

## 2019-12-02 NOTE — TELEPHONE ENCOUNTER
Pt is requesting to talk to the Nurse before ordering Clobetasol  Nim[facility]    Requested Prescriptions   Pending Prescriptions Disp Refills     clobetasol (TEMOVATE) 0.05 % external ointment       Sig: Apply topically daily as needed For itching       There is no refill protocol information for this order        Last Written Prescription Date:  6/28/19  Last Fill Quantity: 45 g ,  # refills: 1   Last office visit: 9/16/2019 with prescribing provider:  1/14/19  Future Office Visit:

## 2019-12-02 NOTE — TELEPHONE ENCOUNTER
SUBJECTIVE:   Kym Perea is a 47 year old female who presents to clinic today for the following health issues: Right hand check    Patient has tenderness to the top of her right hand.  States it occurred shortly after having an IV.          Patient Active Problem List    Diagnosis Date Noted     Medical cannabis use 07/10/2018     Priority: Medium     Pain in joint, ankle and foot 01/29/2018     Priority: Medium     Overview:   Chronic left ankle pain secondary to reflex sympathetic dystrophy.  Patient   has had 4 previous surgeries, the most recent one done by Dr. Noriega in   2007. On Narcotic contract.       Chronic pain disorder 01/29/2018     Priority: Medium     Overview:   complex regional pain syndrome left ankle       Esophageal reflux 01/29/2018     Priority: Medium     Hyperlipidemia 01/29/2018     Priority: Medium     Insomnia 01/29/2018     Priority: Medium     Panic disorder 01/29/2018     Priority: Medium     Overview:   Previously patient of Dr. Reynoso since her teens. Patient is attempting slow taper down on her Xanax. She's been on Xanax 2 mg 4 times a day for years       Tobacco abuse 01/29/2018     Priority: Medium     Influenza A 01/11/2018     Priority: Medium     Pain medication agreement 4/18/18 04/21/2017     Priority: Medium     Overview:   4/20/17:  Oxycodone 10 mg #180/mo    Hydrocodone 10/325mg, #240/mo. Stopped and changed to morphine. Agreement signed 10/3/12, updated 10/1/ 14.  Complex regional pain syndrome (aka Reflex Sympathetic Dystrophy)  MS Contin 30 mg q 8 hours #90 per month.  Morphine sulfate IR 15 mg 2-3 tab tid prn #180 per month, decreased to #90/month.   query 4/9/16 as part of chart review. Seems appropriate. 6/2/16 tapered and off Morphine. [place] 5/325 #120/month.  query appropriate. ToxAssure appropriate.    August 2016 Consultation with [facility]. Recommended spinal cord stimulator trial, but she is reluctant to pursue that. Pain  I talked with Tommy and he says the temovate helps.  He says he has itchy fingers.  The rash is bumpy and after itching the bumps turn white.  The bumps are confined to his fingers.  He has not been seen since 14 January 2019.   I made appointment for him for on 12/20/19  Dorys Arguello RN       clinic stated that continuing opioids was OK.       Complex regional pain syndrome type 1 affecting left lower leg 04/15/2016     Priority: Medium     Overview:   Patient with a history of complex regional pain syndrome has seen Dr. Dino Skelton4/23/16        Migraine headache 11/25/2014     Priority: Medium     Overweight (BMI 25.0-29.9) 11/25/2014     Priority: Medium     Lumbago 02/06/2012     Priority: Medium     Major depressive disorder, recurrent episode, severe (H) 07/13/2011     Priority: Medium     Paroxysmal supraventricular tachycardia (H) 05/24/2010     Priority: Medium     Past Medical History:   Diagnosis Date     Acquired absence of other organs (CODE)     9/2/2011     Chronic pain syndrome     No Comments Provided     Complex regional pain syndrome I     left ankle     Encounter for other administrative examinations     10/2012,Hydrocodone 10/325mg, #240/mo signed 10/3/12, updated 10/1/ 14     Encounter for other administrative examinations     10/1/2014,MS Contin 30 mg q 8 hours #90 per month.  Morphine sulfate IR 15 mg 2-3 tab tid prn #180 per month     Gastro-esophageal reflux disease without esophagitis     No Comments Provided     Injury of left ankle     1997,requiring surgery     Low back pain     No Comments Provided     Major depressive disorder, recurrent severe without psychotic features (H)     No Comments Provided     Migraine without status migrainosus, not intractable     No Comments Provided     Nicotine dependence, uncomplicated     No Comments Provided     Obesity     No Comments Provided     Overweight     11/25/2014     Pain in ankle     Chronic left ankle pain secondary to reflex sympathetic dystrophy.  Patient  has had 4 previous surgeries, the most recent one done by Dr. Noriega in  2007. On Narcotic contract.     Pain in thoracic spine     No Comments Provided     Panic disorder without agoraphobia     Dr Reynoso     Personal history of other diseases of the female  genital tract     No Comments Provided     Personal history of other medical treatment (CODE)     G2, P1-0-1-1 with one vaginal delivery.     Supraventricular tachycardia (H)     5/24/2010      Past Surgical History:   Procedure Laterality Date     ANKLE SURGERY      1997, 2000,Left ankle surgery x 2     HYSTERECTOMY TOTAL ABDOMINAL      11/1999,secondary to endometriosis, no malignancy; patient reports no malignancy, but abnormal cells were present *     LAPAROSCOPIC CHOLECYSTECTOMY      No Comments Provided     LAPAROSCOPY DIAGNOSTIC (GENERAL)      1995     OTHER SURGICAL HISTORY      21299,CRANIO/MAXILLOFACIAL SURGERY,Jaw Surgery     OTHER SURGICAL HISTORY      207040,[facility] EMG,sural nerve disruption secondary to previous surgery.  No other abnormalities noted.       Review of Systems     OBJECTIVE:     /68   Pulse 56   Temp 97.3  F (36.3  C)   Resp 14   There is no height or weight on file to calculate BMI.  Physical Exam   Constitutional: She appears well-developed.   HENT:   Head: Normocephalic.   Pulmonary/Chest: Effort normal.   Skin: Skin is warm.   There is a small clot in her vein on top of her right hand measuring about a centimeter in length.  Tender to palpate           ASSESSMENT/PLAN:       Reassurance is given to the patient.  She has a superficial thrombus    ICD-10-CM    1. Personal history of thrombophlebitis Z86.72      Secondary to the IV.  Warm moist compresses.    Cordell Batres MD  [facility]

## 2019-12-06 ENCOUNTER — TELEPHONE (OUTPATIENT)
Dept: FAMILY MEDICINE | Facility: CLINIC | Age: 64
End: 2019-12-06

## 2019-12-06 DIAGNOSIS — L30.9 ECZEMA, UNSPECIFIED TYPE: ICD-10-CM

## 2019-12-06 RX ORDER — CLOBETASOL PROPIONATE 0.5 MG/G
OINTMENT TOPICAL DAILY PRN
Qty: 60 G | Refills: 0 | Status: SHIPPED | OUTPATIENT
Start: 2019-12-06

## 2019-12-06 NOTE — TELEPHONE ENCOUNTER
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   [facility] [place] and [facility] would be much cheaper for him there.  The pharmacy at [facility] said they have 60 g tube so to send this instead 45 g that had been sent to the mail order pharmacy.     He uses this on his fingers for itching.

## 2019-12-08 DIAGNOSIS — E78.5 HYPERLIPIDEMIA, UNSPECIFIED HYPERLIPIDEMIA TYPE: ICD-10-CM

## 2019-12-08 NOTE — TELEPHONE ENCOUNTER
"Requested Prescriptions   Pending Prescriptions Disp Refills     simvastatin (ZOCOR) 20 MG tablet [Pharmacy Med Name: SIMVASTATIN 20 MG TABLET] 90 tablet 0     Sig: TAKE ONE TABLET BY MOUTH AT BEDTIME       Statins Protocol Failed - 12/8/2019  8:02 AM        Failed - LDL on file in past 12 months     Recent Labs   Lab Test 06/08/18  0905   LDL 70             Passed - No abnormal creatine kinase in past 12 months     No lab results found.             Passed - Recent (12 mo) or future (30 days) visit within the authorizing provider's specialty     Patient has had an office visit with the authorizing provider or a provider within the authorizing providers department within the previous 12 mos or has a future within next 30 days. See \"Patient Info\" tab in inbasket, or \"Choose Columns\" in Meds & Orders section of the refill encounter.              Passed - Medication is active on med list        Passed - Patient is age 18 or older        Last Written Prescription Date:  5/20/19  Last Fill Quantity: 90,  # refills: 1   Last office visit: 9/16/2019 with prescribing provider:     Future Office Visit:   Next 5 appointments (look out 90 days)    Dec 30, 2019 10:40 AM CST  SHORT with Tim Crawley MD  [facility] ([facility] [address] [place] [id number] [phone]           "

## 2019-12-09 ENCOUNTER — HEALTH MAINTENANCE LETTER (OUTPATIENT)
Age: 64
End: 2019-12-09

## 2019-12-09 RX ORDER — SIMVASTATIN 20 MG
TABLET ORAL
Qty: 30 TABLET | Refills: 0 | Status: SHIPPED | OUTPATIENT
Start: 2019-12-09 | End: 2019-12-30

## 2019-12-26 ENCOUNTER — TRANSFERRED RECORDS (OUTPATIENT)
Dept: HEALTH INFORMATION MANAGEMENT | Facility: CLINIC | Age: 64
End: 2019-12-26

## 2019-12-30 ENCOUNTER — OFFICE VISIT (OUTPATIENT)
Dept: FAMILY MEDICINE | Facility: CLINIC | Age: 64
End: 2019-12-30
Payer: MEDICARE

## 2019-12-30 VITALS
WEIGHT: 311 LBS | HEIGHT: 72 IN | SYSTOLIC BLOOD PRESSURE: 120 MMHG | TEMPERATURE: 97.9 F | HEART RATE: 72 BPM | DIASTOLIC BLOOD PRESSURE: 76 MMHG | RESPIRATION RATE: 18 BRPM | BODY MASS INDEX: 42.12 KG/M2

## 2019-12-30 DIAGNOSIS — E66.01 MORBID OBESITY (H): Chronic | ICD-10-CM

## 2019-12-30 DIAGNOSIS — Z12.5 SCREENING FOR PROSTATE CANCER: ICD-10-CM

## 2019-12-30 DIAGNOSIS — Z23 NEED FOR PROPHYLACTIC VACCINATION AND INOCULATION AGAINST INFLUENZA: ICD-10-CM

## 2019-12-30 DIAGNOSIS — G62.9 PERIPHERAL POLYNEUROPATHY: ICD-10-CM

## 2019-12-30 DIAGNOSIS — Z00.00 MEDICARE ANNUAL WELLNESS VISIT, SUBSEQUENT: Primary | ICD-10-CM

## 2019-12-30 DIAGNOSIS — L30.9 HAND DERMATITIS: ICD-10-CM

## 2019-12-30 DIAGNOSIS — E78.5 HYPERLIPIDEMIA, UNSPECIFIED HYPERLIPIDEMIA TYPE: ICD-10-CM

## 2019-12-30 DIAGNOSIS — K21.9 GASTROESOPHAGEAL REFLUX DISEASE WITHOUT ESOPHAGITIS: ICD-10-CM

## 2019-12-30 DIAGNOSIS — I10 ESSENTIAL HYPERTENSION WITH GOAL BLOOD PRESSURE LESS THAN 140/90: ICD-10-CM

## 2019-12-30 LAB
ANION GAP SERPL CALCULATED.3IONS-SCNC: 5 MMOL/L (ref 3–14)
BUN SERPL-MCNC: 21 MG/DL (ref 7–30)
CALCIUM SERPL-MCNC: 8.9 MG/DL (ref 8.5–10.1)
CHLORIDE SERPL-SCNC: 103 MMOL/L (ref 94–109)
CHOLEST SERPL-MCNC: 137 MG/DL
CO2 SERPL-SCNC: 31 MMOL/L (ref 20–32)
CREAT SERPL-MCNC: 0.92 MG/DL (ref 0.66–1.25)
GFR SERPL CREATININE-BSD FRML MDRD: 88 ML/MIN/{1.73_M2}
GLUCOSE SERPL-MCNC: 88 MG/DL (ref 70–99)
HDLC SERPL-MCNC: 39 MG/DL
LDLC SERPL CALC-MCNC: 60 MG/DL
NONHDLC SERPL-MCNC: 98 MG/DL
POTASSIUM SERPL-SCNC: 3.4 MMOL/L (ref 3.4–5.3)
PSA SERPL-ACNC: 0.98 UG/L (ref 0–4)
SODIUM SERPL-SCNC: 139 MMOL/L (ref 133–144)
TRIGL SERPL-MCNC: 192 MG/DL

## 2019-12-30 PROCEDURE — G0008 ADMIN INFLUENZA VIRUS VAC: HCPCS | Performed by: FAMILY MEDICINE

## 2019-12-30 PROCEDURE — G0103 PSA SCREENING: HCPCS | Performed by: FAMILY MEDICINE

## 2019-12-30 PROCEDURE — 90682 RIV4 VACC RECOMBINANT DNA IM: CPT | Performed by: FAMILY MEDICINE

## 2019-12-30 PROCEDURE — 99213 OFFICE O/P EST LOW 20 MIN: CPT | Mod: 25 | Performed by: FAMILY MEDICINE

## 2019-12-30 PROCEDURE — 99396 PREV VISIT EST AGE 40-64: CPT | Mod: 25 | Performed by: FAMILY MEDICINE

## 2019-12-30 PROCEDURE — 36415 COLL VENOUS BLD VENIPUNCTURE: CPT | Performed by: FAMILY MEDICINE

## 2019-12-30 PROCEDURE — 80061 LIPID PANEL: CPT | Performed by: FAMILY MEDICINE

## 2019-12-30 PROCEDURE — 80048 BASIC METABOLIC PNL TOTAL CA: CPT | Performed by: FAMILY MEDICINE

## 2019-12-30 RX ORDER — AMLODIPINE BESYLATE 5 MG/1
5 TABLET ORAL DAILY
Qty: 90 TABLET | Refills: 3 | Status: SHIPPED | OUTPATIENT
Start: 2019-12-30 | End: 2020-01-13

## 2019-12-30 RX ORDER — SIMVASTATIN 20 MG
TABLET ORAL
Qty: 90 TABLET | Refills: 3 | Status: SHIPPED | OUTPATIENT
Start: 2019-12-30 | End: 2020-01-13

## 2019-12-30 RX ORDER — GABAPENTIN 300 MG/1
300 CAPSULE ORAL 3 TIMES DAILY
Qty: 270 CAPSULE | Refills: 3 | Status: SHIPPED | OUTPATIENT
Start: 2019-12-30 | End: 2020-01-13

## 2019-12-30 RX ORDER — CHLORTHALIDONE 25 MG/1
25 TABLET ORAL DAILY
Qty: 90 TABLET | Refills: 3 | Status: SHIPPED | OUTPATIENT
Start: 2019-12-30 | End: 2020-01-13

## 2019-12-30 RX ORDER — OMEPRAZOLE 40 MG/1
CAPSULE, DELAYED RELEASE ORAL
Qty: 90 CAPSULE | Refills: 3 | Status: SHIPPED | OUTPATIENT
Start: 2019-12-30 | End: 2020-01-13

## 2019-12-30 ASSESSMENT — ENCOUNTER SYMPTOMS
WEAKNESS: 0
HEMATOCHEZIA: 0
COUGH: 0
FEVER: 0
SORE THROAT: 0
FREQUENCY: 0
MYALGIAS: 1
CONSTIPATION: 0
JOINT SWELLING: 1
HEARTBURN: 1
PARESTHESIAS: 0
CHILLS: 0
HEMATURIA: 0
DIARRHEA: 0
DYSURIA: 0
NERVOUS/ANXIOUS: 0
PALPITATIONS: 0
HEADACHES: 1
EYE PAIN: 0
DIZZINESS: 0
ARTHRALGIAS: 1
ABDOMINAL PAIN: 0
NAUSEA: 0
SHORTNESS OF BREATH: 1

## 2019-12-30 ASSESSMENT — ACTIVITIES OF DAILY LIVING (ADL): CURRENT_FUNCTION: NO ASSISTANCE NEEDED

## 2019-12-30 ASSESSMENT — MIFFLIN-ST. JEOR: SCORE: 2230.75

## 2019-12-30 NOTE — NURSING NOTE
"Chief Complaint   Patient presents with     Physical       Initial /76   Pulse 72   Temp 97.9  F (36.6  C) (Tympanic)   Resp 18   Ht 1.816 m (5' 11.5\")   Wt 141.1 kg (311 lb)   BMI 42.77 kg/m   Estimated body mass index is 42.77 kg/m  as calculated from the following:    Height as of this encounter: 1.816 m (5' 11.5\").    Weight as of this encounter: 141.1 kg (311 lb).    Patient presents to the clinic using     Health Maintenance that is potentially due pending provider review:          Is there anyone who you would like to be able to receive your results?   If yes have patient fill out DEREK    "

## 2019-12-30 NOTE — PATIENT INSTRUCTIONS
ASSESSMENT/PLAN:                                                    Lifestyle recommendations:regular exercise, at least 150 minutes per week (average 30 minutes 5 times a week)  weight loss recommended (ideal BMI-body mass index is <25)  The following exams/tests were recommended and discussed for health maintenance:  Colon cancer screening recommended 2024  Prostate cancer screening is optional starting at age 50 if normal risk, age 40 or 45 for high risk men (strong family history or blacks.)  Screening can include digital rectal exam and PSA blood test.     (Z00.00) Medicare annual wellness visit, subsequent  (primary encounter diagnosis)    (I10) Essential hypertension with goal blood pressure less than 140/90  Comment: diong well  Plan: amLODIPine (NORVASC) 5 MG tablet,         chlorthalidone (HYGROTON) 25 MG tablet, Basic         metabolic panel        No change in current treatment plan.  Refills for a year.     (K21.9) Gastroesophageal reflux disease without esophagitis  Comment: stable on medication  Plan: omeprazole (PRILOSEC) 40 MG DR capsule        Refills.     (E78.5) Hyperlipidemia, unspecified hyperlipidemia type  Comment: due for recheck  Plan: simvastatin (ZOCOR) 20 MG tablet, Lipid panel         reflex to direct LDL Fasting        Fasting blood tests.  Refills.     (L30.9) Hand dermatitis  Comment:   Plan: I can refill clobatesol cream as needed    (E66.01) Morbid obesity (H)    (Z12.5) Screening for prostate cancer  Comment:   Plan: PSA, screen          (G62.9) Peripheral polyneuropathy  Comment:   Plan: gabapentin (NEURONTIN) 300 MG capsule        Refills.

## 2019-12-30 NOTE — PROGRESS NOTES
"SUBJECTIVE:   CC: Tommy De La O is an 64 year old male who presents for preventative health visit.   Chief Complaint   Patient presents with     Physical      He is on Medicare.      Seen for blood pressure check in September.  Not sure if he got flu shot that day-was not documented.     Would like prescription for Clobetasol cream.  He uses on fingers for rash as needed.  Uses intermittently.  Aggravating factors: dry weather.     Needs refills on medications.  Problem 1: hypertension.  Not checking blood pressure.  Taking chlorthalidone 25mg and amlodipine 5mg daily.  No side effects.  Watches salt in diet.   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     Problem 2: hyperlipidemia.  Taking simvastatin.  NO side effects noted.       Problem 3: takes omeprazole 40mg daily for GERD.  Has not tolerated stopping.  Aggravating factors: spicy food.   Takes occasional tums.     Healthy Habits:     In general, how would you rate your overall health?  Good    Frequency of exercise:  None    Do you usually eat at least 4 servings of fruit and vegetables a day, include whole grains    & fiber and avoid regularly eating high fat or \"junk\" foods?  No    Taking medications regularly:  Yes    Medication side effects:  None    Ability to successfully perform activities of daily living:  No assistance needed    Home Safety:  Throw rugs in the hallway and lack of grab bars in the bathroom    Hearing Impairment:  Difficulty following a conversation in a noisy restaurant or crowded room    In the past 6 months, have you been bothered by leaking of urine?  No    In general, how would you rate your overall mental or emotional health?  Good      PHQ-2 Total Score: 0    Additional concerns today:  Yes    Ability to successfully perform activities of daily living: Yes, no assistance needed  Home safety:  throw rugs in the hallway and lack of grab bars in the bathroom   Hearing impairment: some difficulty     Today's PHQ-2 Score:   PHQ-2 " ( 1999 Pfizer) 2019   Q1: Little interest or pleasure in doing things 0   Q2: Feeling down, depressed or hopeless 0   PHQ-2 Score 0   Q1: Little interest or pleasure in doing things Not at all   Q2: Feeling down, depressed or hopeless Not at all   PHQ-2 Score 0     Abuse: Current or Past(Physical, Sexual or Emotional)- No  Do you feel safe in your environment? Yes    Have you ever done Advance Care Planning? (For example, a Health Directive, POLST, or a discussion with a medical provider or your loved ones about your wishes): No, advance care planning information given to patient to review.  Advanced care planning was discussed at today's visit.    Social History     Tobacco Use     Smoking status: Former Smoker     Packs/day: 1.00     Years: 30.00     Pack years: 30.00     Types: Cigarettes     Last attempt to quit: 2011     Years since quittin.9     Smokeless tobacco: Never Used     Tobacco comment: started at age 22   Substance Use Topics     Alcohol use: Yes   Alcohol:perhaps 6 beers per week.       Alcohol Use 2019   Prescreen: >3 drinks/day or >7 drinks/week? No     Last PSA:   PSA   Date Value Ref Range Status   2018 1.18 0 - 4 ug/L Final     Comment:     Assay Method:  Chemiluminescence using Siemens Vista analyzer     Patient Active Problem List    Diagnosis Date Noted     Morbid obesity (H) 2018     Priority: Medium     Essential hypertension with goal blood pressure less than 140/90 06/15/2018     Priority: Medium     Osteoarthritis of knee 2014     Priority: Medium     He has had bilateral knee replacements.        Gastrointestinal hemorrhage 2014     Priority: Medium     2018:He had colonoscopy.  Presumed from hemorrhoids.        Gastroesophageal reflux disease 2014     Priority: Medium     Hyperlipidemia 2014     Priority: Medium     Obstructive sleep apnea syndrome 2014     Priority: Medium     Uses CPAP nightly.           Family  "history:  CV disease: father in his 50s  Prostate cancer: no  Colon cancer: no    Multivitamin or Vit D use: no    Vaccines:current tetanus     Past Colon cancer screenin    Review of Systems   Constitutional: Negative for chills and fever.   HENT: Negative for congestion, ear pain, hearing loss and sore throat.    Eyes: Negative for pain and visual disturbance.   Respiratory: Positive for shortness of breath. Negative for cough.    Cardiovascular: Negative for chest pain, palpitations and peripheral edema.   Gastrointestinal: Positive for heartburn. Negative for abdominal pain, constipation, diarrhea, hematochezia and nausea.   Genitourinary: Negative for dysuria, frequency, genital sores, hematuria and urgency.   Musculoskeletal: Positive for arthralgias, joint swelling and myalgias.   Skin: Positive for rash.   Neurological: Positive for headaches. Negative for dizziness, weakness and paresthesias.   Psychiatric/Behavioral: Negative for mood changes. The patient is not nervous/anxious.    Mild dyspnea on exertion.  Mild and unchanged.   Some pain in knees.   headaches on occasion.       OBJECTIVE:                                                    OBJECTIVE:Blood pressure 120/76, pulse 72, temperature 97.9  F (36.6  C), temperature source Tympanic, resp. rate 18, height 1.816 m (5' 11.5\"), weight 141.1 kg (311 lb). BMI=Body mass index is 42.77 kg/m .  GENERAL APPEARANCE ADULT: Alert, no acute distress, morbidly obese  EYES: PERRL, EOM normal, conjunctiva and lids normal  HENT: Ears and TMs normal, oral mucosa and posterior oropharynx normal  NECK: No adenopathy,masses or thyromegaly  RESP: lungs clear to auscultation   CV: normal rate, regular rhythm, no murmur or gallop  ABDOMEN: soft, no organomegaly, masses or tenderness  MS: extremities normal, no peripheral edema  Few scaly spots on hands.     ASSESSMENT/PLAN:                                                    Lifestyle recommendations:regular " exercise, at least 150 minutes per week (average 30 minutes 5 times a week)  weight loss recommended (ideal BMI-body mass index is <25)  The following exams/tests were recommended and discussed for health maintenance:  Colon cancer screening recommended 2024  Prostate cancer screening is optional starting at age 50 if normal risk, age 40 or 45 for high risk men (strong family history or blacks.)  Screening can include digital rectal exam and PSA blood test.     (Z00.00) Medicare annual wellness visit, subsequent  (primary encounter diagnosis)    (I10) Essential hypertension with goal blood pressure less than 140/90  Comment: diong well  Plan: amLODIPine (NORVASC) 5 MG tablet,         chlorthalidone (HYGROTON) 25 MG tablet, Basic         metabolic panel        No change in current treatment plan.  Refills for a year.     (K21.9) Gastroesophageal reflux disease without esophagitis  Comment: stable on medication  Plan: omeprazole (PRILOSEC) 40 MG DR capsule        Refills.     (E78.5) Hyperlipidemia, unspecified hyperlipidemia type  Comment: due for recheck  Plan: simvastatin (ZOCOR) 20 MG tablet, Lipid panel         reflex to direct LDL Fasting        Fasting blood tests.  Refills.     (L30.9) Hand dermatitis  Comment:   Plan: I can refill clobatesol cream as needed    (E66.01) Morbid obesity (H)    (Z12.5) Screening for prostate cancer  Comment:   Plan: PSA, screen          (G62.9) Peripheral polyneuropathy  Comment:   Plan: gabapentin (NEURONTIN) 300 MG capsule        Refills.     MARTA  Addendum 3/18/2020:He has been tested and documented to have significant obstructive sleep apnea in 2014.  He has been using his CPAP daily since then.  He reports using his CPAP religiously.  He is even brought to the hospital when he has been hospitalized.  He sleeps much better when using the CPAP.  ELIAZAR ROY MD        COUNSELING:   Reviewed preventive health counseling, as reflected in patient instructions  Special attention  "given to:        Prostate cancer screening    Estimated body mass index is 42.77 kg/m  as calculated from the following:    Height as of this encounter: 1.816 m (5' 11.5\").    Weight as of this encounter: 141.1 kg (311 lb).     Weight management plan: Discussed healthy diet and exercise guidelines     reports that he quit smoking about 8 years ago. His smoking use included cigarettes. He has a 30.00 pack-year smoking history. He has never used smokeless tobacco.      Counseling Resources:  ATP IV Guidelines  Pooled Cohorts Equation Calculator  FRAX Risk Assessment  ICSI Preventive Guidelines  Dietary Guidelines for Americans, 2010  USDA's MyPlate  ASA Prophylaxis  Lung CA Screening    Tim Crawley MD  [facility]  "

## 2019-12-31 NOTE — RESULT ENCOUNTER NOTE
"Hi Tommy,  The blood chemistries (Basic metabolic panel) are all normal including electrolytes (salt balances in the blood), blood glucose and kidney tests.   HDL (\"good cholesterol\") is slightly low.    Triglycerides, a type of fat found in the bloodstream is high.    The LDL \"bad cholesterol\" is at goal level.   PSA test (for prostate cancer screening) is normal.    PLAN: No new changes in treatment recommended.   ELIAZAR ROY MD"

## 2020-01-13 ENCOUNTER — TELEPHONE (OUTPATIENT)
Dept: FAMILY MEDICINE | Facility: CLINIC | Age: 65
End: 2020-01-13

## 2020-01-13 DIAGNOSIS — K21.9 GASTROESOPHAGEAL REFLUX DISEASE WITHOUT ESOPHAGITIS: ICD-10-CM

## 2020-01-13 DIAGNOSIS — L30.9 ECZEMA, UNSPECIFIED TYPE: ICD-10-CM

## 2020-01-13 DIAGNOSIS — I10 ESSENTIAL HYPERTENSION WITH GOAL BLOOD PRESSURE LESS THAN 140/90: ICD-10-CM

## 2020-01-13 DIAGNOSIS — G62.9 PERIPHERAL POLYNEUROPATHY: ICD-10-CM

## 2020-01-13 DIAGNOSIS — E78.5 HYPERLIPIDEMIA, UNSPECIFIED HYPERLIPIDEMIA TYPE: ICD-10-CM

## 2020-01-13 RX ORDER — AMLODIPINE BESYLATE 5 MG/1
5 TABLET ORAL DAILY
Qty: 90 TABLET | Refills: 3 | Status: SHIPPED | OUTPATIENT
Start: 2020-01-13 | End: 2021-01-14

## 2020-01-13 RX ORDER — GABAPENTIN 300 MG/1
300 CAPSULE ORAL 3 TIMES DAILY
Qty: 270 CAPSULE | Refills: 3 | Status: SHIPPED | OUTPATIENT
Start: 2020-01-13 | End: 2021-01-11

## 2020-01-13 RX ORDER — SIMVASTATIN 20 MG
TABLET ORAL
Qty: 90 TABLET | Refills: 3 | Status: SHIPPED | OUTPATIENT
Start: 2020-01-13 | End: 2020-04-20

## 2020-01-13 RX ORDER — CHLORTHALIDONE 25 MG/1
25 TABLET ORAL DAILY
Qty: 90 TABLET | Refills: 3 | Status: SHIPPED | OUTPATIENT
Start: 2020-01-13 | End: 2021-01-14

## 2020-01-13 RX ORDER — OMEPRAZOLE 40 MG/1
CAPSULE, DELAYED RELEASE ORAL
Qty: 90 CAPSULE | Refills: 3 | Status: SHIPPED | OUTPATIENT
Start: 2020-01-13 | End: 2021-01-14

## 2020-01-13 NOTE — TELEPHONE ENCOUNTER
Pt said he has a new Pharmacy to send his Scripts to. Fax/ E scribe them to  [phone] [facility]. Pt's scripts were just all given one yrs's worth on 12/30/19  Please Resend them.  Nimisha [facility] [place] [facility]

## 2020-02-04 ENCOUNTER — TRANSFERRED RECORDS (OUTPATIENT)
Dept: HEALTH INFORMATION MANAGEMENT | Facility: CLINIC | Age: 65
End: 2020-02-04

## 2020-02-13 ENCOUNTER — OFFICE VISIT (OUTPATIENT)
Dept: ORTHOPEDICS | Facility: CLINIC | Age: 65
End: 2020-02-13
Payer: MEDICARE

## 2020-02-13 VITALS
WEIGHT: 310 LBS | BODY MASS INDEX: 41.99 KG/M2 | SYSTOLIC BLOOD PRESSURE: 118 MMHG | HEIGHT: 72 IN | DIASTOLIC BLOOD PRESSURE: 76 MMHG

## 2020-02-13 DIAGNOSIS — M25.531 RIGHT WRIST PAIN: Primary | ICD-10-CM

## 2020-02-13 PROCEDURE — 20606 DRAIN/INJ JOINT/BURSA W/US: CPT | Mod: RT | Performed by: FAMILY MEDICINE

## 2020-02-13 RX ORDER — TRIAMCINOLONE ACETONIDE 40 MG/ML
40 INJECTION, SUSPENSION INTRA-ARTICULAR; INTRAMUSCULAR
Status: DISCONTINUED | OUTPATIENT
Start: 2020-02-13 | End: 2020-06-09

## 2020-02-13 RX ORDER — ROPIVACAINE HYDROCHLORIDE 5 MG/ML
2 INJECTION, SOLUTION EPIDURAL; INFILTRATION; PERINEURAL
Status: DISCONTINUED | OUTPATIENT
Start: 2020-02-13 | End: 2020-06-09

## 2020-02-13 RX ADMIN — TRIAMCINOLONE ACETONIDE 40 MG: 40 INJECTION, SUSPENSION INTRA-ARTICULAR; INTRAMUSCULAR at 10:00

## 2020-02-13 RX ADMIN — ROPIVACAINE HYDROCHLORIDE 2 ML: 5 INJECTION, SOLUTION EPIDURAL; INFILTRATION; PERINEURAL at 10:00

## 2020-02-13 ASSESSMENT — MIFFLIN-ST. JEOR: SCORE: 2226.21

## 2020-02-13 NOTE — LETTER
2020         RE: Tommy De La O   [facility] [id number]        Dear Colleague,    Thank you for referring your patient, Tommy De La O, to the [facility]. Please see a copy of my visit note below.    Tommy De La O  :  1955  DOS: 2020  MRN: [id number]    Sports Medicine Clinic Procedure    Ultrasound Guided Right Wrist DRUJ Injection    Clinical History: Gradual onset of right wrist pain over the past ~ 6+ months.  He has noted minimal/temporary relief with right wrist injection and hand therapy/splinting.    Diagnosis:   1. Right wrist pain      Referring Physician: Hawk Joshi MD @ [facility] Joint Injection/Arthrocentesis: R distal radioulnar  Date/Time: 2020 10:00 AM  Performed by: Abdulaziz Aly DO  Authorized by: Abdulaziz Aly DO     Indications:  Pain and joint swelling  Needle Size:  25 G  Guidance: ultrasound    Approach:  Dorsal  Location:  Wrist  Site:  R distal radioulnar  Medications:  40 mg triamcinolone 40 MG/ML; 2 mL ropivacaine 5 MG/ML  Outcome:  Tolerated well, no immediate complications  Procedure discussed: discussed risks, benefits, and alternatives    Consent Given by:  Patient  Timeout: timeout called immediately prior to procedure    Prep: patient was prepped and draped in usual sterile fashion        Impression:  Successful US guided DRUJ injection    Plan:  Follow up as directed by Dr Joshi  Good initial relief from anesthetic effect today  Report sx improvement or lability to Dr Joshi's team in 2 weeks, or as directed  Expectations and goals of CSI reviewed  Often 2-3 days for steroid effect, and can take up to two weeks for maximum effect  We discussed modified progressive pain-free activity as tolerated  Do not overuse in first two weeks if feeling better due to concern for vulnerability while steroid is working  Supportive care reviewed  All questions were answered today  Contact  us with additional questions or concerns  Signs and sx of concern reviewed      Abdulaziz Aly DO, CACORDELIA  Primary Care Sports Medicine  [facility]           Again, thank you for allowing me to participate in the care of your patient.        Sincerely,        Abdulaziz Aly DO

## 2020-02-13 NOTE — PROGRESS NOTES
Tommy De La O  :  1955  DOS: 2020  MRN: [id number]    Sports Medicine Clinic Procedure    Ultrasound Guided Right Wrist DRUJ Injection    Clinical History: Gradual onset of right wrist pain over the past ~ 6+ months.  He has noted minimal/temporary relief with right wrist injection and hand therapy/splinting.    Diagnosis:   1. Right wrist pain      Referring Physician: Hawk Joshi MD @ [facility] Joint Injection/Arthrocentesis: R distal radioulnar  Date/Time: 2020 10:00 AM  Performed by: Abdulaziz Aly DO  Authorized by: Abdulaziz Aly DO     Indications:  Pain and joint swelling  Needle Size:  25 G  Guidance: ultrasound    Approach:  Dorsal  Location:  Wrist  Site:  R distal radioulnar  Medications:  40 mg triamcinolone 40 MG/ML; 2 mL ropivacaine 5 MG/ML  Outcome:  Tolerated well, no immediate complications  Procedure discussed: discussed risks, benefits, and alternatives    Consent Given by:  Patient  Timeout: timeout called immediately prior to procedure    Prep: patient was prepped and draped in usual sterile fashion        Impression:  Successful US guided DRUJ injection    Plan:  Follow up as directed by Dr Joshi  Good initial relief from anesthetic effect today  Report sx improvement or lability to Dr Joshi's team in 2 weeks, or as directed  Expectations and goals of CSI reviewed  Often 2-3 days for steroid effect, and can take up to two weeks for maximum effect  We discussed modified progressive pain-free activity as tolerated  Do not overuse in first two weeks if feeling better due to concern for vulnerability while steroid is working  Supportive care reviewed  All questions were answered today  Contact us with additional questions or concerns  Signs and sx of concern reviewed      Abdulaziz Aly DO, CAQ  Primary Care Sports Medicine  [facility]

## 2020-03-10 ENCOUNTER — TELEPHONE (OUTPATIENT)
Dept: FAMILY MEDICINE | Facility: CLINIC | Age: 65
End: 2020-03-10

## 2020-03-10 NOTE — TELEPHONE ENCOUNTER
Pt is requesting a new C-pap machine as he qualifies for a new one for Medicare. He goes through [employer] in [place] and has been talking with Nadia.    I spoke to Nadia and he needs a face to face visit after the day he qualifies which is March 11, 2020. Medicare does not require the machine be downloaded but they will need the doctor to put in the notes that he is using and benefiting from the machine.    Apt scheduled for March 18, 2020

## 2020-03-17 ENCOUNTER — TELEPHONE (OUTPATIENT)
Dept: FAMILY MEDICINE | Facility: CLINIC | Age: 65
End: 2020-03-17

## 2020-03-17 NOTE — TELEPHONE ENCOUNTER
S-(situation): asking if can not come into clinic tomorrow.   Has dry cough and the feeling in his chest is funny.  When takes a deep breathe, chest feels sore.  Has upper body soreness .  Moved a bunch wood over the weekend.  Little headache this AM.  Cough for 3 days.  A small amount of diarrhea stool after normal BM this AM.    B-(background): has COPD.    A-(assessment): cough, no fever    R-(recommendations): changed clinic visit to telephone visit   Dorys Arguello RN

## 2020-03-17 NOTE — TELEPHONE ENCOUNTER
Patient is calling stating he has an appointment tomorrow for a new CPAP machine that he qualifies for. He first off, is wondering if he can just do something over the phone for this without coming in. Second thing is, he has for 7 days now, had a chest congestion type thing going on. He doesn't have a fever, checked his temp earlier and it was 97.8, and hour later it was 98.3. He only coughs in the morning, although yesterday he did cough a little bit in the afternoon. He is a little short of breath. He thinks he is getting pneumonia. He doesn't want to come in but if the nurse feels it is necessary, he will. Also states he had 2 bowel movements and the second one he had some diarrhea. He only has an Ipad and doesn't feel comfortable and gets confused going online to go the [facility] site. Would like to talk to someone. Please advise.    Sera [place]-Station

## 2020-03-18 ENCOUNTER — VIRTUAL VISIT (OUTPATIENT)
Dept: FAMILY MEDICINE | Facility: CLINIC | Age: 65
End: 2020-03-18
Payer: MEDICARE

## 2020-03-18 DIAGNOSIS — R05.9 COUGH: ICD-10-CM

## 2020-03-18 DIAGNOSIS — G47.33 OBSTRUCTIVE SLEEP APNEA SYNDROME: Primary | Chronic | ICD-10-CM

## 2020-03-18 DIAGNOSIS — G44.209 ACUTE NON INTRACTABLE TENSION-TYPE HEADACHE: ICD-10-CM

## 2020-03-18 PROCEDURE — G2012 BRIEF CHECK IN BY MD/QHP: HCPCS | Performed by: FAMILY MEDICINE

## 2020-03-18 NOTE — LETTER
March 18, 2020      Tommy De La O  [address] [facility] [address]        To Whom It May Concern,    Mr. Tommy De La O was seen December 30, 2019 for Medicare wellness visit.  He was seen face-to-face that visit.  I did another visit with him by virtual means telephone call due to COVID-19 virus outbreak and limiting clinic visits.  He has been diagnosed with obstructive sleep apnea in 2014.  He has been using CPAP since that time.  He uses a CPAP every night, religiously.  It helps him sleep better.  I will send copies of my clinic notes from December 30 and today.  Please renew his prescription for the CPAP machine and equipment needed.       Sincerely,        Tim Crawley MD

## 2020-03-18 NOTE — PROGRESS NOTES
"Tommy De La O is a 64 year old male who is being evaluated via a billable telephone visit.      The patient has been notified of following:     \"This telephone visit will be conducted via a call between you and your physician/provider. We have found that certain health care needs can be provided without the need for a physical exam.  This service lets us provide the care you need with a short phone conversation.  If a prescription is necessary we can send it directly to your pharmacy.  If lab work is needed we can place an order for that and you can then stop by our lab to have the test done at a later time.    If during the course of the call the physician/provider feels a telephone visit is not appropriate, you will not be charged for this service.\"       Tommy De La O complains of    Chief Complaint   Patient presents with     Orders       I have reviewed and updated the patient's Past Medical History, Social History, Family History and Medication List.    ALLERGIES  Cefzil [cefprozil]; Darvocet [propoxyphene n-apap]; and Penicillins    Roseann Maldonado CMA   (MA signature)    I did a virtual telephone visit with Tommy today due to COVID-19 virus.  We are trying to limit clinic visits.  I have just seen Tommy on December 30, 2019 for Medicare wellness exam and physical.  This was a face-to-face visit.  He needs documentation to continue his CPAP machine and equipment for obstructive sleep apnea.  He has been tested and documented to have significant obstructive sleep apnea in 2014.  He has been using his CPAP daily since then.  He reports using his CPAP religiously.  He is even brought to the hospital when he has been hospitalized.  He sleeps much better when using the CPAP.  He needs documentation sent to his CPAP provider [facility] regarding his routine CPAP use.    Problem #2 he is noted some tightness in his chest for the last 8 to 10 days.  He has had no fever.  He notes some shortness of breath but this is " chronic and sounds like about his baseline and is minimally changed.  He attributes that to smoking for 35 years and weighing 310 pounds.  He notes coughing 10-15 times in the morning then seems to have no problem during the day.  He has occasional sneezing no significant rhinorrhea or congestion.  He wonders if an antibiotic would be helpful.    Problem #3.  He has had some headaches also for the last 8 days similar to headaches that he is had in the past but more frequently.  They are located bifrontal area.  They occur typically late morning early afternoon.  They are relieved with ibuprofen 600 mg or Excedrin.    Additional health problems: He has noted some upper body soreness but he has been moving around a lot of wood in and out of his trunk he notes that with deep breaths.  He also notes worrying a lot about the virus outbreak.  He was asked to care for grandchildren who have been quarantined for respiratory illness recently.      Assessment/Plan:  (G47.33) Obstructive sleep apnea syndrome  (primary encounter diagnosis)  Comment: He has demonstrated need for CPAP and improvement with CPAP and uses it on a regular daily basis.  Plan: I do recommend continuing the CPAP daily and will notify his equipment supplier.    (R05) Cough  Comment: This is a viral URI.  He has had no fever no significant other respiratory symptoms.  I expect he will improve gradually over time.  It is possible he has some underlying emphysema.  He has not had pulmonary function testing in the past.  Plan: I do not recommend antibiotics at this time expected to improve.  If he has more shortness of breath or fever contact me.  If continuing respiratory symptoms we can do pulmonary function test in the future.  If cough continues some anti-inflammatory may be beneficial and possibly albuterol inhaler.    (G41.106) Acute non intractable tension-type headache  Comment: I expect this is related to current viral illness and should improve  over time.  Plan: Continue his ibuprofen or Excedrin as needed          I have reviewed the note as documented above.  This accurately captures the substance of my conversation with the patient.  ELIAZAR ROY MD     Phone call contact time  Call Started at 10:38  Call Ended at 10:53

## 2020-03-31 ENCOUNTER — TELEPHONE (OUTPATIENT)
Dept: FAMILY MEDICINE | Facility: CLINIC | Age: 65
End: 2020-03-31

## 2020-04-20 DIAGNOSIS — E78.5 HYPERLIPIDEMIA, UNSPECIFIED HYPERLIPIDEMIA TYPE: ICD-10-CM

## 2020-04-20 RX ORDER — SIMVASTATIN 20 MG
TABLET ORAL
Qty: 90 TABLET | Refills: 3 | Status: SHIPPED | OUTPATIENT
Start: 2020-04-20 | End: 2020-04-21

## 2020-04-20 NOTE — TELEPHONE ENCOUNTER
Requested Prescriptions   Pending Prescriptions Disp Refills     simvastatin (ZOCOR) 20 MG tablet 90 tablet 3     Sig: TAKE ONE TABLET BY MOUTH AT BEDTIME       There is no refill protocol information for this order        Last Written Prescription Date:  1/13/2020  Last Fill Quantity: 90,  # refills: 3   Last office visit: 3/18/2020 with prescribing provider:  Denisa    Please order 15 tablets to [facility] as he is out and need this filled today.    Ella Restrepo  [facility]      Future Office Visit:

## 2020-04-21 ENCOUNTER — TELEPHONE (OUTPATIENT)
Dept: FAMILY MEDICINE | Facility: CLINIC | Age: 65
End: 2020-04-21

## 2020-04-21 DIAGNOSIS — E78.5 HYPERLIPIDEMIA, UNSPECIFIED HYPERLIPIDEMIA TYPE: ICD-10-CM

## 2020-04-21 RX ORDER — SIMVASTATIN 20 MG
TABLET ORAL
Qty: 7 TABLET | Refills: 0 | Status: SHIPPED | OUTPATIENT
Start: 2020-04-21 | End: 2021-01-21

## 2020-04-21 NOTE — TELEPHONE ENCOUNTER
Reason for Call:  Medication or medication refill:    Do you use a [facility]?  Name of the pharmacy and phone number for the current request:  [facility] - [phone]    Name of the medication requested: Simvastatin - Pt is asking for a weeks worth of his Simvastatin sent to [facility] until his mail order pharmacy comes. It was just sent to [facility] Pharm[facility] 4/20/20    Can we leave a detailed message on this number? YES    Phone number patient can be reached at: Home number on file  [phone]    Best Time: Any Time      Call taken on 4/21/2020 at 1:06 PM by Nimisha Minor

## 2020-06-09 ENCOUNTER — OFFICE VISIT (OUTPATIENT)
Dept: ORTHOPEDICS | Facility: CLINIC | Age: 65
End: 2020-06-09
Payer: MEDICARE

## 2020-06-09 VITALS
SYSTOLIC BLOOD PRESSURE: 130 MMHG | HEIGHT: 72 IN | WEIGHT: 310 LBS | BODY MASS INDEX: 41.99 KG/M2 | DIASTOLIC BLOOD PRESSURE: 80 MMHG

## 2020-06-09 DIAGNOSIS — M25.531 RIGHT WRIST PAIN: Primary | ICD-10-CM

## 2020-06-09 PROCEDURE — 20606 DRAIN/INJ JOINT/BURSA W/US: CPT | Mod: RT | Performed by: FAMILY MEDICINE

## 2020-06-09 RX ORDER — TRIAMCINOLONE ACETONIDE 40 MG/ML
40 INJECTION, SUSPENSION INTRA-ARTICULAR; INTRAMUSCULAR
Status: DISCONTINUED | OUTPATIENT
Start: 2020-06-09 | End: 2020-08-25 | Stop reason: ALTCHOICE

## 2020-06-09 RX ORDER — ROPIVACAINE HYDROCHLORIDE 5 MG/ML
2 INJECTION, SOLUTION EPIDURAL; INFILTRATION; PERINEURAL
Status: DISCONTINUED | OUTPATIENT
Start: 2020-06-09 | End: 2020-08-25 | Stop reason: ALTCHOICE

## 2020-06-09 RX ADMIN — ROPIVACAINE HYDROCHLORIDE 2 ML: 5 INJECTION, SOLUTION EPIDURAL; INFILTRATION; PERINEURAL at 11:00

## 2020-06-09 RX ADMIN — TRIAMCINOLONE ACETONIDE 40 MG: 40 INJECTION, SUSPENSION INTRA-ARTICULAR; INTRAMUSCULAR at 11:00

## 2020-06-09 ASSESSMENT — MIFFLIN-ST. JEOR: SCORE: 2221.21

## 2020-06-09 NOTE — LETTER
2020         RE: Tommy De La O   [address] [facility] [address]        Dear Colleague,    Thank you for referring your patient, Tommy De La O, to the [facility]. Please see a copy of my visit note below.    Tommy De La O  :  1955  DOS: 20  MRN: [id number]    Sports Medicine Clinic Procedure    Ultrasound Guided Right Wrist DRUJ Injection    Clinical History: Gradual onset of right wrist pain over the past ~ 6+ months.  He has noted minimal/temporary relief with right wrist injection and hand therapy/splinting.    Interim History - 2020  Since last visit on 2020 patient has moderate right wrist pain.  Right wrist DRUJ injection completed on  provided good relief for ~ 3 months.  No new injury in the interim.    Diagnosis:   1. Right wrist pain      Referring Physician: Hawk Joshi MD @ [facility] Joint Injection/Arthrocentesis: R distal radioulnar    Date/Time: 2020 11:00 AM  Performed by: Abdulaziz Aly DO  Authorized by: Abdulaziz Aly DO     Indications:  Pain  Indications comment:  Osteoarthritis  Needle Size:  25 G  Guidance: ultrasound    Approach:  Dorsal  Location:  Wrist  Site:  R distal radioulnar  Medications:  40 mg triamcinolone 40 MG/ML; 2 mL ropivacaine 5 MG/ML  Outcome:  Tolerated well, no immediate complications  Procedure discussed: discussed risks, benefits, and alternatives    Consent Given by:  Patient  Timeout: timeout called immediately prior to procedure    Prep: patient was prepped and draped in usual sterile fashion        Impression:  Successful US guided DRUJ injection    Plan:  Follow up as directed by Dr Joshi  Good initial relief from anesthetic effect today  Report sx improvement or lability to Dr Joshi's team in 2 weeks, or as directed  Expectations and goals of CSI reviewed  Often 2-3 days for steroid effect, and can take up to two weeks for maximum effect  We discussed  modified progressive pain-free activity as tolerated  Do not overuse in first two weeks if feeling better due to concern for vulnerability while steroid is working  Supportive care reviewed  All questions were answered today  Contact us with additional questions or concerns  Signs and sx of concern reviewed      Abdulaziz Aly DO, HAILEE  Primary Care Sports Medicine  [facility]           Again, thank you for allowing me to participate in the care of your patient.        Sincerely,        Abdulaziz Aly DO

## 2020-06-09 NOTE — PROGRESS NOTES
Tommy De La O  :  1955  DOS: 20  MRN: [id number]    Sports Medicine Clinic Procedure    Ultrasound Guided Right Wrist DRUJ Injection    Clinical History: Gradual onset of right wrist pain over the past ~ 6+ months.  He has noted minimal/temporary relief with right wrist injection and hand therapy/splinting.    Interim History - 2020  Since last visit on 2020 patient has moderate right wrist pain.  Right wrist DRUJ injection completed on  provided good relief for ~ 3 months.  No new injury in the interim.    Diagnosis:   1. Right wrist pain      Referring Physician: Hawk Joshi MD @ [facility] Joint Injection/Arthrocentesis: R distal radioulnar    Date/Time: 2020 11:00 AM  Performed by: Abdulaziz Aly DO  Authorized by: Abdulaziz Aly DO     Indications:  Pain  Indications comment:  Osteoarthritis  Needle Size:  25 G  Guidance: ultrasound    Approach:  Dorsal  Location:  Wrist  Site:  R distal radioulnar  Medications:  40 mg triamcinolone 40 MG/ML; 2 mL ropivacaine 5 MG/ML  Outcome:  Tolerated well, no immediate complications  Procedure discussed: discussed risks, benefits, and alternatives    Consent Given by:  Patient  Timeout: timeout called immediately prior to procedure    Prep: patient was prepped and draped in usual sterile fashion        Impression:  Successful US guided DRUJ injection    Plan:  Follow up as directed by Dr Joshi  Good initial relief from anesthetic effect today  Report sx improvement or lability to Dr Joshi's team in 2 weeks, or as directed  Expectations and goals of CSI reviewed  Often 2-3 days for steroid effect, and can take up to two weeks for maximum effect  We discussed modified progressive pain-free activity as tolerated  Do not overuse in first two weeks if feeling better due to concern for vulnerability while steroid is working  Supportive care reviewed  All questions were answered today  Contact us with additional  questions or concerns  Signs and sx of concern reviewed      Abdulaziz Aly DO, HAILEE  Primary Care Sports Medicine  [facility]

## 2020-06-25 ENCOUNTER — OFFICE VISIT (OUTPATIENT)
Dept: ORTHOPEDICS | Facility: CLINIC | Age: 65
End: 2020-06-25
Payer: MEDICARE

## 2020-06-25 VITALS
WEIGHT: 312 LBS | SYSTOLIC BLOOD PRESSURE: 127 MMHG | DIASTOLIC BLOOD PRESSURE: 81 MMHG | HEIGHT: 72 IN | BODY MASS INDEX: 42.26 KG/M2

## 2020-06-25 DIAGNOSIS — G89.29 CHRONIC PAIN OF RIGHT WRIST: Primary | ICD-10-CM

## 2020-06-25 DIAGNOSIS — M25.531 CHRONIC PAIN OF RIGHT WRIST: Primary | ICD-10-CM

## 2020-06-25 PROCEDURE — 99203 OFFICE O/P NEW LOW 30 MIN: CPT | Performed by: FAMILY MEDICINE

## 2020-06-25 ASSESSMENT — MIFFLIN-ST. JEOR: SCORE: 2230.28

## 2020-06-25 NOTE — PROGRESS NOTES
"Tommy De La O  :  1955  DOS: 2020  MRN: [id number]    Sports Medicine Clinic Visit    PCP: Tim Crawley    Tommy De La O is a 65 year old Right hand dominant male who is seen in follow-up presenting with chronic right wrist pain.    Interim History - 2020  Since last visit on 2020 patient has moderate right ulnar sided wrist pain.  Right wrist DRUJ injection completed on  provided moderate relief after ~ 9 - 10 days.  Continues to note mild discomfort over DRUJ joint, along with volar ulnar wrist.  No new injury in the interim.    Social History: works as farmer    Review of Systems  Musculoskeletal: as above  Remainder of review of systems is negative including constitutional, CV, pulmonary, GI, Skin and Neurologic except as noted in HPI or medical history.    No past medical history on file.  Past Surgical History:   Procedure Laterality Date     ARTHROSCOPY KNEE Bilateral     both knees with arthroscopy in the past.      AS TOTAL KNEE ARTHROPLASTY Bilateral     Both total knees.      REPAIR TENDON ACHILLES      1980s two separate surgeries.     ROTATOR CUFF REPAIR RT/LT Left      Family History   Problem Relation Age of Onset     Coronary Artery Disease Father 56         at 56 MI     Hyperlipidemia Mother      Prostate Cancer No family hx of      Colon Cancer No family hx of          Objective  /81   Ht 1.816 m (5' 11.5\")   Wt 141.5 kg (312 lb)   BMI 42.91 kg/m        General: healthy, alert and in no distress      HEENT: no scleral icterus or conjunctival erythema     Skin: no suspicious lesions or rash. No jaundice.     CV: regular rhythm by palpation, 2+ distal pulses, no pedal edema      Resp: normal respiratory effort without conversational dyspnea     Psych: normal mood and affect      Gait: nonantalgic, appropriate coordination and balance     Neuro: normal light touch sensory exam of the extremities. Motor strength as noted below     Right Wrist and Hand " exam    Inspection:       No swelling, bruising or deformity right    Tender:       DRUJ and ulnocarpal>radiocarpal joint TTP    Non Tender:       Remainder of the Wrist and Hand bilateral    ROM:       Full and symmetric active and passive range of motion of the forearm, wrist and digits bilateral    Strength:       5/5 strength in the muscles of the hand, wrist and forearm bilateral    Special Tests:        neg (-) Tinel's test bilateral,       neg (-) Phalen's test bilateral,       neg (-) TFCC compression test bilateral and       neg (-) Finkelstein's maneuver bilateral    Neurovascular:       2+ radial pulses bilaterally with brisk capillary refill and      normal sensation to light touch in the radial, median and ulnar nerve distributions      Radiology:  Prior imaging uploaded to PACS, reviewed in detail    Assessment:  1. Chronic pain of right wrist        Plan:  Discussed the assessment with the patient.  Follow up: prn  Reviewed option to return to Dr Joshi who referred to me for injections  Last DRUJ injection helping more now, not as much initially  Could consider diagnostic/therapeutic CSI to radiocarpal joint for labile sx in that location  XR images independently visualized and reviewed with patient today in clinic  Continue activity modification and brace usage  We discussed modified progressive pain-free activity as tolerated  Home handouts provided and supportive care reviewed  All questions were answered today  Contact us with additional questions or concerns  Signs and sx of concern reviewed      Abdulaziz Aly DO, CAQ  Primary Care Sports Medicine  [facility]         Time spent in one-on-one evaluation and discussion with patient regarding nature of problem, course, prior treatments, and therapeutic options, at least 50% of which was spent in counseling and coordination of care: 30 minutes          Disclaimer: This note consists of symbols derived from keyboarding,  dictation and/or voice recognition software. As a result, there may be errors in the script that have gone undetected. Please consider this when interpreting information found in this chart.

## 2020-06-25 NOTE — LETTER
"    2020         RE: Tommy De La O   [address] [facility] [address]        Dear Colleague,    Thank you for referring your patient, Tommy De La O, to the [facility]. Please see a copy of my visit note below.    Tommy De La O  :  1955  DOS: 2020  MRN: [id number]    Sports Medicine Clinic Visit    PCP: Tim Crawley    Tommy De La O is a 65 year old Right hand dominant male who is seen in follow-up presenting with chronic right wrist pain.    Interim History - 2020  Since last visit on 2020 patient has moderate right ulnar sided wrist pain.  Right wrist DRUJ injection completed on  provided moderate relief after ~ 9 - 10 days.  Continues to note mild discomfort over DRUJ joint, along with volar ulnar wrist.  No new injury in the interim.    Social History: works as farmer    Review of Systems  Musculoskeletal: as above  Remainder of review of systems is negative including constitutional, CV, pulmonary, GI, Skin and Neurologic except as noted in HPI or medical history.    No past medical history on file.  Past Surgical History:   Procedure Laterality Date     ARTHROSCOPY KNEE Bilateral     both knees with arthroscopy in the past.      AS TOTAL KNEE ARTHROPLASTY Bilateral     Both total knees.      REPAIR TENDON ACHILLES      1980s two separate surgeries.     ROTATOR CUFF REPAIR RT/LT Left      Family History   Problem Relation Age of Onset     Coronary Artery Disease Father 56         at 56 MI     Hyperlipidemia Mother      Prostate Cancer No family hx of      Colon Cancer No family hx of          Objective  /81   Ht 1.816 m (5' 11.5\")   Wt 141.5 kg (312 lb)   BMI 42.91 kg/m        General: healthy, alert and in no distress      HEENT: no scleral icterus or conjunctival erythema     Skin: no suspicious lesions or rash. No jaundice.     CV: regular rhythm by palpation, 2+ distal pulses, no pedal edema      Resp: normal respiratory effort " without conversational dyspnea     Psych: normal mood and affect      Gait: nonantalgic, appropriate coordination and balance     Neuro: normal light touch sensory exam of the extremities. Motor strength as noted below     Right Wrist and Hand exam    Inspection:       No swelling, bruising or deformity right    Tender:       DRUJ and ulnocarpal>radiocarpal joint TTP    Non Tender:       Remainder of the Wrist and Hand bilateral    ROM:       Full and symmetric active and passive range of motion of the forearm, wrist and digits bilateral    Strength:       5/5 strength in the muscles of the hand, wrist and forearm bilateral    Special Tests:        neg (-) Tinel's test bilateral,       neg (-) Phalen's test bilateral,       neg (-) TFCC compression test bilateral and       neg (-) Finkelstein's maneuver bilateral    Neurovascular:       2+ radial pulses bilaterally with brisk capillary refill and      normal sensation to light touch in the radial, median and ulnar nerve distributions      Radiology:  Prior imaging uploaded to PACS, reviewed in detail    Assessment:  1. Chronic pain of right wrist        Plan:  Discussed the assessment with the patient.  Follow up: prn  Reviewed option to return to Dr Joshi who referred to me for injections  Last DRUJ injection helping more now, not as much initially  Could consider diagnostic/therapeutic CSI to radiocarpal joint for labile sx in that location  XR images independently visualized and reviewed with patient today in clinic  Continue activity modification and brace usage  We discussed modified progressive pain-free activity as tolerated  Home handouts provided and supportive care reviewed  All questions were answered today  Contact us with additional questions or concerns  Signs and sx of concern reviewed      Abdulaziz Aly DO, CAQ  Primary Care Sports Medicine  [facility]         Time spent in one-on-one evaluation and discussion with patient  regarding nature of problem, course, prior treatments, and therapeutic options, at least 50% of which was spent in counseling and coordination of care: 30 minutes          Disclaimer: This note consists of symbols derived from keyboarding, dictation and/or voice recognition software. As a result, there may be errors in the script that have gone undetected. Please consider this when interpreting information found in this chart.    Again, thank you for allowing me to participate in the care of your patient.        Sincerely,        Abdulaziz Aly, DO

## 2020-08-25 ENCOUNTER — OFFICE VISIT (OUTPATIENT)
Dept: ORTHOPEDICS | Facility: CLINIC | Age: 65
End: 2020-08-25
Payer: MEDICARE

## 2020-08-25 VITALS
BODY MASS INDEX: 42.66 KG/M2 | DIASTOLIC BLOOD PRESSURE: 80 MMHG | HEIGHT: 72 IN | WEIGHT: 315 LBS | SYSTOLIC BLOOD PRESSURE: 122 MMHG

## 2020-08-25 DIAGNOSIS — M19.031 OSTEOARTHRITIS OF RIGHT WRIST, UNSPECIFIED OSTEOARTHRITIS TYPE: ICD-10-CM

## 2020-08-25 DIAGNOSIS — M25.531 CHRONIC PAIN OF RIGHT WRIST: Primary | ICD-10-CM

## 2020-08-25 DIAGNOSIS — G89.29 CHRONIC PAIN OF RIGHT WRIST: Primary | ICD-10-CM

## 2020-08-25 PROCEDURE — 99214 OFFICE O/P EST MOD 30 MIN: CPT | Performed by: FAMILY MEDICINE

## 2020-08-25 ASSESSMENT — MIFFLIN-ST. JEOR: SCORE: 2257.5

## 2020-08-25 NOTE — PROGRESS NOTES
"Tommy De La O  :  1955  DOS: 20  MRN: [id number]    Sports Medicine Clinic Visit    PCP: Tim Crawley    Tommy De La O is a 65 year old Right hand dominant male who is seen in follow-up presenting with chronic right wrist pain.    Interim History - 2020  Since last visit on 2020 patient has moderate right ulnar sided wrist pain.  Right wrist DRUJ injection completed on  provided moderate relief after ~ 9 - 10 days.  Continues to note mild discomfort over DRUJ joint, along with volar ulnar wrist.  No new injury in the interim.    Social History: works as farmer    Interim History - 2020  Since last visit on 2020 patient has moderate-severe right ulnar sided right wrist pain.  Right wrist DRUJ injection completed on  provided ~ 70 - 80% relief for ~ 6 weeks.  Notes that pain has been worsening over the past ~ 2 - 3 weeks   No new injury in the interim.    Review of Systems  Musculoskeletal: as above  Remainder of review of systems is negative including constitutional, CV, pulmonary, GI, Skin and Neurologic except as noted in HPI or medical history.    No past medical history on file.  Past Surgical History:   Procedure Laterality Date     ARTHROSCOPY KNEE Bilateral     both knees with arthroscopy in the past.      AS TOTAL KNEE ARTHROPLASTY Bilateral     Both total knees.      REPAIR TENDON ACHILLES      1980s two separate surgeries.     ROTATOR CUFF REPAIR RT/LT Left      Family History   Problem Relation Age of Onset     Coronary Artery Disease Father 56         at 56 MI     Hyperlipidemia Mother      Prostate Cancer No family hx of      Colon Cancer No family hx of        Objective  /80   Ht 1.816 m (5' 11.5\")   Wt 144.2 kg (318 lb)   BMI 43.73 kg/m        General: healthy, alert and in no distress      HEENT: no scleral icterus or conjunctival erythema     Skin: no suspicious lesions or rash. No jaundice.     CV: regular rhythm by palpation, 2+ " distal pulses, no pedal edema      Resp: normal respiratory effort without conversational dyspnea     Psych: normal mood and affect      Gait: nonantalgic, appropriate coordination and balance     Neuro: normal light touch sensory exam of the extremities. Motor strength as noted below     Right Wrist and Hand exam    Inspection:       Mild generalized distal/dorsal forearm swelling, no bruising or deformity right    Tender:       DRUJ improved, ulnocarpal>radiocarpal joint TTP    Non Tender:       Remainder of the Wrist and Hand bilateral    ROM:       Full and symmetric active and passive range of motion of the forearm, wrist and digits bilateral    Strength:       5/5 strength in the muscles of the hand, wrist and forearm bilateral    Special Tests:        neg (-) Tinel's test bilateral,       neg (-) Phalen's test bilateral,       neg (-) TFCC compression test bilateral and       neg (-) Finkelstein's maneuver bilateral    Neurovascular:       2+ radial pulses bilaterally with brisk capillary refill and      normal sensation to light touch in the radial, median and ulnar nerve distributions      Radiology:  Prior imaging uploaded to PACS, report reviewed in Epic, reviewed in detail again today    Assessment:  1. Chronic pain of right wrist    2. Osteoarthritis of right wrist, unspecified osteoarthritis type        Plan:  Discussed the assessment with the patient.  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  Continue activity modification and brace usage  We discussed modified progressive pain-free activity as tolerated  Home handouts provided and supportive care reviewed  All questions were answered today  Contact us with additional questions or concerns  Signs and sx of concern reviewed      Abdulaziz Aly DO, HAILEE  Primary Care Sports Medicine  [facility]         Time spent in one-on-one evaluation and discussion with patient regarding nature of problem, course, prior treatments, and therapeutic options, at least 50% of which was spent in counseling and coordination of care: 26 minutes          Disclaimer: This note consists of symbols derived from keyboarding, dictation and/or voice recognition software. As a result, there may be errors in the script that have gone undetected. Please consider this when interpreting information found in this chart.

## 2020-08-25 NOTE — LETTER
2020         RE: Tommy De La O   [address] [facility] [address]        Dear Colleague,    Thank you for referring your patient, Tommy De La O, to the [facility]. Please see a copy of my visit note below.    Tommy De La O  :  1955  DOS: 20  MRN: [id number]    Sports Medicine Clinic Visit    PCP: Tim Crawley    Tommy De La O is a 65 year old Right hand dominant male who is seen in follow-up presenting with chronic right wrist pain.    Interim History - 2020  Since last visit on 2020 patient has moderate right ulnar sided wrist pain.  Right wrist DRUJ injection completed on  provided moderate relief after ~ 9 - 10 days.  Continues to note mild discomfort over DRUJ joint, along with volar ulnar wrist.  No new injury in the interim.    Social History: works as farmer    Interim History - 2020  Since last visit on 2020 patient has moderate-severe right ulnar sided right wrist pain.  Right wrist DRUJ injection completed on  provided ~ 70 - 80% relief for ~ 6 weeks.  Notes that pain has been worsening over the past ~ 2 - 3 weeks   No new injury in the interim.    Review of Systems  Musculoskeletal: as above  Remainder of review of systems is negative including constitutional, CV, pulmonary, GI, Skin and Neurologic except as noted in HPI or medical history.    No past medical history on file.  Past Surgical History:   Procedure Laterality Date     ARTHROSCOPY KNEE Bilateral     both knees with arthroscopy in the past.      AS TOTAL KNEE ARTHROPLASTY Bilateral     Both total knees.      REPAIR TENDON ACHILLES      1980s two separate surgeries.     ROTATOR CUFF REPAIR RT/LT Left      Family History   Problem Relation Age of Onset     Coronary Artery Disease Father 56         at 56 MI     Hyperlipidemia Mother      Prostate Cancer No family hx of      Colon Cancer No family hx of        Objective  /80   Ht 1.816 m (5'  "11.5\")   Wt 144.2 kg (318 lb)   BMI 43.73 kg/m        General: healthy, alert and in no distress      HEENT: no scleral icterus or conjunctival erythema     Skin: no suspicious lesions or rash. No jaundice.     CV: regular rhythm by palpation, 2+ distal pulses, no pedal edema      Resp: normal respiratory effort without conversational dyspnea     Psych: normal mood and affect      Gait: nonantalgic, appropriate coordination and balance     Neuro: normal light touch sensory exam of the extremities. Motor strength as noted below     Right Wrist and Hand exam    Inspection:       Mild generalized distal/dorsal forearm swelling, no bruising or deformity right    Tender:       DRUJ improved, ulnocarpal>radiocarpal joint TTP    Non Tender:       Remainder of the Wrist and Hand bilateral    ROM:       Full and symmetric active and passive range of motion of the forearm, wrist and digits bilateral    Strength:       5/5 strength in the muscles of the hand, wrist and forearm bilateral    Special Tests:        neg (-) Tinel's test bilateral,       neg (-) Phalen's test bilateral,       neg (-) TFCC compression test bilateral and       neg (-) Finkelstein's maneuver bilateral    Neurovascular:       2+ radial pulses bilaterally with brisk capillary refill and      normal sensation to light touch in the radial, median and ulnar nerve distributions      Radiology:  Prior imaging uploaded to PACS, report reviewed in Epic, reviewed in detail again today    Assessment:  1. Chronic pain of right wrist    2. Osteoarthritis of right wrist, unspecified osteoarthritis type        Plan:  Discussed the assessment with the patient.  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  Continue activity modification and brace usage  We discussed modified progressive pain-free activity as tolerated  Home handouts provided and supportive care reviewed  All questions were answered today  Contact us with additional questions or concerns  Signs and sx of concern reviewed      Abdulaziz Aly DO, HAILEE  Primary Care Sports Medicine  [facility]         Time spent in one-on-one evaluation and discussion with patient regarding nature of problem, course, prior treatments, and therapeutic options, at least 50% of which was spent in counseling and coordination of care: 26 minutes          Disclaimer: This note consists of symbols derived from keyboarding, dictation and/or voice recognition software. As a result, there may be errors in the script that have gone undetected. Please consider this when interpreting information found in this chart.    Again, thank you for allowing me to participate in the care of your patient.        Sincerely,        Abdulaziz Aly DO

## 2020-09-15 ENCOUNTER — TELEPHONE (OUTPATIENT)
Dept: ORTHOPEDICS | Facility: CLINIC | Age: 65
End: 2020-09-15

## 2020-09-15 NOTE — TELEPHONE ENCOUNTER
Spoke to patient discussed he having moderate-severe radial and ulnar sided right wrist pain.  Would like to follow up for discussion of repeat DRUJ vs RC joint injection as discussed on 8/25/20.  Patient scheduled for 9/17/20.    Dilshad Riggs ATC

## 2020-09-17 ENCOUNTER — OFFICE VISIT (OUTPATIENT)
Dept: ORTHOPEDICS | Facility: CLINIC | Age: 65
End: 2020-09-17
Payer: MEDICARE

## 2020-09-17 VITALS
WEIGHT: 315 LBS | SYSTOLIC BLOOD PRESSURE: 132 MMHG | DIASTOLIC BLOOD PRESSURE: 75 MMHG | HEIGHT: 72 IN | BODY MASS INDEX: 42.66 KG/M2

## 2020-09-17 DIAGNOSIS — M25.531 CHRONIC PAIN OF RIGHT WRIST: Primary | ICD-10-CM

## 2020-09-17 DIAGNOSIS — G89.29 CHRONIC PAIN OF RIGHT WRIST: Primary | ICD-10-CM

## 2020-09-17 DIAGNOSIS — M19.031 OSTEOARTHRITIS OF RIGHT WRIST, UNSPECIFIED OSTEOARTHRITIS TYPE: ICD-10-CM

## 2020-09-17 PROCEDURE — 20606 DRAIN/INJ JOINT/BURSA W/US: CPT | Mod: RT | Performed by: FAMILY MEDICINE

## 2020-09-17 PROCEDURE — 99214 OFFICE O/P EST MOD 30 MIN: CPT | Mod: 25 | Performed by: FAMILY MEDICINE

## 2020-09-17 RX ORDER — TRIAMCINOLONE ACETONIDE 40 MG/ML
40 INJECTION, SUSPENSION INTRA-ARTICULAR; INTRAMUSCULAR
Status: DISCONTINUED | OUTPATIENT
Start: 2020-09-17 | End: 2021-01-12 | Stop reason: ALTCHOICE

## 2020-09-17 RX ORDER — ROPIVACAINE HYDROCHLORIDE 5 MG/ML
2 INJECTION, SOLUTION EPIDURAL; INFILTRATION; PERINEURAL
Status: DISCONTINUED | OUTPATIENT
Start: 2020-09-17 | End: 2021-01-12 | Stop reason: ALTCHOICE

## 2020-09-17 RX ADMIN — TRIAMCINOLONE ACETONIDE 40 MG: 40 INJECTION, SUSPENSION INTRA-ARTICULAR; INTRAMUSCULAR at 09:00

## 2020-09-17 RX ADMIN — ROPIVACAINE HYDROCHLORIDE 2 ML: 5 INJECTION, SOLUTION EPIDURAL; INFILTRATION; PERINEURAL at 09:00

## 2020-09-17 ASSESSMENT — MIFFLIN-ST. JEOR: SCORE: 2257.5

## 2020-09-17 NOTE — LETTER
2020         RE: Tommy De La O   [address] [facility] [address]        Dear Colleague,    Thank you for referring your patient, Tommy De La O, to the [facility]. Please see a copy of my visit note below.    Tommy De La O  :  1955  DOS: 20  MRN: [id number]    Sports Medicine Clinic Visit    PCP: Tim Crawley    Tommy De La O is a 65 year old Right hand dominant male who is seen in follow-up presenting with chronic right wrist pain.    Interim History - 2020  Since last visit on 2020 patient has moderate right ulnar sided wrist pain.  Right wrist DRUJ injection completed on  provided moderate relief after ~ 9 - 10 days.  Continues to note mild discomfort over DRUJ joint, along with volar ulnar wrist.  No new injury in the interim.    Social History: works as farmer    Interim History - 2020  Since last visit on 2020 patient has moderate-severe right ulnar sided right wrist pain.  Right wrist DRUJ injection completed on  provided ~ 70 - 80% relief for ~ 6 weeks.  Notes that pain has been worsening over the past ~ 2 - 3 weeks   No new injury in the interim.    Interim History - 2020  Since last visit on 2020 patient has worsening right radial and ulnar sided wrist pain over the past one week.  Patient notes that wrist is weakening due to pain and waking him several times per night.  OTC pain medication is providing minimal relief.  No interim injury.         Review of Systems  Musculoskeletal: as above  Remainder of review of systems is negative including constitutional, CV, pulmonary, GI, Skin and Neurologic except as noted in HPI or medical history.    No past medical history on file.  Past Surgical History:   Procedure Laterality Date     ARTHROSCOPY KNEE Bilateral     both knees with arthroscopy in the past.      AS TOTAL KNEE ARTHROPLASTY Bilateral     Both total knees.      REPAIR TENDON ACHILLES       " two separate surgeries.     ROTATOR CUFF REPAIR RT/LT Left      Family History   Problem Relation Age of Onset     Coronary Artery Disease Father 56         at 56 MI     Hyperlipidemia Mother      Prostate Cancer No family hx of      Colon Cancer No family hx of      Objective  /75   Ht 1.816 m (5' 11.5\")   Wt 144.2 kg (318 lb)   BMI 43.73 kg/m        General: healthy, alert and in no distress      HEENT: no scleral icterus or conjunctival erythema     Skin: no suspicious lesions or rash. No jaundice.     CV: regular rhythm by palpation, 2+ distal pulses, no pedal edema      Resp: normal respiratory effort without conversational dyspnea     Psych: normal mood and affect      Gait: nonantalgic, appropriate coordination and balance     Neuro: normal light touch sensory exam of the extremities. Motor strength as noted below     Right Wrist and Hand exam    Inspection:       Mild generalized distal/dorsal forearm swelling, no bruising or deformity right    Tender:       DRUJ improved, ulnocarpal>radiocarpal joint TTP    Non Tender:       Remainder of the Wrist and Hand bilateral    ROM:       Full and symmetric active and passive range of motion of the forearm, wrist and digits bilateral    Strength:       5/5 strength in the muscles of the hand, wrist and forearm bilateral    Special Tests:        neg (-) Tinel's test bilateral,       neg (-) Phalen's test bilateral,       neg (-) TFCC compression test bilateral and       neg (-) Finkelstein's maneuver bilateral    Neurovascular:       2+ radial pulses bilaterally with brisk capillary refill and      normal sensation to light touch in the radial, median and ulnar nerve distributions      Radiology:  Prior imaging uploaded to PACS, report reviewed in Epic, reviewed in detail again today    Medium Joint Injection/Arthrocentesis    Date/Time: 2020 9:00 AM  Performed by: Abdulaziz Aly DO  Authorized by: Abdulaziz Aly DO "     Indications:  Pain  Indications comment:  Osteoarthritis  Needle Size:  25 G  Guidance: ultrasound    Approach:  Dorsal  Location:  Wrist  Location comment:  Right wrist joint (RC and UC joints)  Medications:  40 mg triamcinolone 40 MG/ML; 2 mL ropivacaine 5 MG/ML  Procedure discussed: discussed risks, benefits, and alternatives    Consent Given by:  Patient  Timeout: timeout called immediately prior to procedure    Prep: patient was prepped and draped in usual sterile fashion        Assessment:  No diagnosis found.    Plan:  Discussed the assessment with the patient.  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  Has compression and splint braces for use prn, which do help  Continue activity modification and brace usage  Reviewed risks of corticosteroid use including CSI during current viral pandemic, patient acknowledged and wished to proceed  The patient has greater than 5/10 pain with limitations in daily activity and has exhausted other supportive care measures including OTC medications without relief  Expectations and goals of CSI reviewed  Often 2-3 days for steroid effect, and can take up to two weeks for maximum effect  We discussed modified progressive pain-free activity as tolerated  Do not overuse in first two weeks if feeling better due to concern for vulnerability while steroid is working  Supportive care reviewed  All questions were answered today  Contact us with additional questions or concerns  Signs and sx of concern reviewed      Abdulaziz Aly DO, HAILEE  Primary Care Sports Medicine  [facility]         Time spent in one-on-one evaluation and discussion with patient regarding nature of problem, course,  prior treatments, and therapeutic options, at least 50% of which was spent in counseling and coordination of care: 30 minutes          Disclaimer: This note consists of symbols derived from keyboarding, dictation and/or voice recognition software. As a result, there may be errors in the script that have gone undetected. Please consider this when interpreting information found in this chart.    Again, thank you for allowing me to participate in the care of your patient.        Sincerely,        Abdulaziz Aly, DO

## 2020-09-17 NOTE — PROGRESS NOTES
Tommy De La O  :  1955  DOS: 20  MRN: [id number]    Sports Medicine Clinic Visit    PCP: Tim Crawley    Tommy De La O is a 65 year old Right hand dominant male who is seen in follow-up presenting with chronic right wrist pain.    Interim History - 2020  Since last visit on 2020 patient has moderate right ulnar sided wrist pain.  Right wrist DRUJ injection completed on  provided moderate relief after ~ 9 - 10 days.  Continues to note mild discomfort over DRUJ joint, along with volar ulnar wrist.  No new injury in the interim.    Social History: works as farmer    Interim History - 2020  Since last visit on 2020 patient has moderate-severe right ulnar sided right wrist pain.  Right wrist DRUJ injection completed on  provided ~ 70 - 80% relief for ~ 6 weeks.  Notes that pain has been worsening over the past ~ 2 - 3 weeks   No new injury in the interim.    Interim History - 2020  Since last visit on 2020 patient has worsening right radial and ulnar sided wrist pain over the past one week.  Patient notes that wrist is weakening due to pain and waking him several times per night.  OTC pain medication is providing minimal relief.  No interim injury.         Review of Systems  Musculoskeletal: as above  Remainder of review of systems is negative including constitutional, CV, pulmonary, GI, Skin and Neurologic except as noted in HPI or medical history.    No past medical history on file.  Past Surgical History:   Procedure Laterality Date     ARTHROSCOPY KNEE Bilateral     both knees with arthroscopy in the past.      AS TOTAL KNEE ARTHROPLASTY Bilateral     Both total knees.      REPAIR TENDON ACHILLES      1980s two separate surgeries.     ROTATOR CUFF REPAIR RT/LT Left      Family History   Problem Relation Age of Onset     Coronary Artery Disease Father 56         at 56 MI     Hyperlipidemia Mother      Prostate Cancer No family hx of      Colon  "Cancer No family hx of      Objective  /75   Ht 1.816 m (5' 11.5\")   Wt 144.2 kg (318 lb)   BMI 43.73 kg/m        General: healthy, alert and in no distress      HEENT: no scleral icterus or conjunctival erythema     Skin: no suspicious lesions or rash. No jaundice.     CV: regular rhythm by palpation, 2+ distal pulses, no pedal edema      Resp: normal respiratory effort without conversational dyspnea     Psych: normal mood and affect      Gait: nonantalgic, appropriate coordination and balance     Neuro: normal light touch sensory exam of the extremities. Motor strength as noted below     Right Wrist and Hand exam    Inspection:       Mild generalized distal/dorsal forearm swelling, no bruising or deformity right    Tender:       DRUJ improved, ulnocarpal>radiocarpal joint TTP    Non Tender:       Remainder of the Wrist and Hand bilateral    ROM:       Full and symmetric active and passive range of motion of the forearm, wrist and digits bilateral    Strength:       5/5 strength in the muscles of the hand, wrist and forearm bilateral    Special Tests:        neg (-) Tinel's test bilateral,       neg (-) Phalen's test bilateral,       neg (-) TFCC compression test bilateral and       neg (-) Finkelstein's maneuver bilateral    Neurovascular:       2+ radial pulses bilaterally with brisk capillary refill and      normal sensation to light touch in the radial, median and ulnar nerve distributions      Radiology:  Prior imaging uploaded to PACS, report reviewed in Epic, reviewed in detail again today    Medium Joint Injection/Arthrocentesis    Date/Time: 9/17/2020 9:00 AM  Performed by: Abdulaziz Aly DO  Authorized by: Abdulaziz Aly DO     Indications:  Pain  Indications comment:  Osteoarthritis  Needle Size:  25 G  Guidance: ultrasound    Approach:  Dorsal  Location:  Wrist  Location comment:  Right wrist joint (RC and UC joints)  Medications:  40 mg triamcinolone 40 MG/ML; 2 mL " ropivacaine 5 MG/ML  Procedure discussed: discussed risks, benefits, and alternatives    Consent Given by:  Patient  Timeout: timeout called immediately prior to procedure    Prep: patient was prepped and draped in usual sterile fashion        Assessment:  No diagnosis found.    Plan:  Discussed the assessment with the patient.  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  Has compression and splint braces for use prn, which do help  Continue activity modification and brace usage  Reviewed risks of corticosteroid use including CSI during current viral pandemic, patient acknowledged and wished to proceed  The patient has greater than 5/10 pain with limitations in daily activity and has exhausted other supportive care measures including OTC medications without relief  Expectations and goals of CSI reviewed  Often 2-3 days for steroid effect, and can take up to two weeks for maximum effect  We discussed modified progressive pain-free activity as tolerated  Do not overuse in first two weeks if feeling better due to concern for vulnerability while steroid is working  Supportive care reviewed  All questions were answered today  Contact us with additional questions or concerns  Signs and sx of concern reviewed      Abdulaziz Aly DO, CACORDELIA  Primary Care Sports Medicine  [facility]         Time spent in one-on-one evaluation and discussion with patient regarding nature of problem, course, prior treatments, and therapeutic options, at least 50% of which was spent in counseling and coordination of care: 30 minutes          Disclaimer: This note consists of symbols derived from keyboarding, dictation and/or voice recognition software. As  a result, there may be errors in the script that have gone undetected. Please consider this when interpreting information found in this chart.

## 2020-10-01 ENCOUNTER — IMMUNIZATION (OUTPATIENT)
Dept: FAMILY MEDICINE | Facility: CLINIC | Age: 65
End: 2020-10-01
Payer: MEDICARE

## 2020-10-01 PROCEDURE — G0008 ADMIN INFLUENZA VIRUS VAC: HCPCS

## 2020-10-01 PROCEDURE — 90662 IIV NO PRSV INCREASED AG IM: CPT

## 2020-10-05 ENCOUNTER — VIRTUAL VISIT (OUTPATIENT)
Dept: URGENT CARE | Facility: CLINIC | Age: 65
End: 2020-10-05
Payer: MEDICARE

## 2020-10-05 ENCOUNTER — NURSE TRIAGE (OUTPATIENT)
Dept: NURSING | Facility: CLINIC | Age: 65
End: 2020-10-05

## 2020-10-05 DIAGNOSIS — J44.0 CHRONIC OBSTRUCTIVE PULMONARY DISEASE WITH ACUTE LOWER RESPIRATORY INFECTION (H): ICD-10-CM

## 2020-10-05 DIAGNOSIS — R05.9 COUGH: Primary | ICD-10-CM

## 2020-10-05 PROBLEM — N40.1 BENIGN PROSTATIC HYPERPLASIA WITH INCOMPLETE BLADDER EMPTYING: Status: ACTIVE | Noted: 2018-11-28

## 2020-10-05 PROBLEM — R39.14 BENIGN PROSTATIC HYPERPLASIA WITH INCOMPLETE BLADDER EMPTYING: Status: ACTIVE | Noted: 2018-11-28

## 2020-10-05 PROCEDURE — 99442 PR PHYSICIAN TELEPHONE EVALUATION 11-20 MIN: CPT | Performed by: FAMILY MEDICINE

## 2020-10-05 RX ORDER — AZITHROMYCIN 250 MG/1
TABLET, FILM COATED ORAL
Qty: 6 TABLET | Refills: 0 | Status: SHIPPED | OUTPATIENT
Start: 2020-10-05 | End: 2020-10-16

## 2020-10-05 NOTE — PROGRESS NOTES
"The patient has been notified of following:     \"This telephone or video visit will be conducted via a call between you and your physician/provider. We have found that certain health care needs can be provided without the need for a physical exam.  This service lets us provide the care you need with a short phone conversation.  If a prescription is necessary we can send it directly to your pharmacy.  If lab work is needed we can place an order for that and you can then stop by our lab to have the test done at a later time.    Telephone or video visits are billed at different rates depending on your insurance coverage. During this emergency period, for some insurers they may be billed the same as an in-person visit.  Please reach out to your insurance provider with any questions.    Patient has given verbal consent for Telephone or video visit?  Yes  Subjective   HPI    Was with his sister and brother in law 10-12 days ago  Brother in law is in the hospital with covid19.  Sister is also positive but asymptomatic    The last 4-5 days has had a headache in the morning   Patient would take advil and seems to be help   Today temp 99.1   Cough in the morning as well slightly worse than his chronic cough with copd   No loss of taste or smell  No sore throat   No fevers or chills chest pain or shortness of breath   No abdominal pain  No nausea vomiting or diarrhea   No rash       Patient Active Problem List   Diagnosis     Osteoarthritis of knee     Gastrointestinal hemorrhage     Gastroesophageal reflux disease     Hyperlipidemia     Obstructive sleep apnea syndrome     Essential hypertension with goal blood pressure less than 140/90     Morbid obesity (H)     Hand dermatitis     Peripheral polyneuropathy     Benign prostatic hyperplasia with incomplete bladder emptying     Bilateral leg paresthesia     Chronic obstructive pulmonary disease (H)     Morbid obesity with BMI of 40.0-44.9, adult (H)     Past Surgical History: "   Procedure Laterality Date     ARTHROSCOPY KNEE Bilateral     both knees with arthroscopy in the past.      AS TOTAL KNEE ARTHROPLASTY Bilateral     Both total knees.      REPAIR TENDON ACHILLES      1980s two separate surgeries.     ROTATOR CUFF REPAIR RT/LT Left        Social History     Tobacco Use     Smoking status: Former Smoker     Packs/day: 1.00     Years: 30.00     Pack years: 30.00     Types: Cigarettes     Quit date: 2011     Years since quittin.6     Smokeless tobacco: Never Used     Tobacco comment: started at age 22   Substance Use Topics     Alcohol use: Yes     Family History   Problem Relation Age of Onset     Coronary Artery Disease Father 56         at 56 MI     Hyperlipidemia Mother      Prostate Cancer No family hx of      Colon Cancer No family hx of            Reviewed and updated as needed this visit by Provider   Allergies               Review of Systems   Constitutional, HEENT, cardiovascular, pulmonary, GI, , musculoskeletal, neuro, skin, endocrine and psych systems are negative, except as otherwise noted.       Objective   Reported vitals:  There were no vitals taken for this visit.   healthy, alert and no distress  PSYCH: Alert and oriented times 3; coherent speech, normal   rate and volume, able to articulate logical thoughts, able   to abstract reason, no tangential thoughts, no hallucinations   or delusions  His affect is normal  RESP: No cough, no audible wheezing, able to talk in full sentences  Additional exam:  none  Remainder of exam unable to be completed due to telephone visits    Diagnostic Test Results:  Labs reviewed in Epic  none         Assessment/Plan:      ICD-10-CM    1. Cough  R05 Symptomatic COVID-19 Virus (Coronavirus) by PCR   2. Chronic obstructive pulmonary disease with acute lower respiratory infection (H)  J44.0 azithromycin (ZITHROMAX) 250 MG tablet     Prescribed with zithromax   Patient advised that he/she also has symptoms consistent with  covid19  covid19 precautions advised  Patient referred for testing   Alarm signs or symptoms discussed, if present recommend go to ER   If with any symptoms of chest pain or shortness of breath, lightheadedness, palpitations, feeling like passing out or change and worsening in the quality of your symptoms, please proceed to ER. Recommend follow up with PCP in a few days for re-evaluation.     If negative - advsied to self isolate to complete 14 days from last close contact with either brother or sister and symptoms resolved for 24 hours  If positive - advised to self isolate for 10 days from when his symptoms started AND no symptoms for 24 hours         call duration:  18 minutes  Video: jacky Angelo MD

## 2020-10-05 NOTE — TELEPHONE ENCOUNTER
Five days of headache. Aspirin and ibuprofen haven't helped with the pain. He also has a cough and some pain with breathing. I connected him with scheduling for a virtual visit today.  COVID 19 Nurse Triage Plan/Patient Instructions    Please be aware that novel coronavirus (COVID-19) may be circulating in the community. If you develop symptoms such as fever, cough, or SOB or if you have concerns about the presence of another infection including coronavirus (COVID-19), please contact your health care provider or visit [email or web address].     Disposition/Instructions    Virtual Visit with provider recommended. Reference Visit Selection Guide.    Thank you for taking steps to prevent the spread of this virus.  o Limit your contact with others.  o Wear a simple mask to cover your cough.  o Wash your hands well and often.    Resources    [facility]: About COVID-19: [email or web address]    CDC: What to Do If You're Sick: www.cdc.gov/coronavirus/2019-ncov/about/steps-when-sick.html    CDC: Ending Home Isolation: www.cdc.gov/coronavirus/2019-ncov/hcp/disposition-in-home-patients.html     CDC: Caring for Someone: www.cdc.gov/coronavirus/2019-ncov/if-you-are-sick/care-for-someone.html     [facility]: Interim Guidance for Hospital Discharge to Home: www.health.[facility].mn.us/diseases/coronavirus/hcp/hospdischarge.pdf    [facility] clinical trials (COVID-19 research studies): clinicalaffairs.[facility]-clinical-trials     Below are the COVID-19 hotlines at the [facility] of Health ([facility]). Interpreters are available.   o For health questions: Call 370-912-2971 or 1-974.584.2492 (7 a.m. to 7 p.m.)  o For questions about schools and childcare: Call 253-351-1048 or 1-812.584.8176 (7 a.m. to 7 p.m.)     Aster Alvarez RN  [facility]    Reason for Disposition    Chest pain or pressure    Additional Information    Negative: SEVERE difficulty breathing (e.g., struggling for each breath,  speaks in single words)    Negative: Difficult to awaken or acting confused (e.g., disoriented, slurred speech)    Negative: Bluish (or gray) lips or face now    Negative: Shock suspected (e.g., cold/pale/clammy skin, too weak to stand, low BP, rapid pulse)    Negative: Sounds like a life-threatening emergency to the triager    Negative: [1] COVID-19 exposure AND [2] no symptoms    Negative: COVID-19 and Breastfeeding, questions about    Negative: [1] Adult with possible COVID-19 symptoms AND [2] triager concerned about severity of symptoms or other causes    Negative: SEVERE or constant chest pain or pressure (Exception: mild central chest pain, present only when coughing)    Negative: MODERATE difficulty breathing (e.g., speaks in phrases, SOB even at rest, pulse 100-120)    Negative: Patient sounds very sick or weak to the triager    Negative: MILD difficulty breathing (e.g., minimal/no SOB at rest, SOB with walking, pulse <100)    Protocols used: CORONAVIRUS (COVID-19) DIAGNOSED OR [id number] 8.4.20

## 2020-10-09 DIAGNOSIS — R05.9 COUGH: ICD-10-CM

## 2020-10-09 PROCEDURE — U0003 INFECTIOUS AGENT DETECTION BY NUCLEIC ACID (DNA OR RNA); SEVERE ACUTE RESPIRATORY SYNDROME CORONAVIRUS 2 (SARS-COV-2) (CORONAVIRUS DISEASE [COVID-19]), AMPLIFIED PROBE TECHNIQUE, MAKING USE OF HIGH THROUGHPUT TECHNOLOGIES AS DESCRIBED BY CMS-2020-01-R: HCPCS | Performed by: FAMILY MEDICINE

## 2020-10-10 ENCOUNTER — NURSE TRIAGE (OUTPATIENT)
Dept: NURSING | Facility: CLINIC | Age: 65
End: 2020-10-10

## 2020-10-10 LAB
LABORATORY COMMENT REPORT: NORMAL
SARS-COV-2 RNA SPEC QL NAA+PROBE: NEGATIVE
SARS-COV-2 RNA SPEC QL NAA+PROBE: NORMAL
SPECIMEN SOURCE: NORMAL
SPECIMEN SOURCE: NORMAL

## 2020-10-16 ENCOUNTER — OFFICE VISIT (OUTPATIENT)
Dept: FAMILY MEDICINE | Facility: CLINIC | Age: 65
End: 2020-10-16
Payer: MEDICARE

## 2020-10-16 VITALS
DIASTOLIC BLOOD PRESSURE: 78 MMHG | HEART RATE: 60 BPM | TEMPERATURE: 97.9 F | OXYGEN SATURATION: 96 % | WEIGHT: 315 LBS | HEIGHT: 72 IN | BODY MASS INDEX: 42.66 KG/M2 | SYSTOLIC BLOOD PRESSURE: 138 MMHG

## 2020-10-16 DIAGNOSIS — M77.12 EPICONDYLITIS, LATERAL, LEFT: ICD-10-CM

## 2020-10-16 DIAGNOSIS — E78.5 HYPERLIPIDEMIA, UNSPECIFIED HYPERLIPIDEMIA TYPE: Chronic | ICD-10-CM

## 2020-10-16 DIAGNOSIS — M77.02 MEDIAL EPICONDYLITIS OF ELBOW, LEFT: Primary | ICD-10-CM

## 2020-10-16 PROCEDURE — 99213 OFFICE O/P EST LOW 20 MIN: CPT | Performed by: FAMILY MEDICINE

## 2020-10-16 RX ORDER — SIMVASTATIN 20 MG
20 TABLET ORAL AT BEDTIME
Qty: 90 TABLET | Refills: 3 | Status: SHIPPED | OUTPATIENT
Start: 2020-10-16 | End: 2021-07-05

## 2020-10-16 ASSESSMENT — MIFFLIN-ST. JEOR: SCORE: 2271.11

## 2020-10-16 NOTE — PATIENT INSTRUCTIONS
"ASSESSMENT:     ICD-10-CM    1. Medial epicondylitis of elbow, left  M77.02    2. Epicondylitis, lateral, left  M77.12         Most important is to lessen injury to the tendons  Around your elbow.   OK  To continue the ice treatments.   You can try your left wrist splint.   Try ibuprofen on a three times daily basis for the next few days, 600-800mg.  Take with food.   If not better on Monday, contact me and we can try a course of prednisone a different anti-inflammatory medication.    We can have you see Dr. Aly if not improving as well.     Epicondylitis \"tennis elbow\" is an inflammatory, strain or overuse problem with tendons at elbow.  It often takes a while to appear and can take a long time to go away.  There are some things that can be done to improve healing.   Avoid activities and use of arm that aggravates like repetitive use of the elbow or wrist.   Most important are exercises to strengthen forearm muscles and tendons. For outside (lateral) elbow, reverse wrist curl exercises can help.  For the inside of elbow (medial), wrist curls can help.  Doing exercises for strengthening for 10 minutes or so a day and some stretches can help strengthen the tendons.   Ice can help reduce inflammation and pain.   Friction massage can help.  Anti-inflammatory medications may help like ibuprofen or Aleve.  Elbow brace or wrist splint can be tried.  Recheck if not improving.   We sometimes do cortisone injections which can help but there can be some side effects and studies show not much difference after 6 weeks with injections or no injection.   Therapy can help if not improving.   "

## 2020-10-16 NOTE — PROGRESS NOTES
SUBJECTIVE: Tommy De La O is a 65 year old male  Chief Complaint   Patient presents with     Musculoskeletal Problem        Musculoskeletal problem/pain  Onset/Duration: 7-8 days   Description  Location: elbow - left  Joint Swelling: YES  Redness: no  Pain: YES  Warmth: YES- it has been   Radiates up the arm   Intensity:  moderate  Progression of Symptoms:  worsening  Accompanying signs and symptoms:   Fevers: no  Numbness/tingling/weakness: no  History  Trauma to the area: no   Recent illness:  no  Previous similar problem: no  Previous evaluation:  YES- seen in ED recently   Precipitating or alleviating factors:  Aggravating factors include: elevating the arm   Therapies tried and outcome: ice     He has been diagnosed with tennis elbow left elbow in the past.    Location: all around left elbow.  Pain seems to radiate up to left shoulder.   Aggravating factors: bending elbow.  Pushing himself out of a chair.  Shoveling manure, putting on compression stockings.   Alleviating factors: icing three times daily.  Takes ibuprofen on occasion.    Seems worse in the AM.   No known injury.  No overuse known.   Seems there is swelling in elbow.     chronic pain with arthritis right wrist-gets episodic steroid injections with ortho.     He has had left shoulder pain.  Needs rotator cuff surgery.      Patient Active Problem List   Diagnosis     Osteoarthritis of knee     Gastrointestinal hemorrhage     Gastroesophageal reflux disease     Hyperlipidemia     Obstructive sleep apnea syndrome     Essential hypertension with goal blood pressure less than 140/90     Hand dermatitis     Peripheral polyneuropathy     Benign prostatic hyperplasia with incomplete bladder emptying     Bilateral leg paresthesia     Chronic obstructive pulmonary disease (H)     Morbid obesity with BMI of 40.0-44.9, adult (H)     Medial epicondylitis of elbow, left      OBJECTIVE:Blood pressure 138/78, pulse 60, temperature 97.9  F (36.6  C), temperature  "source Tympanic, height 1.816 m (5' 11.5\"), weight 145.6 kg (321 lb), SpO2 96 %. BMI=Body mass index is 44.15 kg/m .  GENERAL APPEARANCE ADULT: Alert, no acute distress, morbidly obese  MS: elbow exam: swelling present at medial elbow, range of motion: full extension, pronation and supination but some decrease in flexion.  tender at the lateral epicondyle, tender at the medial epicondyle, pain with resisted wrist extension and pain with resisted supination     ASSESSMENT:     ICD-10-CM    1. Medial epicondylitis of elbow, left  M77.02    2. Epicondylitis, lateral, left  M77.12         Most important is to lessen injury to the tendons  Around your elbow.   OK  To continue the ice treatments.   You can try your left wrist splint.   Try ibuprofen on a three times daily basis for the next few days, 600-800mg.  Take with food.   If not better on Monday, contact me and we can try a course of prednisone a different anti-inflammatory medication.    We can have you see Dr. Aly if not improving as well.     Epicondylitis \"tennis elbow\" is an inflammatory, strain or overuse problem with tendons at elbow.  It often takes a while to appear and can take a long time to go away.  There are some things that can be done to improve healing.   Avoid activities and use of arm that aggravates like repetitive use of the elbow or wrist.   Most important are exercises to strengthen forearm muscles and tendons. For outside (lateral) elbow, reverse wrist curl exercises can help.  For the inside of elbow (medial), wrist curls can help.  Doing exercises for strengthening for 10 minutes or so a day and some stretches can help strengthen the tendons.   Ice can help reduce inflammation and pain.   Friction massage can help.  Anti-inflammatory medications may help like ibuprofen or Aleve.  Elbow brace or wrist splint can be tried.  Recheck if not improving.   We sometimes do cortisone injections which can help but there can be some side effects " and studies show not much difference after 6 weeks with injections or no injection.   Therapy can help if not improving.

## 2020-11-06 ENCOUNTER — TRANSFERRED RECORDS (OUTPATIENT)
Dept: HEALTH INFORMATION MANAGEMENT | Facility: CLINIC | Age: 65
End: 2020-11-06

## 2020-11-16 ENCOUNTER — TRANSFERRED RECORDS (OUTPATIENT)
Dept: HEALTH INFORMATION MANAGEMENT | Facility: CLINIC | Age: 65
End: 2020-11-16

## 2021-01-11 DIAGNOSIS — G62.9 PERIPHERAL POLYNEUROPATHY: ICD-10-CM

## 2021-01-11 RX ORDER — GABAPENTIN 300 MG/1
CAPSULE ORAL
Qty: 270 CAPSULE | Refills: 3 | Status: SHIPPED | OUTPATIENT
Start: 2021-01-11 | End: 2022-01-03

## 2021-01-12 ENCOUNTER — OFFICE VISIT (OUTPATIENT)
Dept: ORTHOPEDICS | Facility: CLINIC | Age: 66
End: 2021-01-12
Payer: MEDICARE

## 2021-01-12 VITALS
DIASTOLIC BLOOD PRESSURE: 84 MMHG | HEIGHT: 72 IN | BODY MASS INDEX: 42.66 KG/M2 | WEIGHT: 315 LBS | SYSTOLIC BLOOD PRESSURE: 122 MMHG

## 2021-01-12 DIAGNOSIS — M25.531 CHRONIC PAIN OF RIGHT WRIST: Primary | ICD-10-CM

## 2021-01-12 DIAGNOSIS — M19.031 OSTEOARTHRITIS OF RIGHT WRIST, UNSPECIFIED OSTEOARTHRITIS TYPE: ICD-10-CM

## 2021-01-12 DIAGNOSIS — M25.512 ACUTE PAIN OF LEFT SHOULDER: ICD-10-CM

## 2021-01-12 DIAGNOSIS — G89.29 CHRONIC PAIN OF RIGHT WRIST: Primary | ICD-10-CM

## 2021-01-12 DIAGNOSIS — M77.12 LEFT LATERAL EPICONDYLITIS: ICD-10-CM

## 2021-01-12 PROCEDURE — 99214 OFFICE O/P EST MOD 30 MIN: CPT | Performed by: FAMILY MEDICINE

## 2021-01-12 ASSESSMENT — MIFFLIN-ST. JEOR: SCORE: 2289.25

## 2021-01-12 NOTE — LETTER
2021         RE: Tommy De La O   [address] [facility] [address]        Dear Colleague,    Thank you for referring your patient, Tommy De La O, to the [facility] [place]. Please see a copy of my visit note below.    Tommy De La O  :  1955  DOS: 20  MRN: [id number]    Sports Medicine Clinic Visit    PCP: Tim Crawley    Tommy De La O is a 65 year old Right hand dominant male who is seen in follow-up presenting with chronic right wrist pain.    Interim History - 2020  Since last visit on 2020 patient has moderate right ulnar sided wrist pain.  Right wrist DRUJ injection completed on  provided moderate relief after ~ 9 - 10 days.  Continues to note mild discomfort over DRUJ joint, along with volar ulnar wrist.  No new injury in the interim.    Social History: works as farmer    Interim History - 2020  Since last visit on 2020 patient has moderate-severe right ulnar sided right wrist pain.  Right wrist DRUJ injection completed on  provided ~ 70 - 80% relief for ~ 6 weeks.  Notes that pain has been worsening over the past ~ 2 - 3 weeks   No new injury in the interim.    Interim History - 2020  Since last visit on 2020 patient has worsening right radial and ulnar sided wrist pain over the past one week.  Patient notes that wrist is weakening due to pain and waking him several times per night.  OTC pain medication is providing minimal relief.  No interim injury.       Interim History - 2021  Since last visit on 2020 patient has moderate-severe right wrist pain with weakness over the past ~ 4 weeks.  Right wrist UC/RC joint injection completed on 20 provided good relief for ~ 3 months.  Notes that pain was significantly worse ~ one week ago, pain is moderately improved over the past 4 - 5 days.  No new injury in the interim.      Review of Systems  Musculoskeletal: as above  Remainder of  "review of systems is negative including constitutional, CV, pulmonary, GI, Skin and Neurologic except as noted in HPI or medical history.    No past medical history on file.  Past Surgical History:   Procedure Laterality Date     ARTHROSCOPY KNEE Bilateral     both knees with arthroscopy in the past.      AS TOTAL KNEE ARTHROPLASTY Bilateral     Both total knees.      REPAIR TENDON ACHILLES      1980s two separate surgeries.     ROTATOR CUFF REPAIR RT/LT Left      Family History   Problem Relation Age of Onset     Coronary Artery Disease Father 56         at 56 MI     Hyperlipidemia Mother      Prostate Cancer No family hx of      Colon Cancer No family hx of      Objective  /84   Ht 1.816 m (5' 11.5\")   Wt 147.4 kg (325 lb)   BMI 44.70 kg/m        General: healthy, alert and in no distress      HEENT: no scleral icterus or conjunctival erythema     Skin: no suspicious lesions or rash. No jaundice.     CV: regular rhythm by palpation, 2+ distal pulses, no pedal edema      Resp: normal respiratory effort without conversational dyspnea     Psych: normal mood and affect      Gait: nonantalgic, appropriate coordination and balance     Neuro: normal light touch sensory exam of the extremities. Motor strength as noted below     Right Wrist and Hand exam    Inspection:       Mild generalized distal/dorsal forearm swelling, no bruising or deformity right    Tender:       DRUJ improved, ulnocarpal>radiocarpal joint TTP    Non Tender:       Remainder of the Wrist and Hand bilateral    ROM:       Full and symmetric active and passive range of motion of the forearm, wrist and digits bilateral    Strength:       5/5 strength in the muscles of the hand, wrist and forearm bilateral    Special Tests:        neg (-) Tinel's test bilateral,       neg (-) Phalen's test bilateral,       neg (-) TFCC compression test bilateral and       neg (-) Finkelstein's maneuver bilateral    Neurovascular:       2+ radial pulses " bilaterally with brisk capillary refill and      normal sensation to light touch in the radial, median and ulnar nerve distributions      Left Elbow exam  Inspection: no swelling  Tender: lateral epicondyle and common extensor tendon  Non-tender: medial epicondyle, common flexor tendon, flexor muscle of forearm, supracondylar notch, olecranon bursa, distal bicep tendon and radial head/neck  Range of Motion: all normal  Strength: elbow strength full and pain with resisted wrist extension and supination  Special tests: normal stability, normal valgus stress, normal varus stress, ulnar Tinel's negative, no pain with resisted middle finger extension, no pain with resisted wrist flexion:     Left Shoulder exam    ROM:        Full active and passive ROM with flexion, extension, abduction, internal and external rotation.       asymmetric scapular motion on L>R due to dysfunction       Painful terminal flexion and abduction, mildly in ER    Tender:        subacromial space mild       Lateral deltoid mild       Anterior GH joint, mild/moderate    Non Tender:       remainder of shoulder       sternoclavicular joint       acromioclavicular joint       posterior shoulder       periscapular region    Strength:        abduction 5/5       internal rotation 5/5       external rotation 5/5       adduction 5/5    Impingement testing:       Neg Neer       positive (+) Rascon       Mild pain with empty can       neg (-) crossover       neg (-) O'osmany       Mildly painful crank    Stability testing:       neg (-) relocation       neg (-) anterior glide       neg (-) sulcus sign    Skin:       no visible deformities       well perfused       capillary refill brisk    Sensation:        normal sensation over shoulder and upper extremity     Radiology:  Prior imaging uploaded to PACS, report reviewed in Epic, reviewed in detail again today      Assessment:  1. Chronic pain of right wrist    2. Osteoarthritis of right wrist, unspecified  osteoarthritis type    3. Left lateral epicondylitis    4. Acute pain of left shoulder        Plan:  Discussed the assessment with the patient.  Follow up: prn  Reviewed option to return to Dr Joshi who referred to me for injections  Left shoulder and elbow sx have their own wear-and-tear hx and risks, but reviewed the role of compensating for right wrist issue that is also likely a factor  Last DRUJ injection helped and still giving pain relief based on exam, had recent flare of pain with shoveling, but that is now imporving  Diagnostic/therapeutic CSI to ulno/radiocarpal joint for labile sx available, defer for now  Left shoulder pain most focally in glenohumeral joint, consider imaging for labile sx  Reviewed tx strategies and activity modification relative to tennis elbow as well  PT/OT available in the future anytime prn  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 As a result, there may be errors in the script that have gone undetected. Please consider this when interpreting information found in this chart.      Again, thank you for allowing me to participate in the care of your patient.        Sincerely,        Abdulaziz Aly DO

## 2021-01-12 NOTE — PROGRESS NOTES
Tommy De La O  :  1955  DOS: 20  MRN: [id number]    Sports Medicine Clinic Visit    PCP: Tim Crawley    Tommy De La O is a 65 year old Right hand dominant male who is seen in follow-up presenting with chronic right wrist pain.    Interim History - 2020  Since last visit on 2020 patient has moderate right ulnar sided wrist pain.  Right wrist DRUJ injection completed on  provided moderate relief after ~ 9 - 10 days.  Continues to note mild discomfort over DRUJ joint, along with volar ulnar wrist.  No new injury in the interim.    Social History: works as farmer    Interim History - 2020  Since last visit on 2020 patient has moderate-severe right ulnar sided right wrist pain.  Right wrist DRUJ injection completed on  provided ~ 70 - 80% relief for ~ 6 weeks.  Notes that pain has been worsening over the past ~ 2 - 3 weeks   No new injury in the interim.    Interim History - 2020  Since last visit on 2020 patient has worsening right radial and ulnar sided wrist pain over the past one week.  Patient notes that wrist is weakening due to pain and waking him several times per night.  OTC pain medication is providing minimal relief.  No interim injury.       Interim History - 2021  Since last visit on 2020 patient has moderate-severe right wrist pain with weakness over the past ~ 4 weeks.  Right wrist UC/RC joint injection completed on 20 provided good relief for ~ 3 months.  Notes that pain was significantly worse ~ one week ago, pain is moderately improved over the past 4 - 5 days.  No new injury in the interim.      Review of Systems  Musculoskeletal: as above  Remainder of review of systems is negative including constitutional, CV, pulmonary, GI, Skin and Neurologic except as noted in HPI or medical history.    No past medical history on file.  Past Surgical History:   Procedure Laterality Date     ARTHROSCOPY KNEE Bilateral      "both knees with arthroscopy in the past.      AS TOTAL KNEE ARTHROPLASTY Bilateral     Both total knees.      REPAIR TENDON ACHILLES      1980s two separate surgeries.     ROTATOR CUFF REPAIR RT/LT Left      Family History   Problem Relation Age of Onset     Coronary Artery Disease Father 56         at 56 MI     Hyperlipidemia Mother      Prostate Cancer No family hx of      Colon Cancer No family hx of      Objective  /84   Ht 1.816 m (5' 11.5\")   Wt 147.4 kg (325 lb)   BMI 44.70 kg/m        General: healthy, alert and in no distress      HEENT: no scleral icterus or conjunctival erythema     Skin: no suspicious lesions or rash. No jaundice.     CV: regular rhythm by palpation, 2+ distal pulses, no pedal edema      Resp: normal respiratory effort without conversational dyspnea     Psych: normal mood and affect      Gait: nonantalgic, appropriate coordination and balance     Neuro: normal light touch sensory exam of the extremities. Motor strength as noted below     Right Wrist and Hand exam    Inspection:       Mild generalized distal/dorsal forearm swelling, no bruising or deformity right    Tender:       DRUJ improved, ulnocarpal>radiocarpal joint TTP    Non Tender:       Remainder of the Wrist and Hand bilateral    ROM:       Full and symmetric active and passive range of motion of the forearm, wrist and digits bilateral    Strength:       5/5 strength in the muscles of the hand, wrist and forearm bilateral    Special Tests:        neg (-) Tinel's test bilateral,       neg (-) Phalen's test bilateral,       neg (-) TFCC compression test bilateral and       neg (-) Finkelstein's maneuver bilateral    Neurovascular:       2+ radial pulses bilaterally with brisk capillary refill and      normal sensation to light touch in the radial, median and ulnar nerve distributions      Left Elbow exam  Inspection: no swelling  Tender: lateral epicondyle and common extensor tendon  Non-tender: medial epicondyle, " common flexor tendon, flexor muscle of forearm, supracondylar notch, olecranon bursa, distal bicep tendon and radial head/neck  Range of Motion: all normal  Strength: elbow strength full and pain with resisted wrist extension and supination  Special tests: normal stability, normal valgus stress, normal varus stress, ulnar Tinel's negative, no pain with resisted middle finger extension, no pain with resisted wrist flexion:     Left Shoulder exam    ROM:        Full active and passive ROM with flexion, extension, abduction, internal and external rotation.       asymmetric scapular motion on L>R due to dysfunction       Painful terminal flexion and abduction, mildly in ER    Tender:        subacromial space mild       Lateral deltoid mild       Anterior GH joint, mild/moderate    Non Tender:       remainder of shoulder       sternoclavicular joint       acromioclavicular joint       posterior shoulder       periscapular region    Strength:        abduction 5/5       internal rotation 5/5       external rotation 5/5       adduction 5/5    Impingement testing:       Neg Neer       positive (+) Rascon       Mild pain with empty can       neg (-) crossover       neg (-) O'osmany       Mildly painful crank    Stability testing:       neg (-) relocation       neg (-) anterior glide       neg (-) sulcus sign    Skin:       no visible deformities       well perfused       capillary refill brisk    Sensation:        normal sensation over shoulder and upper extremity     Radiology:  Prior imaging uploaded to PACS, report reviewed in Epic, reviewed in detail again today      Assessment:  1. Chronic pain of right wrist    2. Osteoarthritis of right wrist, unspecified osteoarthritis type    3. Left lateral epicondylitis    4. Acute pain of left shoulder        Plan:  Discussed the assessment with the patient.  Follow up: prn  Reviewed option to return to Dr Joshi who referred to me for injections  Left shoulder and elbow sx have  their own wear-and-tear hx and risks, but reviewed the role of compensating for right wrist issue that is also likely a factor  Last DRUJ injection helped and still giving pain relief based on exam, had recent flare of pain with shoveling, but that is now imporving  Diagnostic/therapeutic CSI to ulno/radiocarpal joint for labile sx available, defer for now  Left shoulder pain most focally in glenohumeral joint, consider imaging for labile sx  Reviewed tx strategies and activity modification relative to tennis elbow as well  PT/OT available in the future anytime prn  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  Reviewed role of different foods in inflammatory problems  Has compression and splint braces for use prn, which do help  Continue activity modification and brace usage  Supportive care reviewed  All questions were answered today  Contact us with additional questions or concerns  Signs and sx of concern reviewed      Abdulaziz Aly DO, CACORDELIA  Primary Care Sports Medicine  [facility]     Time spent in chart review, one-on-one evaluation, discussion with patient regarding nature of problem, course, prior treatments, and therapeutic options, share-decision making, procedures and referrals as appropriate/documented, and charting related to the visit: 32 minutes          Disclaimer: This note consists of symbols derived from keyboarding, dictation and/or voice recognition software. As a result, there may be errors in the script that have gone undetected. Please consider this when interpreting information found in this chart.

## 2021-01-13 DIAGNOSIS — I10 ESSENTIAL HYPERTENSION WITH GOAL BLOOD PRESSURE LESS THAN 140/90: ICD-10-CM

## 2021-01-13 DIAGNOSIS — K21.9 GASTROESOPHAGEAL REFLUX DISEASE WITHOUT ESOPHAGITIS: ICD-10-CM

## 2021-01-13 NOTE — LETTER
[facility]  [address] [place] [id number]  Phone: [phone]  Fax: [phone]       January 14, 2021         Tommy De La O  [address] [place] [id number]            Dear Tommy:    We are concerned about your health care.  We recently provided you with medication refills.  Many medications require routine follow-up with your doctor.    Your prescription(s) have been refilled for chlorthalidone, omeprazole and amlodipine so you may have time for the above noted follow-up. Please call to schedule soon so we can assure you have an appointment before your next refills are needed.    Thank you,      Tim Crawley MD/ Dorys Arguello RN

## 2021-01-14 ENCOUNTER — MYC MEDICAL ADVICE (OUTPATIENT)
Dept: FAMILY MEDICINE | Facility: CLINIC | Age: 66
End: 2021-01-14

## 2021-01-14 RX ORDER — AMLODIPINE BESYLATE 5 MG/1
TABLET ORAL
Qty: 90 TABLET | Refills: 3 | Status: SHIPPED | OUTPATIENT
Start: 2021-01-14 | End: 2022-01-03

## 2021-01-14 RX ORDER — OMEPRAZOLE 40 MG/1
CAPSULE, DELAYED RELEASE ORAL
Qty: 90 CAPSULE | Refills: 3 | Status: SHIPPED | OUTPATIENT
Start: 2021-01-14 | End: 2022-01-03

## 2021-01-14 RX ORDER — CHLORTHALIDONE 25 MG/1
TABLET ORAL
Qty: 90 TABLET | Refills: 3 | Status: SHIPPED | OUTPATIENT
Start: 2021-01-14 | End: 2022-01-03

## 2021-01-21 ENCOUNTER — VIRTUAL VISIT (OUTPATIENT)
Dept: FAMILY MEDICINE | Facility: CLINIC | Age: 66
End: 2021-01-21
Payer: MEDICARE

## 2021-01-21 DIAGNOSIS — L29.9 EAR ITCHING: ICD-10-CM

## 2021-01-21 DIAGNOSIS — Z00.00 ENCOUNTER FOR MEDICARE ANNUAL WELLNESS EXAM: Primary | ICD-10-CM

## 2021-01-21 DIAGNOSIS — E78.5 HYPERLIPIDEMIA, UNSPECIFIED HYPERLIPIDEMIA TYPE: Chronic | ICD-10-CM

## 2021-01-21 DIAGNOSIS — E66.01 MORBID OBESITY WITH BMI OF 40.0-44.9, ADULT (H): ICD-10-CM

## 2021-01-21 DIAGNOSIS — Z12.5 SCREENING FOR PROSTATE CANCER: ICD-10-CM

## 2021-01-21 DIAGNOSIS — J44.9 CHRONIC OBSTRUCTIVE PULMONARY DISEASE, UNSPECIFIED COPD TYPE (H): ICD-10-CM

## 2021-01-21 DIAGNOSIS — I10 ESSENTIAL HYPERTENSION WITH GOAL BLOOD PRESSURE LESS THAN 140/90: ICD-10-CM

## 2021-01-21 DIAGNOSIS — G62.9 PERIPHERAL POLYNEUROPATHY: ICD-10-CM

## 2021-01-21 PROCEDURE — G0438 PPPS, INITIAL VISIT: HCPCS | Performed by: FAMILY MEDICINE

## 2021-01-21 PROCEDURE — 99214 OFFICE O/P EST MOD 30 MIN: CPT | Mod: 25 | Performed by: FAMILY MEDICINE

## 2021-01-21 RX ORDER — NEOMYCIN SULFATE, POLYMYXIN B SULFATE AND HYDROCORTISONE 10; 3.5; 1 MG/ML; MG/ML; [USP'U]/ML
3 SUSPENSION/ DROPS AURICULAR (OTIC) 4 TIMES DAILY PRN
Qty: 10 ML | Refills: 1 | Status: SHIPPED | OUTPATIENT
Start: 2021-01-21 | End: 2023-01-26

## 2021-01-21 ASSESSMENT — ENCOUNTER SYMPTOMS
HEADACHES: 0
WEAKNESS: 1
PALPITATIONS: 0
EYE PAIN: 0
FREQUENCY: 0
NAUSEA: 0
COUGH: 0
SHORTNESS OF BREATH: 1
JOINT SWELLING: 1
ARTHRALGIAS: 1
HEMATURIA: 0
DIZZINESS: 1
CHILLS: 0
CONSTIPATION: 0
MYALGIAS: 0
HEMATOCHEZIA: 0
ABDOMINAL PAIN: 0
HEARTBURN: 1
DIARRHEA: 0
PARESTHESIAS: 0
DYSURIA: 0
NERVOUS/ANXIOUS: 0
SORE THROAT: 0
FEVER: 0

## 2021-01-21 ASSESSMENT — ACTIVITIES OF DAILY LIVING (ADL): CURRENT_FUNCTION: NO ASSISTANCE NEEDED

## 2021-01-21 NOTE — PROGRESS NOTES
Tommy is a 65 year old who is being evaluated via a billable video visit.       How would you like to obtain your AVS? MyChart  If the video visit is dropped, the invitation should be resent by:   [email or web address]    [phone]  Will anyone else be joining your video visit? No      Video Start Time: 7:37  ASSESSMENT:   (Z00.00) Encounter for Medicare annual wellness exam  (primary encounter diagnosis)  Comment: done by video visit.     (G62.9) Peripheral polyneuropathy  Comment: doing OK.  Thinking about a trial off the gabapentin.   Plan: If you are not certain the gabapentin is helpful, you could try gradually reducing the dose.  You could cut down to twice a day for a couple of weeks then once a day for a couple of weeks and then stop the medication.  If you do feel like you do better on the medication it is okay to continue at your current dose.    (E66.01,  Z68.41) Morbid obesity with BMI of 40.0-44.9, adult (H)  Comment: He would like some help with weight loss.  Plan: NUTRITION REFERRAL        I have placed a referral for the dietitian.  This is a good place to start for help with weight loss.  Consider starting a diet diary before you see the dietitian.  Write down absolutely everything you eat that has calories in a small notebook that you can take with you.  Note what foods you eat and how much and what time of the day.  Being aware of what you eat is I think the first step in helping to lose weight.  We do sometimes use medications for weight loss.  They typically do not work very well unless you are doing other things like seeing a dietitian or in a weight loss program.  There are weight loss programs available if needed.  Regular exercise can help.  It is difficult for you with your knees but being physically active helps to burn calories and helps with weight.    (I10) Essential hypertension with goal blood pressure less than 140/90  Comment: You have been checked your blood pressure for a long  time.  We do not know if it is under good control.  Plan: Basic metabolic panel        Have your blood pressure checked when you come in for blood work.  Schedule an appointment with our  for a lab appointment.  Blood tests at your convenience.    (J44.9) Chronic obstructive pulmonary disease, unspecified COPD type (H)  Comment: You have been a smoker in the past and have had some shortness of breath.  Plan: General PFT Lab (Please always keep checked),         Pulmonary Function Test        I recommend scheduling some lung function test to see how good your lung function is.  Schedule an appointment with respiratory therapy for pulmonary function tests.   Call [phone] to schedule.     (L29.9) Ear itching  Comment: occasional ear itching.  Drops have helped in the past.   Plan: neomycin-polymyxin-hydrocortisone (CORTISPORIN)        3.5-46251-9 otic suspension        I did refill the eardrops to use as needed.  You can use 3 or 4 drops up to 4 times a day as needed for the itching or ear pain.    (Z12.5) Screening for prostate cancer  Comment:   Plan: PSA, screen        Check for prostate cancer screening blood test when you come in for your lab work.    (E78.5) Hyperlipidemia, unspecified hyperlipidemia type  Comment: Due for follow-up.  Plan: Lipid panel reflex to direct LDL Fasting        No change in current treatment plan.  Blood tests when you are able to check your cholesterol.    I do recommend a Pneumovax pneumonia vaccine.  You can get this at a pharmacy or at the clinic.  It could be done when you come in for the blood work as well as checking her blood pressure.    Shingles vaccine is recommended for those adults age 50 and older.  The newer vaccine available since 2018 is very effective in preventing shingles (over 90%).  It is not currently covered by Medicare.  Check at pharmacy for this vaccine, they can check on your cost and give you the vaccine.   The vaccine may be less expensive at  "a pharmacy.         Subjective     Tommy is a 65 year old who presents to clinic today for the following health issues   Chief Complaint   Patient presents with     Physical     Weight concerns, would like refill of ear medication ? neomycin       Takes fish oil 2000 mg in AM.     Taking gabapentin three times daily.   Sometimes forgets Noon dose and takes later in the afternoon.  It does seem to help.     He has dyspnea on exertion.  Sometimes with going up and down stairs.  Asking about pulmonary function tests.   He has not done in the past.  He remains active.      Weight gfabgpfwi=760#.  He would like dietician consult.  Eats when feeling down.       Healthy Habits:     In general, how would you rate your overall health?  Poor    Frequency of exercise:  None    Do you usually eat at least 4 servings of fruit and vegetables a day, include whole grains    & fiber and avoid regularly eating high fat or \"junk\" foods?  No    Taking medications regularly:  Yes    Medication side effects:  None    Ability to successfully perform activities of daily living:  No assistance needed    Home Safety:  No safety concerns identified    Hearing Impairment:  No hearing concerns    In the past 6 months, have you been bothered by leaking of urine?  No    In general, how would you rate your overall mental or emotional health?  Fair      PHQ-2 Total Score: 1    Additional concerns today:  Yes   Exercise:none.  He has had bilateral knee replacements.  Difficult to exercise.     Annual Wellness Visit    Patient has been advised of split billing requirements and indicates understanding: Yes     Are you in the first 12 months of your Medicare Part B coverage?  Yes unable to due on the phone    Physical Health:    In general, how would you rate your overall physical health? poor    Outside of work, how many days during the week do you exercise?none    Outside of work, approximately how many minutes a day do you exercise?not " "applicable    If you drink alcohol do you typically have >3 drinks per day or >7 drinks per week? No    Do you usually eat at least 4 servings of fruit and vegetables a day, include whole grains & fiber and avoid regularly eating high fat or \"junk\" foods? NO    Do you have any problems taking medications regularly? No    Do you have any side effects from medications? ? from gabapentin    Needs assistance for the following daily activities: no assistance needed    Which of the following safety concerns are present in your home?  none identified     Hearing impairment: No    In the past 6 months, have you been bothered by leaking of urine? no    There were no vitals taken for this visit.  Weight: Provided by patient  Height: Unable to obtain due to video visit  BMI: Provided by patient-weight=322#  Blood Pressure: Unable to obtain due to video visit  Not checking blood pressure at home.     Mental Health:    In general, how would you rate your overall mental or emotional health? fair  PHQ-2 Score:      Do you feel safe in your environment? Yes    Have you ever done Advance Care Planning? (For example, a Health Directive, POLST, or a discussion with a medical provider or your loved ones about your wishes)? Will mail out to pt    Fall risk:  Fallen 2 or more times in the past year?: No  Any fall with injury in the past year?: No    Cognitive Screening: Unable to complete due to virtual visit; need for additional assessment in future face-to-face visit    Do you have sleep apnea, excessive snoring or daytime drowsiness?: yes    Current providers sharing in care for this patient include:   Patient Care Team:  Tim Crawley MD as PCP - General (Family Practice)  Tim Crawley MD as Assigned PCP  Abdulaziz Aly DO as Assigned Musculoskeletal Provider  Neurology  Urology  Rheumatology     Patient has been advised of split billing requirements and indicates understanding: Yes    Review of Systems "   Constitutional: Negative for chills and fever.   HENT: Negative for congestion, ear pain, hearing loss and sore throat.    Eyes: Negative for pain and visual disturbance.   Respiratory: Positive for shortness of breath. Negative for cough.    Cardiovascular: Negative for chest pain, palpitations and peripheral edema.   Gastrointestinal: Positive for heartburn. Negative for abdominal pain, constipation, diarrhea, hematochezia and nausea.   Genitourinary: Negative for dysuria, frequency, genital sores, hematuria and urgency.   Musculoskeletal: Positive for arthralgias and joint swelling. Negative for myalgias.   Skin: Negative for rash.   Neurological: Positive for dizziness and weakness. Negative for headaches and paresthesias.   Psychiatric/Behavioral: Negative for mood changes. The patient is not nervous/anxious.        Vitals:  No vitals were obtained today due to virtual visit.    Physical Exam   GENERAL: Healthy, alert and no distress  EYES: Eyes grossly normal to inspection.  No discharge or erythema, or obvious scleral/conjunctival abnormalities.  RESP: No audible wheeze, cough, or visible cyanosis.  No visible retractions or increased work of breathing.    SKIN: Visible skin clear. No significant rash, abnormal pigmentation or lesions.  NEURO: Cranial nerves grossly intact.  Mentation and speech appropriate for age.  PSYCH: Mentation appears normal, affect normal/bright, judgement and insight intact, normal speech and appearance well-groomed.    Video-Visit Details    Type of service:  Video Visit    Video End Time:7:56    Originating Location (pt. Location): Home    Distant Location (provider location):  [facility]     Platform used for Video Visit: [employer]

## 2021-01-21 NOTE — PATIENT INSTRUCTIONS
ASSESSMENT:   (Z00.00) Encounter for Medicare annual wellness exam  (primary encounter diagnosis)  Comment: done by video visit.     (G62.9) Peripheral polyneuropathy  Comment: doing OK.  Thinking about a trial off the gabapentin.   Plan: If you are not certain the gabapentin is helpful, you could try gradually reducing the dose.  You could cut down to twice a day for a couple of weeks then once a day for a couple of weeks and then stop the medication.  If you do feel like you do better on the medication it is okay to continue at your current dose.    (E66.01,  Z68.41) Morbid obesity with BMI of 40.0-44.9, adult (H)  Comment: He would like some help with weight loss.  Plan: NUTRITION REFERRAL        I have placed a referral for the dietitian.  This is a good place to start for help with weight loss.  Consider starting a diet diary before you see the dietitian.  Write down absolutely everything you eat that has calories in a small notebook that you can take with you.  Note what foods you eat and how much and what time of the day.  Being aware of what you eat is I think the first step in helping to lose weight.  We do sometimes use medications for weight loss.  They typically do not work very well unless you are doing other things like seeing a dietitian or in a weight loss program.  There are weight loss programs available if needed.  Regular exercise can help.  It is difficult for you with your knees but being physically active helps to burn calories and helps with weight.    (I10) Essential hypertension with goal blood pressure less than 140/90  Comment: You have been checked your blood pressure for a long time.  We do not know if it is under good control.  Plan: Basic metabolic panel        Have your blood pressure checked when you come in for blood work.  Schedule an appointment with our  for a lab appointment.  Blood tests at your convenience.    (J44.9) Chronic obstructive pulmonary disease,  unspecified COPD type (H)  Comment: You have been a smoker in the past and have had some shortness of breath.  Plan: General PFT Lab (Please always keep checked),         Pulmonary Function Test        I recommend scheduling some lung function test to see how good your lung function is.  Schedule an appointment with respiratory therapy for pulmonary function tests.   Call [phone] to schedule.     (L29.9) Ear itching  Comment: occasional ear itching.  Drops have helped in the past.   Plan: neomycin-polymyxin-hydrocortisone (CORTISPORIN)        3.5-09909-6 otic suspension        I did refill the eardrops to use as needed.  You can use 3 or 4 drops up to 4 times a day as needed for the itching or ear pain.    (Z12.5) Screening for prostate cancer  Comment:   Plan: PSA, screen        Check for prostate cancer screening blood test when you come in for your lab work.    (E78.5) Hyperlipidemia, unspecified hyperlipidemia type  Comment: Due for follow-up.  Plan: Lipid panel reflex to direct LDL Fasting        No change in current treatment plan.  Blood tests when you are able to check your cholesterol.    I do recommend a Pneumovax pneumonia vaccine.  You can get this at a pharmacy or at the clinic.  It could be done when you come in for the blood work as well as checking her blood pressure.    Shingles vaccine is recommended for those adults age 50 and older.  The newer vaccine available since 2018 is very effective in preventing shingles (over 90%).  It is not currently covered by Medicare.  Check at pharmacy for this vaccine, they can check on your cost and give you the vaccine.   The vaccine may be less expensive at a pharmacy.

## 2021-02-08 ENCOUNTER — HOSPITAL ENCOUNTER (OUTPATIENT)
Dept: RESPIRATORY THERAPY | Facility: CLINIC | Age: 66
Discharge: HOME | End: 2021-02-08
Attending: INTERNAL MEDICINE | Admitting: INTERNAL MEDICINE
Payer: MEDICARE

## 2021-02-08 DIAGNOSIS — J44.9 CHRONIC OBSTRUCTIVE PULMONARY DISEASE, UNSPECIFIED COPD TYPE (H): ICD-10-CM

## 2021-02-08 PROCEDURE — 94060 EVALUATION OF WHEEZING: CPT | Mod: 26 | Performed by: INTERNAL MEDICINE

## 2021-02-08 PROCEDURE — 94729 DIFFUSING CAPACITY: CPT | Mod: 26 | Performed by: INTERNAL MEDICINE

## 2021-02-08 PROCEDURE — 94726 PLETHYSMOGRAPHY LUNG VOLUMES: CPT | Mod: 26 | Performed by: INTERNAL MEDICINE

## 2021-02-08 PROCEDURE — 94060 EVALUATION OF WHEEZING: CPT

## 2021-02-08 PROCEDURE — 94726 PLETHYSMOGRAPHY LUNG VOLUMES: CPT

## 2021-02-08 PROCEDURE — 250N000009 HC RX 250: Performed by: FAMILY MEDICINE

## 2021-02-08 PROCEDURE — 94729 DIFFUSING CAPACITY: CPT

## 2021-02-08 RX ORDER — ALBUTEROL SULFATE 0.83 MG/ML
2.5 SOLUTION RESPIRATORY (INHALATION) ONCE
Status: COMPLETED | OUTPATIENT
Start: 2021-02-08 | End: 2021-02-08

## 2021-02-08 RX ADMIN — ALBUTEROL SULFATE 2.5 MG: 2.5 SOLUTION RESPIRATORY (INHALATION) at 11:50

## 2021-02-09 LAB
DLCOUNC-%PRED-PRE: 106 %
DLCOUNC-PRE: 29.75 ML/MIN/MMHG
DLCOUNC-PRED: 28.01 ML/MIN/MMHG
ERV-%PRED-PRE: 83 %
ERV-PRE: 0.39 L
ERV-PRED: 0.46 L
EXPTIME-PRE: 8.27 SEC
FEF2575-%PRED-POST: 114 %
FEF2575-%PRED-PRE: 61 %
FEF2575-POST: 3.22 L/SEC
FEF2575-PRE: 1.74 L/SEC
FEF2575-PRED: 2.81 L/SEC
FEFMAX-%PRED-PRE: 83 %
FEFMAX-PRE: 7.65 L/SEC
FEFMAX-PRED: 9.17 L/SEC
FEV1-%PRED-PRE: 84 %
FEV1-PRE: 3 L
FEV1FEV6-PRE: 64 %
FEV1FEV6-PRED: 78 %
FEV1FVC-PRE: 63 %
FEV1FVC-PRED: 76 %
FEV1SVC-PRE: 60 %
FEV1SVC-PRED: 69 %
FIFMAX-PRE: 6.76 L/SEC
FRCPLETH-%PRED-PRE: 101 %
FRCPLETH-PRE: 3.8 L
FRCPLETH-PRED: 3.74 L
FVC-%PRED-PRE: 101 %
FVC-PRE: 4.76 L
FVC-PRED: 4.68 L
IC-%PRED-PRE: 98 %
IC-PRE: 4.6 L
IC-PRED: 4.68 L
RVPLETH-%PRED-PRE: 129 %
RVPLETH-PRE: 3.35 L
RVPLETH-PRED: 2.58 L
TLCPLETH-%PRED-PRE: 112 %
TLCPLETH-PRE: 8.4 L
TLCPLETH-PRED: 7.43 L
VA-%PRED-PRE: 107 %
VA-PRE: 7.34 L
VC-%PRED-PRE: 98 %
VC-PRE: 5.05 L
VC-PRED: 5.14 L

## 2021-02-10 ENCOUNTER — MYC MEDICAL ADVICE (OUTPATIENT)
Dept: FAMILY MEDICINE | Facility: CLINIC | Age: 66
End: 2021-02-10

## 2021-02-10 DIAGNOSIS — J44.9 CHRONIC OBSTRUCTIVE PULMONARY DISEASE, UNSPECIFIED COPD TYPE (H): Primary | ICD-10-CM

## 2021-02-10 DIAGNOSIS — R06.09 DOE (DYSPNEA ON EXERTION): ICD-10-CM

## 2021-02-10 RX ORDER — ALBUTEROL SULFATE 90 UG/1
2 AEROSOL, METERED RESPIRATORY (INHALATION) EVERY 4 HOURS PRN
Qty: 1 INHALER | Refills: 3 | Status: SHIPPED | OUTPATIENT
Start: 2021-02-10 | End: 2021-08-13

## 2021-02-10 NOTE — PROGRESS NOTES
Albuteral inhaler 2 inhalations every 4 hours as needed for coughing, wheezing or shortness of breath.   Please arrange for prescription(s) per Dr. Crawley.   Pharmacy is [facility] in [place].

## 2021-02-10 NOTE — RESULT ENCOUNTER NOTE
Please call. pulmonary function tests show mild disease more suggestive of asthma than COPD.   PLAN: I suggest trying albuteral inhaler.  Albuteral inhaler can be used taking 2 inhalations every 4 hours as needed for coughing, wheezing or shortness of breath.   Please arrange for prescription(s).  There are other medications we can try if needed to help with breathing and shortness of breath.

## 2021-02-18 ENCOUNTER — TELEPHONE (OUTPATIENT)
Dept: FAMILY MEDICINE | Facility: CLINIC | Age: 66
End: 2021-02-18

## 2021-02-18 DIAGNOSIS — Z12.5 SCREENING FOR PROSTATE CANCER: ICD-10-CM

## 2021-02-18 DIAGNOSIS — E78.5 HYPERLIPIDEMIA, UNSPECIFIED HYPERLIPIDEMIA TYPE: Chronic | ICD-10-CM

## 2021-02-18 DIAGNOSIS — I10 ESSENTIAL HYPERTENSION WITH GOAL BLOOD PRESSURE LESS THAN 140/90: ICD-10-CM

## 2021-02-18 LAB
ANION GAP SERPL CALCULATED.3IONS-SCNC: 6 MMOL/L (ref 3–14)
BUN SERPL-MCNC: 18 MG/DL (ref 7–30)
CALCIUM SERPL-MCNC: 9.1 MG/DL (ref 8.5–10.1)
CHLORIDE SERPL-SCNC: 104 MMOL/L (ref 94–109)
CHOLEST SERPL-MCNC: 158 MG/DL
CO2 SERPL-SCNC: 28 MMOL/L (ref 20–32)
CREAT SERPL-MCNC: 0.79 MG/DL (ref 0.66–1.25)
GFR SERPL CREATININE-BSD FRML MDRD: >90 ML/MIN/{1.73_M2}
GLUCOSE SERPL-MCNC: 91 MG/DL (ref 70–99)
HDLC SERPL-MCNC: 46 MG/DL
LDLC SERPL CALC-MCNC: 76 MG/DL
NONHDLC SERPL-MCNC: 112 MG/DL
POTASSIUM SERPL-SCNC: 3.3 MMOL/L (ref 3.4–5.3)
PSA SERPL-ACNC: 1.04 UG/L (ref 0–4)
SODIUM SERPL-SCNC: 138 MMOL/L (ref 133–144)
TRIGL SERPL-MCNC: 181 MG/DL

## 2021-02-18 PROCEDURE — 36415 COLL VENOUS BLD VENIPUNCTURE: CPT | Performed by: FAMILY MEDICINE

## 2021-02-18 PROCEDURE — G0103 PSA SCREENING: HCPCS | Performed by: FAMILY MEDICINE

## 2021-02-18 PROCEDURE — 80061 LIPID PANEL: CPT | Performed by: FAMILY MEDICINE

## 2021-02-18 PROCEDURE — 80048 BASIC METABOLIC PNL TOTAL CA: CPT | Performed by: FAMILY MEDICINE

## 2021-02-18 NOTE — TELEPHONE ENCOUNTER
Reason for call:  Patient reporting a symptom    Symptom or request: Pt was in today for a Physical. Pt is wondering if Dr. Crawley is doing everything possible for?   Is Dr. Crawley maximizing all that he can do for Lab work for Physical.     Phone Number patient can be reached at:  Home number on file [phone] (home)    Best Time:  Any Time      Can we leave a detailed message on this number:  YES    Call taken on 2/18/2021 at 3:01 PM by Nimisha Minor

## 2021-02-18 NOTE — TELEPHONE ENCOUNTER
"Call placed to patient . He is concerned regarding having enough \"lab results for a man my age with my conditions\". We reviewed his lab drawn this am. Assured patient Dr Crawley would be reviewing and licha advise as needed.   Kim SNYDER RN  "

## 2021-02-18 NOTE — RESULT ENCOUNTER NOTE
Coy Sanders,  PSA test (for prostate cancer screening) is normal.   Triglycerides, a type of fat found in the bloodstream is high.  Other lipids all normal.   Your potassium is slightly low-probably from the chlorthalidone.  Other chemistries all oK.   PLAN: Eating foods higher in potassium can help increase the potassium.    See list below.   ELIAZAR ROY MD

## 2021-02-19 ENCOUNTER — TRANSFERRED RECORDS (OUTPATIENT)
Dept: HEALTH INFORMATION MANAGEMENT | Facility: CLINIC | Age: 66
End: 2021-02-19

## 2021-02-24 ENCOUNTER — TELEPHONE (OUTPATIENT)
Dept: ORTHOPEDICS | Facility: CLINIC | Age: 66
End: 2021-02-24

## 2021-02-24 NOTE — TELEPHONE ENCOUNTER
Reason for call:  Patient reporting a symptom    Symptom or request: Pt has a new injury - not the same one MD has been seeing him for.  Wondering if he can speak to provider to see if he should make an appt for this?  Or if this is something he can even treat.    Duration (how long have symptoms been present):     Have you been treated for this before? No    Additional comments:     Phone Number patient can be reached at:  Home number on file [phone] (home)    Best Time:      Can we leave a detailed message on this number:  YES    Call taken on 2/24/2021 at 8:09 AM by Sabina Thompson

## 2021-02-24 NOTE — TELEPHONE ENCOUNTER
Spoke to patient discussed he noting chronic bilateral shoulder pain, left>>>right.  Based on his description he is having shoulder joint pain along possible chronic rotator cuff tear.  Prior history of right rotator cuff repair ~ 20 years ago.  Reviewed expectations for appointment, he is appropriate to consult with Dr Aly.  Patient scheduled for 3/4/21.    Dilshad Riggs ATC

## 2021-03-04 ENCOUNTER — OFFICE VISIT (OUTPATIENT)
Dept: ORTHOPEDICS | Facility: CLINIC | Age: 66
End: 2021-03-04
Payer: MEDICARE

## 2021-03-04 ENCOUNTER — ANCILLARY PROCEDURE (OUTPATIENT)
Dept: GENERAL RADIOLOGY | Facility: CLINIC | Age: 66
End: 2021-03-04
Attending: FAMILY MEDICINE
Payer: MEDICARE

## 2021-03-04 VITALS
BODY MASS INDEX: 42.66 KG/M2 | SYSTOLIC BLOOD PRESSURE: 138 MMHG | WEIGHT: 315 LBS | DIASTOLIC BLOOD PRESSURE: 76 MMHG | HEIGHT: 72 IN

## 2021-03-04 DIAGNOSIS — M75.82 ROTATOR CUFF TENDINITIS, LEFT: ICD-10-CM

## 2021-03-04 DIAGNOSIS — M25.512 PAIN IN JOINT OF LEFT SHOULDER: ICD-10-CM

## 2021-03-04 DIAGNOSIS — M25.512 ACUTE PAIN OF LEFT SHOULDER: Primary | ICD-10-CM

## 2021-03-04 DIAGNOSIS — M25.512 ACUTE PAIN OF LEFT SHOULDER: ICD-10-CM

## 2021-03-04 PROCEDURE — 73030 X-RAY EXAM OF SHOULDER: CPT | Mod: LT | Performed by: RADIOLOGY

## 2021-03-04 PROCEDURE — 99214 OFFICE O/P EST MOD 30 MIN: CPT | Performed by: FAMILY MEDICINE

## 2021-03-04 ASSESSMENT — MIFFLIN-ST. JEOR: SCORE: 2266.57

## 2021-03-04 NOTE — PROGRESS NOTES
"Tommy De La O  :  1955  DOS: 3/4/2021  MRN: [id number]    Sports Medicine Clinic Visit    PCP: Tim Crawley    Tommy De La O is a 65 year old Right hand dominant male who is seen as a self referral presenting with chronic left shoulder pain.    Injury: Gradual onset of chronic left shoulder pain over the past 5+ years, that is progressing over the past ~ 3+ months.  Pain located over left deep anterior superior shoulder, glenohumeral joint, nonradiating.  Additional Features:  Positive: grinding and weakness.  Symptoms are better with Rest and Excedrin.  Symptoms are worse with: driving skid steer, shoulder flexion/abduction, lying on left shoulder, reaching behind back.  Other evaluation and/or treatments so far consists of: Ice, Rest and Excedrin PM.  Recent imaging completed: No recent imaging completed.  Prior History of related problems: Chronic left shoulder pain that he has not previously treated.  History of status post right rotator cuff repair ~ 25 years ago.    Social History: hobby     Review of Systems  Musculoskeletal: as above  Remainder of review of systems is negative including constitutional, CV, pulmonary, GI, Skin and Neurologic except as noted in HPI or medical history.    No past medical history on file.  Past Surgical History:   Procedure Laterality Date     ARTHROSCOPY KNEE Bilateral     both knees with arthroscopy in the past.      AS TOTAL KNEE ARTHROPLASTY Bilateral     Both total knees.      REPAIR TENDON ACHILLES      1980s two separate surgeries.     ROTATOR CUFF REPAIR RT/LT Left      Family History   Problem Relation Age of Onset     Coronary Artery Disease Father 56         at 56 MI     Hyperlipidemia Mother      Prostate Cancer No family hx of      Colon Cancer No family hx of        Objective  /76   Ht 1.816 m (5' 11.5\")   Wt 145.2 kg (320 lb)   BMI 44.01 kg/m        General: healthy, alert and in no distress      HEENT: no scleral " icterus or conjunctival erythema     Skin: no suspicious lesions or rash. No jaundice.     CV: regular rhythm by palpation, 2+ distal pulses, no pedal edema      Resp: normal respiratory effort without conversational dyspnea     Psych: normal mood and affect      Gait: nonantalgic, appropriate coordination and balance     Neuro: normal light touch sensory exam of the extremities. Motor strength as noted below     Left Shoulder exam    ROM:        Full active and passive ROM with flexion, extension, abduction, internal and external rotation.       asymmetric scapular motion due to pain on the left    Tender:        subacromial space       posterior shoulder       Anterior GH joint    Non Tender:       remainder of shoulder       sternoclavicular joint       acromioclavicular joint       periscapular region    Strength:        abduction 5-/5       internal rotation 5/5       external rotation 4/5       adduction 5/5       Painful resisted ER>abduction    Impingement testing:        neg (-) Neer       Mild pain wth Rascon       positive (+) empty can       neg (-) crossover       positive (+) O'osmany       positive (+) crank    Stability testing:       neg (-) anterior glide       neg (-) sulcus sign    Skin:       no visible deformities       well perfused       capillary refill brisk    Sensation:        normal sensation over shoulder and upper extremity       Radiology  Recent Results (from the past 744 hour(s))   XR Shoulder Left G/E 3 Views    Narrative    XR SHOULDER LT G/E 3 VW   3/4/2021 1:23 PM     HISTORY:  Acute pain of left shoulder      Impression    IMPRESSION: Unremarkable exam.    JULES KIMBALL MD         Assessment:  1. Acute pain of left shoulder    2. Pain in joint of left shoulder    3. Rotator cuff tendinitis, left        Plan:  Discussed the assessment with the patient.  Follow up: 2 weeks s/p 2nd COVID vaccine, if pain is labile and would like to consider CSI  Will avoid CSI for now given that he  has started the COVID vaccine process  Acute on chronic shoulder pain, no significant RTC insufficiency on exam, mild weakness and moderate pain with infraspinatus and supraspinatus activation  Most focal pain is with stress and palpation of the GH joint, suspect mild OA vs labral issue vs less likely early frozen shoulder  XR images independently visualized and reviewed with patient today in clinic  No significant acute or chronic findings on XR, can consider advanced imaging for labile or worsening sx  PT offered, available anytime prn, HEP and activity modification reviewed  We discussed modified progressive pain-free activity as tolerated  Home handouts provided and supportive care reviewed  All questions were answered today  Contact us with additional questions or concerns  Signs and sx of concern reviewed      Abdulaziz Aly DO, CACORDELIA  Primary Care Sports Medicine  [facility]       Time spent in chart review, one-on-one evaluation, discussion with patient regarding nature of problem, course, prior treatments, and therapeutic options, share-decision making, procedures and referrals as appropriate/documented, and charting related to the visit: 33 minutes          Disclaimer: This note consists of symbols derived from keyboarding, dictation and/or voice recognition software. As a result, there may be errors in the script that have gone undetected. Please consider this when interpreting information found in this chart.

## 2021-03-04 NOTE — LETTER
3/4/2021         RE: Tommy De La O   [address] [facility] [address]        Dear Colleague,    Thank you for referring your patient, Tommy De La O, to the [facility] [place]. Please see a copy of my visit note below.    Tommy De La O  :  1955  DOS: 3/4/2021  MRN: [id number]    Sports Medicine Clinic Visit    PCP: Tim Crawley    Tommy De La O is a 65 year old Right hand dominant male who is seen as a self referral presenting with chronic left shoulder pain.    Injury: Gradual onset of chronic left shoulder pain over the past 5+ years, that is progressing over the past ~ 3+ months.  Pain located over left deep anterior superior shoulder, glenohumeral joint, nonradiating.  Additional Features:  Positive: grinding and weakness.  Symptoms are better with Rest and Excedrin.  Symptoms are worse with: driving skid steer, shoulder flexion/abduction, lying on left shoulder, reaching behind back.  Other evaluation and/or treatments so far consists of: Ice, Rest and Excedrin PM.  Recent imaging completed: No recent imaging completed.  Prior History of related problems: Chronic left shoulder pain that he has not previously treated.  History of status post right rotator cuff repair ~ 25 years ago.    Social History: hobby     Review of Systems  Musculoskeletal: as above  Remainder of review of systems is negative including constitutional, CV, pulmonary, GI, Skin and Neurologic except as noted in HPI or medical history.    No past medical history on file.  Past Surgical History:   Procedure Laterality Date     ARTHROSCOPY KNEE Bilateral     both knees with arthroscopy in the past.      AS TOTAL KNEE ARTHROPLASTY Bilateral     Both total knees.      REPAIR TENDON ACHILLES      1980s two separate surgeries.     ROTATOR CUFF REPAIR RT/LT Left      Family History   Problem Relation Age of Onset     Coronary Artery Disease Father 56         at 56 MI      "Hyperlipidemia Mother      Prostate Cancer No family hx of      Colon Cancer No family hx of        Objective  /76   Ht 1.816 m (5' 11.5\")   Wt 145.2 kg (320 lb)   BMI 44.01 kg/m        General: healthy, alert and in no distress      HEENT: no scleral icterus or conjunctival erythema     Skin: no suspicious lesions or rash. No jaundice.     CV: regular rhythm by palpation, 2+ distal pulses, no pedal edema      Resp: normal respiratory effort without conversational dyspnea     Psych: normal mood and affect      Gait: nonantalgic, appropriate coordination and balance     Neuro: normal light touch sensory exam of the extremities. Motor strength as noted below     Left Shoulder exam    ROM:        Full active and passive ROM with flexion, extension, abduction, internal and external rotation.       asymmetric scapular motion due to pain on the left    Tender:        subacromial space       posterior shoulder       Anterior GH joint    Non Tender:       remainder of shoulder       sternoclavicular joint       acromioclavicular joint       periscapular region    Strength:        abduction 5-/5       internal rotation 5/5       external rotation 4/5       adduction 5/5       Painful resisted ER>abduction    Impingement testing:        neg (-) Neer       Mild pain wth Rascon       positive (+) empty can       neg (-) crossover       positive (+) O'osmany       positive (+) crank    Stability testing:       neg (-) anterior glide       neg (-) sulcus sign    Skin:       no visible deformities       well perfused       capillary refill brisk    Sensation:        normal sensation over shoulder and upper extremity       Radiology  Recent Results (from the past 744 hour(s))   XR Shoulder Left G/E 3 Views    Narrative    XR SHOULDER LT G/E 3 VW   3/4/2021 1:23 PM     HISTORY:  Acute pain of left shoulder      Impression    IMPRESSION: Unremarkable exam.    JULES KIMBALL MD         Assessment:  1. Acute pain of left shoulder "    2. Pain in joint of left shoulder    3. Rotator cuff tendinitis, left        Plan:  Discussed the assessment with the patient.  Follow up: 2 weeks s/p 2nd COVID vaccine, if pain is labile and would like to consider CSI  Will avoid CSI for now given that he has started the COVID vaccine process  Acute on chronic shoulder pain, no significant RTC insufficiency on exam, mild weakness and moderate pain with infraspinatus and supraspinatus activation  Most focal pain is with stress and palpation of the GH joint, suspect mild OA vs labral issue vs less likely early frozen shoulder  XR images independently visualized and reviewed with patient today in clinic  No significant acute or chronic findings on XR, can consider advanced imaging for labile or worsening sx  PT offered, available anytime prn, HEP and activity modification reviewed  We discussed modified progressive pain-free activity as tolerated  Home handouts provided and supportive care reviewed  All questions were answered today  Contact us with additional questions or concerns  Signs and sx of concern reviewed      Abdulaziz Aly DO, HAILEE  Primary Care Sports Medicine  [facility]       Time spent in chart review, one-on-one evaluation, discussion with patient regarding nature of problem, course, prior treatments, and therapeutic options, share-decision making, procedures and referrals as appropriate/documented, and charting related to the visit: 33 minutes          Disclaimer: This note consists of symbols derived from keyboarding, dictation and/or voice recognition software. As a result, there may be errors in the script that have gone undetected. Please consider this when interpreting information found in this chart.      Again, thank you for allowing me to participate in the care of your patient.        Sincerely,        Abdulaziz Aly DO

## 2021-03-06 ENCOUNTER — IMMUNIZATION (OUTPATIENT)
Dept: FAMILY MEDICINE | Facility: CLINIC | Age: 66
End: 2021-03-06
Payer: MEDICARE

## 2021-03-06 PROCEDURE — 91300 PR COVID VAC PFIZER DIL RECON 30 MCG/0.3 ML IM: CPT

## 2021-03-06 PROCEDURE — 0001A PR COVID VAC PFIZER DIL RECON 30 MCG/0.3 ML IM: CPT

## 2021-03-26 ENCOUNTER — TRANSFERRED RECORDS (OUTPATIENT)
Dept: HEALTH INFORMATION MANAGEMENT | Facility: CLINIC | Age: 66
End: 2021-03-26

## 2021-03-27 ENCOUNTER — IMMUNIZATION (OUTPATIENT)
Dept: FAMILY MEDICINE | Facility: CLINIC | Age: 66
End: 2021-03-27
Attending: FAMILY MEDICINE
Payer: MEDICARE

## 2021-03-27 PROCEDURE — 91300 PR COVID VAC PFIZER DIL RECON 30 MCG/0.3 ML IM: CPT

## 2021-03-27 PROCEDURE — 0002A PR COVID VAC PFIZER DIL RECON 30 MCG/0.3 ML IM: CPT

## 2021-04-12 ENCOUNTER — TRANSFERRED RECORDS (OUTPATIENT)
Dept: HEALTH INFORMATION MANAGEMENT | Facility: CLINIC | Age: 66
End: 2021-04-12

## 2021-04-15 ENCOUNTER — OFFICE VISIT (OUTPATIENT)
Dept: ORTHOPEDICS | Facility: CLINIC | Age: 66
End: 2021-04-15
Payer: MEDICARE

## 2021-04-15 VITALS
DIASTOLIC BLOOD PRESSURE: 84 MMHG | WEIGHT: 315 LBS | SYSTOLIC BLOOD PRESSURE: 128 MMHG | HEIGHT: 72 IN | BODY MASS INDEX: 42.66 KG/M2

## 2021-04-15 DIAGNOSIS — M25.512 PAIN IN JOINT OF LEFT SHOULDER: ICD-10-CM

## 2021-04-15 DIAGNOSIS — M25.531 CHRONIC PAIN OF RIGHT WRIST: Primary | ICD-10-CM

## 2021-04-15 DIAGNOSIS — M75.82 ROTATOR CUFF TENDINITIS, LEFT: ICD-10-CM

## 2021-04-15 DIAGNOSIS — G89.29 CHRONIC PAIN OF RIGHT WRIST: Primary | ICD-10-CM

## 2021-04-15 DIAGNOSIS — M19.031 OSTEOARTHRITIS OF RIGHT WRIST, UNSPECIFIED OSTEOARTHRITIS TYPE: ICD-10-CM

## 2021-04-15 PROCEDURE — 99214 OFFICE O/P EST MOD 30 MIN: CPT | Mod: 25 | Performed by: FAMILY MEDICINE

## 2021-04-15 PROCEDURE — 20606 DRAIN/INJ JOINT/BURSA W/US: CPT | Mod: RT | Performed by: FAMILY MEDICINE

## 2021-04-15 RX ADMIN — ROPIVACAINE HYDROCHLORIDE 1 ML: 5 INJECTION, SOLUTION EPIDURAL; INFILTRATION; PERINEURAL at 11:10

## 2021-04-15 RX ADMIN — TRIAMCINOLONE ACETONIDE 40 MG: 40 INJECTION, SUSPENSION INTRA-ARTICULAR; INTRAMUSCULAR at 11:10

## 2021-04-15 ASSESSMENT — MIFFLIN-ST. JEOR: SCORE: 2261.57

## 2021-04-15 NOTE — LETTER
4/15/2021         RE: Tommy De La O   [address] [facility] [address]        Dear Colleague,    Thank you for referring your patient, Tommy De La O, to the [facility] [place]. Please see a copy of my visit note below.    Tommy De La O  :  1955  DOS: 04/15/21  MRN: [id number]    Sports Medicine Clinic Visit    PCP: Tim Crawley    Tommy De La O is a 65 year old Right hand dominant male who is seen as a self referral presenting with chronic left shoulder pain.    Injury: Gradual onset of chronic left shoulder pain over the past 5+ years, that is progressing over the past ~ 3+ months.  Pain located over left deep anterior superior shoulder, glenohumeral joint, nonradiating.  Additional Features:  Positive: grinding and weakness.  Symptoms are better with Rest and Excedrin.  Symptoms are worse with: driving skid steer, shoulder flexion/abduction, lying on left shoulder, reaching behind back.  Other evaluation and/or treatments so far consists of: Ice, Rest and Excedrin PM.  Recent imaging completed: No recent imaging completed.  Prior History of related problems: Chronic left shoulder pain that he has not previously treated.  History of status post right rotator cuff repair ~ 25 years ago.    Social History: hobby     Interim History - April 15, 2021  Since last visit on 3/4/2021 patient has moderate-severe left shoulder pain.  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  He would like to discuss steroid injection vs further imaging today.  No interim injury.       Second complaint for follow up on chronic right wrist pain. Right wrist RC and UC joint injections last completed 21 provided good relief for ~ 3 months.  He has been relying on wrist brace increasingly more at home.      Review of Systems  Musculoskeletal: as above  Remainder of review of systems is negative  "including constitutional, CV, pulmonary, GI, Skin and Neurologic except as noted in HPI or medical history.    No past medical history on file.  Past Surgical History:   Procedure Laterality Date     ARTHROSCOPY KNEE Bilateral     both knees with arthroscopy in the past.      AS TOTAL KNEE ARTHROPLASTY Bilateral     Both total knees.      REPAIR TENDON ACHILLES      1980s two separate surgeries.     ROTATOR CUFF REPAIR RT/LT Left      Family History   Problem Relation Age of Onset     Coronary Artery Disease Father 56         at 56 MI     Hyperlipidemia Mother      Prostate Cancer No family hx of      Colon Cancer No family hx of        Objective  /84   Ht 1.816 m (5' 11.5\")   Wt 145.2 kg (320 lb)   BMI 44.01 kg/m        General: healthy, alert and in no distress      HEENT: no scleral icterus or conjunctival erythema     Skin: no suspicious lesions or rash. No jaundice.     CV: regular rhythm by palpation, 2+ distal pulses, no pedal edema      Resp: normal respiratory effort without conversational dyspnea     Psych: normal mood and affect      Gait: nonantalgic, appropriate coordination and balance     Neuro: normal light touch sensory exam of the extremities. Motor strength as noted below     Left Shoulder exam    ROM:        Full active and passive ROM with flexion, extension, abduction, internal and external rotation.       asymmetric scapular motion due to pain on the left, mild    Tender:        subacromial space       posterior shoulder       Anterior GH joint    Non Tender:       remainder of shoulder       sternoclavicular joint       acromioclavicular joint       periscapular region    Strength:        abduction 5-/5       internal rotation 5/5       external rotation 4/5       adduction 5/5       Painful resisted ER>abduction    Impingement testing:        neg (-) Neer       Mild pain wth Rascon       positive (+) empty can       neg (-) crossover       Painful (+) O'osmany       positive (+) " crank    Stability testing:       neg (-) anterior glide       neg (-) sulcus sign    Skin:       no visible deformities       well perfused       capillary refill brisk    Sensation:        normal sensation over shoulder and upper extremity     Right Wrist and Hand exam     Inspection:       Mild generalized distal/dorsal forearm swelling, no bruising or deformity right, prominent distal ulna     Tender:       DRUJ focal, ulnocarpal>radiocarpal joint TTP as well, appears less than DRUJ     Non Tender:       Remainder of the Wrist and Hand bilateral     ROM:       Full and symmetric active and passive range of motion of the forearm, wrist and digits bilateral     Strength:       5/5 strength in the muscles of the hand, wrist and forearm bilateral     Special Tests:        neg (-) Tinel's test bilateral,       neg (-) Phalen's test bilateral,      mildly painful TFCC compression test right and       neg (-) Finkelstein's maneuver bilateral     Neurovascular:       2+ radial pulses bilaterally with brisk capillary refill and      normal sensation to light touch in the radial, median and ulnar nerve distributions      Radiology  Recent Results (from the past 744 hour(s))   XR Shoulder Left G/E 3 Views    Narrative    XR SHOULDER LT G/E 3 VW   3/4/2021 1:23 PM     HISTORY:  Acute pain of left shoulder      Impression    IMPRESSION: Unremarkable exam.    JULES KIMBALL MD     Medium Joint Injection/Arthrocentesis    Date/Time: 4/15/2021 11:10 AM  Performed by: Abdulaziz Aly DO  Authorized by: Abdulaziz Aly DO     Indications:  Pain  Indications comment:  Osteoarthritis  Needle Size:  25 G  Guidance: ultrasound    Approach:  Dorsal  Location:  Wrist  Location comment:  Right radiocarpal and DRUJ  Medications:  40 mg triamcinolone 40 MG/ML; 1 mL ropivacaine 5 MG/ML  Outcome:  Tolerated well, no immediate complications  Procedure discussed: discussed risks, benefits, and alternatives    Consent Given by:   Patient  Timeout: timeout called immediately prior to procedure    Prep: patient was prepped and draped in usual sterile fashion     Injection was spread equally between right RC joint and right DRUJ joint        Assessment:  1. Chronic pain of right wrist    2. Osteoarthritis of right wrist, unspecified osteoarthritis type    3. Pain in joint of left shoulder    4. Rotator cuff tendinitis, left        Plan:  Discussed the assessment with the patient.  Follow up: 2 weeks if pain is not improved   Acute on chronic shoulder pain, no significant RTC insufficiency on exam, mild weakness and moderate pain with infraspinatus and supraspinatus activation, as well as pain is with stress and palpation of the GH joint  PT activity modification, and CSI options reviewed  XR images independently visualized and reviewed with patient again today in clinic  No significant acute or chronic findings on XR, can consider advanced imaging for labile or worsening sx  Flare of chronic right wrist pain, pain in DRUJ>radiocarpal joints  US guided CSI split between DRUJ today  PT offered, available anytime prn, HEP and activity modification reviewed  We discussed modified progressive pain-free activity as tolerated  Expectations and goals of CSI reviewed  Often 2-3 days for steroid effect, and can take up to two weeks for maximum effect  We discussed modified progressive pain-free activity as tolerated  Do not overuse in first two weeks if feeling better due to concern for vulnerability while steroid is working  Supportive care reviewed  All questions were answered today  Contact us with additional questions or concerns  Signs and sx of concern reviewed      Abdulaziz Aly DO, CACORDELIA  Primary Care Sports Medicine  [facility]       Time spent in chart review, one-on-one evaluation, discussion with patient regarding nature of problem, course, prior treatments, and therapeutic options, share-decision making, procedures and  referrals as appropriate/documented, and charting related to the visit: 31 minutes          Disclaimer: This note consists of symbols derived from keyboarding, dictation and/or voice recognition software. As a result, there may be errors in the script that have gone undetected. Please consider this when interpreting information found in this chart.      Again, thank you for allowing me to participate in the care of your patient.        Sincerely,        Abdulaziz Aly, DO

## 2021-04-15 NOTE — PROGRESS NOTES
Tommy De La O  :  1955  DOS: 04/15/21  MRN: [id number]    Sports Medicine Clinic Visit    PCP: Tim Crawley    Tommy De La O is a 65 year old Right hand dominant male who is seen as a self referral presenting with chronic left shoulder pain.    Injury: Gradual onset of chronic left shoulder pain over the past 5+ years, that is progressing over the past ~ 3+ months.  Pain located over left deep anterior superior shoulder, glenohumeral joint, nonradiating.  Additional Features:  Positive: grinding and weakness.  Symptoms are better with Rest and Excedrin.  Symptoms are worse with: driving skid steer, shoulder flexion/abduction, lying on left shoulder, reaching behind back.  Other evaluation and/or treatments so far consists of: Ice, Rest and Excedrin PM.  Recent imaging completed: No recent imaging completed.  Prior History of related problems: Chronic left shoulder pain that he has not previously treated.  History of status post right rotator cuff repair ~ 25 years ago.    Social History: hobby     Interim History - April 15, 2021  Since last visit on 3/4/2021 patient has moderate-severe left shoulder pain.  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  He would like to discuss steroid injection vs further imaging today.  No interim injury.       Second complaint for follow up on chronic right wrist pain. Right wrist RC and UC joint injections last completed 21 provided good relief for ~ 3 months.  He has been relying on wrist brace increasingly more at home.      Review of Systems  Musculoskeletal: as above  Remainder of review of systems is negative including constitutional, CV, pulmonary, GI, Skin and Neurologic except as noted in HPI or medical history.    No past medical history on file.  Past Surgical History:   Procedure Laterality Date     ARTHROSCOPY KNEE Bilateral     both knees with arthroscopy in the  "past.      AS TOTAL KNEE ARTHROPLASTY Bilateral     Both total knees.      REPAIR TENDON ACHILLES      1980s two separate surgeries.     ROTATOR CUFF REPAIR RT/LT Left      Family History   Problem Relation Age of Onset     Coronary Artery Disease Father 56         at 56 MI     Hyperlipidemia Mother      Prostate Cancer No family hx of      Colon Cancer No family hx of        Objective  /84   Ht 1.816 m (5' 11.5\")   Wt 145.2 kg (320 lb)   BMI 44.01 kg/m        General: healthy, alert and in no distress      HEENT: no scleral icterus or conjunctival erythema     Skin: no suspicious lesions or rash. No jaundice.     CV: regular rhythm by palpation, 2+ distal pulses, no pedal edema      Resp: normal respiratory effort without conversational dyspnea     Psych: normal mood and affect      Gait: nonantalgic, appropriate coordination and balance     Neuro: normal light touch sensory exam of the extremities. Motor strength as noted below     Left Shoulder exam    ROM:        Full active and passive ROM with flexion, extension, abduction, internal and external rotation.       asymmetric scapular motion due to pain on the left, mild    Tender:        subacromial space       posterior shoulder       Anterior GH joint    Non Tender:       remainder of shoulder       sternoclavicular joint       acromioclavicular joint       periscapular region    Strength:        abduction 5-/5       internal rotation 5/5       external rotation 4/5       adduction 5/5       Painful resisted ER>abduction    Impingement testing:        neg (-) Neer       Mild pain wth [place]       positive (+) empty can       neg (-) crossover       Painful (+) O'osmany       positive (+) crank    Stability testing:       neg (-) anterior glide       neg (-) sulcus sign    Skin:       no visible deformities       well perfused       capillary refill brisk    Sensation:        normal sensation over shoulder and upper extremity     Right Wrist and " Hand exam     Inspection:       Mild generalized distal/dorsal forearm swelling, no bruising or deformity right, prominent distal ulna     Tender:       DRUJ focal, ulnocarpal>radiocarpal joint TTP as well, appears less than DRUJ     Non Tender:       Remainder of the Wrist and Hand bilateral     ROM:       Full and symmetric active and passive range of motion of the forearm, wrist and digits bilateral     Strength:       5/5 strength in the muscles of the hand, wrist and forearm bilateral     Special Tests:        neg (-) Tinel's test bilateral,       neg (-) Phalen's test bilateral,      mildly painful TFCC compression test right and       neg (-) Finkelstein's maneuver bilateral     Neurovascular:       2+ radial pulses bilaterally with brisk capillary refill and      normal sensation to light touch in the radial, median and ulnar nerve distributions      Radiology  Recent Results (from the past 744 hour(s))   XR Shoulder Left G/E 3 Views    Narrative    XR SHOULDER LT G/E 3 VW   3/4/2021 1:23 PM     HISTORY:  Acute pain of left shoulder      Impression    IMPRESSION: Unremarkable exam.    JULES KIMBALL MD     Medium Joint Injection/Arthrocentesis    Date/Time: 4/15/2021 11:10 AM  Performed by: Abdulaziz Aly DO  Authorized by: Abdulaziz Aly DO     Indications:  Pain  Indications comment:  Osteoarthritis  Needle Size:  25 G  Guidance: ultrasound    Approach:  Dorsal  Location:  Wrist  Location comment:  Right radiocarpal and DRUJ  Medications:  40 mg triamcinolone 40 MG/ML; 1 mL ropivacaine 5 MG/ML  Outcome:  Tolerated well, no immediate complications  Procedure discussed: discussed risks, benefits, and alternatives    Consent Given by:  Patient  Timeout: timeout called immediately prior to procedure    Prep: patient was prepped and draped in usual sterile fashion     Injection was spread equally between right RC joint and right DRUJ joint        Assessment:  1. Chronic pain of right wrist     2. Osteoarthritis of right wrist, unspecified osteoarthritis type    3. Pain in joint of left shoulder    4. Rotator cuff tendinitis, left        Plan:  Discussed the assessment with the patient.  Follow up: 2 weeks if pain is not improved   Acute on chronic shoulder pain, no significant RTC insufficiency on exam, mild weakness and moderate pain with infraspinatus and supraspinatus activation, as well as pain is with stress and palpation of the GH joint  PT activity modification, and CSI options reviewed  XR images independently visualized and reviewed with patient again today in clinic  No significant acute or chronic findings on XR, can consider advanced imaging for labile or worsening sx  Flare of chronic right wrist pain, pain in DRUJ>radiocarpal joints  US guided CSI split between DRUJ today  PT offered, available anytime prn, HEP and activity modification reviewed  We discussed modified progressive pain-free activity as tolerated  Expectations and goals of CSI reviewed  Often 2-3 days for steroid effect, and can take up to two weeks for maximum effect  We discussed modified progressive pain-free activity as tolerated  Do not overuse in first two weeks if feeling better due to concern for vulnerability while steroid is working  Supportive care reviewed  All questions were answered today  Contact us with additional questions or concerns  Signs and sx of concern reviewed      Abdulaziz Aly DO, CACORDELIA  Primary Care Sports Medicine  [facility]       Time spent in chart review, one-on-one evaluation, discussion with patient regarding nature of problem, course, prior treatments, and therapeutic options, share-decision making, procedures and referrals as appropriate/documented, and charting related to the visit: 31 minutes          Disclaimer: This note consists of symbols derived from keyboarding, dictation and/or voice recognition software. As a result, there may be errors in the script that  have gone undetected. Please consider this when interpreting information found in this chart.

## 2021-04-19 RX ORDER — ROPIVACAINE HYDROCHLORIDE 5 MG/ML
1 INJECTION, SOLUTION EPIDURAL; INFILTRATION; PERINEURAL
Status: DISCONTINUED | OUTPATIENT
Start: 2021-04-15 | End: 2021-10-19 | Stop reason: ALTCHOICE

## 2021-04-19 RX ORDER — TRIAMCINOLONE ACETONIDE 40 MG/ML
40 INJECTION, SUSPENSION INTRA-ARTICULAR; INTRAMUSCULAR
Status: DISCONTINUED | OUTPATIENT
Start: 2021-04-15 | End: 2021-10-19 | Stop reason: ALTCHOICE

## 2021-05-27 ENCOUNTER — RECORDS - HEALTHEAST (OUTPATIENT)
Dept: ADMINISTRATIVE | Facility: CLINIC | Age: 66
End: 2021-05-27

## 2021-05-30 VITALS — BODY MASS INDEX: 44.1 KG/M2 | HEIGHT: 71 IN | WEIGHT: 315 LBS

## 2021-05-31 VITALS — BODY MASS INDEX: 43.4 KG/M2 | WEIGHT: 310 LBS | HEIGHT: 71 IN

## 2021-06-09 NOTE — PROGRESS NOTES
Assessment:   1. Routine general medical examination at a health care facility  As far as healthcare maintenance, patient is up-to-date on colonoscopy.  He is overdue for tetanus and is willing to do that today.  I also recommended fasting labs including a PSA.  He would like to proceed with that.  He would like to hold off on the Zostavax, but would consider it at next physical.  - Comprehensive Metabolic Panel  - Glycosylated Hemoglobin A1c  - HM2(CBC w/o Differential)  - Lipid Cascade  - PSA (Prostatic-Specific Antigen), Annual Screen  - Thyroid Stimulating Hormone (TSH)  - Tdap vaccine,  6yo or older,  IM    2. Essential hypertension with goal blood pressure less than 140/90  Patient's blood pressure is been under good control on his current medications.    3. Hypercholesteremia  Patient continues on a statin.  He is due for fasting lipids today.    4. Morbid obesity with BMI of 40.0-44.9, adult  Had a discussion today with patient regarding his obesity.  This is likely contributing to his ongoing knee and back issues.  I recommend the [facility] weight loss program.  I think it would be good for him to be in a more structured dietary program.  He states he is not sure he can follow their exercise guidelines due to his knee issues.    5. Family history of heart disease  Patient has had 3 of his brothers have coronary artery bypass surgery.  He is concerned about heart disease.  We discussed options and he would like to proceed with a stress test.  He does think he would be able to walk on a treadmill enough to complete a stress test.  We also discussed lifestyle modification and losing weight would certainly decrease his risks.  - Stress Test Graded; Future    6. Back pain  Patient states he has some disc disease diagnosed on an MRI that he had at [facility], I do not have those records.  He thinks this is why he is having ongoing shooting pain into his right leg.  I recommend that we start with  "conservative therapy including physical therapy and weight loss.  We could certainly have him be seen at the spine care center at some point if things are not improving.  - Ambulatory referral to Physical Therapy      Subjective:      Tommy De La O is a 61 y.o. male who presents for an annual exam.  Patient has decided to transfer his primary care to the [facility].  We reviewed his past medical history.  Concerns today include his weight.  He knows that this is contributed to issues with his knees and back.  He said both his knees replaced and has also had varicose vein surgery.  He states he still gets numbness and swelling in his lower extremities.  He has ongoing back issues with some shooting numbness down his right leg.  He has been seen at [facility] or so for this in the past and had a MRI of his back.  Unfortunately, I do not have those records of that MRI.  He feels like these issues are keeping him from being able to exercise on a regular basis.  He feels like this is prohibiting him from losing weight.  He admits he is not following a very good diet, but trying to eat more \"whole foods\".  I think he would benefit from going to the weight loss program at [facility].  I think losing weight would definitely help his back in the symptoms.  I do recommend that he continue wearing the compression stockings for the swelling.  He is also concerned about his family history of heart disease.  He has had 3 brothers out of 7 that have had coronary artery bypass surgery.  He is wondering what kind of testing he can do.  He is not really having any chest pain with activities.  He would like to have a stress test.  I was a little skeptical that he be able to walk on the treadmill, but he thinks he would be able to do it at least for the stress test.  As far as healthcare maintenance, he is up-to-date on colonoscopy.  He is overdue for fasting blood work.  We discussed the PSA and he would like to proceed with " that.  He is due for tetanus and he is agreeable to that.  He has not had his shingles vaccine and we discussed that, he would like to hold off for now.    Healthy Habits:   Regular Exercise: Yes  Sunscreen Use: No  Healthy Diet: Yes  Dental Visits Regularly: No  Seat Belt: Yes  Sexually active: Yes  Monthly Self Testicular Exams:  No  Hemoccults: No  Flex Sig: No  Colonoscopy: Yes  Lipid Profile: Yes  Glucose Screen: Yes  Prevention of Osteoporosis: No  Last Dexa: No  Guns at Home:  N/A      Immunization History   Administered Date(s) Administered     DT (pediatric) 02/01/1995     Influenza, inj, historic 11/05/2014     Influenza, seasonal,quad inj 36+ mos 11/05/2015     Td, historic 01/05/2004     Tdap 03/07/2017     Immunization status: due today.    No exam data present    Current Outpatient Prescriptions   Medication Sig Dispense Refill     amLODIPine (NORVASC) 5 MG tablet Take 1 tablet (5 mg total) by mouth daily. 90 tablet 0     aspirin 325 MG tablet Take 325 mg by mouth daily.       chlorthalidone (HYGROTEN) 25 MG tablet TAKE 1 TABLET EVERY DAY 90 tablet 1     clindamycin (CLEOCIN) 300 MG capsule TK 2 CS PO 1 HOUR PRIOR TO DENTAL APPOINTMENT  3     clobetasol (TEMOVATE) 0.05 % ointment Apply 1 application topically 2 (two) times a day as needed. To hands.       omeprazole (PRILOSEC) 40 MG capsule Take 1 capsule (40 mg total) by mouth daily. 90 capsule 3     simvastatin (ZOCOR) 20 MG tablet TAKE 1 TABLET EVERY DAY 90 tablet 0     Current Facility-Administered Medications   Medication Dose Route Frequency Provider Last Rate Last Dose     diazePAM tablet 10 mg (VALIUM)  10 mg Oral Once Jarrod Fischer MD         lidocaine 10 mg/mL (1 %) injection 10 mL  10 mL Other Once Jarrod Fischer MD         Past Medical History:   Diagnosis Date     Acute blood loss anemia 3/18/2015     GERD (gastroesophageal reflux disease)      Hypertension      Sleep apnea      Past Surgical History:   Procedure Laterality Date      ACHILLES TENDON LENGTHENING Left      KNEE ARTHROSCOPY       MA TOTAL KNEE ARTHROPLASTY Right 3/17/2015    Procedure: RIGHT KNEE TOTAL ARTHROPLASTY;  Surgeon: Jaiden Barnes MD;  Location: [facility] Main OR;  Service: Orthopedics     MA TOTAL KNEE ARTHROPLASTY Left 6/30/2015    Procedure: LEFT KNEE TOTAL ARTHROPLASTY;  Surgeon: Jaiden Barnes MD;  Location: [facility] Main OR;  Service: Orthopedics     ROTATOR CUFF REPAIR       Cefzil [cefprozil] and Propoxyphene n-acetaminophen  Family History   Problem Relation Age of Onset     Hypertension Mother      Heart disease Mother      Heart disease Father      Heart attack Father      Hypertension Sister      Cancer Brother      No Medical Problems Daughter      No Medical Problems Son      Cancer Maternal Grandmother      Vision loss Maternal Grandmother      No Medical Problems Sister      Cancer Brother      Hypertension Brother      Hypertension Brother      Cancer Brother      Sleep apnea Brother      No Medical Problems Brother      No Medical Problems Daughter      Social History     Social History     Marital status: Single     Spouse name: N/A     Number of children: N/A     Years of education: N/A     Occupational History     Not on file.     Social History Main Topics     Smoking status: Former Smoker     Packs/day: 1.00     Years: 30.00     Quit date: 1/1/2010     Smokeless tobacco: Never Used     Alcohol use Yes     Drug use: No     Sexual activity: No     Other Topics Concern     Not on file     Social History Narrative    .  3 kids.  Retired [employer].       Review of Systems - See HPI  General:  Denies problem  Eyes: Denies problem  Ears/Nose/Throat: Denies problem  Cardiovascular: Denies problem  Respiratory:  Denies problem  Gastrointestinal:  Denies problem  Genitourinary: Denies problem  Musculoskeletal:  Denies problem  Skin: Denies problem  Neurologic: Denies problem  Psychiatric: Denies problem  Endocrine: Denies  "problem  Heme/Lymphatic: Denies problem   Allergic/Immunologic: Denies problem        Objective:     Vitals:    03/07/17 1052   BP: 128/76   Pulse: 78   Resp: 20   Temp: 97.8  F (36.6  C)   TempSrc: Oral   Weight: (!) 317 lb (143.8 kg)   Height: 5' 11\" (1.803 m)     Body mass index is 44.21 kg/(m^2).    Physical  General Appearance: Alert, cooperative, no distress, appears stated age obese  Head: Normocephalic, without obvious abnormality, atraumatic  Eyes: PERRL, conjunctiva/corneas clear, EOM's intact  Ears: Normal TM's and external ear canals, both ears  Nose: Nares normal, septum midline,mucosa normal, no drainage  Throat: Lips, mucosa, and tongue normal; teeth and gums normal  Neck: Supple, symmetrical, trachea midline, no adenopathy;  thyroid: not enlarged, symmetric, no tenderness/mass/nodules; no carotid bruit or JVD  Back: Symmetric, no curvature, ROM normal, no CVA tenderness,neg straight leg raise  Lungs: Clear to auscultation bilaterally, respirations unlabored  Heart: Regular rate and rhythm, S1 and S2 normal, no murmur, rub, or gallop,  Abdomen: Soft, non-tender, bowel sounds active all four quadrants,  no masses, no organomegaly, obese  Genitourinary: Penis normal. Right testis is descended. Left testis is descended. Rectal: normal tone, prostate smooth  Musculoskeletal: Normal range of motion. No joint swelling or deformity.   Extremities: mild edema b, venous stasis discoloration.  Skin: Skin color, texture, turgor normal, no rashes or lesions  Lymph nodes: Cervical, supraclavicular, and axillary nodes normal  Neurologic: He is alert. He has normal reflexes.   Psychiatric: He has a normal mood and affect.            "

## 2021-06-11 ENCOUNTER — MYC MEDICAL ADVICE (OUTPATIENT)
Dept: FAMILY MEDICINE | Facility: CLINIC | Age: 66
End: 2021-06-11

## 2021-06-11 ENCOUNTER — TELEPHONE (OUTPATIENT)
Dept: ORTHOPEDICS | Facility: CLINIC | Age: 66
End: 2021-06-11

## 2021-06-11 DIAGNOSIS — G89.29 CHRONIC PAIN OF RIGHT WRIST: Primary | ICD-10-CM

## 2021-06-11 DIAGNOSIS — M19.031 OSTEOARTHRITIS OF RIGHT WRIST, UNSPECIFIED OSTEOARTHRITIS TYPE: ICD-10-CM

## 2021-06-11 DIAGNOSIS — M25.531 CHRONIC PAIN OF RIGHT WRIST: Primary | ICD-10-CM

## 2021-06-11 NOTE — TELEPHONE ENCOUNTER
Reason for Call:  Other     Detailed comments: 2 months got injection in rt wrist - split the dose. States he is only having partial relief. Wants to discuss getting another injection. - Please contact pt     Phone Number Patient can be reached at: Home number on file [phone] (home)    Best Time: Any    Can we leave a detailed message on this number? YES    Call taken on 6/11/2021 at 7:40 AM by Denise Behrendt

## 2021-06-11 NOTE — TELEPHONE ENCOUNTER
Called patient to discuss options for wrist pain and discuss another injection. Patient stated he would like to chat directly with Dr. Aly or Dilshad.     Lindy Shin, ATC  Certified Athletic Trainer

## 2021-06-11 NOTE — PROGRESS NOTES
62 year old male w/  left common iliac artery aneurysm, med Hx includes tobaccos abuse, HTN, and HLD. Patient underwent CT 05/08/2017 due to c/o hematuria, CT showed a left common iliac artery measuring to 2.3 cm. CMP done 03/01/2017 showed a creatinine of 10 w/ a GFR >60. Patient reports that he has does not have claudication, does have bilateral leg and feet numbness w/ known degenerative disk disease and osteoarthritis in his knees, has seen [facility]. Pedal pulses are palpable bilaterally, he has growth the length of his legs. He was previously seen by Dr. Daniels for venous insufficiency, underwent bilateral GSV RFA.

## 2021-06-14 NOTE — TELEPHONE ENCOUNTER
Spoke to patient discussed his right wrist pain and discussion of repeat procedure.  Reviewed that Dr Aly would likely not recommending doing full dose of steroid medication in each joint at same visit, given close proximity.  Would be able to offer patient follow up appointment for repeat RC joint vs DRUJ joint injection soonest would be 7/6/21 (12 weeks post-procedure).  He would like new wrist brace to wear at home given that he sweats heavily at work and unable to dry out during night.  Patient is getting some relief with bracing, will leave new wrist brace at [facility]  on 6/15 for .  Dr Aly please sign updated DME order for new brace.    Patient was scheduled for follow up appointment on 7/6/21 for repeat injection.    Dilshad Riggs ATC

## 2021-06-14 NOTE — TELEPHONE ENCOUNTER
Order signed, agree with plan as documented.  Thanks.    Abdulaziz Aly DO, CAQ  Sports Medicine Physician  [facility]

## 2021-06-28 NOTE — PROGRESS NOTES
Tommy De La O  :  1955  DOS: 2021  MRN: [id number]    Sports Medicine Clinic Visit    PCP: Tim Crawley    Tommy De La O is a 65 year old Right hand dominant male who is seen as a self referral presenting with chronic left shoulder pain.    Injury: Gradual onset of chronic left shoulder pain over the past 5+ years, that is progressing over the past ~ 3+ months.  Pain located over left deep anterior superior shoulder, glenohumeral joint, nonradiating.  Additional Features:  Positive: grinding and weakness.  Symptoms are better with Rest and Excedrin.  Symptoms are worse with: driving skid steer, shoulder flexion/abduction, lying on left shoulder, reaching behind back.  Other evaluation and/or treatments so far consists of: Ice, Rest and Excedrin PM.  Recent imaging completed: No recent imaging completed.  Prior History of related problems: Chronic left shoulder pain that he has not previously treated.  History of status post right rotator cuff repair ~ 25 years ago.    Social History: hobby     Interim History - April 15, 2021  Since last visit on 3/4/2021 patient has moderate-severe left shoulder pain.  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  He would like to discuss steroid injection vs further imaging today.  No interim injury.       Second complaint for follow up on chronic right wrist pain. Right wrist RC and UC joint injections last completed 21 provided good relief for ~ 3 months.  He has been relying on wrist brace increasingly more at home.    Interim History - 2021  Since last visit on 4/15/2021 patient has noted continued right wrist pain.  Right wrist radiocarpal and DRUJ injections completed on 4/15/21 provided less relief and for less amount of time then past injections. Gripping and flexing his right wrist is the most painful. Has been wearing wrist brace but lately has been  causing extra pain over his dorsal wrist and is not able to remove the piece that is causing the pressure. Constant ache at 4/10 and sharp pains of 8/10 at times. No new injury in the interim.       Review of Systems  Musculoskeletal: as above  Remainder of review of systems is negative including constitutional, CV, pulmonary, GI, Skin and Neurologic except as noted in HPI or medical history.    No past medical history on file.  Past Surgical History:   Procedure Laterality Date     ARTHROSCOPY KNEE Bilateral     both knees with arthroscopy in the past.      ARTHROSCOPY KNEE       ARTHROSCOPY SHOULDER ROTATOR CUFF REPAIR       AS TOTAL KNEE ARTHROPLASTY Bilateral     Both total knees.      C TOTAL KNEE ARTHROPLASTY Right 3/17/2015    Procedure: RIGHT KNEE TOTAL ARTHROPLASTY;  Surgeon: Jaiden Barnes MD;  Location: [facility] OR;  Service: Orthopedics     C TOTAL KNEE ARTHROPLASTY Left 2015    Procedure: LEFT KNEE TOTAL ARTHROPLASTY;  Surgeon: Jaiden Barnes MD;  Location: [facility] OR;  Service: Orthopedics     LENGTHEN TENDON ACHILLES Left      REPAIR TENDON ACHILLES      1980s two separate surgeries.     ROTATOR CUFF REPAIR RT/LT Left      Family History   Problem Relation Age of Onset     Coronary Artery Disease Father 56         at 56 MI     Hyperlipidemia Mother      Prostate Cancer No family hx of      Colon Cancer No family hx of        Objective  /79   Pulse 55   Ht 1.829 m (6')   Wt 145.2 kg (320 lb)   BMI 43.40 kg/m        General: healthy, alert and in no distress      HEENT: no scleral icterus or conjunctival erythema     Skin: no suspicious lesions or rash. No jaundice.     CV: regular rhythm by palpation, 2+ distal pulses, no pedal edema      Resp: normal respiratory effort without conversational dyspnea     Psych: normal mood and affect      Gait: nonantalgic, appropriate coordination and balance     Neuro: normal light touch sensory exam of the extremities. Motor  strength as noted below       Right Wrist and Hand exam     Inspection:       Mild generalized distal/dorsal forearm swelling, no bruising or deformity right, prominent distal ulna     Tender:       DRUJ focal, ulnocarpal>radiocarpal joint TTP as well, ECU tendon pain as well with palpation and resisted extension, though milder again today     Non Tender:       Remainder of the Wrist and Hand bilateral     ROM:       Full and symmetric active and passive range of motion of the forearm, wrist and digits bilateral     Strength:       5/5 strength in the muscles of the hand, wrist and forearm bilateral     Special Tests:        neg (-) Tinel's test bilateral,       neg (-) Phalen's test bilateral,      mildly painful TFCC compression test right and       neg (-) Finkelstein's maneuver bilateral     Neurovascular:       2+ radial pulses bilaterally with brisk capillary refill and      normal sensation to light touch in the radial, median and ulnar nerve distributions      Radiology  Recent Results (from the past 744 hour(s))   XR Shoulder Left G/E 3 Views    Narrative    XR SHOULDER LT G/E 3 VW   3/4/2021 1:23 PM     HISTORY:  Acute pain of left shoulder      Impression    IMPRESSION: Unremarkable exam.    JULES KIMBALL MD     MR LEFT WRIST WITHOUT CONTRAST 6/26/2018 7:54 PM     HISTORY: Left wrist MRI DX left wrist injury, evaluate ECU tendon.  Wrist pain.     TECHNIQUE: Coronal T1, STIR, gradient echo.  Sagittal T1.  Axial T1  and T2 fat suppression.     FINDINGS:   Osseous and Cartilaginous Structures: Some motion artifact.  Mild-moderate degenerative arthrosis at the thumb CMC joint. Mild  degenerative change at the thumb MCP joint. Mild osteoarthritis at the  triscaphe (STT) and radiocarpal joints. Focal edema at the  proximal-medial aspect of the lunate suggesting ulnolunate impaction.  There is a 0.5 cm loose body at the dorsal aspect of the radioscaphoid  joint (sagittal image 15, series 6 image 5). Mild  degenerative change  at the pisotriquetral articulation. No bone contusion or fracture.     Scapholunate and Lunotriquetral Ligaments: No definite tear  identified, although MR arthrography would be the study of choice in  this regard, if indicated clinically.     Triangular Fibrocartilage Complex: Partial thickness degenerative  tearing at the central and radial aspect of the mid triangular  fibrocartilage.     Extensor Tendons: Mild medial subluxation of the extensor carpi  ulnaris tendon. There is mild-moderate extensor carpi ulnaris  tendinosis at the level of the ulnar styloid and TFCC. There is thin  linear longitudinal tearing of the extensor carpi ulnaris tendon at  the distal ulnar level     Flexor Tendons: Unremarkable. No tear, tendinosis, or tenosynovitis  identified.     Joint Spaces:  No significant joint effusion.      Additional Findings: The carpal tunnel and median nerve appear  unremarkable. Guyon's canal is unremarkable. No ganglion cyst  identified. There appears to be a small varix within the volar  subcutaneous tissues at the level of the lateral radioscaphoid joint.  Nonspecific soft tissue edema medially (including along the ECU  tendon) which could relate to injury or reactive edema.                                                                      IMPRESSION:   1. Linear longitudinal tearing of the extensor carpi ulnaris tendon,  as well as mild-moderate tendinosis. Mild medial subluxation of the  tendon  2. Scattered osteoarthritic changes. Small loose body  3. Findings suggesting ulnolunate impaction  4. Partial thickness degenerative tearing of the TFC.    Assessment:  1. Chronic pain of right wrist    2. Osteoarthritis of right wrist, unspecified osteoarthritis type    3. Right wrist tendinitis        Plan:  Discussed the assessment with the patient.  Follow up: 2-4 weeks prn if pain is not improved   PT, activity modification, and CSI options reviewed  Defer repeat CSI today,  reviewed risks  XR images independently visualized and reviewed with patient again today in clinic  Flare of chronic right wrist pain, pain in DRUJ>radiocarpal joints more related to activity than worsening pathology based on hx  US guided CSI split between DRUJ and RC joint helpful but temporary  OT offered, available anytime prn, HEP and activity modification reviewed  We discussed modified progressive pain-free activity as tolerated  Supportive care reviewed  All questions were answered today  Contact us with additional questions or concerns  Signs and sx of concern reviewed      Abdulaziz Aly DO, HAILEE  Primary Care Sports Medicine  [facility]               Disclaimer: This note consists of symbols derived from keyboarding, dictation and/or voice recognition software. As a result, there may be errors in the script that have gone undetected. Please consider this when interpreting information found in this chart.

## 2021-06-30 DIAGNOSIS — E78.5 HYPERLIPIDEMIA, UNSPECIFIED HYPERLIPIDEMIA TYPE: Chronic | ICD-10-CM

## 2021-07-01 RX ORDER — SIMVASTATIN 20 MG
TABLET ORAL
Qty: 90 TABLET | Refills: 3 | OUTPATIENT
Start: 2021-07-01

## 2021-07-02 DIAGNOSIS — E78.5 HYPERLIPIDEMIA, UNSPECIFIED HYPERLIPIDEMIA TYPE: Chronic | ICD-10-CM

## 2021-07-05 RX ORDER — SIMVASTATIN 20 MG
20 TABLET ORAL AT BEDTIME
Qty: 90 TABLET | Refills: 3 | Status: SHIPPED | OUTPATIENT
Start: 2021-07-05 | End: 2022-01-03

## 2021-07-05 NOTE — TELEPHONE ENCOUNTER
"Routing refill request to provider for review/approval because:  Drug interaction warning    Requested Prescriptions   Pending Prescriptions Disp Refills     simvastatin (ZOCOR) 20 MG tablet 90 tablet 3     Sig: Take 1 tablet (20 mg) by mouth At Bedtime       Statins Protocol Passed - 7/2/2021  4:42 PM        Passed - LDL on file in past 12 months     Recent Labs   Lab Test 02/18/21  1032   LDL 76             Passed - No abnormal creatine kinase in past 12 months     No lab results found.             Passed - Recent (12 mo) or future (30 days) visit within the authorizing provider's specialty     Patient has had an office visit with the authorizing provider or a provider within the authorizing providers department within the previous 12 mos or has a future within next 30 days. See \"Patient Info\" tab in inbasket, or \"Choose Columns\" in Meds & Orders section of the refill encounter.              Passed - Medication is active on med list        Passed - Patient is age 18 or older                   "

## 2021-07-06 ENCOUNTER — OFFICE VISIT (OUTPATIENT)
Dept: ORTHOPEDICS | Facility: CLINIC | Age: 66
End: 2021-07-06
Payer: MEDICARE

## 2021-07-06 VITALS
WEIGHT: 315 LBS | HEIGHT: 72 IN | SYSTOLIC BLOOD PRESSURE: 122 MMHG | BODY MASS INDEX: 42.66 KG/M2 | HEART RATE: 55 BPM | DIASTOLIC BLOOD PRESSURE: 79 MMHG

## 2021-07-06 DIAGNOSIS — M77.8 RIGHT WRIST TENDINITIS: ICD-10-CM

## 2021-07-06 DIAGNOSIS — G89.29 CHRONIC PAIN OF RIGHT WRIST: Primary | ICD-10-CM

## 2021-07-06 DIAGNOSIS — M25.531 CHRONIC PAIN OF RIGHT WRIST: Primary | ICD-10-CM

## 2021-07-06 DIAGNOSIS — M19.031 OSTEOARTHRITIS OF RIGHT WRIST, UNSPECIFIED OSTEOARTHRITIS TYPE: ICD-10-CM

## 2021-07-06 PROCEDURE — 99213 OFFICE O/P EST LOW 20 MIN: CPT | Performed by: FAMILY MEDICINE

## 2021-07-06 ASSESSMENT — PAIN SCALES - GENERAL: PAINLEVEL: MODERATE PAIN (4)

## 2021-07-06 ASSESSMENT — MIFFLIN-ST. JEOR: SCORE: 2269.51

## 2021-07-06 NOTE — LETTER
2021         RE: Tommy De La O   [address] [facility] [address]        Dear Colleague,    Thank you for referring your patient, Tommy De La O, to the [facility] [place]. Please see a copy of my visit note below.    Tommy De La O  :  1955  DOS: 2021  MRN: [id number]    Sports Medicine Clinic Visit    PCP: Tim Crawley    Tommy De La O is a 65 year old Right hand dominant male who is seen as a self referral presenting with chronic left shoulder pain.    Injury: Gradual onset of chronic left shoulder pain over the past 5+ years, that is progressing over the past ~ 3+ months.  Pain located over left deep anterior superior shoulder, glenohumeral joint, nonradiating.  Additional Features:  Positive: grinding and weakness.  Symptoms are better with Rest and Excedrin.  Symptoms are worse with: driving skid steer, shoulder flexion/abduction, lying on left shoulder, reaching behind back.  Other evaluation and/or treatments so far consists of: Ice, Rest and Excedrin PM.  Recent imaging completed: No recent imaging completed.  Prior History of related problems: Chronic left shoulder pain that he has not previously treated.  History of status post right rotator cuff repair ~ 25 years ago.    Social History: hobby     Interim History - April 15, 2021  Since last visit on 3/4/2021 patient has moderate-severe left shoulder pain.  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  He would like to discuss steroid injection vs further imaging today.  No interim injury.       Second complaint for follow up on chronic right wrist pain. Right wrist RC and UC joint injections last completed 21 provided good relief for ~ 3 months.  He has been relying on wrist brace increasingly more at home.    Interim History - 2021  Since last visit on 4/15/2021 patient has noted continued right  wrist pain.  Right wrist radiocarpal and DRUJ injections completed on 4/15/21 provided less relief and for less amount of time then past injections. Gripping and flexing his right wrist is the most painful. Has been wearing wrist brace but lately has been causing extra pain over his dorsal wrist and is not able to remove the piece that is causing the pressure. Constant ache at 4/10 and sharp pains of 8/10 at times. No new injury in the interim.       Review of Systems  Musculoskeletal: as above  Remainder of review of systems is negative including constitutional, CV, pulmonary, GI, Skin and Neurologic except as noted in HPI or medical history.    No past medical history on file.  Past Surgical History:   Procedure Laterality Date     ARTHROSCOPY KNEE Bilateral     both knees with arthroscopy in the past.      ARTHROSCOPY KNEE       ARTHROSCOPY SHOULDER ROTATOR CUFF REPAIR       AS TOTAL KNEE ARTHROPLASTY Bilateral     Both total knees.      C TOTAL KNEE ARTHROPLASTY Right 3/17/2015    Procedure: RIGHT KNEE TOTAL ARTHROPLASTY;  Surgeon: Jaiden Barnes MD;  Location: [facility] OR;  Service: Orthopedics     C TOTAL KNEE ARTHROPLASTY Left 2015    Procedure: LEFT KNEE TOTAL ARTHROPLASTY;  Surgeon: Jaiden Barnes MD;  Location: [facility] OR;  Service: Orthopedics     LENGTHEN TENDON ACHILLES Left      REPAIR TENDON ACHILLES      1980s two separate surgeries.     ROTATOR CUFF REPAIR RT/LT Left      Family History   Problem Relation Age of Onset     Coronary Artery Disease Father 56         at 56 MI     Hyperlipidemia Mother      Prostate Cancer No family hx of      Colon Cancer No family hx of        Objective  /79   Pulse 55   Ht 1.829 m (6')   Wt 145.2 kg (320 lb)   BMI 43.40 kg/m        General: healthy, alert and in no distress      HEENT: no scleral icterus or conjunctival erythema     Skin: no suspicious lesions or rash. No jaundice.     CV: regular rhythm by palpation, 2+ distal  pulses, no pedal edema      Resp: normal respiratory effort without conversational dyspnea     Psych: normal mood and affect      Gait: nonantalgic, appropriate coordination and balance     Neuro: normal light touch sensory exam of the extremities. Motor strength as noted below       Right Wrist and Hand exam     Inspection:       Mild generalized distal/dorsal forearm swelling, no bruising or deformity right, prominent distal ulna     Tender:       DRUJ focal, ulnocarpal>radiocarpal joint TTP as well, ECU tendon pain as well with palpation and resisted extension, though milder again today     Non Tender:       Remainder of the Wrist and Hand bilateral     ROM:       Full and symmetric active and passive range of motion of the forearm, wrist and digits bilateral     Strength:       5/5 strength in the muscles of the hand, wrist and forearm bilateral     Special Tests:        neg (-) Tinel's test bilateral,       neg (-) Phalen's test bilateral,      mildly painful TFCC compression test right and       neg (-) Finkelstein's maneuver bilateral     Neurovascular:       2+ radial pulses bilaterally with brisk capillary refill and      normal sensation to light touch in the radial, median and ulnar nerve distributions      Radiology  Recent Results (from the past 744 hour(s))   XR Shoulder Left G/E 3 Views    Narrative    XR SHOULDER LT G/E 3 VW   3/4/2021 1:23 PM     HISTORY:  Acute pain of left shoulder      Impression    IMPRESSION: Unremarkable exam.    JULES KIMBALL MD     MR LEFT WRIST WITHOUT CONTRAST 6/26/2018 7:54 PM     HISTORY: Left wrist MRI DX left wrist injury, evaluate ECU tendon.  Wrist pain.     TECHNIQUE: Coronal T1, STIR, gradient echo.  Sagittal T1.  Axial T1  and T2 fat suppression.     FINDINGS:   Osseous and Cartilaginous Structures: Some motion artifact.  Mild-moderate degenerative arthrosis at the thumb CMC joint. Mild  degenerative change at the thumb MCP joint. Mild osteoarthritis at  the  triscaphe (STT) and radiocarpal joints. Focal edema at the  proximal-medial aspect of the lunate suggesting ulnolunate impaction.  There is a 0.5 cm loose body at the dorsal aspect of the radioscaphoid  joint (sagittal image 15, series 6 image 5). Mild degenerative change  at the pisotriquetral articulation. No bone contusion or fracture.     Scapholunate and Lunotriquetral Ligaments: No definite tear  identified, although MR arthrography would be the study of choice in  this regard, if indicated clinically.     Triangular Fibrocartilage Complex: Partial thickness degenerative  tearing at the central and radial aspect of the mid triangular  fibrocartilage.     Extensor Tendons: Mild medial subluxation of the extensor carpi  ulnaris tendon. There is mild-moderate extensor carpi ulnaris  tendinosis at the level of the ulnar styloid and TFCC. There is thin  linear longitudinal tearing of the extensor carpi ulnaris tendon at  the distal ulnar level     Flexor Tendons: Unremarkable. No tear, tendinosis, or tenosynovitis  identified.     Joint Spaces:  No significant joint effusion.      Additional Findings: The carpal tunnel and median nerve appear  unremarkable. Guyon's canal is unremarkable. No ganglion cyst  identified. There appears to be a small varix within the volar  subcutaneous tissues at the level of the lateral radioscaphoid joint.  Nonspecific soft tissue edema medially (including along the ECU  tendon) which could relate to injury or reactive edema.                                                                      IMPRESSION:   1. Linear longitudinal tearing of the extensor carpi ulnaris tendon,  as well as mild-moderate tendinosis. Mild medial subluxation of the  tendon  2. Scattered osteoarthritic changes. Small loose body  3. Findings suggesting ulnolunate impaction  4. Partial thickness degenerative tearing of the TFC.    Assessment:  1. Chronic pain of right wrist    2. Osteoarthritis of right  wrist, unspecified osteoarthritis type    3. Right wrist tendinitis        Plan:  Discussed the assessment with the patient.  Follow up: 2-4 weeks prn if pain is not improved   PT, activity modification, and CSI options reviewed  Defer repeat CSI today, reviewed risks  XR images independently visualized and reviewed with patient again today in clinic  Flare of chronic right wrist pain, pain in DRUJ>radiocarpal joints more related to activity than worsening pathology based on hx  US guided CSI split between DRUJ and RC joint helpful but temporary  OT offered, available anytime prn, HEP and activity modification reviewed  We discussed modified progressive pain-free activity as tolerated  Supportive care reviewed  All questions were answered today  Contact us with additional questions or concerns  Signs and sx of concern reviewed      Abdulaziz Aly DO, CAQ  Primary Care Sports Medicine  [facility]               Disclaimer: This note consists of symbols derived from keyboarding, dictation and/or voice recognition software. As a result, there may be errors in the script that have gone undetected. Please consider this when interpreting information found in this chart.      Again, thank you for allowing me to participate in the care of your patient.        Sincerely,        Abdulaziz Aly DO

## 2021-07-11 ENCOUNTER — HOSPITAL ENCOUNTER (EMERGENCY)
Facility: CLINIC | Age: 66
Discharge: HOME | End: 2021-07-11
Attending: EMERGENCY MEDICINE | Admitting: EMERGENCY MEDICINE
Payer: MEDICARE

## 2021-07-11 VITALS
SYSTOLIC BLOOD PRESSURE: 155 MMHG | DIASTOLIC BLOOD PRESSURE: 98 MMHG | TEMPERATURE: 97.6 F | OXYGEN SATURATION: 98 % | HEART RATE: 60 BPM | WEIGHT: 315 LBS | BODY MASS INDEX: 43.4 KG/M2 | RESPIRATION RATE: 16 BRPM

## 2021-07-11 DIAGNOSIS — M79.672 LEFT FOOT PAIN: ICD-10-CM

## 2021-07-11 PROCEDURE — 99284 EMERGENCY DEPT VISIT MOD MDM: CPT | Performed by: EMERGENCY MEDICINE

## 2021-07-11 PROCEDURE — 99283 EMERGENCY DEPT VISIT LOW MDM: CPT | Performed by: EMERGENCY MEDICINE

## 2021-07-11 RX ORDER — OXYCODONE HYDROCHLORIDE 5 MG/1
5 TABLET ORAL EVERY 6 HOURS PRN
Qty: 10 TABLET | Refills: 0 | Status: SHIPPED | OUTPATIENT
Start: 2021-07-11 | End: 2021-10-19

## 2021-07-11 RX ORDER — DOXYCYCLINE 100 MG/1
100 TABLET ORAL 2 TIMES DAILY
Qty: 10 TABLET | Refills: 0 | Status: SHIPPED | OUTPATIENT
Start: 2021-07-11 | End: 2021-07-16

## 2021-07-11 ASSESSMENT — ENCOUNTER SYMPTOMS
RESPIRATORY NEGATIVE: 1
PSYCHIATRIC NEGATIVE: 1
HEMATOLOGIC/LYMPHATIC NEGATIVE: 1
EYES NEGATIVE: 1
GASTROINTESTINAL NEGATIVE: 1
CARDIOVASCULAR NEGATIVE: 1
CONSTITUTIONAL NEGATIVE: 1
ALLERGIC/IMMUNOLOGIC NEGATIVE: 1
NEUROLOGICAL NEGATIVE: 1
COLOR CHANGE: 1
ENDOCRINE NEGATIVE: 1

## 2021-07-11 NOTE — ED NOTES
Presents to ED with complaints of LLE foot pain.  Redness, swelling, and warmth on top of foot.  States it started Friday.  No hx of gout.  MD at bedside.

## 2021-07-11 NOTE — ED TRIAGE NOTES
Thurs am wore shoes that he normally does not wear that are more narrow then normal. Left foot throbbing on Thursday. Throbbing continues, painful to touch. Down to big toe. Normally wears compression stockings as well. Non-diabetic.

## 2021-07-11 NOTE — DISCHARGE INSTRUCTIONS
1) The pain and discomfort about your foot could be from skin irritation from wearing tight tennis shoes 4 days ago.  We also discussed the possibility of gout or crystalline arthropathy.  You have reported no personal history of gout or diabetes and during your wellness exam in February 2018 there is no actual testing for diabetes.  We have agreed to letter to go home with plan to focus and keep your foot elevated avoid wearing compressive shoes.  Taking pain medication and applying ice and heat as needed and taking antibiotics over the next 5 to 7 days.    2) you appear stable for discharge to home.  Although the exact cause of your pain is not clear if you have fever chills the redness triples in size (and expect symptoms to persist or worsen for the next 2 to 3 days and then begin to improve).    3) be sure to have a recheck in clinic if needed if your symptoms persist in the next 5 days or return to be reevaluated    4) you can take Tylenol 650 mg every 4 hours, ibuprofen 600 mg every 6 hours in addition to the pain medication(oxycodone) 5 mg every 6 hours as needed for pain.

## 2021-07-11 NOTE — ED PROVIDER NOTES
History     Chief Complaint   Patient presents with     Foot Pain     Thurs am wore shoes that he normally does not wear that are more narrow then normal. Left foot throbbing on Thursday. Throbbing continues, painful to touch. Down to big toe. Normally wears compression stockings as well. Non-diabetic.      JAXON De La O is a 66 year old male who presents with report of left foot pain.  Patient has multiple medical diagnoses including history of GERD, hyperlipidemia, obstructive sleep apnea syndrome, osteoarthritis of the knee, peripheral polyneuropathy, BPH with incomplete bladder emptying, diagnosis of morbid obesity.  Patient is currently on albuterol, amlodipine, chlorthalidone, gabapentin, omeprazole, simvastatin. On arrival and on examination patient reports he wore tight tennis shoes 4 days earlier  During a trip to [place].  Over the last 3 days he has noted progressive swelling redness warmth and pain with ambulation especially over the left foot on the dorsum of the foot.  No fever or chills.  No ankle pain no knee pain no hip pain.  No prior history of gout but family history of gout.  Does eat a fair amount of beef and pork.  No known history of diabetes.  He does wear compression socks. No fall or trauma or bites to the foot.  With increasing pain and discomfort he presents for further care    Allergies:  Allergies   Allergen Reactions     Cefzil [Cefprozil] Hives and Itching     Darvocet [Propoxyphene N-Apap] Hives     Penicillins Hives       Problem List:    Patient Active Problem List    Diagnosis Date Noted     Medial epicondylitis of elbow, left 10/16/2020     Priority: Medium     CHUNG (dyspnea on exertion) 10/05/2020     Priority: Medium     PFT 2/9/2021 results:SPJ=780%, FEV1=84%, FEV1/FVC=63%, BORW=714%.   The FEV1 and  FVC are normal but the FEV1/FVC ratio is reduced.   The inspiratory flow rates are within normal limits.  Lung volumes are within normal limits.  Following  administration of bronchodilators, there is a significant response.  The diffusing   capacity is normal.  However, the diffusing capacity was not corrected for the patient's hemoglobin.   Mild  airway obstruction and a response to bronchodilators indicates asthma.   IMPRESSION: Mild  Airflow Obstruction  -Asthmatic Type   2/10/2021:PFT suggests asthma.  He has a 30 pack year smoking history.        Hand dermatitis 12/30/2019     Priority: Medium     12/30/2019:Uses Clobetasol cream.  He uses on fingers for rash as needed.  Uses intermittently.  Aggravating factors: dry weather.        Peripheral polyneuropathy 12/30/2019     Priority: Medium     12/30/2019:Has pains in legs and feet.  TAking gabapentin chronically.        Benign prostatic hyperplasia with incomplete bladder emptying 11/28/2018     Priority: Medium     Essential hypertension with goal blood pressure less than 140/90 06/15/2018     Priority: Medium     Morbid obesity with BMI of 40.0-44.9, adult (H) 03/31/2016     Priority: Medium     Osteoarthritis of knee 11/03/2014     Priority: Medium     He has had bilateral knee replacements.        Gastrointestinal hemorrhage 11/03/2014     Priority: Medium     11/19/2018:He had colonoscopy.  Presumed from hemorrhoids.        Gastroesophageal reflux disease 11/03/2014     Priority: Medium     Hyperlipidemia 11/03/2014     Priority: Medium     Obstructive sleep apnea syndrome 11/03/2014     Priority: Medium     Diagnosed in 2014 with sleep study and sleep consultation.   3/18/2020:Uses CPAP nightly.  Sleeps much better with use.           Past Medical History:    No past medical history on file.    Past Surgical History:    Past Surgical History:   Procedure Laterality Date     ARTHROSCOPY KNEE Bilateral     both knees with arthroscopy in the past.      ARTHROSCOPY KNEE       ARTHROSCOPY SHOULDER ROTATOR CUFF REPAIR       AS TOTAL KNEE ARTHROPLASTY Bilateral     Both total knees.      C TOTAL KNEE ARTHROPLASTY  Right 3/17/2015    Procedure: RIGHT KNEE TOTAL ARTHROPLASTY;  Surgeon: Jaiden Barnes MD;  Location: [facility] OR;  Service: Orthopedics     C TOTAL KNEE ARTHROPLASTY Left 6/30/2015    Procedure: LEFT KNEE TOTAL ARTHROPLASTY;  Surgeon: Jaiden Barnes MD;  Location: [facility] OR;  Service: Orthopedics     LENGTHEN TENDON ACHILLES Left      REPAIR TENDON ACHILLES      1980s two separate surgeries.     ROTATOR CUFF REPAIR RT/LT Left        Family History:    Family History   Problem Relation Age of Onset     Coronary Artery Disease Father      Hyperlipidemia Mother      Prostate Cancer No family hx of      Colon Cancer No family hx of      Hypertension Mother      Heart Disease Mother      Heart Disease Father      Hypertension Sister      Cancer Brother      No Known Problems Daughter      No Known Problems Son      Cancer Maternal Grandmother      Vision Loss Maternal Grandmother      No Known Problems Sister      Cancer Brother      Hypertension Brother      Hypertension Brother      Cancer Brother      Sleep Apnea Brother      No Known Problems Brother      No Known Problems Daughter        Social History:  Marital Status:  Single [1]  Social History     Tobacco Use     Smoking status: Former Smoker     Packs/day: 1.00     Years: 30.00     Pack years: 30.00     Types: Cigarettes     Quit date: 2/1/2011     Years since quitting: 10.4     Smokeless tobacco: Never Used     Tobacco comment: started at age 22   Substance Use Topics     Alcohol use: Yes     Drug use: No        Medications:    doxycycline monohydrate (ADOXA) 100 MG tablet  oxyCODONE (ROXICODONE) 5 MG tablet  albuterol (PROAIR HFA/PROVENTIL HFA/VENTOLIN HFA) 108 (90 Base) MCG/ACT inhaler  amLODIPine (NORVASC) 5 MG tablet  chlorthalidone (HYGROTON) 25 MG tablet  clobetasol (TEMOVATE) 0.05 % external ointment  gabapentin (NEURONTIN) 300 MG capsule  neomycin-polymyxin-hydrocortisone (CORTISPORIN) 3.5-17647-3 otic suspension  omeprazole  (PRILOSEC) 40 MG DR capsule  simvastatin (ZOCOR) 20 MG tablet          Review of Systems   Constitutional: Negative.    HENT: Negative.    Eyes: Negative.    Respiratory: Negative.    Cardiovascular: Negative.    Gastrointestinal: Negative.    Endocrine: Negative.    Genitourinary: Negative.    Musculoskeletal:        Left foot pain, warmth swelling redness.   Skin: Positive for color change.   Allergic/Immunologic: Negative.    Neurological: Negative.    Hematological: Negative.    Psychiatric/Behavioral: Negative.    All other systems reviewed and are negative.      Physical Exam   BP: (!) 155/98  Pulse: 60  Temp: 97.6  F (36.4  C)  Resp: 16  Weight: 145.2 kg (320 lb)  SpO2: 98 %      Physical Exam  Constitutional:       General: He is not in acute distress.     Appearance: He is not ill-appearing, toxic-appearing or diaphoretic.   HENT:      Head: Normocephalic and atraumatic.      Nose: Nose normal.   Eyes:      Extraocular Movements: Extraocular movements intact.      Pupils: Pupils are equal, round, and reactive to light.   Cardiovascular:      Pulses: Normal pulses.   Musculoskeletal:         General: Tenderness and signs of injury present. No swelling.      Cervical back: Normal range of motion and neck supple.      Left foot: Swelling and tenderness present. No crepitus. Normal pulse.        Legs:    Skin:     Capillary Refill: Capillary refill takes less than 2 seconds.      Findings: Erythema present.   Neurological:      General: No focal deficit present.      Mental Status: He is alert and oriented to person, place, and time.      Cranial Nerves: No cranial nerve deficit.      Sensory: No sensory deficit.      Motor: No weakness.      Coordination: Coordination normal.      Gait: Gait normal.      Deep Tendon Reflexes: Reflexes normal.   Psychiatric:         Mood and Affect: Mood normal.         Behavior: Behavior normal.         Thought Content: Thought content normal.         Judgment: Judgment  normal.             ED Course        Procedures              Critical Care time:  none               ED medications: none      ED vitals:  Vitals:    07/11/21 0952   BP: (!) 155/98   Pulse: 60   Resp: 16   Temp: 97.6  F (36.4  C)   TempSrc: Temporal   SpO2: 98%   Weight: 145.2 kg (320 lb)     ED labs and imaging:none        Assessments & Plan (with Medical Decision Making)   Assessment Summary and Clinical impression: 66-year-old male who presented with left foot pain.  The cause of his pain is not clear.  Patient has warmth redness over the dorsum of the left foot which could be due to crystalline arthropathy.  An evolving cellulitis also possible with report of wearing a tight pair of tennis shoe 4 days earlier.  After reviewing options for care patient expressed comfort going home with plan to take pain medication empiric antibiotics with watchful waiting with close outpatient follow-up.      ED course and plan:  Reviewed the medical record.  Office visit on July 6, 2021.  Reviewed possible causes for his pain and a broad differential was considered including crystalline arthropathy, cellulitis, (lower suspicion for fracture or foreign body)..  Patient reported no trauma to the foot except for wearing a tight tennis shoe ..  We agreed to forego imaging today with plan to focus on pain management, keeping his foot elevated, keeping the skin dry.  We also discussed empiric coverage for possible cellulitis.  He is placed on doxycycline twice daily x7 days.  Patient is allergy to penicillin causing hives and cefzil causing hives and itching.  We discussed worrisome symptoms including but not limited to erythema that progresses fever and or chills, cyanosis, numbness. We reviewed that symptoms could worsen before they improve over the next 3 to 5 days.      Disclaimer: This note consists of symbols derived from keyboarding, dictation and/or voice recognition software. As a result, there may be errors in the script  that have gone undetected. Please consider this when interpreting information found in this chart.  I have reviewed the nursing notes.    I have reviewed the findings, diagnosis, plan and need for follow up with the patient.       New Prescriptions    DOXYCYCLINE MONOHYDRATE (ADOXA) 100 MG TABLET    Take 1 tablet (100 mg) by mouth 2 times daily for 5 days    OXYCODONE (ROXICODONE) 5 MG TABLET    Take 1 tablet (5 mg) by mouth every 6 hours as needed for pain or moderate to severe pain       Final diagnoses:   Left foot pain - With warmth and redness and point tenderness in the dorsum of the last 4 days after wearing tight tennis shoes       7/11/2021   [facility] EMERGENCY DEPT     Ambrocio Brewster MD  07/11/21 9416

## 2021-08-13 ENCOUNTER — OFFICE VISIT (OUTPATIENT)
Dept: FAMILY MEDICINE | Facility: CLINIC | Age: 66
End: 2021-08-13
Payer: MEDICARE

## 2021-08-13 VITALS
RESPIRATION RATE: 17 BRPM | WEIGHT: 315 LBS | HEIGHT: 72 IN | OXYGEN SATURATION: 98 % | DIASTOLIC BLOOD PRESSURE: 82 MMHG | BODY MASS INDEX: 42.66 KG/M2 | TEMPERATURE: 98.2 F | HEART RATE: 65 BPM | SYSTOLIC BLOOD PRESSURE: 130 MMHG

## 2021-08-13 DIAGNOSIS — S16.1XXA STRAIN OF NECK MUSCLE, INITIAL ENCOUNTER: Primary | ICD-10-CM

## 2021-08-13 DIAGNOSIS — E66.01 MORBID OBESITY WITH BMI OF 40.0-44.9, ADULT (H): ICD-10-CM

## 2021-08-13 DIAGNOSIS — G47.33 OBSTRUCTIVE SLEEP APNEA SYNDROME: ICD-10-CM

## 2021-08-13 PROCEDURE — 99214 OFFICE O/P EST MOD 30 MIN: CPT | Performed by: NURSE PRACTITIONER

## 2021-08-13 RX ORDER — CYCLOBENZAPRINE HCL 10 MG
10 TABLET ORAL 3 TIMES DAILY PRN
Qty: 30 TABLET | Refills: 1 | Status: SHIPPED | OUTPATIENT
Start: 2021-08-13 | End: 2021-08-16

## 2021-08-13 RX ORDER — PREDNISONE 20 MG/1
40 TABLET ORAL DAILY
Qty: 10 TABLET | Refills: 0 | Status: SHIPPED | OUTPATIENT
Start: 2021-08-13 | End: 2021-08-18

## 2021-08-13 ASSESSMENT — MIFFLIN-ST. JEOR: SCORE: 2281.3

## 2021-08-13 NOTE — PROGRESS NOTES
Assessment & Plan     Strain of neck muscle, initial encounter  Treating with prednisone and flexeril and recommend follow-up with Physical Therapy for further evaluation and treatment, due to no red flags on exam today.  Discussed signs and symptoms that would be concerning and to follow-up if these occur or no improvement despite treatment and Physical Therapy.  I also discussed OTC treatment including TENs, biofreeze/tiger balm, and ice/heat.  - predniSONE (DELTASONE) 20 MG tablet; Take 2 tablets (40 mg) by mouth daily for 5 days  - cyclobenzaprine (FLEXERIL) 10 MG tablet; Take 1 tablet (10 mg) by mouth 3 times daily as needed for muscle spasms  - Physical Therapy Referral; Future    Morbid obesity with BMI of 40.0-44.9, adult (H)  Patient declines comprehensive weight management referral today.  I discussed that his weight can be cause of increase in sleep apnea.  He admits to bad diet and depression and anxiety may play a role but he does not want any treatment for this either today.  Recommend follow-up with PCP for further discussion.    Obstructive sleep apnea syndrome  Discussed exercise which is difficult with his knee's and weight as well as diet changes with smaller portion sizes and choosing healthier foods .    See Patient Instructions    Return in about 2 weeks (around 8/27/2021), or if symptoms worsen or fail to improve.    Yue Cooper NP  [facility]    Apirl Sanders is a 66 year old who presents for the following health issues;     HPI     Head and Neck Pain  Onset/Duration: 2 - 3 week   Description:   Location: left side of neck  Radiation: left side of head   Intensity: moderate  Progression of Symptoms:  Same and constant   Accompanying Signs & Symptoms:  Burning, tingling, prickly sensation in arm(s): No  Numbness in arm(s): YES- numbness in his left hand   Weakness in arm(s):  no  Fever: no  Headache: YES- Left side of head   Nausea and/or vomiting:  no  History:   Trauma: no  Previous neck pain: YES- once in awhile he will tweek his neck and it will cause him pain but does not last this long   Previous surgery or injections: no  Previous Imaging (MRI,X ray): no  Precipitating or alleviating factors: movement of the head to the right causes pain in the left posterior neck just below the base of the skull and this correlates with headache around the left temple and top of eye  Does movement impact the pain:  YES- turning his head hurts   Therapies tried and outcome: Aleve seemed to help, advil does not work     Patient discusses complaints about excessive daytime drowsiness but uses CPAP nightly.  He is overweight and states that he has some depression and anxiety but declines medications at this time.  He worries about his calves and if they are not getting enough to eat but cannot be outside to watch them.    Review of Systems   CONSTITUTIONAL: NEGATIVE for fever, chills, change in weight  RESP: NEGATIVE for significant cough or SOB  CV: NEGATIVE for chest pain, palpitations or peripheral edema  MUSCULOSKELETAL: POSITIVE  for left neck pain with headache in the left temple and forehead that occurs correlating to the pain in the neck  PSYCHIATRIC: POSITIVE foranxiety and depressed mood  ROS otherwise negative      Objective    /82   Pulse 65   Temp 98.2  F (36.8  C) (Tympanic)   Resp 17   Ht 1.829 m (6')   Wt 146.3 kg (322 lb 9.6 oz)   SpO2 98%   BMI 43.75 kg/m    Body mass index is 43.75 kg/m .  Physical Exam   GENERAL: healthy, alert and no distress  RESP: lungs clear to auscultation - no rales, rhonchi or wheezes  CV: regular rate and rhythm, normal S1 S2, no S3 or S4, no murmur, click or rub, no peripheral edema and peripheral pulses strong  MS: neck exam shows limited ROM turning to the right, pain with movement in all directions, good strength in neck  PSYCH: anxious, judgement and insight intact, appearance well groomed and very  talkative

## 2021-08-16 ENCOUNTER — TELEPHONE (OUTPATIENT)
Dept: FAMILY MEDICINE | Facility: CLINIC | Age: 66
End: 2021-08-16

## 2021-08-16 DIAGNOSIS — S16.1XXA STRAIN OF NECK MUSCLE, INITIAL ENCOUNTER: ICD-10-CM

## 2021-08-16 RX ORDER — CYCLOBENZAPRINE HCL 10 MG
10 TABLET ORAL 3 TIMES DAILY PRN
Qty: 30 TABLET | Refills: 1 | Status: SHIPPED | OUTPATIENT
Start: 2021-08-16 | End: 2021-10-19

## 2021-08-16 NOTE — TELEPHONE ENCOUNTER
Patient's call transferred to author  Patient attempting to reach the [facility] Team     Patient states he was evaluated by Consuelo and prescribed Prednisone and Flexeril   Patient states he was able to  the Prednisone on Sunday and states the pharmacy had no knowledge of the Flexeril order.     Patient started the Prednisone yesterday.   Will call [facility] in [place] to follow-up on the Flexeril order.     Patient verbalized understanding   No further questions/concerns    Parish Santos RN

## 2021-08-16 NOTE — TELEPHONE ENCOUNTER
Call placed to Pharmacy  Pharmacy staff state they have not received the order for the Flexeril.   Will route high priority to [facility] to resend prescription.     Call placed to patient and updated patient on plan  Patient verbalized understanding    Parish Santos RN

## 2021-09-29 ENCOUNTER — ALLIED HEALTH/NURSE VISIT (OUTPATIENT)
Dept: FAMILY MEDICINE | Facility: CLINIC | Age: 66
End: 2021-09-29
Payer: MEDICARE

## 2021-09-29 ENCOUNTER — VIRTUAL VISIT (OUTPATIENT)
Dept: FAMILY MEDICINE | Facility: CLINIC | Age: 66
End: 2021-09-29
Payer: MEDICARE

## 2021-09-29 DIAGNOSIS — J02.9 ACUTE PHARYNGITIS, UNSPECIFIED ETIOLOGY: Primary | ICD-10-CM

## 2021-09-29 DIAGNOSIS — I10 HYPERTENSION, UNSPECIFIED TYPE: ICD-10-CM

## 2021-09-29 DIAGNOSIS — R42 EPISODE OF DIZZINESS: Primary | ICD-10-CM

## 2021-09-29 DIAGNOSIS — J02.9 ACUTE PHARYNGITIS, UNSPECIFIED ETIOLOGY: ICD-10-CM

## 2021-09-29 PROBLEM — J02.0 ACUTE STREPTOCOCCAL PHARYNGITIS: Status: ACTIVE | Noted: 2021-09-29

## 2021-09-29 LAB — DEPRECATED S PYO AG THROAT QL EIA: NEGATIVE

## 2021-09-29 PROCEDURE — 87651 STREP A DNA AMP PROBE: CPT | Performed by: PHYSICIAN ASSISTANT

## 2021-09-29 PROCEDURE — 99207 PR NO CHARGE LOS: CPT

## 2021-09-29 PROCEDURE — U0003 INFECTIOUS AGENT DETECTION BY NUCLEIC ACID (DNA OR RNA); SEVERE ACUTE RESPIRATORY SYNDROME CORONAVIRUS 2 (SARS-COV-2) (CORONAVIRUS DISEASE [COVID-19]), AMPLIFIED PROBE TECHNIQUE, MAKING USE OF HIGH THROUGHPUT TECHNOLOGIES AS DESCRIBED BY CMS-2020-01-R: HCPCS | Mod: 90 | Performed by: PHYSICIAN ASSISTANT

## 2021-09-29 PROCEDURE — 99214 OFFICE O/P EST MOD 30 MIN: CPT | Mod: 95 | Performed by: PHYSICIAN ASSISTANT

## 2021-09-29 RX ORDER — PREDNISONE 20 MG/1
40 TABLET ORAL DAILY
Qty: 6 TABLET | Refills: 0 | Status: SHIPPED | OUTPATIENT
Start: 2021-09-29 | End: 2021-10-02

## 2021-09-29 RX ORDER — LIDOCAINE HYDROCHLORIDE 20 MG/ML
15 SOLUTION OROPHARYNGEAL
Qty: 200 ML | Refills: 0 | Status: SHIPPED | OUTPATIENT
Start: 2021-09-29 | End: 2021-10-19

## 2021-09-29 NOTE — PROGRESS NOTES
Tommy is a 66 year old who is being evaluated via a billable video visit.      How would you like to obtain your AVS? MyChart  If the video visit is dropped, the invitation should be resent by: Text to cell phone: [phone]  Will anyone else be joining your video visit? No    Video Start Time: 8:54 AM    Assessment & Plan     Episode of dizziness  Primary concern, broad differential including TIA, arrhythmia, etc.  Unlikely dehydration as self resolved without drinking fluids.  He opts to monitor rather than extensive work up for single episode.    Acute pharyngitis, unspecified etiology  Discussed in detail. Patient had wanted antibiotics.  - predniSONE (DELTASONE) 20 MG tablet; Take 2 tablets (40 mg) by mouth daily for 3 days  - lidocaine (XYLOCAINE) 2 % solution; Swish and spit 15 mLs in mouth every 3 hours as needed for moderate pain ; Max 8 doses/24 hour period.  - Streptococcus A Rapid Screen w/Reflex to PCR - Clinic Collect  - Symptomatic COVID-19 Virus (Coronavirus) by PCR Nose  - Group A Streptococcus PCR Throat Swab    Hypertension, unspecified type  He will monitor.      Patient Instructions   Monitor for further dizziness  Be seen promptly if having symptoms   Otherwise if doing well, due for lab in Feb  Discussed potassium rich foods     Let me know if throat doing well          No follow-ups on file.    [facility]    Subjective   Tommy is a 66 year old who presents for the following health issues     HPI     Acute Illness - is on C-Pap  Acute illness concerns: dizziness, ear and sore throat  Onset/Duration: 3 days  Symptoms:  Fever: no  Chills/Sweats: no  Headache (location?): YES - not uncommon, attributes to his CPAP  Sinus Pressure: no  Conjunctivitis:  no  Ear Pain: YES: right  Rhinorrhea: no  Congestion: no  Sore Throat: YES  Cough: no  Wheeze: no  Decreased Appetite: no  Nausea: no  Vomiting: no  Diarrhea: no  Dysuria/Freq.: no  Dysuria  or Hematuria: no  Fatigue/Achiness: YES- neck - not uncommon for him, can get this from his CPAP  Sick/Strep Exposure: no  Therapies tried and outcome: None    He briefly got dizzy on Monday while doing his usual level of activity (walking around with chores).  He says that he guarantees that he doesn't drink enough.  Lasted for 3-4 minutes, concerned him enough to call and stay on the phone with a neighbor until he felt better.  Dizzy, whoosy, blurry vision.  He sat down.  No no headache, confusion, word finding difficulty, no weakness.  No SOB, chest pain or pressure, palpitations.  No nausea.      ST started last night.  Ear feeling a bit off starting an hour ago.  Feels his sore throats get very wicked very fast and is wanting to treat.  Has been gargling salt water due to some mouth sores he gets from his CPAP.      Objective           Vitals:  No vitals were obtained today due to virtual visit.    Physical Exam   GENERAL: Healthy, alert and no distress  EYES: Eyes grossly normal to inspection.  No discharge or erythema, or obvious scleral/conjunctival abnormalities.  RESP: No audible wheeze, cough, or visible cyanosis.  No visible retractions or increased work of breathing.    SKIN: Visible skin clear. No significant rash, abnormal pigmentation or lesions.  NEURO: Cranial nerves grossly intact.  Mentation and speech appropriate for age.  PSYCH: Mentation appears normal, affect normal/bright, judgement and insight intact, normal speech and appearance well-groomed.            Video-Visit Details    Type of service:  Video Visit    Video End Time:8:48, video with poor sound converted to phone, phone also dropped multiple times.  Total time 27 min.    Originating Location (pt. Location): Home    Distant Location (provider location):  [facility]     Platform used for Video Visit: [employer]Stay

## 2021-09-30 LAB — GROUP A STREP BY PCR: NOT DETECTED

## 2021-09-30 NOTE — PATIENT INSTRUCTIONS
Monitor for further dizziness  Be seen promptly if having symptoms   Otherwise if doing well, due for lab in Feb  Discussed potassium rich foods     Let me know if throat doing well

## 2021-10-02 LAB — SARS-COV-2 RNA RESP QL NAA+PROBE: NOT DETECTED

## 2021-10-19 ENCOUNTER — OFFICE VISIT (OUTPATIENT)
Dept: ORTHOPEDICS | Facility: CLINIC | Age: 66
End: 2021-10-19
Payer: MEDICARE

## 2021-10-19 VITALS
SYSTOLIC BLOOD PRESSURE: 134 MMHG | BODY MASS INDEX: 42.66 KG/M2 | HEIGHT: 72 IN | DIASTOLIC BLOOD PRESSURE: 82 MMHG | WEIGHT: 315 LBS

## 2021-10-19 DIAGNOSIS — M25.531 CHRONIC PAIN OF RIGHT WRIST: Primary | ICD-10-CM

## 2021-10-19 DIAGNOSIS — M19.031 OSTEOARTHRITIS OF RIGHT WRIST, UNSPECIFIED OSTEOARTHRITIS TYPE: ICD-10-CM

## 2021-10-19 DIAGNOSIS — G89.29 CHRONIC PAIN OF RIGHT WRIST: Primary | ICD-10-CM

## 2021-10-19 DIAGNOSIS — M77.8 RIGHT WRIST TENDINITIS: ICD-10-CM

## 2021-10-19 PROCEDURE — 99213 OFFICE O/P EST LOW 20 MIN: CPT | Mod: 25 | Performed by: FAMILY MEDICINE

## 2021-10-19 PROCEDURE — 20606 DRAIN/INJ JOINT/BURSA W/US: CPT | Mod: RT | Performed by: FAMILY MEDICINE

## 2021-10-19 RX ORDER — TRIAMCINOLONE ACETONIDE 40 MG/ML
40 INJECTION, SUSPENSION INTRA-ARTICULAR; INTRAMUSCULAR
Status: DISCONTINUED | OUTPATIENT
Start: 2021-10-19 | End: 2023-01-10

## 2021-10-19 RX ORDER — ROPIVACAINE HYDROCHLORIDE 5 MG/ML
2 INJECTION, SOLUTION EPIDURAL; INFILTRATION; PERINEURAL
Status: DISCONTINUED | OUTPATIENT
Start: 2021-10-19 | End: 2023-01-10

## 2021-10-19 RX ADMIN — TRIAMCINOLONE ACETONIDE 40 MG: 40 INJECTION, SUSPENSION INTRA-ARTICULAR; INTRAMUSCULAR at 10:15

## 2021-10-19 RX ADMIN — ROPIVACAINE HYDROCHLORIDE 2 ML: 5 INJECTION, SOLUTION EPIDURAL; INFILTRATION; PERINEURAL at 10:15

## 2021-10-19 ASSESSMENT — MIFFLIN-ST. JEOR: SCORE: 2292.19

## 2021-10-19 NOTE — LETTER
10/19/2021         RE: Tommy De La O   [address] [facility] [address]        Dear Colleague,    Thank you for referring your patient, Tommy De La O, to the [facility] [place]. Please see a copy of my visit note below.    Tommy De La O  :  1955  DOS: 10/19/21  MRN: [id number]    Sports Medicine Clinic Visit    PCP: Tim Crawley    Tommy De La O is a 65 year old Right hand dominant male who is seen as a self referral presenting with chronic left shoulder pain.    Injury: Gradual onset of chronic left shoulder pain over the past 5+ years, that is progressing over the past ~ 3+ months.  Pain located over left deep anterior superior shoulder, glenohumeral joint, nonradiating.  Additional Features:  Positive: grinding and weakness.  Symptoms are better with Rest and Excedrin.  Symptoms are worse with: driving skid steer, shoulder flexion/abduction, lying on left shoulder, reaching behind back.  Other evaluation and/or treatments so far consists of: Ice, Rest and Excedrin PM.  Recent imaging completed: No recent imaging completed.  Prior History of related problems: Chronic left shoulder pain that he has not previously treated.  History of status post right rotator cuff repair ~ 25 years ago.    Social History: hobby     Interim History - April 15, 2021  Since last visit on 3/4/2021 patient has moderate-severe left shoulder pain.  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  He would like to discuss steroid injection vs further imaging today.  No interim injury.       Second complaint for follow up on chronic right wrist pain. Right wrist RC and UC joint injections last completed 21 provided good relief for ~ 3 months.  He has been relying on wrist brace increasingly more at home.    Interim History - 2021  Since last visit on 4/15/2021 patient has noted continued right  wrist pain.  Right wrist radiocarpal and DRUJ injections completed on 4/15/21 provided less relief and for less amount of time then past injections. Gripping and flexing his right wrist is the most painful. Has been wearing wrist brace but lately has been causing extra pain over his dorsal wrist and is not able to remove the piece that is causing the pressure. Constant ache at 4/10 and sharp pains of 8/10 at times. No new injury in the interim.     Interim History - October 19, 2021  Since last visit on 7/6/2021 patient has moderate-severe right wrist pain.  Patient reports that pain has been significantly flared over the past ~ 3 weeks.  No interim injury.       Review of Systems  Musculoskeletal: as above  Remainder of review of systems is negative including constitutional, CV, pulmonary, GI, Skin and Neurologic except as noted in HPI or medical history.    No past medical history on file.  Past Surgical History:   Procedure Laterality Date     ARTHROSCOPY KNEE Bilateral     both knees with arthroscopy in the past.      ARTHROSCOPY KNEE       ARTHROSCOPY SHOULDER ROTATOR CUFF REPAIR       AS TOTAL KNEE ARTHROPLASTY Bilateral     Both total knees.      C TOTAL KNEE ARTHROPLASTY Right 3/17/2015    Procedure: RIGHT KNEE TOTAL ARTHROPLASTY;  Surgeon: Jaiden Barnes MD;  Location: [facility];  Service: Orthopedics     C TOTAL KNEE ARTHROPLASTY Left 6/30/2015    Procedure: LEFT KNEE TOTAL ARTHROPLASTY;  Surgeon: Jaiden Barnes MD;  Location: [facility];  Service: Orthopedics     LENGTHEN TENDON ACHILLES Left      REPAIR TENDON ACHILLES      1980s two separate surgeries.     ROTATOR CUFF REPAIR RT/LT Left      Family History   Problem Relation Age of Onset     Coronary Artery Disease Father      Hyperlipidemia Mother      Prostate Cancer No family hx of      Colon Cancer No family hx of      Hypertension Mother      Heart Disease Mother      Heart Disease Father      Hypertension Sister      Cancer  Brother      No Known Problems Daughter      No Known Problems Son      Cancer Maternal Grandmother      Vision Loss Maternal Grandmother      No Known Problems Sister      Cancer Brother      Hypertension Brother      Hypertension Brother      Cancer Brother      Sleep Apnea Brother      No Known Problems Brother      No Known Problems Daughter        Objective  /82   Ht 1.829 m (6')   Wt 147.4 kg (325 lb)   BMI 44.08 kg/m        General: healthy, alert and in no distress      HEENT: no scleral icterus or conjunctival erythema     Skin: no suspicious lesions or rash. No jaundice.     CV: regular rhythm by palpation, 2+ distal pulses, no pedal edema      Resp: normal respiratory effort without conversational dyspnea     Psych: normal mood and affect      Gait: nonantalgic, appropriate coordination and balance     Neuro: normal light touch sensory exam of the extremities. Motor strength as noted below       Right Wrist and Hand exam     Inspection:       Mild generalized distal/dorsal forearm swelling, no bruising or deformity right, prominent distal ulna     Tender:       DRUJ, ulnocarpal>radiocarpal joint TTP, ECU tendon pain as well with palpation and resisted extension, though milder again today     Non Tender:       Remainder of the Wrist and Hand bilateral     ROM:       Full and symmetric active and passive range of motion of the forearm, wrist and digits bilateral     Strength:       5/5 strength in the muscles of the hand, wrist and forearm bilateral     Special Tests:        neg (-) Tinel's test bilateral,       neg (-) Phalen's test bilateral,      mildly painful TFCC compression test right and       neg (-) Finkelstein's maneuver bilateral     Neurovascular:       2+ radial pulses bilaterally with brisk capillary refill and      normal sensation to light touch in the radial, median and ulnar nerve distributions      Radiology  Recent Results (from the past 744 hour(s))   XR Shoulder Left G/E 3  Views    Narrative    XR SHOULDER LT G/E 3 VW   3/4/2021 1:23 PM     HISTORY:  Acute pain of left shoulder      Impression    IMPRESSION: Unremarkable exam.    JULES KIMBALL MD     MR LEFT WRIST WITHOUT CONTRAST 6/26/2018 7:54 PM     HISTORY: Left wrist MRI DX left wrist injury, evaluate ECU tendon.  Wrist pain.     TECHNIQUE: Coronal T1, STIR, gradient echo.  Sagittal T1.  Axial T1  and T2 fat suppression.     FINDINGS:   Osseous and Cartilaginous Structures: Some motion artifact.  Mild-moderate degenerative arthrosis at the thumb CMC joint. Mild  degenerative change at the thumb MCP joint. Mild osteoarthritis at the  triscaphe (STT) and radiocarpal joints. Focal edema at the  proximal-medial aspect of the lunate suggesting ulnolunate impaction.  There is a 0.5 cm loose body at the dorsal aspect of the radioscaphoid  joint (sagittal image 15, series 6 image 5). Mild degenerative change  at the pisotriquetral articulation. No bone contusion or fracture.     Scapholunate and Lunotriquetral Ligaments: No definite tear  identified, although MR arthrography would be the study of choice in  this regard, if indicated clinically.     Triangular Fibrocartilage Complex: Partial thickness degenerative  tearing at the central and radial aspect of the mid triangular  fibrocartilage.     Extensor Tendons: Mild medial subluxation of the extensor carpi  ulnaris tendon. There is mild-moderate extensor carpi ulnaris  tendinosis at the level of the ulnar styloid and TFCC. There is thin  linear longitudinal tearing of the extensor carpi ulnaris tendon at  the distal ulnar level     Flexor Tendons: Unremarkable. No tear, tendinosis, or tenosynovitis  identified.     Joint Spaces:  No significant joint effusion.      Additional Findings: The carpal tunnel and median nerve appear  unremarkable. Guyon's canal is unremarkable. No ganglion cyst  identified. There appears to be a small varix within the volar  subcutaneous tissues at the level  of the lateral radioscaphoid joint.  Nonspecific soft tissue edema medially (including along the ECU  tendon) which could relate to injury or reactive edema.                                                                      IMPRESSION:   1. Linear longitudinal tearing of the extensor carpi ulnaris tendon,  as well as mild-moderate tendinosis. Mild medial subluxation of the  tendon  2. Scattered osteoarthritic changes. Small loose body  3. Findings suggesting ulnolunate impaction  4. Partial thickness degenerative tearing of the TFC.    Medium Joint Injection/Arthrocentesis: R radiocarpal    Date/Time: 10/19/2021 10:15 AM  Performed by: Abdulaziz Aly DO  Authorized by: Abdulaziz Aly DO     Indications:  Pain  Needle Size:  25 G  Guidance: ultrasound    Approach:  Dorsal  Location:  Wrist  Site:  R radiocarpal  Medications:  40 mg triamcinolone 40 MG/ML; 2 mL ropivacaine 5 MG/ML  Outcome:  Tolerated well, no immediate complications  Procedure discussed: discussed risks, benefits, and alternatives    Consent Given by:  Patient  Timeout: timeout called immediately prior to procedure    Prep: patient was prepped and draped in usual sterile fashion        Assessment:  1. Chronic pain of right wrist    2. Osteoarthritis of right wrist, unspecified osteoarthritis type    3. Right wrist tendinitis        Plan:  Discussed the assessment with the patient.  Follow up: 2-4 weeks prn if pain is not improved   PT, activity modification, and CSI options reviewed  Repeat ultrasound-guided radio and ulnocarpal CSI today, reviewed risks  XR images independently visualized and reviewed with patient again today in clinic  Flare of chronic right wrist pain, pain in DRUJ>radiocarpal joints more related to activity than worsening pathology based on hx  Expectations and goals of CSI reviewed  Often 2-3 days for steroid effect, and can take up to two weeks for maximum effect  We discussed modified progressive  pain-free activity as tolerated  Do not overuse in first two weeks if feeling better due to concern for vulnerability while steroid is working  Supportive care reviewed  All questions were answered today  Contact us with additional questions or concerns  Signs and sx of concern reviewed      Abdulaziz Aly DO, HAILEE  Primary Care Sports Medicine  [facility]               Disclaimer: This note consists of symbols derived from keyboarding, dictation and/or voice recognition software. As a result, there may be errors in the script that have gone undetected. Please consider this when interpreting information found in this chart.      Again, thank you for allowing me to participate in the care of your patient.        Sincerely,        Abdulaziz Aly DO

## 2021-10-19 NOTE — PROGRESS NOTES
Tommy De La O  :  1955  DOS: 10/19/21  MRN: [id number]    Sports Medicine Clinic Visit    PCP: Tim Crawley    Tommy De La O is a 65 year old Right hand dominant male who is seen as a self referral presenting with chronic left shoulder pain.    Injury: Gradual onset of chronic left shoulder pain over the past 5+ years, that is progressing over the past ~ 3+ months.  Pain located over left deep anterior superior shoulder, glenohumeral joint, nonradiating.  Additional Features:  Positive: grinding and weakness.  Symptoms are better with Rest and Excedrin.  Symptoms are worse with: driving skid steer, shoulder flexion/abduction, lying on left shoulder, reaching behind back.  Other evaluation and/or treatments so far consists of: Ice, Rest and Excedrin PM.  Recent imaging completed: No recent imaging completed.  Prior History of related problems: Chronic left shoulder pain that he has not previously treated.  History of status post right rotator cuff repair ~ 25 years ago.    Social History: hobby     Interim History - April 15, 2021  Since last visit on 3/4/2021 patient has moderate-severe left shoulder pain.  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  He would like to discuss steroid injection vs further imaging today.  No interim injury.       Second complaint for follow up on chronic right wrist pain. Right wrist RC and UC joint injections last completed 21 provided good relief for ~ 3 months.  He has been relying on wrist brace increasingly more at home.    Interim History - 2021  Since last visit on 4/15/2021 patient has noted continued right wrist pain.  Right wrist radiocarpal and DRUJ injections completed on 4/15/21 provided less relief and for less amount of time then past injections. Gripping and flexing his right wrist is the most painful. Has been wearing wrist brace but lately has been  causing extra pain over his dorsal wrist and is not able to remove the piece that is causing the pressure. Constant ache at 4/10 and sharp pains of 8/10 at times. No new injury in the interim.     Interim History - October 19, 2021  Since last visit on 7/6/2021 patient has moderate-severe right wrist pain.  Patient reports that pain has been significantly flared over the past ~ 3 weeks.  No interim injury.       Review of Systems  Musculoskeletal: as above  Remainder of review of systems is negative including constitutional, CV, pulmonary, GI, Skin and Neurologic except as noted in HPI or medical history.    No past medical history on file.  Past Surgical History:   Procedure Laterality Date     ARTHROSCOPY KNEE Bilateral     both knees with arthroscopy in the past.      ARTHROSCOPY KNEE       ARTHROSCOPY SHOULDER ROTATOR CUFF REPAIR       AS TOTAL KNEE ARTHROPLASTY Bilateral     Both total knees.      C TOTAL KNEE ARTHROPLASTY Right 3/17/2015    Procedure: RIGHT KNEE TOTAL ARTHROPLASTY;  Surgeon: Jaiden Barnes MD;  Location: [facility];  Service: Orthopedics     C TOTAL KNEE ARTHROPLASTY Left 6/30/2015    Procedure: LEFT KNEE TOTAL ARTHROPLASTY;  Surgeon: Jaiden Barnes MD;  Location: [facility];  Service: Orthopedics     LENGTHEN TENDON ACHILLES Left      REPAIR TENDON ACHILLES      1980s two separate surgeries.     ROTATOR CUFF REPAIR RT/LT Left      Family History   Problem Relation Age of Onset     Coronary Artery Disease Father      Hyperlipidemia Mother      Prostate Cancer No family hx of      Colon Cancer No family hx of      Hypertension Mother      Heart Disease Mother      Heart Disease Father      Hypertension Sister      Cancer Brother      No Known Problems Daughter      No Known Problems Son      Cancer Maternal Grandmother      Vision Loss Maternal Grandmother      No Known Problems Sister      Cancer Brother      Hypertension Brother      Hypertension Brother      Cancer  Brother      Sleep Apnea Brother      No Known Problems Brother      No Known Problems Daughter        Objective  /82   Ht 1.829 m (6')   Wt 147.4 kg (325 lb)   BMI 44.08 kg/m        General: healthy, alert and in no distress      HEENT: no scleral icterus or conjunctival erythema     Skin: no suspicious lesions or rash. No jaundice.     CV: regular rhythm by palpation, 2+ distal pulses, no pedal edema      Resp: normal respiratory effort without conversational dyspnea     Psych: normal mood and affect      Gait: nonantalgic, appropriate coordination and balance     Neuro: normal light touch sensory exam of the extremities. Motor strength as noted below       Right Wrist and Hand exam     Inspection:       Mild generalized distal/dorsal forearm swelling, no bruising or deformity right, prominent distal ulna     Tender:       DRUJ, ulnocarpal>radiocarpal joint TTP, ECU tendon pain as well with palpation and resisted extension, though milder again today     Non Tender:       Remainder of the Wrist and Hand bilateral     ROM:       Full and symmetric active and passive range of motion of the forearm, wrist and digits bilateral     Strength:       5/5 strength in the muscles of the hand, wrist and forearm bilateral     Special Tests:        neg (-) Tinel's test bilateral,       neg (-) Phalen's test bilateral,      mildly painful TFCC compression test right and       neg (-) Finkelstein's maneuver bilateral     Neurovascular:       2+ radial pulses bilaterally with brisk capillary refill and      normal sensation to light touch in the radial, median and ulnar nerve distributions      Radiology  Recent Results (from the past 744 hour(s))   XR Shoulder Left G/E 3 Views    Narrative    XR SHOULDER LT G/E 3 VW   3/4/2021 1:23 PM     HISTORY:  Acute pain of left shoulder      Impression    IMPRESSION: Unremarkable exam.    JULES KIMBALL MD     MR LEFT WRIST WITHOUT CONTRAST 6/26/2018 7:54 PM     HISTORY: Left wrist  MRI DX left wrist injury, evaluate ECU tendon.  Wrist pain.     TECHNIQUE: Coronal T1, STIR, gradient echo.  Sagittal T1.  Axial T1  and T2 fat suppression.     FINDINGS:   Osseous and Cartilaginous Structures: Some motion artifact.  Mild-moderate degenerative arthrosis at the thumb CMC joint. Mild  degenerative change at the thumb MCP joint. Mild osteoarthritis at the  triscaphe (STT) and radiocarpal joints. Focal edema at the  proximal-medial aspect of the lunate suggesting ulnolunate impaction.  There is a 0.5 cm loose body at the dorsal aspect of the radioscaphoid  joint (sagittal image 15, series 6 image 5). Mild degenerative change  at the pisotriquetral articulation. No bone contusion or fracture.     Scapholunate and Lunotriquetral Ligaments: No definite tear  identified, although MR arthrography would be the study of choice in  this regard, if indicated clinically.     Triangular Fibrocartilage Complex: Partial thickness degenerative  tearing at the central and radial aspect of the mid triangular  fibrocartilage.     Extensor Tendons: Mild medial subluxation of the extensor carpi  ulnaris tendon. There is mild-moderate extensor carpi ulnaris  tendinosis at the level of the ulnar styloid and TFCC. There is thin  linear longitudinal tearing of the extensor carpi ulnaris tendon at  the distal ulnar level     Flexor Tendons: Unremarkable. No tear, tendinosis, or tenosynovitis  identified.     Joint Spaces:  No significant joint effusion.      Additional Findings: The carpal tunnel and median nerve appear  unremarkable. Guyon's canal is unremarkable. No ganglion cyst  identified. There appears to be a small varix within the volar  subcutaneous tissues at the level of the lateral radioscaphoid joint.  Nonspecific soft tissue edema medially (including along the ECU  tendon) which could relate to injury or reactive edema.                                                                      IMPRESSION:   1. Linear  longitudinal tearing of the extensor carpi ulnaris tendon,  as well as mild-moderate tendinosis. Mild medial subluxation of the  tendon  2. Scattered osteoarthritic changes. Small loose body  3. Findings suggesting ulnolunate impaction  4. Partial thickness degenerative tearing of the TFC.    Medium Joint Injection/Arthrocentesis: R radiocarpal    Date/Time: 10/19/2021 10:15 AM  Performed by: Abdulaziz Aly DO  Authorized by: Abdulaziz Aly DO     Indications:  Pain  Needle Size:  25 G  Guidance: ultrasound    Approach:  Dorsal  Location:  Wrist  Site:  R radiocarpal  Medications:  40 mg triamcinolone 40 MG/ML; 2 mL ropivacaine 5 MG/ML  Outcome:  Tolerated well, no immediate complications  Procedure discussed: discussed risks, benefits, and alternatives    Consent Given by:  Patient  Timeout: timeout called immediately prior to procedure    Prep: patient was prepped and draped in usual sterile fashion        Assessment:  1. Chronic pain of right wrist    2. Osteoarthritis of right wrist, unspecified osteoarthritis type    3. Right wrist tendinitis        Plan:  Discussed the assessment with the patient.  Follow up: 2-4 weeks prn if pain is not improved   PT, activity modification, and CSI options reviewed  Repeat ultrasound-guided radio and ulnocarpal CSI today, reviewed risks  XR images independently visualized and reviewed with patient again today in clinic  Flare of chronic right wrist pain, pain in DRUJ>radiocarpal joints more related to activity than worsening pathology based on hx  Expectations and goals of CSI reviewed  Often 2-3 days for steroid effect, and can take up to two weeks for maximum effect  We discussed modified progressive pain-free activity as tolerated  Do not overuse in first two weeks if feeling better due to concern for vulnerability while steroid is working  Supportive care reviewed  All questions were answered today  Contact us with additional questions or  concerns  Signs and sx of concern reviewed      Abdulaziz Aly DO, CAQ  Primary Care Sports Medicine  [facility]               Disclaimer: This note consists of symbols derived from keyboarding, dictation and/or voice recognition software. As a result, there may be errors in the script that have gone undetected. Please consider this when interpreting information found in this chart.

## 2021-10-23 ENCOUNTER — HEALTH MAINTENANCE LETTER (OUTPATIENT)
Age: 66
End: 2021-10-23

## 2021-12-21 ENCOUNTER — TELEPHONE (OUTPATIENT)
Dept: FAMILY MEDICINE | Facility: CLINIC | Age: 66
End: 2021-12-21
Payer: MEDICARE

## 2021-12-21 NOTE — TELEPHONE ENCOUNTER
[facility] - CPAP Supplies  Form placed on Provider Dr. Crawley desk for [employer].  Nimisha [place] Sec

## 2021-12-22 ENCOUNTER — MEDICAL CORRESPONDENCE (OUTPATIENT)
Dept: HEALTH INFORMATION MANAGEMENT | Facility: CLINIC | Age: 66
End: 2021-12-22
Payer: MEDICARE

## 2021-12-29 ENCOUNTER — MEDICAL CORRESPONDENCE (OUTPATIENT)
Dept: HEALTH INFORMATION MANAGEMENT | Facility: CLINIC | Age: 66
End: 2021-12-29
Payer: MEDICARE

## 2022-01-03 ENCOUNTER — TELEPHONE (OUTPATIENT)
Dept: FAMILY MEDICINE | Facility: CLINIC | Age: 67
End: 2022-01-03
Payer: MEDICARE

## 2022-01-03 DIAGNOSIS — G62.9 PERIPHERAL POLYNEUROPATHY: ICD-10-CM

## 2022-01-03 DIAGNOSIS — K21.9 GASTROESOPHAGEAL REFLUX DISEASE WITHOUT ESOPHAGITIS: ICD-10-CM

## 2022-01-03 DIAGNOSIS — I10 ESSENTIAL HYPERTENSION WITH GOAL BLOOD PRESSURE LESS THAN 140/90: ICD-10-CM

## 2022-01-03 DIAGNOSIS — E78.5 HYPERLIPIDEMIA, UNSPECIFIED HYPERLIPIDEMIA TYPE: Chronic | ICD-10-CM

## 2022-01-03 RX ORDER — GABAPENTIN 300 MG/1
CAPSULE ORAL
Qty: 270 CAPSULE | Refills: 0 | Status: SHIPPED | OUTPATIENT
Start: 2022-01-03 | End: 2022-02-28

## 2022-01-03 RX ORDER — CHLORTHALIDONE 25 MG/1
25 TABLET ORAL DAILY
Qty: 90 TABLET | Refills: 0 | Status: SHIPPED | OUTPATIENT
Start: 2022-01-03 | End: 2022-02-28

## 2022-01-03 RX ORDER — SIMVASTATIN 20 MG
20 TABLET ORAL AT BEDTIME
Qty: 90 TABLET | Refills: 0 | Status: SHIPPED | OUTPATIENT
Start: 2022-01-03 | End: 2022-02-28

## 2022-01-03 RX ORDER — OMEPRAZOLE 40 MG/1
CAPSULE, DELAYED RELEASE ORAL
Qty: 90 CAPSULE | Refills: 0 | Status: SHIPPED | OUTPATIENT
Start: 2022-01-03 | End: 2022-02-28

## 2022-01-03 RX ORDER — AMLODIPINE BESYLATE 5 MG/1
5 TABLET ORAL DAILY
Qty: 90 TABLET | Refills: 0 | Status: SHIPPED | OUTPATIENT
Start: 2022-01-03 | End: 2022-02-28

## 2022-01-03 NOTE — LETTER
[facility]  [address] [place] [id number]  Phone: [phone]  Fax: [phone]        January 3, 2022      Tommy De La O                                                                                                                                [address] [place] [id number]            Dear Mr. De La O,    We are concerned about your health care.  We recently provided you with a medication refill.  Many medications require routine follow-up with your Doctor.      At this time we ask that: You schedule an appointment for your annual physical.    Your prescription: Has been refilled for so you may have time for the above noted follow-up.      Thank you,      Tim Crawley MD/ Dorys Arguello RN

## 2022-01-03 NOTE — TELEPHONE ENCOUNTER
Sent patient My Chart  needs appointment with the doctor for further refill.  Dorys Arguello RN

## 2022-01-19 ENCOUNTER — TELEPHONE (OUTPATIENT)
Dept: ORTHOPEDICS | Facility: CLINIC | Age: 67
End: 2022-01-19
Payer: MEDICARE

## 2022-01-19 NOTE — PROGRESS NOTES
Tommy De La O  :  1955  DOS: 2022  MRN: [id number]    Sports Medicine Clinic Visit    PCP: Tim Crawley    Tommy De La O is a 65 year old Right hand dominant male who is seen as a self referral presenting with chronic left shoulder pain.    Injury: Gradual onset of chronic left shoulder pain over the past 5+ years, that is progressing over the past ~ 3+ months.  Pain located over left deep anterior superior shoulder, glenohumeral joint, nonradiating.  Additional Features:  Positive: grinding and weakness.  Symptoms are better with Rest and Excedrin.  Symptoms are worse with: driving skid steer, shoulder flexion/abduction, lying on left shoulder, reaching behind back.  Other evaluation and/or treatments so far consists of: Ice, Rest and Excedrin PM.  Recent imaging completed: No recent imaging completed.  Prior History of related problems: Chronic left shoulder pain that he has not previously treated.  History of status post right rotator cuff repair ~ 25 years ago.    Social History: hobby     Interim History - April 15, 2021  Since last visit on 3/4/2021 patient has moderate-severe left shoulder pain.  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  He would like to discuss steroid injection vs further imaging today.  No interim injury.       Second complaint for follow up on chronic right wrist pain. Right wrist RC and UC joint injections last completed 21 provided good relief for ~ 3 months.  He has been relying on wrist brace increasingly more at home.    Interim History - 2021  Since last visit on 4/15/2021 patient has noted continued right wrist pain.  Right wrist radiocarpal and DRUJ injections completed on 4/15/21 provided less relief and for less amount of time then past injections. Gripping and flexing his right wrist is the most painful. Has been wearing wrist brace but lately has been  causing extra pain over his dorsal wrist and is not able to remove the piece that is causing the pressure. Constant ache at 4/10 and sharp pains of 8/10 at times. No new injury in the interim.     Interim History - October 19, 2021  Since last visit on 7/6/2021 patient has moderate-severe right wrist pain.  Patient reports that pain has been significantly flared over the past ~ 3 weeks.  No interim injury.       Interim History - January 21, 2022  Since last visit on 10/19/2022, patient has right anterior shoulder pain. 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 Pain is at 4/10. Notes that he is currently trying a massage gun without relief. Pain increases throughout the day.  No interim injury.         Review of Systems  Musculoskeletal: as above  Remainder of review of systems is negative including constitutional, CV, pulmonary, GI, Skin and Neurologic except as noted in HPI or medical history.    No past medical history on file.  Past Surgical History:   Procedure Laterality Date     ARTHROSCOPY KNEE Bilateral     both knees with arthroscopy in the past.      ARTHROSCOPY KNEE       ARTHROSCOPY SHOULDER ROTATOR CUFF REPAIR       AS TOTAL KNEE ARTHROPLASTY Bilateral     Both total knees.      LENGTHEN TENDON ACHILLES Left      REPAIR TENDON ACHILLES      1980s two separate surgeries.     ROTATOR CUFF REPAIR RT/LT Left      [facility] TOTAL KNEE ARTHROPLASTY Right 3/17/2015    Procedure: RIGHT KNEE TOTAL ARTHROPLASTY;  Surgeon: Jaiden Barnes MD;  Location: [facility] OR;  Service: Orthopedics     [facility] TOTAL KNEE ARTHROPLASTY Left 6/30/2015    Procedure: LEFT KNEE TOTAL ARTHROPLASTY;  Surgeon: Jaiden Barnes MD;  Location: [facility];  Service: Orthopedics     Family History   Problem Relation Age of Onset     Coronary Artery Disease Father      Hyperlipidemia Mother      Prostate Cancer No family hx of      Colon Cancer No  family hx of      Hypertension Mother      Heart Disease Mother      Heart Disease Father      Hypertension Sister      Cancer Brother      No Known Problems Daughter      No Known Problems Son      Cancer Maternal Grandmother      Vision Loss Maternal Grandmother      No Known Problems Sister      Cancer Brother      Hypertension Brother      Hypertension Brother      Cancer Brother      Sleep Apnea Brother      No Known Problems Brother      No Known Problems Daughter        Objective  /76   Wt 143.8 kg (317 lb)   BMI 42.99 kg/m        General: healthy, alert and in no distress      HEENT: no scleral icterus or conjunctival erythema     Skin: no suspicious lesions or rash. No jaundice.     CV: regular rhythm by palpation, 2+ distal pulses, no pedal edema      Resp: normal respiratory effort without conversational dyspnea     Psych: normal mood and affect      Gait: nonantalgic, appropriate coordination and balance     Neuro: normal light touch sensory exam of the extremities. Motor strength as noted below     Right Shoulder exam    ROM:        Near full active and passive ROM with flexion, extension, abduction, internal and external rotation.       asymmetric scapular motion on R due to mild pain       Painful terminal flexion and abduction, ER    Tender:        subacromial space right       Lateral deltoid mild       Anterior GH joint    Non Tender:       remainder of shoulder       sternoclavicular joint       acromioclavicular joint       posterior shoulder       periscapular region    Strength:        abduction 5-/5, painful       internal rotation 5/5       external rotation 4+/5, painful       adduction 5/5    Impingement testing:       neg Neer       Mildly painful Rascon       positive (+) empty can       neg (-) crossover       positive crank    Stability testing:       neg (-) relocation       neg (-) anterior glide       neg (-) sulcus sign    Skin:       no visible deformities       well  perfused       capillary refill brisk    Sensation:        normal sensation over shoulder and upper extremity       Radiology  Recent Results (from the past 744 hour(s))   XR Shoulder Left G/E 3 Views    Narrative    XR SHOULDER LT G/E 3 VW   3/4/2021 1:23 PM     HISTORY:  Acute pain of left shoulder      Impression    IMPRESSION: Unremarkable exam.    JULES KIMBALL MD     MR LEFT WRIST WITHOUT CONTRAST 6/26/2018 7:54 PM     HISTORY: Left wrist MRI DX left wrist injury, evaluate ECU tendon.  Wrist pain.     TECHNIQUE: Coronal T1, STIR, gradient echo.  Sagittal T1.  Axial T1  and T2 fat suppression.     FINDINGS:   Osseous and Cartilaginous Structures: Some motion artifact.  Mild-moderate degenerative arthrosis at the thumb CMC joint. Mild  degenerative change at the thumb MCP joint. Mild osteoarthritis at the  triscaphe (STT) and radiocarpal joints. Focal edema at the  proximal-medial aspect of the lunate suggesting ulnolunate impaction.  There is a 0.5 cm loose body at the dorsal aspect of the radioscaphoid  joint (sagittal image 15, series 6 image 5). Mild degenerative change  at the pisotriquetral articulation. No bone contusion or fracture.     Scapholunate and Lunotriquetral Ligaments: No definite tear  identified, although MR arthrography would be the study of choice in  this regard, if indicated clinically.     Triangular Fibrocartilage Complex: Partial thickness degenerative  tearing at the central and radial aspect of the mid triangular  fibrocartilage.     Extensor Tendons: Mild medial subluxation of the extensor carpi  ulnaris tendon. There is mild-moderate extensor carpi ulnaris  tendinosis at the level of the ulnar styloid and TFCC. There is thin  linear longitudinal tearing of the extensor carpi ulnaris tendon at  the distal ulnar level     Flexor Tendons: Unremarkable. No tear, tendinosis, or tenosynovitis  identified.     Joint Spaces:  No significant joint effusion.      Additional Findings: The  carpal tunnel and median nerve appear  unremarkable. Guyon's canal is unremarkable. No ganglion cyst  identified. There appears to be a small varix within the volar  subcutaneous tissues at the level of the lateral radioscaphoid joint.  Nonspecific soft tissue edema medially (including along the ECU  tendon) which could relate to injury or reactive edema.                                                                      IMPRESSION:   1. Linear longitudinal tearing of the extensor carpi ulnaris tendon,  as well as mild-moderate tendinosis. Mild medial subluxation of the  tendon  2. Scattered osteoarthritic changes. Small loose body  3. Findings suggesting ulnolunate impaction  4. Partial thickness degenerative tearing of the TFC.    Large Joint Injection/Arthocentesis: R glenohumeral joint    Date/Time: 1/21/2022 2:05 PM  Performed by: Abdulaziz Aly DO  Authorized by: Abdulaizz Aly DO     Indications:  Pain  Needle Size:  21 G  Guidance: ultrasound    Approach:  Posterolateral  Location:  Shoulder      Site:  R glenohumeral joint  Medications:  40 mg triamcinolone 40 MG/ML  Medications comment:  3 mL Ropivacaine 0.5%  NDC: 58070-512-25  Lot: [id number]  Exp: 07/31/2025    Outcome:  Tolerated well, no immediate complications  Procedure discussed: discussed risks, benefits, and alternatives    Consent Given by:  Patient  Timeout: timeout called immediately prior to procedure    Prep: patient was prepped and draped in usual sterile fashion          Assessment:  1. Acute pain of right shoulder    2. Rotator cuff tendinitis, right    3. Hx of shoulder surgery    4. Osteoarthritis of glenohumeral joint, right        Plan:  Discussed the assessment with the patient.  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  We discussed working \"smarter, not harder\" at length today, and options to change some of his chores  Wrist also worsening secondary to this work, advised we can repeat CSI but has not been that help[ful, and activity/job modification will likely be much more helpful  Has appt next week if needed  Could consider subacromial bursa injection for sx that are not changed or worsened  PT options reviewed, defer for now  Expectations and goals of CSI reviewed  Often 2-3 days for steroid effect, and can take up to two weeks for maximum effect  We discussed modified progressive pain-free activity as tolerated  Do not overuse in first two weeks if feeling better due to concern for vulnerability while steroid is working  Supportive care reviewed  All questions were answered today  Contact us with additional questions or concerns  Signs and sx of concern reviewed      Abdulaziz Aly DO, CACORDELIA  Primary Care Sports Medicine  [facility]       Time spent in chart review, one-on-one evaluation, discussion with patient regarding: nature of problem, clinical course, prior treatments, therapeutic options, shared-decision making, potential procedures and referrals, and charting related to the visit: 33 minutes.  If applicable, time does not include time spent performing any procedure.              Disclaimer: This note consists of symbols derived from keyboarding, dictation and/or voice recognition software. As a result, there may be errors in the script that have gone undetected. Please consider this when interpreting information found in " this chart.

## 2022-01-20 ENCOUNTER — MEDICAL CORRESPONDENCE (OUTPATIENT)
Dept: HEALTH INFORMATION MANAGEMENT | Facility: CLINIC | Age: 67
End: 2022-01-20
Payer: MEDICARE

## 2022-01-21 ENCOUNTER — ANCILLARY PROCEDURE (OUTPATIENT)
Dept: GENERAL RADIOLOGY | Facility: CLINIC | Age: 67
End: 2022-01-21
Attending: FAMILY MEDICINE
Payer: MEDICARE

## 2022-01-21 ENCOUNTER — OFFICE VISIT (OUTPATIENT)
Dept: ORTHOPEDICS | Facility: CLINIC | Age: 67
End: 2022-01-21
Payer: MEDICARE

## 2022-01-21 VITALS — DIASTOLIC BLOOD PRESSURE: 76 MMHG | WEIGHT: 315 LBS | BODY MASS INDEX: 42.99 KG/M2 | SYSTOLIC BLOOD PRESSURE: 125 MMHG

## 2022-01-21 DIAGNOSIS — Z98.890 HX OF SHOULDER SURGERY: ICD-10-CM

## 2022-01-21 DIAGNOSIS — M25.511 RIGHT SHOULDER PAIN: ICD-10-CM

## 2022-01-21 DIAGNOSIS — M25.511 ACUTE PAIN OF RIGHT SHOULDER: Primary | ICD-10-CM

## 2022-01-21 DIAGNOSIS — M19.011 OSTEOARTHRITIS OF GLENOHUMERAL JOINT, RIGHT: ICD-10-CM

## 2022-01-21 DIAGNOSIS — M75.81 ROTATOR CUFF TENDINITIS, RIGHT: ICD-10-CM

## 2022-01-21 PROCEDURE — 99214 OFFICE O/P EST MOD 30 MIN: CPT | Mod: 25 | Performed by: FAMILY MEDICINE

## 2022-01-21 PROCEDURE — 20611 DRAIN/INJ JOINT/BURSA W/US: CPT | Mod: RT | Performed by: FAMILY MEDICINE

## 2022-01-21 PROCEDURE — 73030 X-RAY EXAM OF SHOULDER: CPT | Mod: RT | Performed by: RADIOLOGY

## 2022-01-21 RX ORDER — TRIAMCINOLONE ACETONIDE 40 MG/ML
40 INJECTION, SUSPENSION INTRA-ARTICULAR; INTRAMUSCULAR
Status: DISCONTINUED | OUTPATIENT
Start: 2022-01-21 | End: 2023-01-10

## 2022-01-21 RX ADMIN — TRIAMCINOLONE ACETONIDE 40 MG: 40 INJECTION, SUSPENSION INTRA-ARTICULAR; INTRAMUSCULAR at 14:05

## 2022-01-21 ASSESSMENT — PAIN SCALES - GENERAL: PAINLEVEL: MODERATE PAIN (4)

## 2022-01-21 NOTE — LETTER
2022         RE: Tommy De La O   [address] [facility] [address]        Dear Colleague,    Thank you for referring your patient, Tommy De La O, to the [facility]. Please see a copy of my visit note below.    Tommy De La O  :  1955  DOS: 2022  MRN: [id number]    Sports Medicine Clinic Visit    PCP: Tim Crawley    Tommy De La O is a 65 year old Right hand dominant male who is seen as a self referral presenting with chronic left shoulder pain.    Injury: Gradual onset of chronic left shoulder pain over the past 5+ years, that is progressing over the past ~ 3+ months.  Pain located over left deep anterior superior shoulder, glenohumeral joint, nonradiating.  Additional Features:  Positive: grinding and weakness.  Symptoms are better with Rest and Excedrin.  Symptoms are worse with: driving skid steer, shoulder flexion/abduction, lying on left shoulder, reaching behind back.  Other evaluation and/or treatments so far consists of: Ice, Rest and Excedrin PM.  Recent imaging completed: No recent imaging completed.  Prior History of related problems: Chronic left shoulder pain that he has not previously treated.  History of status post right rotator cuff repair ~ 25 years ago.    Social History: hobby     Interim History - April 15, 2021  Since last visit on 3/4/2021 patient has moderate-severe left shoulder pain.  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  He would like to discuss steroid injection vs further imaging today.  No interim injury.       Second complaint for follow up on chronic right wrist pain. Right wrist RC and UC joint injections last completed 21 provided good relief for ~ 3 months.  He has been relying on wrist brace increasingly more at home.    Interim History - 2021  Since last visit on 4/15/2021 patient has noted continued right  wrist pain.  Right wrist radiocarpal and DRUJ injections completed on 4/15/21 provided less relief and for less amount of time then past injections. Gripping and flexing his right wrist is the most painful. Has been wearing wrist brace but lately has been causing extra pain over his dorsal wrist and is not able to remove the piece that is causing the pressure. Constant ache at 4/10 and sharp pains of 8/10 at times. No new injury in the interim.     Interim History - October 19, 2021  Since last visit on 7/6/2021 patient has moderate-severe right wrist pain.  Patient reports that pain has been significantly flared over the past ~ 3 weeks.  No interim injury.       Interim History - January 21, 2022  Since last visit on 10/19/2022, patient has right anterior shoulder pain. 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 Pain is at 4/10. Notes that he is currently trying a massage gun without relief. Pain increases throughout the day.  No interim injury.         Review of Systems  Musculoskeletal: as above  Remainder of review of systems is negative including constitutional, CV, pulmonary, GI, Skin and Neurologic except as noted in HPI or medical history.    No past medical history on file.  Past Surgical History:   Procedure Laterality Date     ARTHROSCOPY KNEE Bilateral     both knees with arthroscopy in the past.      ARTHROSCOPY KNEE       ARTHROSCOPY SHOULDER ROTATOR CUFF REPAIR       AS TOTAL KNEE ARTHROPLASTY Bilateral     Both total knees.      LENGTHEN TENDON ACHILLES Left      REPAIR TENDON ACHILLES      1980s two separate surgeries.     ROTATOR CUFF REPAIR RT/LT Left      [facility] TOTAL KNEE ARTHROPLASTY Right 3/17/2015    Procedure: RIGHT KNEE TOTAL ARTHROPLASTY;  Surgeon: Jaiden Barnes MD;  Location: [facility];  Service: Orthopedics     [facility] TOTAL KNEE ARTHROPLASTY Left 6/30/2015    Procedure: LEFT KNEE TOTAL ARTHROPLASTY;   Surgeon: Jaiden Barnes MD;  Location: [facility] OR;  Service: Orthopedics     Family History   Problem Relation Age of Onset     Coronary Artery Disease Father      Hyperlipidemia Mother      Prostate Cancer No family hx of      Colon Cancer No family hx of      Hypertension Mother      Heart Disease Mother      Heart Disease Father      Hypertension Sister      Cancer Brother      No Known Problems Daughter      No Known Problems Son      Cancer Maternal Grandmother      Vision Loss Maternal Grandmother      No Known Problems Sister      Cancer Brother      Hypertension Brother      Hypertension Brother      Cancer Brother      Sleep Apnea Brother      No Known Problems Brother      No Known Problems Daughter        Objective  /76   Wt 143.8 kg (317 lb)   BMI 42.99 kg/m        General: healthy, alert and in no distress      HEENT: no scleral icterus or conjunctival erythema     Skin: no suspicious lesions or rash. No jaundice.     CV: regular rhythm by palpation, 2+ distal pulses, no pedal edema      Resp: normal respiratory effort without conversational dyspnea     Psych: normal mood and affect      Gait: nonantalgic, appropriate coordination and balance     Neuro: normal light touch sensory exam of the extremities. Motor strength as noted below     Right Shoulder exam    ROM:        Near full active and passive ROM with flexion, extension, abduction, internal and external rotation.       asymmetric scapular motion on R due to mild pain       Painful terminal flexion and abduction, ER    Tender:        subacromial space right       Lateral deltoid mild       Anterior GH joint    Non Tender:       remainder of shoulder       sternoclavicular joint       acromioclavicular joint       posterior shoulder       periscapular region    Strength:        abduction 5-/5, painful       internal rotation 5/5       external rotation 4+/5, painful       adduction 5/5    Impingement testing:       adal Dueñas        Mildly painful Rascon       positive (+) empty can       neg (-) crossover       positive crank    Stability testing:       neg (-) relocation       neg (-) anterior glide       neg (-) sulcus sign    Skin:       no visible deformities       well perfused       capillary refill brisk    Sensation:        normal sensation over shoulder and upper extremity       Radiology  Recent Results (from the past 744 hour(s))   XR Shoulder Left G/E 3 Views    Narrative    XR SHOULDER LT G/E 3 VW   3/4/2021 1:23 PM     HISTORY:  Acute pain of left shoulder      Impression    IMPRESSION: Unremarkable exam.    JULES KIMBALL MD     MR LEFT WRIST WITHOUT CONTRAST 6/26/2018 7:54 PM     HISTORY: Left wrist MRI DX left wrist injury, evaluate ECU tendon.  Wrist pain.     TECHNIQUE: Coronal T1, STIR, gradient echo.  Sagittal T1.  Axial T1  and T2 fat suppression.     FINDINGS:   Osseous and Cartilaginous Structures: Some motion artifact.  Mild-moderate degenerative arthrosis at the thumb CMC joint. Mild  degenerative change at the thumb MCP joint. Mild osteoarthritis at the  triscaphe (STT) and radiocarpal joints. Focal edema at the  proximal-medial aspect of the lunate suggesting ulnolunate impaction.  There is a 0.5 cm loose body at the dorsal aspect of the radioscaphoid  joint (sagittal image 15, series 6 image 5). Mild degenerative change  at the pisotriquetral articulation. No bone contusion or fracture.     Scapholunate and Lunotriquetral Ligaments: No definite tear  identified, although MR arthrography would be the study of choice in  this regard, if indicated clinically.     Triangular Fibrocartilage Complex: Partial thickness degenerative  tearing at the central and radial aspect of the mid triangular  fibrocartilage.     Extensor Tendons: Mild medial subluxation of the extensor carpi  ulnaris tendon. There is mild-moderate extensor carpi ulnaris  tendinosis at the level of the ulnar styloid and TFCC. There is thin  linear  longitudinal tearing of the extensor carpi ulnaris tendon at  the distal ulnar level     Flexor Tendons: Unremarkable. No tear, tendinosis, or tenosynovitis  identified.     Joint Spaces:  No significant joint effusion.      Additional Findings: The carpal tunnel and median nerve appear  unremarkable. Guyon's canal is unremarkable. No ganglion cyst  identified. There appears to be a small varix within the volar  subcutaneous tissues at the level of the lateral radioscaphoid joint.  Nonspecific soft tissue edema medially (including along the ECU  tendon) which could relate to injury or reactive edema.                                                                      IMPRESSION:   1. Linear longitudinal tearing of the extensor carpi ulnaris tendon,  as well as mild-moderate tendinosis. Mild medial subluxation of the  tendon  2. Scattered osteoarthritic changes. Small loose body  3. Findings suggesting ulnolunate impaction  4. Partial thickness degenerative tearing of the TFC.    Large Joint Injection/Arthocentesis: R glenohumeral joint    Date/Time: 1/21/2022 2:05 PM  Performed by: Abdulaziz Aly DO  Authorized by: Abdulaziz Aly DO     Indications:  Pain  Needle Size:  21 G  Guidance: ultrasound    Approach:  Posterolateral  Location:  Shoulder      Site:  R glenohumeral joint  Medications:  40 mg triamcinolone 40 MG/ML  Medications comment:  3 mL Ropivacaine 0.5%  NDC: 24815-054-12  Lot: [id number]  Exp: 07/31/2025    Outcome:  Tolerated well, no immediate complications  Procedure discussed: discussed risks, benefits, and alternatives    Consent Given by:  Patient  Timeout: timeout called immediately prior to procedure    Prep: patient was prepped and draped in usual sterile fashion          Assessment:  1. Acute pain of right shoulder    2. Rotator cuff tendinitis, right    3. Hx of shoulder surgery    4. Osteoarthritis of glenohumeral joint, right        Plan:  Discussed the assessment with the  "patient.  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  We discussed working \"smarter, not harder\" at length today, and options to change some of his chores  Wrist also worsening secondary to this work, advised we can repeat CSI but has not been that help[ful, and activity/job modification will likely be much more helpful  Has appt next week if needed  Could consider subacromial bursa injection for sx that are not changed or worsened  PT options reviewed, defer for now  Expectations and goals of CSI reviewed  Often 2-3 days for steroid effect, and can take up to two weeks for maximum effect  We discussed modified progressive pain-free activity as tolerated  Do not overuse in first two weeks if feeling better due to concern for vulnerability while steroid is working  Supportive care reviewed  All questions were answered today  Contact us with additional questions or concerns  Signs and sx of concern reviewed      Abdulaziz Aly DO, CACORDELIA  Primary Care Sports Medicine  [facility]       Time spent in chart review, one-on-one evaluation, discussion with patient regarding: nature of problem, clinical course, prior treatments, therapeutic options, shared-decision making, potential procedures and referrals, and charting related to the visit: 33 minutes.  If applicable, time does not include time spent performing any " procedure.              Disclaimer: This note consists of symbols derived from keyboarding, dictation and/or voice recognition software. As a result, there may be errors in the script that have gone undetected. Please consider this when interpreting information found in this chart.      Again, thank you for allowing me to participate in the care of your patient.        Sincerely,        Abdulaziz Aly, DO

## 2022-02-14 ENCOUNTER — TELEPHONE (OUTPATIENT)
Dept: UROLOGY | Facility: CLINIC | Age: 67
End: 2022-02-14
Payer: MEDICARE

## 2022-02-14 NOTE — TELEPHONE ENCOUNTER
Called and spoke with patient.  He has had a few episodes of hematuria in the last 5 days.  He states that happens after he has been sitting or laying for an extended period of time, especially at night.  Opening noted on Wednesday 2/16/2022 in [place], appointment made.    Alejandra HAN RN Urology 2/14/2022 3:20 PM

## 2022-02-14 NOTE — TELEPHONE ENCOUNTER
Patient states would like to talk to someone about getting fit in as he has blood in his urine.    Thank you.

## 2022-02-16 ENCOUNTER — OFFICE VISIT (OUTPATIENT)
Dept: UROLOGY | Facility: CLINIC | Age: 67
End: 2022-02-16
Payer: MEDICARE

## 2022-02-16 VITALS
OXYGEN SATURATION: 96 % | WEIGHT: 315 LBS | DIASTOLIC BLOOD PRESSURE: 80 MMHG | BODY MASS INDEX: 42.99 KG/M2 | SYSTOLIC BLOOD PRESSURE: 130 MMHG | HEART RATE: 52 BPM | TEMPERATURE: 97.9 F

## 2022-02-16 DIAGNOSIS — N40.0 BENIGN PROSTATIC HYPERPLASIA WITHOUT LOWER URINARY TRACT SYMPTOMS: ICD-10-CM

## 2022-02-16 DIAGNOSIS — R31.0 GROSS HEMATURIA: Primary | ICD-10-CM

## 2022-02-16 LAB
ALBUMIN UR-MCNC: NEGATIVE MG/DL
APPEARANCE UR: CLEAR
BILIRUB UR QL STRIP: NEGATIVE
COLOR UR AUTO: YELLOW
GLUCOSE UR STRIP-MCNC: NEGATIVE MG/DL
HGB UR QL STRIP: NEGATIVE
KETONES UR STRIP-MCNC: NEGATIVE MG/DL
LEUKOCYTE ESTERASE UR QL STRIP: NEGATIVE
NITRATE UR QL: NEGATIVE
PH UR STRIP: 6.5 [PH] (ref 5–7)
SP GR UR STRIP: 1.01 (ref 1–1.03)
UROBILINOGEN UR STRIP-MCNC: NORMAL MG/DL

## 2022-02-16 PROCEDURE — 51798 US URINE CAPACITY MEASURE: CPT | Performed by: UROLOGY

## 2022-02-16 PROCEDURE — 99213 OFFICE O/P EST LOW 20 MIN: CPT | Mod: 25 | Performed by: UROLOGY

## 2022-02-16 PROCEDURE — 81003 URINALYSIS AUTO W/O SCOPE: CPT | Performed by: UROLOGY

## 2022-02-16 PROCEDURE — 88112 CYTOPATH CELL ENHANCE TECH: CPT | Performed by: PATHOLOGY

## 2022-02-16 ASSESSMENT — PAIN SCALES - GENERAL: PAINLEVEL: NO PAIN (0)

## 2022-02-16 NOTE — PATIENT INSTRUCTIONS
Please call [phone] to schedule your CT scan   Please call our office if you have any questions [phone]

## 2022-02-16 NOTE — PROGRESS NOTES
Bladder Scan performed. 0 maximum residual urine detected after 3 scans. MD informed   Dafne Estrada RN on 2/16/2022 at 11:47 AM

## 2022-02-16 NOTE — PROGRESS NOTES
Chief Complaint   Patient presents with     RECHECK     hematuria       Tommy De La O is a 66 year old male who presents today for follow up of   Chief Complaint   Patient presents with     RECHECK     hematuria      Patient is a pleasant 66-year-old gentleman with history of BPH status post TURP several years ago.  Recently patient noticed several episodes of gross hematuria without any other urinary symptoms.  He denies any flank pain.  He has no history of kidney stones in the past.  He has a history of smoking however.  Current Outpatient Medications   Medication Sig Dispense Refill     amLODIPine (NORVASC) 5 MG tablet Take 1 tablet (5 mg) by mouth daily 90 tablet 0     chlorthalidone (HYGROTON) 25 MG tablet Take 1 tablet (25 mg) by mouth daily 90 tablet 0     clobetasol (TEMOVATE) 0.05 % external ointment Apply topically daily as needed For itching 60 g 0     gabapentin (NEURONTIN) 300 MG capsule TAKE 1 CAPSULE THREE TIMES A  capsule 0     neomycin-polymyxin-hydrocortisone (CORTISPORIN) 3.5-80088-7 otic suspension Place 3 drops Into the left ear 4 times daily as needed 10 mL 1     omeprazole (PRILOSEC) 40 MG DR capsule TAKE 1 CAPSULE DAILY 30 TO 60 MINUTES BEFORE A MEAL 90 capsule 0     simvastatin (ZOCOR) 20 MG tablet Take 1 tablet (20 mg) by mouth At Bedtime 90 tablet 0     Allergies   Allergen Reactions     Cefzil [Cefprozil] Hives and Itching     Darvocet [Propoxyphene N-Apap] Hives     Penicillins Hives      No past medical history on file.  Past Surgical History:   Procedure Laterality Date     ARTHROSCOPY KNEE Bilateral     both knees with arthroscopy in the past.      ARTHROSCOPY KNEE       ARTHROSCOPY SHOULDER ROTATOR CUFF REPAIR       AS TOTAL KNEE ARTHROPLASTY Bilateral     Both total knees.      LENGTHEN TENDON ACHILLES Left      REPAIR TENDON ACHILLES      1980s two separate surgeries.     ROTATOR CUFF REPAIR RT/LT Left      ZZC TOTAL KNEE ARTHROPLASTY Right 3/17/2015    Procedure: RIGHT  KNEE TOTAL ARTHROPLASTY;  Surgeon: Jaiden Barnes MD;  Location: [facility];  Service: Orthopedics     [facility] TOTAL KNEE ARTHROPLASTY Left 2015    Procedure: LEFT KNEE TOTAL ARTHROPLASTY;  Surgeon: Jaiden Barnes MD;  Location: [facility];  Service: Orthopedics     Family History   Problem Relation Age of Onset     Coronary Artery Disease Father      Hyperlipidemia Mother      Prostate Cancer No family hx of      Colon Cancer No family hx of      Hypertension Mother      Heart Disease Mother      Heart Disease Father      Hypertension Sister      Cancer Brother      No Known Problems Daughter      No Known Problems Son      Cancer Maternal Grandmother      Vision Loss Maternal Grandmother      No Known Problems Sister      Cancer Brother      Hypertension Brother      Hypertension Brother      Cancer Brother      Sleep Apnea Brother      No Known Problems Brother      No Known Problems Daughter      Social History     Socioeconomic History     Marital status: Single     Spouse name: None     Number of children: None     Years of education: None     Highest education level: None   Occupational History     None   Tobacco Use     Smoking status: Former Smoker     Packs/day: 1.00     Years: 30.00     Pack years: 30.00     Types: Cigarettes     Quit date: 2011     Years since quittin.0     Smokeless tobacco: Never Used     Tobacco comment: started at age 22   Substance and Sexual Activity     Alcohol use: Yes     Drug use: No     Sexual activity: Yes     Partners: Female   Other Topics Concern     Parent/sibling w/ CABG, MI or angioplasty before 65F 55M? Not Asked   Social History Narrative     None     Social Determinants of Health     Financial Resource Strain: Not on file   Food Insecurity: Not on file   Transportation Needs: Not on file   Physical Activity: Not on file   Stress: Not on file   Social Connections: Not on file   Intimate Partner Violence: Not on file   Housing Stability:  Not on file       REVIEW OF SYSTEMS  =================  C: NEGATIVE for fever, chills, change in weight  I: NEGATIVE for worrisome rashes, moles or lesions  E/M: NEGATIVE for ear, mouth and throat problems  R: NEGATIVE for significant cough or SHORTNESS OF BREATH  CV:  NEGATIVE for chest pain, palpitations or peripheral edema  GI: NEGATIVE for nausea, abdominal pain, heartburn, or change in bowel habits  NEURO: NEGATIVE numbness/weakness  : see HPI  PSYCH: NEGATIVE depression/anxiety  LYmph: no new enlarged lymph nodes  Ortho: no new trauma/movements    Physical Exam:  /80 (BP Location: Right arm, Patient Position: Sitting, Cuff Size: Adult Regular)   Pulse 52   Temp 97.9  F (36.6  C) (Temporal)   Wt 143.8 kg (317 lb)   SpO2 96%   BMI 42.99 kg/m     Patient is pleasant, in no acute distress, good general condition.  Lung: no evidence of respiratory distress    Abdomen: Soft, nondistended, non tender. No masses. No rebound or guarding.   Exam: Normal male  Skin: Warm and dry.  No redness.  Psych: normal mood and affect  Neuro: alert and oriented  Musculaskeletal: moving all extremities    Assessment/Plan:   (R31.0) Gross hematuria  (primary encounter diagnosis)  Comment:    Plan: We will schedule patient for hematuria work-up  (N40.0) Benign prostatic hyperplasia without lower urinary tract symptoms  Comment: Status post TURP.  Doing well.  Plan: As needed.

## 2022-02-17 ENCOUNTER — DOCUMENTATION ONLY (OUTPATIENT)
Dept: LAB | Facility: CLINIC | Age: 67
End: 2022-02-17
Payer: MEDICARE

## 2022-02-17 DIAGNOSIS — I10 ESSENTIAL HYPERTENSION WITH GOAL BLOOD PRESSURE LESS THAN 140/90: ICD-10-CM

## 2022-02-17 DIAGNOSIS — E78.5 HYPERLIPIDEMIA, UNSPECIFIED HYPERLIPIDEMIA TYPE: Primary | ICD-10-CM

## 2022-02-17 NOTE — PROGRESS NOTES
Tommy De La O has an upcoming lab appointment:    Future Appointments   Date Time Provider Department [place]   2/21/2022  2:00 PM [facility]   2/23/2022  8:30 AM NB LAB [facility]   2/28/2022  2:20 PM Tim Crawley MD [facility]     Patient is scheduled for the following lab(s): pre-visit labs    There is no order available. Please review and place either future orders or HMPO (Review of Health Maintenance Protocol Orders), as appropriate.    Health Maintenance Due   Topic     ANNUAL REVIEW OF HM ORDERS      Cat Gerber

## 2022-02-18 LAB
PATH REPORT.COMMENTS IMP SPEC: NORMAL
PATH REPORT.FINAL DX SPEC: NORMAL
PATH REPORT.GROSS SPEC: NORMAL
PATH REPORT.MICROSCOPIC SPEC OTHER STN: NORMAL
PATH REPORT.RELEVANT HX SPEC: NORMAL

## 2022-02-21 ENCOUNTER — HOSPITAL ENCOUNTER (OUTPATIENT)
Dept: CT IMAGING | Facility: CLINIC | Age: 67
Discharge: HOME | End: 2022-02-21
Attending: UROLOGY | Admitting: UROLOGY
Payer: MEDICARE

## 2022-02-21 DIAGNOSIS — R31.0 GROSS HEMATURIA: ICD-10-CM

## 2022-02-21 PROCEDURE — G1004 CDSM NDSC: HCPCS

## 2022-02-21 PROCEDURE — 250N000011 HC RX IP 250 OP 636: Performed by: RADIOLOGY

## 2022-02-21 PROCEDURE — 250N000009 HC RX 250: Performed by: RADIOLOGY

## 2022-02-21 RX ORDER — IOPAMIDOL 755 MG/ML
100 INJECTION, SOLUTION INTRAVASCULAR ONCE
Status: COMPLETED | OUTPATIENT
Start: 2022-02-21 | End: 2022-02-21

## 2022-02-21 RX ADMIN — SODIUM CHLORIDE 100 ML: 9 INJECTION, SOLUTION INTRAVENOUS at 13:38

## 2022-02-21 RX ADMIN — IOPAMIDOL 100 ML: 755 INJECTION, SOLUTION INTRAVENOUS at 13:38

## 2022-02-22 ASSESSMENT — ENCOUNTER SYMPTOMS
ABDOMINAL PAIN: 0
PARESTHESIAS: 1
SORE THROAT: 0
NERVOUS/ANXIOUS: 0
NAUSEA: 0
WEAKNESS: 0
ARTHRALGIAS: 1
DIZZINESS: 0
CHILLS: 0
CONSTIPATION: 0
EYE PAIN: 0
JOINT SWELLING: 1
HEARTBURN: 0
DYSURIA: 0
COUGH: 0
PALPITATIONS: 0
HEMATURIA: 1
DIARRHEA: 0
MYALGIAS: 1
HEADACHES: 0
HEMATOCHEZIA: 0
FREQUENCY: 0
SHORTNESS OF BREATH: 1
FEVER: 0

## 2022-02-22 ASSESSMENT — ACTIVITIES OF DAILY LIVING (ADL): CURRENT_FUNCTION: NO ASSISTANCE NEEDED

## 2022-02-23 ENCOUNTER — LAB (OUTPATIENT)
Dept: LAB | Facility: CLINIC | Age: 67
End: 2022-02-23
Payer: MEDICARE

## 2022-02-23 DIAGNOSIS — Z12.5 SCREENING FOR PROSTATE CANCER: ICD-10-CM

## 2022-02-23 DIAGNOSIS — I10 ESSENTIAL HYPERTENSION WITH GOAL BLOOD PRESSURE LESS THAN 140/90: ICD-10-CM

## 2022-02-23 DIAGNOSIS — E78.5 HYPERLIPIDEMIA, UNSPECIFIED HYPERLIPIDEMIA TYPE: ICD-10-CM

## 2022-02-23 LAB
ANION GAP SERPL CALCULATED.3IONS-SCNC: 2 MMOL/L (ref 3–14)
BUN SERPL-MCNC: 16 MG/DL (ref 7–30)
CALCIUM SERPL-MCNC: 9 MG/DL (ref 8.5–10.1)
CHLORIDE BLD-SCNC: 101 MMOL/L (ref 94–109)
CHOLEST SERPL-MCNC: 136 MG/DL
CO2 SERPL-SCNC: 32 MMOL/L (ref 20–32)
CREAT SERPL-MCNC: 0.8 MG/DL (ref 0.66–1.25)
FASTING STATUS PATIENT QL REPORTED: NO
GFR SERPL CREATININE-BSD FRML MDRD: >90 ML/MIN/1.73M2
GLUCOSE BLD-MCNC: 103 MG/DL (ref 70–99)
HDLC SERPL-MCNC: 37 MG/DL
LDLC SERPL CALC-MCNC: 60 MG/DL
NONHDLC SERPL-MCNC: 99 MG/DL
POTASSIUM BLD-SCNC: 3.2 MMOL/L (ref 3.4–5.3)
SODIUM SERPL-SCNC: 135 MMOL/L (ref 133–144)
TRIGL SERPL-MCNC: 195 MG/DL

## 2022-02-23 PROCEDURE — 36415 COLL VENOUS BLD VENIPUNCTURE: CPT

## 2022-02-23 PROCEDURE — G0103 PSA SCREENING: HCPCS

## 2022-02-23 PROCEDURE — 80061 LIPID PANEL: CPT

## 2022-02-23 PROCEDURE — 80048 BASIC METABOLIC PNL TOTAL CA: CPT

## 2022-02-24 NOTE — RESULT ENCOUNTER NOTE
"HI Tommy,   Your potassium remains a little low, similar to last year.  Glucose is borderline.   Triglycerides, a type of fat found in the bloodstream is high.  HDL (\"good cholesterol\") is low.  .  The LDL \"bad cholesterol\" is at goal level.   PLAN: We can discuss at your upcoming visit.   ELIAZAR ROY MD "

## 2022-02-28 ENCOUNTER — TELEPHONE (OUTPATIENT)
Dept: UROLOGY | Facility: CLINIC | Age: 67
End: 2022-02-28

## 2022-02-28 ENCOUNTER — OFFICE VISIT (OUTPATIENT)
Dept: FAMILY MEDICINE | Facility: CLINIC | Age: 67
End: 2022-02-28
Payer: MEDICARE

## 2022-02-28 VITALS
TEMPERATURE: 99.2 F | HEART RATE: 56 BPM | RESPIRATION RATE: 18 BRPM | BODY MASS INDEX: 41.99 KG/M2 | WEIGHT: 310 LBS | HEIGHT: 72 IN | SYSTOLIC BLOOD PRESSURE: 120 MMHG | DIASTOLIC BLOOD PRESSURE: 80 MMHG | OXYGEN SATURATION: 96 %

## 2022-02-28 DIAGNOSIS — Z00.00 ENCOUNTER FOR SUBSEQUENT ANNUAL WELLNESS VISIT IN MEDICARE PATIENT: Primary | ICD-10-CM

## 2022-02-28 DIAGNOSIS — Z12.5 SCREENING FOR PROSTATE CANCER: ICD-10-CM

## 2022-02-28 DIAGNOSIS — E78.5 HYPERLIPIDEMIA, UNSPECIFIED HYPERLIPIDEMIA TYPE: Chronic | ICD-10-CM

## 2022-02-28 DIAGNOSIS — I10 ESSENTIAL HYPERTENSION WITH GOAL BLOOD PRESSURE LESS THAN 140/90: ICD-10-CM

## 2022-02-28 DIAGNOSIS — G62.9 PERIPHERAL POLYNEUROPATHY: ICD-10-CM

## 2022-02-28 DIAGNOSIS — R31.0 GROSS HEMATURIA: ICD-10-CM

## 2022-02-28 DIAGNOSIS — E87.6 HYPOKALEMIA: ICD-10-CM

## 2022-02-28 DIAGNOSIS — K21.9 GASTROESOPHAGEAL REFLUX DISEASE WITHOUT ESOPHAGITIS: ICD-10-CM

## 2022-02-28 LAB — PSA SERPL-MCNC: 1.08 UG/L (ref 0–4)

## 2022-02-28 PROCEDURE — G0439 PPPS, SUBSEQ VISIT: HCPCS | Performed by: FAMILY MEDICINE

## 2022-02-28 RX ORDER — AMLODIPINE BESYLATE 5 MG/1
5 TABLET ORAL DAILY
Qty: 90 TABLET | Refills: 3 | Status: SHIPPED | OUTPATIENT
Start: 2022-02-28 | End: 2023-01-26

## 2022-02-28 RX ORDER — CHLORTHALIDONE 25 MG/1
25 TABLET ORAL DAILY
Qty: 90 TABLET | Refills: 3 | Status: SHIPPED | OUTPATIENT
Start: 2022-02-28 | End: 2023-01-26

## 2022-02-28 RX ORDER — OMEPRAZOLE 40 MG/1
CAPSULE, DELAYED RELEASE ORAL
Qty: 90 CAPSULE | Refills: 3 | Status: SHIPPED | OUTPATIENT
Start: 2022-02-28 | End: 2023-01-26

## 2022-02-28 RX ORDER — SIMVASTATIN 20 MG
20 TABLET ORAL AT BEDTIME
Qty: 90 TABLET | Refills: 3 | Status: SHIPPED | OUTPATIENT
Start: 2022-02-28 | End: 2023-01-09

## 2022-02-28 RX ORDER — GABAPENTIN 300 MG/1
CAPSULE ORAL
Qty: 270 CAPSULE | Refills: 3 | Status: SHIPPED | OUTPATIENT
Start: 2022-02-28 | End: 2023-01-26

## 2022-02-28 ASSESSMENT — ANXIETY QUESTIONNAIRES
3. WORRYING TOO MUCH ABOUT DIFFERENT THINGS: SEVERAL DAYS
7. FEELING AFRAID AS IF SOMETHING AWFUL MIGHT HAPPEN: NOT AT ALL
6. BECOMING EASILY ANNOYED OR IRRITABLE: SEVERAL DAYS
1. FEELING NERVOUS, ANXIOUS, OR ON EDGE: NOT AT ALL
GAD7 TOTAL SCORE: 3
5. BEING SO RESTLESS THAT IT IS HARD TO SIT STILL: NOT AT ALL
2. NOT BEING ABLE TO STOP OR CONTROL WORRYING: SEVERAL DAYS

## 2022-02-28 ASSESSMENT — PAIN SCALES - GENERAL: PAINLEVEL: NO PAIN (0)

## 2022-02-28 ASSESSMENT — PATIENT HEALTH QUESTIONNAIRE - PHQ9
SUM OF ALL RESPONSES TO PHQ QUESTIONS 1-9: 6
5. POOR APPETITE OR OVEREATING: NOT AT ALL

## 2022-02-28 NOTE — TELEPHONE ENCOUNTER
Reason for Call:  Other appointment    Detailed comments: Patient has some questions about his CT scan and possible up coming appointments. Please call.     Phone Number Patient can be reached at: Home number on file [phone] (home)    Best Time: any    Can we leave a detailed message on this number? YES    Call taken on 2/28/2022 at 4:05 PM by Lesli Ca

## 2022-02-28 NOTE — PROGRESS NOTES
SUBJECTIVE:   Tommy De La O is a 66 year old male who presents for Preventive Visit.  Chief Complaint   Patient presents with     Physical     Melatonin took and didn't help. Does his meds cause sleep problem. Blood also in urine      He notes lightheadedness on occasion.   Farms, takes care of cattle and chickens.   Sometimes in AM feel lightheadedness.  This is after he has been up for a while and had coffee.  Not in over a month or two.  Happened a couple times within a couple week.   Triggering factors:?  Seems to be after doing bending.   Does not have to stop what he is doing.  Once had to sit down briefly.     2/16 Urology for gross hematuria.   He had a CT scan:  IMPRESSION:  Possible punctate stone versus small amount of blood  products in the distal left ureter. Otherwise, no evidence to suggest  renal or ureteral calculi. No evidence for hydronephrosis or  hydroureter on either side.    Will have cystoscopy.     1/21.22 sports med visit for shoulder pain.   Shoulder improved with injection.     He has tried melatonin a couple nights.  Did not help.  Does not sleep well a couple nights a week.  Eventually he can get to sleep.     Daughter feels he has depression.  Sometimes can't sleep.  Worries some.     He had blood tests on 2/23.     Patient has been advised of split billing requirements and indicates understanding: Yes  Are you in the first 12 months of your Medicare coverage?  No    Do you feel safe in your environment? Yes    Have you ever done Advance Care Planning? (For example, a Health Directive, POLST, or a discussion with a medical provider or your loved ones about your wishes): No, advance care planning information given to patient to review.  Patient plans to discuss their wishes with loved ones or provider.        Fall risk  Fallen 2 or more times in the past year?: No  Any fall with injury in the past year?: No    Cognitive Screening   1) Repeat 3 items (Leader, Season, Table)    2) Clock  draw: NORMAL  3) 3 item recall: Recalls 3 objects  Results: 3 items recalled: COGNITIVE IMPAIRMENT LESS LIKELY    Mini-CogTM Copyright S Alex. Licensed by the author for use in [facility]; reprinted with permission (júnior@[facility]). All rights reserved.      Do you have sleep apnea, excessive snoring or daytime drowsiness?: yes      Social History     Tobacco Use     Smoking status: Former Smoker     Packs/day: 1.00     Years: 30.00     Pack years: 30.00     Types: Cigarettes     Quit date: 2011     Years since quittin.0     Smokeless tobacco: Never Used     Tobacco comment: started at age 22   Substance Use Topics     Alcohol use: Yes   alcohol:drinks twice weekly up to 2-3 beers.   If you drink alcohol do you typically have >3 drinks per day or >7 drinks per week? No    No flowsheet data found.  Current providers sharing in care for this patient include:   Patient Care Team:  Tim Crawley MD as PCP - General (Family Practice)  Tim Crawley MD as Assigned PCP  Abdulaziz Aly DO as Assigned Musculoskeletal Provider  Patricio oDmingo MD as Assigned Surgical Provider  Urology.   Sports medicine.     The following health maintenance items are reviewed in Epic and correct as of today:  Health Maintenance Due   Topic Date Due     URINE DRUG SCREEN  Never done     ANNUAL REVIEW OF HM ORDERS  Never done     ZOSTER IMMUNIZATION (1 of 2) Never done     LUNG CANCER SCREENING  Never done     Pneumococcal Vaccine: 65+ Years (1 of 1 - PPSV23) Never done     FALL RISK ASSESSMENT  2022     Patient Active Problem List    Diagnosis Date Noted     Acute streptococcal pharyngitis 2021     Priority: Medium     Medial epicondylitis of elbow, left 10/16/2020     Priority: Medium     CHUNG (dyspnea on exertion) 10/05/2020     Priority: Medium     PFT 2021 results:OPV=385%, FEV1=84%, FEV1/FVC=63%, HBTE=884%.   The FEV1 and  FVC are normal but the FEV1/FVC ratio is reduced.   The  inspiratory flow rates are within normal limits.  Lung volumes are within normal limits.  Following administration of bronchodilators, there is a significant response.  The diffusing   capacity is normal.  However, the diffusing capacity was not corrected for the patient's hemoglobin.   Mild  airway obstruction and a response to bronchodilators indicates asthma.   IMPRESSION: Mild  Airflow Obstruction  -Asthmatic Type   2/10/2021:PFT suggests asthma.  He has a 30 pack year smoking history.        Hand dermatitis 2019     Priority: Medium     2019:Uses Clobetasol cream.  He uses on fingers for rash as needed.  Uses intermittently.  Aggravating factors: dry weather.        Peripheral polyneuropathy 2019     Priority: Medium     2019:Has pains in legs and feet.  TAking gabapentin chronically.        Benign prostatic hyperplasia with incomplete bladder emptying 2018     Priority: Medium     Essential hypertension with goal blood pressure less than 140/90 06/15/2018     Priority: Medium     Morbid obesity with BMI of 40.0-44.9, adult (H) 2016     Priority: Medium     Osteoarthritis of knee 2014     Priority: Medium     He has had bilateral knee replacements.        Gastrointestinal hemorrhage 2014     Priority: Medium     2018:He had colonoscopy.  Presumed from hemorrhoids.        Gastroesophageal reflux disease 2014     Priority: Medium     Hyperlipidemia 2014     Priority: Medium     Obstructive sleep apnea syndrome 2014     Priority: Medium     Diagnosed in  with sleep study and sleep consultation.   3/18/2020:Uses CPAP nightly.  Sleeps much better with use.           Family history:  CV disease: father   Prostate cancer: no  Colon cancer: no    Multivitamin or Vit D use: Vit C, fish oil    Vaccines:current tetanus.  He has had COVID-19 virus vaccine.     Past Colon cancer screenin  No polyps.  Can go 10 years-    ROS:see below.    Eats a lot of candy.   PHQ9 score today= 6  generalized anxiety disorder scale score today= 3    OBJECTIVE:                                                    OBJECTIVE:Blood pressure 120/80, pulse 56, temperature 99.2  F (37.3  C), temperature source Tympanic, resp. rate 18, height 1.829 m (6'), weight 140.6 kg (310 lb), SpO2 96 %. BMI=Body mass index is 42.04 kg/m .  GENERAL APPEARANCE ADULT: Alert, no acute distress, morbidly obese  EYES: PERRL, EOM normal, conjunctiva and lids normal  HENT: Ears and TMs normal, oral mucosa and posterior oropharynx normal  NECK: No adenopathy,masses or thyromegaly  RESP: lungs clear to auscultation   CV: normal rate, regular rhythm, no murmur or gallop  ABDOMEN: soft, no organomegaly, masses or tenderness  MS: extremities normal, no peripheral edema    ASSESSMENT/PLAN:                                                    Lifestyle recommendations:regular exercise, at least 150 minutes per week (average 30 minutes 5 times a week)  weight loss recommended (ideal BMI-body mass index is <25)  The following exams/tests were recommended and discussed for health maintenance:  Colon cancer screening recommended 2024  Prostate cancer screening is optional starting at age 50 if normal risk, age 40 or 45 for high risk men (strong family history or blacks.)  Screening can include digital rectal exam and PSA blood test.     (Z00.00) Encounter for subsequent annual wellness visit in Medicare patient  (primary encounter diagnosis)    (I10) Essential hypertension with goal blood pressure less than 140/90  Comment: doing well on current medications   Plan: amLODIPine (NORVASC) 5 MG tablet,         chlorthalidone (HYGROTON) 25 MG tablet        No change in current treatment plan.  Refills.     (G62.9) Peripheral polyneuropathy  Comment: doing OK on the gabapentin.   Plan: gabapentin (NEURONTIN) 300 MG capsule        No change in current treatment plan.  Refills.     (K21.9) Gastroesophageal reflux  disease without esophagitis  Comment: stable on medication  Plan: omeprazole (PRILOSEC) 40 MG DR capsule        REfill. No change in current treatment plan.     (E78.5) Hyperlipidemia, unspecified hyperlipidemia type  Comment: doing OK  Plan: simvastatin (ZOCOR) 20 MG tablet        No change in current treatment plan.      (R31.0) Gross hematuria  Comment:   Plan: talk with your urologist.  You still need a cystoscopy to look brendan bladder.     (Z12.5) Screening for prostate cancer  Comment:   Plan: PSA, screen          (E87.6) Hypokalemia  Comment: potassium low on last week's blood test.  Plan: See list of potassium containing food.      Patient has been advised of split billing requirements and indicates understanding: Yes    COUNSELING:  Reviewed preventive health counseling, as reflected in patient instructions  Special attention given to:       Regular exercise       Colon cancer screening       Prostate cancer screening    Estimated body mass index is 42.04 kg/m  as calculated from the following:    Height as of this encounter: 1.829 m (6').    Weight as of this encounter: 140.6 kg (310 lb).    Weight management plan: Discussed healthy diet and exercise guidelines    He reports that he quit smoking about 11 years ago. His smoking use included cigarettes. He has a 30.00 pack-year smoking history. He has never used smokeless tobacco.      Appropriate preventive services were discussed with this patient, including applicable screening as appropriate for cardiovascular disease, diabetes, osteopenia/osteoporosis, and glaucoma.  As appropriate for age/gender, discussed screening for colorectal cancer, prostate cancer, breast cancer, and cervical cancer. Checklist reviewing preventive services available has been given to the patient.    Reviewed patients plan of care and provided an AVS. The Basic Care Plan (routine screening as documented in Health Maintenance) for Tommy meets the Care Plan requirement. This Care  "Plan has been established and reviewed with the Patient.    Counseling Resources:  ATP IV Guidelines  Pooled Cohorts Equation Calculator  Breast Cancer Risk Calculator  Breast Cancer: Medication to Reduce Risk  FRAX Risk Assessment  ICSI Preventive Guidelines  Dietary Guidelines for Americans, 2010  CDC Software's MyPlate  ASA Prophylaxis  Lung CA Screening    Tim Crawley MD  [facility]    Identified Health Risks:  ===============================    Fv Hpi Annual Exam    Question 2/22/2022 12:00 PM CST - Filed by Patient   I understand that completing this form is intended to provide my doctor and/or care team with helpful information for my upcoming clinic visit. It is not to notify my doctor and/or care team of medical matters requiring urgent attention. If I have an urgent medical matter, I should call 911 or my doctor's office. Acknowledge   In general, how would you rate your overall physical health? Good   Outside of work, how many days during the week do you exercise? None   If you drink alcohol do you typically have greater than 3 drinks per day OR greater than 7 drinks per week? No   Do you usually eat at least 4 servings of fruit and vegetables a day, include whole grains & fiber, and avoid regularly eating high fat or \"junk\" foods?  No   Do you have any problems taking medications regularly? No   Side effects from medication: Light-headedness   Do you need assistance with the following daily activities: (select all that apply) NO assistance is needed   Which of the following safety concerns are present in your home?  (select all that apply) None of the above   Do you have any of the following hearing concerns? No concerns   In the past 6 months, have you been bothered by leaking of urine? Yes   .    Abdominal pain No   Blood in stool No   Blood in urine Yes   Chest pain No   Chills No   Congestion No   Constipation No   Cough No   Diarrhea No   Dizziness No   Ear pain No   Eye " pain No   Feeling nervous or anxious No   Fever No   Frequent urination No   Genital sores No   Headaches No   Hearing loss No   Heartburn No   Joint pain Yes   Joint swelling Yes   Leg swelling Yes   Mood changes No   Muscle pain Yes   Nausea No   Painful urination No   Pounding in the chest No   Skin sensation changes Yes   Sore throat No   Sudden urge to urinate No   Rash No   Shortness of breath Yes   Vision change Yes   Weakness No   .    Impotence or erectile dysfunction No   Penile discharge No   In general, how would you rate your overall mental or emotional health? Fair   Over the past 2 weeks, how often have you been bothered by any of the following problems?    Q1: Little interest or pleasure in doing things Not at all   Q2: Feeling down, depressed or hopeless Not at all   Do you have any additional concerns to address? No   PHQ-2 Score (range: 0 - 6) 0

## 2022-02-28 NOTE — NURSING NOTE
Chief Complaint   Patient presents with     Physical     Melatonin took and didn't help. Does his meds cause sleep problem.       Initial /80   Pulse 56   Temp 99.2  F (37.3  C) (Tympanic)   Resp 18   Ht 1.829 m (6')   Wt 140.6 kg (310 lb)   SpO2 96%   BMI 42.04 kg/m   Estimated body mass index is 42.04 kg/m  as calculated from the following:    Height as of this encounter: 1.829 m (6').    Weight as of this encounter: 140.6 kg (310 lb).    Patient presents to the clinic using     Health Maintenance that is potentially due pending provider review:          Is there anyone who you would like to be able to receive your results? If yes have patient fill out DEREK

## 2022-02-28 NOTE — PATIENT INSTRUCTIONS
ASSESSMENT/PLAN:                                                    Lifestyle recommendations:regular exercise, at least 150 minutes per week (average 30 minutes 5 times a week)  weight loss recommended (ideal BMI-body mass index is <25)  The following exams/tests were recommended and discussed for health maintenance:  Colon cancer screening recommended 2024  Prostate cancer screening is optional starting at age 50 if normal risk, age 40 or 45 for high risk men (strong family history or blacks.)  Screening can include digital rectal exam and PSA blood test.     (Z00.00) Encounter for subsequent annual wellness visit in Medicare patient  (primary encounter diagnosis)    (I10) Essential hypertension with goal blood pressure less than 140/90  Comment: doing well on current medications   Plan: amLODIPine (NORVASC) 5 MG tablet,         chlorthalidone (HYGROTON) 25 MG tablet        No change in current treatment plan.  Refills.     (G62.9) Peripheral polyneuropathy  Comment: doing OK on the gabapentin.   Plan: gabapentin (NEURONTIN) 300 MG capsule        No change in current treatment plan.  Refills.     (K21.9) Gastroesophageal reflux disease without esophagitis  Comment: stable on medication  Plan: omeprazole (PRILOSEC) 40 MG DR capsule        REfill. No change in current treatment plan.     (E78.5) Hyperlipidemia, unspecified hyperlipidemia type  Comment: doing OK  Plan: simvastatin (ZOCOR) 20 MG tablet        No change in current treatment plan.      (R31.0) Gross hematuria  Comment:   Plan: talk with your urologist.  You still need a cystoscopy to look brendan bladder.     (Z12.5) Screening for prostate cancer  Comment:   Plan: PSA, screen          (E87.6) Hypokalemia  Comment: potassium low on last week's blood test.  Plan: See list of potassium containing food.      2015 UpToDate     High potassium content foods    Highest content (>25 meq/100 g)  Dried figs  Molasses  Seaweed    Very high content (>12.5 meq/100  g)  Dried fruits (dates, prunes)  Nuts  Avocados  Bran cereals  Wheat germ  Lima beans    High content (>6.2 meq/100 g)  Vegetables  Spinach  Tomatoes  Broccoli  Winter squash  Beets  Carrots  Cauliflower  Potatoes  Fruits  Bananas  Cantaloupe  Kiwis  Oranges  Mangos  Meats  Ground beef  Steak  Pork  Veal  Cortes  Adapted from: Geovanna AUGUSTIN. Hypokalemia. N Engl J Med 1998; 339:451.

## 2022-03-01 ASSESSMENT — ANXIETY QUESTIONNAIRES: GAD7 TOTAL SCORE: 3

## 2022-04-13 ENCOUNTER — OFFICE VISIT (OUTPATIENT)
Dept: UROLOGY | Facility: CLINIC | Age: 67
End: 2022-04-13
Payer: MEDICARE

## 2022-04-13 DIAGNOSIS — R31.0 GROSS HEMATURIA: Primary | ICD-10-CM

## 2022-04-13 PROCEDURE — 52000 CYSTOURETHROSCOPY: CPT | Performed by: UROLOGY

## 2022-04-13 NOTE — PROGRESS NOTES
S: Tommy De La O is a 67 year old male returns for hematuria.    Patient is draped and prepped.  Flexible cystoscopy placed under direct vision.      The anterior urethra is normal   The prostatic urethra showed post turp changes wide open.         In the bladder there is trabeculation grade 1-2.  No tumor found.    CT ABDOMEN AND PELVIS WITHOUT AND WITH CONTRAST  2/21/2022 2:02 PM       HISTORY: Hematuria, unknown cause. Gross hematuria.     COMPARISON: None.     TECHNIQUE: Volumetric helical acquisition of CT images from the lung  bases through the symphysis pubis before and after the uneventful  administration of 100 mL Isovue 370 intravenous contrast. Radiation  dose for this scan was reduced using automated exposure control,  adjustment of the mA and/or kV according to patient size, or iterative  reconstruction technique.     FINDINGS:     Lung bases: Clear.     Hepatobiliary: Normal.     Pancreas: Normal.     Spleen: Normal.     Adrenals: Normal.     Kidneys/bladder: No evidence for renal calculi. There is a 1 mm  punctate focus of high attenuation in the distal left ureter on image  166 of series 7. Cannot exclude a small nonobstructing stone, but this  could represent a small amount of blood products. No right-sided  calculi or hydroureter on either side. No evidence of enhancing mass  through either kidney. Multiple small cysts are present. No follow-up  necessary for these. No evidence of bladder wall thickening or mass.     Bowel: No bowel obstruction. Sigmoid diverticulosis is noted without  evidence of diverticulitis.     Vasculature: The aorta is normal in caliber with extensive  atherosclerotic calcification.     Pelvic organs: No free fluid.     Lymph nodes: Normal.     Muscular skeletal: Mild degenerative changes are noted through the  spine.                                                                      IMPRESSION:  Possible punctate stone versus small amount of blood  products in the distal  left ureter. Otherwise, no evidence to suggest  renal or ureteral calculi. No evidence for hydronephrosis or  hydroureter on either side.       LEONARD FLYNN MD          Assessment/Plan:  (R31.0) Gross hematuria  (primary encounter diagnosis)  Comment:    Plan: repeat CT urogram in 2 months

## 2022-04-13 NOTE — PATIENT INSTRUCTIONS
Please call [facility] to schedule a CT scan. [phone]/ Please arrange follow up with Dr. Domingo to discuss the results.

## 2022-04-19 ENCOUNTER — HOSPITAL ENCOUNTER (OUTPATIENT)
Dept: CT IMAGING | Facility: CLINIC | Age: 67
Discharge: HOME | End: 2022-04-19
Attending: UROLOGY | Admitting: UROLOGY
Payer: MEDICARE

## 2022-04-19 DIAGNOSIS — R31.0 GROSS HEMATURIA: ICD-10-CM

## 2022-04-19 PROCEDURE — G1004 CDSM NDSC: HCPCS

## 2022-04-19 PROCEDURE — 250N000009 HC RX 250: Performed by: RADIOLOGY

## 2022-04-19 PROCEDURE — 250N000011 HC RX IP 250 OP 636: Performed by: RADIOLOGY

## 2022-04-19 RX ORDER — IOPAMIDOL 755 MG/ML
100 INJECTION, SOLUTION INTRAVASCULAR ONCE
Status: COMPLETED | OUTPATIENT
Start: 2022-04-19 | End: 2022-04-19

## 2022-04-19 RX ADMIN — IOPAMIDOL 100 ML: 755 INJECTION, SOLUTION INTRAVENOUS at 13:04

## 2022-04-19 RX ADMIN — SODIUM CHLORIDE 100 ML: 9 INJECTION, SOLUTION INTRAVENOUS at 13:05

## 2022-04-29 ENCOUNTER — TELEPHONE (OUTPATIENT)
Dept: FAMILY MEDICINE | Facility: CLINIC | Age: 67
End: 2022-04-29
Payer: MEDICARE

## 2022-05-01 ENCOUNTER — HOSPITAL ENCOUNTER (EMERGENCY)
Facility: CLINIC | Age: 67
Discharge: HOME | End: 2022-05-01
Attending: EMERGENCY MEDICINE | Admitting: EMERGENCY MEDICINE
Payer: MEDICARE

## 2022-05-01 ENCOUNTER — APPOINTMENT (OUTPATIENT)
Dept: ULTRASOUND IMAGING | Facility: CLINIC | Age: 67
End: 2022-05-01
Attending: EMERGENCY MEDICINE
Payer: MEDICARE

## 2022-05-01 ENCOUNTER — APPOINTMENT (OUTPATIENT)
Dept: GENERAL RADIOLOGY | Facility: CLINIC | Age: 67
End: 2022-05-01
Attending: EMERGENCY MEDICINE
Payer: MEDICARE

## 2022-05-01 VITALS
DIASTOLIC BLOOD PRESSURE: 69 MMHG | BODY MASS INDEX: 42.66 KG/M2 | TEMPERATURE: 97.2 F | RESPIRATION RATE: 16 BRPM | SYSTOLIC BLOOD PRESSURE: 124 MMHG | HEIGHT: 72 IN | WEIGHT: 315 LBS | HEART RATE: 52 BPM | OXYGEN SATURATION: 95 %

## 2022-05-01 DIAGNOSIS — M79.605 PAIN OF LEFT LOWER EXTREMITY: ICD-10-CM

## 2022-05-01 DIAGNOSIS — M79.672 LEFT FOOT PAIN: ICD-10-CM

## 2022-05-01 LAB
BASOPHILS # BLD AUTO: 0 10E3/UL (ref 0–0.2)
BASOPHILS NFR BLD AUTO: 1 %
CRP SERPL-MCNC: 2.9 MG/L (ref 0–8)
EOSINOPHIL # BLD AUTO: 0.4 10E3/UL (ref 0–0.7)
EOSINOPHIL NFR BLD AUTO: 5 %
ERYTHROCYTE [DISTWIDTH] IN BLOOD BY AUTOMATED COUNT: 12.6 % (ref 10–15)
HCT VFR BLD AUTO: 41.6 % (ref 40–53)
HGB BLD-MCNC: 14.4 G/DL (ref 13.3–17.7)
IMM GRANULOCYTES # BLD: 0 10E3/UL
IMM GRANULOCYTES NFR BLD: 0 %
LYMPHOCYTES # BLD AUTO: 2 10E3/UL (ref 0.8–5.3)
LYMPHOCYTES NFR BLD AUTO: 27 %
MCH RBC QN AUTO: 30.6 PG (ref 26.5–33)
MCHC RBC AUTO-ENTMCNC: 34.6 G/DL (ref 31.5–36.5)
MCV RBC AUTO: 89 FL (ref 78–100)
MONOCYTES # BLD AUTO: 0.5 10E3/UL (ref 0–1.3)
MONOCYTES NFR BLD AUTO: 7 %
NEUTROPHILS # BLD AUTO: 4.3 10E3/UL (ref 1.6–8.3)
NEUTROPHILS NFR BLD AUTO: 60 %
NRBC # BLD AUTO: 0 10E3/UL
NRBC BLD AUTO-RTO: 0 /100
PLATELET # BLD AUTO: 199 10E3/UL (ref 150–450)
RBC # BLD AUTO: 4.7 10E6/UL (ref 4.4–5.9)
WBC # BLD AUTO: 7.2 10E3/UL (ref 4–11)

## 2022-05-01 PROCEDURE — 86140 C-REACTIVE PROTEIN: CPT | Performed by: EMERGENCY MEDICINE

## 2022-05-01 PROCEDURE — 93971 EXTREMITY STUDY: CPT | Mod: LT

## 2022-05-01 PROCEDURE — 85025 COMPLETE CBC W/AUTO DIFF WBC: CPT | Performed by: EMERGENCY MEDICINE

## 2022-05-01 PROCEDURE — 36415 COLL VENOUS BLD VENIPUNCTURE: CPT | Performed by: EMERGENCY MEDICINE

## 2022-05-01 PROCEDURE — 73630 X-RAY EXAM OF FOOT: CPT | Mod: LT

## 2022-05-01 PROCEDURE — 99285 EMERGENCY DEPT VISIT HI MDM: CPT | Mod: 25

## 2022-05-01 PROCEDURE — 99284 EMERGENCY DEPT VISIT MOD MDM: CPT | Performed by: EMERGENCY MEDICINE

## 2022-05-01 NOTE — ED NOTES
Outer great toes red with discomfort and top of foot, pain in left outer lower leg, no pain in calf or shin, noted superficial vein present to where points, states has neuropathy, just tried the good feet system for a couple days and feels feet got worse, has went back to Dr Medeiros and they help when up but throbs once puts back on slippers, does not wear compression socks regularly as has been advised, has never seen podiatry for the foot problems

## 2022-05-01 NOTE — DISCHARGE INSTRUCTIONS
Return if symptoms worsen or new symptoms develop.  Follow-up with podiatry this week for recheck.  Elevate your foot take ibuprofen or Tylenol for pain if increased redness swelling or other symptoms present please return for further evaluation and care.

## 2022-05-01 NOTE — ED PROVIDER NOTES
History     Chief Complaint   Patient presents with     Foot Pain     Left foot pain with redness, hx of recurrent cellulitis. Not currently on abx. Desires to have US to r/o blood clot.      HPI  Tommy De La O is a 67 year old male with a past medical history significant for GERD hyperlipidemia struct of sleep apnea osteoarthritis hypertension who presents emergency department complaining of left foot and calf pain.  Patient states he has stones neuropathy and has had increasing pain in the ball area and arch area of his medial left foot.  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  He states he is also Pain radiating up into his calf especially laterally and medially.  Denies any significant swelling or redness at this time has not had any fevers or chills denies any headache or visual changes.  He denies any focal numbness weakness except in his toes bilaterally and this been going on for some time patient has been seen by vascular surgery and neurology for leg symptoms.    Allergies:  Allergies   Allergen Reactions     Cefzil [Cefprozil] Hives and Itching     Darvocet [Propoxyphene N-Apap] Hives     Penicillins Hives       Problem List:    Patient Active Problem List    Diagnosis Date Noted     Acute streptococcal pharyngitis 09/29/2021     Priority: Medium     Medial epicondylitis of elbow, left 10/16/2020     Priority: Medium     CHUNG (dyspnea on exertion) 10/05/2020     Priority: Medium     PFT 2/9/2021 results:VPC=462%, FEV1=84%, FEV1/FVC=63%, NORM=176%.   The FEV1 and  FVC are normal but the FEV1/FVC ratio is reduced.   The inspiratory flow rates are within normal limits.  Lung volumes are within normal limits.  Following administration of bronchodilators, there is a significant response.  The diffusing   capacity is normal.  However, the diffusing capacity was not corrected for the patient's hemoglobin.   Mild  airway obstruction and a response to  bronchodilators indicates asthma.   IMPRESSION: Mild  Airflow Obstruction  -Asthmatic Type   2/10/2021:PFT suggests asthma.  He has a 30 pack year smoking history.        Hand dermatitis 12/30/2019     Priority: Medium     12/30/2019:Uses Clobetasol cream.  He uses on fingers for rash as needed.  Uses intermittently.  Aggravating factors: dry weather.        Peripheral polyneuropathy 12/30/2019     Priority: Medium     12/30/2019:Has pains in legs and feet.  TAking gabapentin chronically.        Benign prostatic hyperplasia with incomplete bladder emptying 11/28/2018     Priority: Medium     Essential hypertension with goal blood pressure less than 140/90 06/15/2018     Priority: Medium     Morbid obesity with BMI of 40.0-44.9, adult (H) 03/31/2016     Priority: Medium     Osteoarthritis of knee 11/03/2014     Priority: Medium     He has had bilateral knee replacements.        Gastrointestinal hemorrhage 11/03/2014     Priority: Medium     11/19/2018:He had colonoscopy.  Presumed from hemorrhoids.        Gastroesophageal reflux disease 11/03/2014     Priority: Medium     Hyperlipidemia 11/03/2014     Priority: Medium     Obstructive sleep apnea syndrome 11/03/2014     Priority: Medium     Diagnosed in 2014 with sleep study and sleep consultation.   3/18/2020:Uses CPAP nightly.  Sleeps much better with use.           Past Medical History:    No past medical history on file.    Past Surgical History:    Past Surgical History:   Procedure Laterality Date     ARTHROSCOPY KNEE Bilateral     both knees with arthroscopy in the past.      ARTHROSCOPY KNEE       ARTHROSCOPY SHOULDER ROTATOR CUFF REPAIR       AS TOTAL KNEE ARTHROPLASTY Bilateral     Both total knees.      LENGTHEN TENDON ACHILLES Left      REPAIR TENDON ACHILLES      1980s two separate surgeries.     ROTATOR CUFF REPAIR RT/LT Left      ZZC TOTAL KNEE ARTHROPLASTY Right 3/17/2015    Procedure: RIGHT KNEE TOTAL ARTHROPLASTY;  Surgeon: Jaiden Barnes MD;   Location: [facility] OR;  Service: Orthopedics     RUST TOTAL KNEE ARTHROPLASTY Left 2015    Procedure: LEFT KNEE TOTAL ARTHROPLASTY;  Surgeon: Jaiden Barnes MD;  Location: [facility];  Service: Orthopedics       Family History:    Family History   Problem Relation Age of Onset     Coronary Artery Disease Father      Hyperlipidemia Mother      Prostate Cancer No family hx of      Colon Cancer No family hx of      Hypertension Mother      Heart Disease Mother      Heart Disease Father      Hypertension Sister      Cancer Brother      No Known Problems Daughter      No Known Problems Son      Cancer Maternal Grandmother      Vision Loss Maternal Grandmother      No Known Problems Sister      Cancer Brother      Hypertension Brother      Hypertension Brother      Cancer Brother      Sleep Apnea Brother      No Known Problems Brother      No Known Problems Daughter        Social History:  Marital Status:  Single [1]  Social History     Tobacco Use     Smoking status: Former Smoker     Packs/day: 1.00     Years: 30.00     Pack years: 30.00     Types: Cigarettes     Quit date: 2011     Years since quittin.2     Smokeless tobacco: Never Used     Tobacco comment: started at age 22   Substance Use Topics     Alcohol use: Yes     Drug use: No        Medications:    amLODIPine (NORVASC) 5 MG tablet  chlorthalidone (HYGROTON) 25 MG tablet  clobetasol (TEMOVATE) 0.05 % external ointment  gabapentin (NEURONTIN) 300 MG capsule  neomycin-polymyxin-hydrocortisone (CORTISPORIN) 3.5-27702-8 otic suspension  omeprazole (PRILOSEC) 40 MG DR capsule  simvastatin (ZOCOR) 20 MG tablet          Review of Systems  As per HPI.  Physical Exam   BP: (!) 140/89  Pulse: 58  Temp: 97.2  F (36.2  C)  Resp: 16  Height: 182.9 cm (6')  Weight: 142.9 kg (315 lb)  SpO2: 98 %      Physical Exam  Vitals and nursing note reviewed.   Constitutional:       General: He is not in acute distress.     Appearance: Normal appearance. He  is not ill-appearing, toxic-appearing or diaphoretic.   HENT:      Head: Normocephalic and atraumatic.      Nose: Nose normal.   Eyes:      Conjunctiva/sclera: Conjunctivae normal.   Cardiovascular:      Rate and Rhythm: Normal rate.      Pulses: Normal pulses.   Pulmonary:      Effort: Pulmonary effort is normal.   Abdominal:      General: Bowel sounds are normal.   Musculoskeletal:         General: Normal range of motion.      Cervical back: Normal range of motion and neck supple.      Comments: Tenderness to palpation of the posterior and lateral aspects of the left calf.  Patient has incisional anterior knee incisions from surgery.  No erythema edema in the foot bilaterally there is tenderness palpation of the arch of the left foot and ball of left foot.  No erythema swelling noted varicose vein noted anterior on the left.  Calf mildly tender to palpation on left not on right.  Pulses sensation symmetrical there is some numbness in the toes bilaterally.   Skin:     Capillary Refill: Capillary refill takes less than 2 seconds.   Neurological:      General: No focal deficit present.      Mental Status: He is alert and oriented to person, place, and time.      Motor: No weakness.      Coordination: Coordination normal.         ED Course                 Procedures              Critical Care time:  none               Results for orders placed or performed during the hospital encounter of 05/01/22 (from the past 24 hour(s))   CBC with platelets differential    Narrative    The following orders were created for panel order CBC with platelets differential.  Procedure                               Abnormality         Status                     ---------                               -----------         ------                     CBC with platelets and d...[[id number]]                      Final result                 Please view results for these tests on the individual orders.   CRP inflammation   Result Value Ref Range     CRP Inflammation 2.9 0.0 - 8.0 mg/L   CBC with platelets and differential   Result Value Ref Range    WBC Count 7.2 4.0 - 11.0 10e3/uL    RBC Count 4.70 4.40 - 5.90 10e6/uL    Hemoglobin 14.4 13.3 - 17.7 g/dL    Hematocrit 41.6 40.0 - 53.0 %    MCV 89 78 - 100 fL    MCH 30.6 26.5 - 33.0 pg    MCHC 34.6 31.5 - 36.5 g/dL    RDW 12.6 10.0 - 15.0 %    Platelet Count 199 150 - 450 10e3/uL    % Neutrophils 60 %    % Lymphocytes 27 %    % Monocytes 7 %    % Eosinophils 5 %    % Basophils 1 %    % Immature Granulocytes 0 %    NRBCs per 100 WBC 0 <1 /100    Absolute Neutrophils 4.3 1.6 - 8.3 10e3/uL    Absolute Lymphocytes 2.0 0.8 - 5.3 10e3/uL    Absolute Monocytes 0.5 0.0 - 1.3 10e3/uL    Absolute Eosinophils 0.4 0.0 - 0.7 10e3/uL    Absolute Basophils 0.0 0.0 - 0.2 10e3/uL    Absolute Immature Granulocytes 0.0 <=0.4 10e3/uL    Absolute NRBCs 0.0 10e3/uL       Medications - No data to display  Results for orders placed or performed during the hospital encounter of 05/01/22   Foot XR, G/E 3 views, left    Narrative    EXAM: XR FOOT LEFT G/E 3 VIEWS  LOCATION: [facility]  DATE/TIME: 5/1/2022 1:16 PM    INDICATION: medial distal foot pain  COMPARISON: None.      Impression    IMPRESSION: Mild scattered midfoot arthrosis. No acute fracture. Dystrophic soft tissue calcification within the Achilles tendon region, chronic.   US Lower Extremity Venous Duplex Left    Narrative    EXAM: US LOWER EXTREMITY VENOUS DUPLEX LEFT  LOCATION: [facility]  DATE/TIME: 5/1/2022 1:36 PM    INDICATION: Left calf pain.  COMPARISON: None.  TECHNIQUE: Venous Duplex ultrasound of the left lower extremity with and without compression, augmentation and duplex. Color flow and spectral Doppler with waveform analysis performed.    FINDINGS: Exam includes the common femoral, femoral, popliteal, and contralateral common femoral veins as well as segmentally visualized deep calf veins and greater  saphenous vein.     LEFT: No deep vein thrombosis. No superficial thrombophlebitis. No popliteal cyst.      Impression    IMPRESSION:  1.  No deep venous thrombosis in the left lower extremity.       Assessments & Plan (with Medical Decision Making) records were reviewed.  X-ray of the left foot and venous Doppler ultrasound of the left leg were obtained.  Ultrasound revealed no deep venous thrombosis and x-ray revealed chronic arthritic changes and soft tissue calcifications in the midfoot.  No obvious swelling or fracture noted.  Findings discussed with patient.  His daughter was requesting labs and we therefore did decide to do a CBC and CRP both of these were without significant abnormality.  I do not see any erythema or signs of gout at this time.  I feel this is more than likely a muscle strain or ligament or tendon injury.  I Alison have patient follow-up with podiatry this week and have him return if any swelling redness numbness fevers or other symptoms present.  Patient is agreement with this plan.     I have reviewed the nursing notes.    I have reviewed the findings, diagnosis, plan and need for follow up with the patient.       Discharge Medication List as of 5/1/2022  3:59 PM          Final diagnoses:   Pain of left lower extremity   Left foot pain       5/1/2022   [facility] EMERGENCY DEPT     Byron, James Greenfield MD  05/02/22 2729

## 2022-05-02 ENCOUNTER — OFFICE VISIT (OUTPATIENT)
Dept: PODIATRY | Facility: CLINIC | Age: 67
End: 2022-05-02
Payer: MEDICARE

## 2022-05-02 VITALS
DIASTOLIC BLOOD PRESSURE: 77 MMHG | HEIGHT: 72 IN | WEIGHT: 315 LBS | SYSTOLIC BLOOD PRESSURE: 126 MMHG | BODY MASS INDEX: 42.66 KG/M2 | OXYGEN SATURATION: 95 % | HEART RATE: 50 BPM

## 2022-05-02 DIAGNOSIS — M72.2 PLANTAR FASCIITIS, LEFT: Primary | ICD-10-CM

## 2022-05-02 DIAGNOSIS — M77.52 TENDINITIS OF LEFT FOOT: ICD-10-CM

## 2022-05-02 DIAGNOSIS — M77.51 TENDINITIS OF RIGHT FOOT: ICD-10-CM

## 2022-05-02 DIAGNOSIS — M72.2 PLANTAR FASCIITIS, RIGHT: ICD-10-CM

## 2022-05-02 DIAGNOSIS — M79.672 LEFT FOOT PAIN: ICD-10-CM

## 2022-05-02 PROCEDURE — 99203 OFFICE O/P NEW LOW 30 MIN: CPT | Performed by: PODIATRIST

## 2022-05-02 ASSESSMENT — PAIN SCALES - GENERAL: PAINLEVEL: EXTREME PAIN (8)

## 2022-05-02 NOTE — LETTER
5/2/2022         RE: Tommy De La O  1964 [address] [facility] [address]        Dear Colleague,    Thank you for referring your patient, Tommy De La O, to the [facility] [place]. Please see a copy of my visit note below.    PATIENT HISTORY:  Tommy De La O is a 67 year old male who presents to clinic in consultation at the request of  James Matthews M.D. with a chief complaint of left foot pain.  The patient is seen by themselves.  The patient relates the pain is primarily located around the alll over foot.  Reports insidious onset without acute precipitating event. that has been going on for 3 week(s).  The patient has previously tried elevation, ice, heat, good feet, and compression socks with little relief.  Prior history of similar pain/issues ~ 10 months and 0 years ago..  The patient is retired.  Any previous notes and studies that pertain to the patient's condition were reviewed.    Pertinent medical, surgical and family history was reviewed in the Epic chart and include peripheral polyneuropathy, gastroesophageal reflux disease, gastrointestinal hemorrhage, morbid obesity, osteoarthritis of knee       Medications:   Current Outpatient Medications:      amLODIPine (NORVASC) 5 MG tablet, Take 1 tablet (5 mg) by mouth daily, Disp: 90 tablet, Rfl: 3     chlorthalidone (HYGROTON) 25 MG tablet, Take 1 tablet (25 mg) by mouth daily, Disp: 90 tablet, Rfl: 3     clobetasol (TEMOVATE) 0.05 % external ointment, Apply topically daily as needed For itching, Disp: 60 g, Rfl: 0     gabapentin (NEURONTIN) 300 MG capsule, TAKE 1 CAPSULE THREE TIMES A DAY, Disp: 270 capsule, Rfl: 3     neomycin-polymyxin-hydrocortisone (CORTISPORIN) 3.5-77386-2 otic suspension, Place 3 drops Into the left ear 4 times daily as needed, Disp: 10 mL, Rfl: 1     omeprazole (PRILOSEC) 40 MG DR capsule, TAKE 1 CAPSULE DAILY 30 TO 60 MINUTES BEFORE A MEAL, Disp: 90 capsule, Rfl: 3     simvastatin (ZOCOR) 20 MG  tablet, Take 1 tablet (20 mg) by mouth At Bedtime, Disp: 90 tablet, Rfl: 3    Current Facility-Administered Medications:      ropivacaine (NAROPIN) injection 2 mL, 2 mL, , , Abdulaziz Aly, , 2 mL at 10/19/21 1015     triamcinolone (KENALOG-40) injection 40 mg, 40 mg, , , Abdulaziz Aly, DO, 40 mg at 01/21/22 1405     triamcinolone (KENALOG-40) injection 40 mg, 40 mg, , , Abdulaziz Aly, DO, 40 mg at 10/19/21 1015     Allergies:    Allergies   Allergen Reactions     Cefzil [Cefprozil] Hives and Itching     Darvocet [Propoxyphene N-Apap] Hives     Penicillins Hives       Vitals: /77   Pulse 50   Ht 1.829 m (6')   Wt 142.9 kg (315 lb)   SpO2 95%   BMI 42.72 kg/m    BMI= Body mass index is 42.72 kg/m .    LOWER EXTREMITY PHYSICAL EXAM    Dermatologic: Skin is intact to right and left lower extremity without significant lesions, rash or abrasion.        Vascular: DP & PT pulses are intact & regular on the right and left.   CFT and skin temperature is normal to the right and left lower extremity.     Neurologic: Lower extremity sensation is intact to light touch.  No evidence of weakness in the right and left lower extremity.        Musculoskeletal: Patient is ambulatory without assistive device or brace.  No gross ankle deformity noted.  No foot or ankle joint effusion is noted.  Noted pain on palpation under the right left heel at the insertion point of plantar fascia.  No surrounding erythema or edema noted.  Noted pain on palpation over the dorsal aspect of the right and left arches with mild edema and erythema noted.    Diagnostics: Recent radiographs included three views of the left foot demonstrating mild degenerative changes to the midtarsal joints.  No cortical erosions or periosteal elevation.  All joint margins appear stable.  There is no apparent fracture or tumor formation noted.  There is no evidence of foreign body.  The images were independently reviewed by myself  along with the patient explaining the findings.      ASSESSMENT / PLAN:     ICD-10-CM    1. Plantar fasciitis, left  M72.2 Ankle/Foot Bracing Supplies DME   2. Left foot pain  M79.672 Orthopedic  Referral   3. Tendinitis of left foot  M77.52 Ankle/Foot Bracing Supplies DME   4. Plantar fasciitis, right  M72.2 Ankle/Foot Bracing Supplies DME   5. Tendinitis of right foot  M77.51 Ankle/Foot Bracing Supplies DME       I have explained to Tommy about the conditions.  We discussed the underlying contributing factors to the condition as well as treatment options along with expected length of recovery.  At this time, the patient was educated on the importance of offloading supportive shoes and other devices.  I demonstrated to the patient calf stretches to perform every hour daily until symptoms resolve.  After symptoms resolve, the patient was advised to perform the stretches 3 times daily to prevent future recurrence.  The patient was instructed to perform warm soaks with Epson salt after which to also apply over-the-counter Voltaren gel to deeply massage the injured tissue.  The patient was instructed to do this on a daily basis until symptoms resolve.   The patient was fitted with  Dynaflex inserts that will aid in offloading the tension forces to the soft tissues and prevent further inflammation.   The patient will return in four weeks for reevaluation and to determine if any further treatment will be needed.      Tommy verbalized agreement with and understanding of the rational for the diagnosis and treatment plan.  All questions were answered to best of my ability and the patient's satisfaction. The patient was advised to contact the clinic with any questions that may arise after the clinic visit.      Disclaimer: This note consists of symbols derived from keyboarding, dictation and/or voice recognition software. As a result, there may be errors in the script that have gone undetected. Please consider this  when interpreting information found in this chart.       IVETTE Mathew D.P.M., [email or web address].        Again, thank you for allowing me to participate in the care of your patient.        Sincerely,        Pradeep Mathew DPM

## 2022-05-02 NOTE — PATIENT INSTRUCTIONS

## 2022-05-02 NOTE — NURSING NOTE
Chief Complaint   Patient presents with     Left Foot - Pain       Initial /77   Pulse 50   Ht 1.829 m (6')   Wt 142.9 kg (315 lb)   SpO2 95%   BMI 42.72 kg/m   Estimated body mass index is 42.72 kg/m  as calculated from the following:    Height as of this encounter: 1.829 m (6').    Weight as of this encounter: 142.9 kg (315 lb).  Medications and allergies reviewed.      Yasmeen TOURE MA

## 2022-05-02 NOTE — PROGRESS NOTES
PATIENT HISTORY:  Tommy De La O is a 67 year old male who presents to clinic in consultation at the request of  James Matthews M.D. with a chief complaint of left foot pain.  The patient is seen by themselves.  The patient relates the pain is primarily located around the alll over foot.  Reports insidious onset without acute precipitating event. that has been going on for 3 week(s).  The patient has previously tried elevation, ice, heat, good feet, and compression socks with little relief.  Prior history of similar pain/issues ~ 10 months and 0 years ago..  The patient is retired.  Any previous notes and studies that pertain to the patient's condition were reviewed.    Pertinent medical, surgical and family history was reviewed in the Epic chart and include peripheral polyneuropathy, gastroesophageal reflux disease, gastrointestinal hemorrhage, morbid obesity, osteoarthritis of knee       Medications:   Current Outpatient Medications:      amLODIPine (NORVASC) 5 MG tablet, Take 1 tablet (5 mg) by mouth daily, Disp: 90 tablet, Rfl: 3     chlorthalidone (HYGROTON) 25 MG tablet, Take 1 tablet (25 mg) by mouth daily, Disp: 90 tablet, Rfl: 3     clobetasol (TEMOVATE) 0.05 % external ointment, Apply topically daily as needed For itching, Disp: 60 g, Rfl: 0     gabapentin (NEURONTIN) 300 MG capsule, TAKE 1 CAPSULE THREE TIMES A DAY, Disp: 270 capsule, Rfl: 3     neomycin-polymyxin-hydrocortisone (CORTISPORIN) 3.5-93276-3 otic suspension, Place 3 drops Into the left ear 4 times daily as needed, Disp: 10 mL, Rfl: 1     omeprazole (PRILOSEC) 40 MG DR capsule, TAKE 1 CAPSULE DAILY 30 TO 60 MINUTES BEFORE A MEAL, Disp: 90 capsule, Rfl: 3     simvastatin (ZOCOR) 20 MG tablet, Take 1 tablet (20 mg) by mouth At Bedtime, Disp: 90 tablet, Rfl: 3    Current Facility-Administered Medications:      ropivacaine (NAROPIN) injection 2 mL, 2 mL, , , Abdulaziz Aly, , 2 mL at 10/19/21 1015     triamcinolone (KENALOG-40)  injection 40 mg, 40 mg, , , Abdulaziz Aly DO, 40 mg at 01/21/22 1405     triamcinolone (KENALOG-40) injection 40 mg, 40 mg, , , Abdulaziz Aly DO, 40 mg at 10/19/21 1015     Allergies:    Allergies   Allergen Reactions     Cefzil [Cefprozil] Hives and Itching     Darvocet [Propoxyphene N-Apap] Hives     Penicillins Hives       Vitals: /77   Pulse 50   Ht 1.829 m (6')   Wt 142.9 kg (315 lb)   SpO2 95%   BMI 42.72 kg/m    BMI= Body mass index is 42.72 kg/m .    LOWER EXTREMITY PHYSICAL EXAM    Dermatologic: Skin is intact to right and left lower extremity without significant lesions, rash or abrasion.        Vascular: DP & PT pulses are intact & regular on the right and left.   CFT and skin temperature is normal to the right and left lower extremity.     Neurologic: Lower extremity sensation is intact to light touch.  No evidence of weakness in the right and left lower extremity.        Musculoskeletal: Patient is ambulatory without assistive device or brace.  No gross ankle deformity noted.  No foot or ankle joint effusion is noted.  Noted pain on palpation under the right left heel at the insertion point of plantar fascia.  No surrounding erythema or edema noted.  Noted pain on palpation over the dorsal aspect of the right and left arches with mild edema and erythema noted.    Diagnostics: Recent radiographs included three views of the left foot demonstrating mild degenerative changes to the midtarsal joints.  No cortical erosions or periosteal elevation.  All joint margins appear stable.  There is no apparent fracture or tumor formation noted.  There is no evidence of foreign body.  The images were independently reviewed by myself along with the patient explaining the findings.      ASSESSMENT / PLAN:     ICD-10-CM    1. Plantar fasciitis, left  M72.2 Ankle/Foot Bracing Supplies DME   2. Left foot pain  M79.672 Orthopedic  Referral   3. Tendinitis of left foot  M77.52  Ankle/Foot Bracing Supplies DME   4. Plantar fasciitis, right  M72.2 Ankle/Foot Bracing Supplies DME   5. Tendinitis of right foot  M77.51 Ankle/Foot Bracing Supplies DME       I have explained to Tommy about the conditions.  We discussed the underlying contributing factors to the condition as well as treatment options along with expected length of recovery.  At this time, the patient was educated on the importance of offloading supportive shoes and other devices.  I demonstrated to the patient calf stretches to perform every hour daily until symptoms resolve.  After symptoms resolve, the patient was advised to perform the stretches 3 times daily to prevent future recurrence.  The patient was instructed to perform warm soaks with Epson salt after which to also apply over-the-counter Voltaren gel to deeply massage the injured tissue.  The patient was instructed to do this on a daily basis until symptoms resolve.   The patient was fitted with  Dynaflex inserts that will aid in offloading the tension forces to the soft tissues and prevent further inflammation.   The patient will return in four weeks for reevaluation and to determine if any further treatment will be needed.      Tommy verbalized agreement with and understanding of the rational for the diagnosis and treatment plan.  All questions were answered to best of my ability and the patient's satisfaction. The patient was advised to contact the clinic with any questions that may arise after the clinic visit.      Disclaimer: This note consists of symbols derived from keyboarding, dictation and/or voice recognition software. As a result, there may be errors in the script that have gone undetected. Please consider this when interpreting information found in this chart.       IVETTE Mathew D.P.M., [email or web address].S.

## 2022-05-04 ENCOUNTER — MEDICAL CORRESPONDENCE (OUTPATIENT)
Dept: HEALTH INFORMATION MANAGEMENT | Facility: CLINIC | Age: 67
End: 2022-05-04
Payer: MEDICARE

## 2022-05-09 ENCOUNTER — OFFICE VISIT (OUTPATIENT)
Dept: FAMILY MEDICINE | Facility: CLINIC | Age: 67
End: 2022-05-09
Payer: MEDICARE

## 2022-05-09 VITALS
HEART RATE: 50 BPM | TEMPERATURE: 98 F | HEIGHT: 72 IN | RESPIRATION RATE: 20 BRPM | OXYGEN SATURATION: 97 % | DIASTOLIC BLOOD PRESSURE: 70 MMHG | BODY MASS INDEX: 41.85 KG/M2 | SYSTOLIC BLOOD PRESSURE: 120 MMHG | WEIGHT: 309 LBS

## 2022-05-09 DIAGNOSIS — R04.0 EPISTAXIS: ICD-10-CM

## 2022-05-09 DIAGNOSIS — M79.672 LEFT FOOT PAIN: Primary | ICD-10-CM

## 2022-05-09 LAB — URATE SERPL-MCNC: 6.9 MG/DL (ref 3.5–7.2)

## 2022-05-09 PROCEDURE — 99213 OFFICE O/P EST LOW 20 MIN: CPT | Performed by: FAMILY MEDICINE

## 2022-05-09 PROCEDURE — 36415 COLL VENOUS BLD VENIPUNCTURE: CPT | Performed by: FAMILY MEDICINE

## 2022-05-09 PROCEDURE — 84550 ASSAY OF BLOOD/URIC ACID: CPT | Performed by: FAMILY MEDICINE

## 2022-05-09 RX ORDER — PREDNISONE 20 MG/1
TABLET ORAL
Qty: 16 TABLET | Refills: 0 | Status: SHIPPED | OUTPATIENT
Start: 2022-05-09 | End: 2023-01-11

## 2022-05-09 ASSESSMENT — PAIN SCALES - GENERAL: PAINLEVEL: MILD PAIN (2)

## 2022-05-09 NOTE — TELEPHONE ENCOUNTER
Forms completed and signed by Dr. Crawley. Faxed back on 5/6/2022. Sent to scanning.    Celi Corbin Patient

## 2022-05-09 NOTE — NURSING NOTE
Chief Complaint   Patient presents with     Nose Bleeds     Musculoskeletal Problem       Initial /70   Pulse 50   Temp 98  F (36.7  C) (Tympanic)   Resp 20   Ht 1.829 m (6')   Wt 140.2 kg (309 lb)   SpO2 97%   BMI 41.91 kg/m   Estimated body mass index is 41.91 kg/m  as calculated from the following:    Height as of this encounter: 1.829 m (6').    Weight as of this encounter: 140.2 kg (309 lb).    Patient presents to the clinic using     Health Maintenance that is potentially due pending provider review:          Is there anyone who you would like to be able to receive your results?   If yes have patient fill out DEREK

## 2022-05-09 NOTE — PROGRESS NOTES
"ASSESSMENT:   (D91.157) Left foot pain  (primary encounter diagnosis)  Comment: You have some chronic neuropathy and numbness/pain.  You also have episodes of pain, redness and swelling which may be gout.   Plan: Uric acid, predniSONE (DELTASONE) 20 MG tablet          Check uric acid blood test for the chemical that causes gout.  Take prednisone 20mg 2 pills daily for 6 days, then 1 pill daily for another 4 days.  This is an anti-inflammatory medication what should help for pain.      (R04.0) Epistaxis  Comment: left nostril  Plan: Avoid any irritation to the nose; blowing, picking or other injury.   Increase humidity in the household and/or the bedroom at night.   Try antibiotic ointment twice daily to nasal septum to keep moist and prevent further bleed.  Bacitracin or Triple antibiotic ointment or similar generic ointment works well.   Recheck with further bleeding problems.  Sometimes we do \"cautery\" with chemicals to burn the lining of the nose where the bleeding is coming from.  This can help bleeding from one persistent spot but can also cause scarring of the lining tissue of the nose.  It is sometimes difficult to do.   How to stop a nosebleed?  Most bleeding from nosebleeds comes from the nasal septum (partition between the nostrils) near the opening of the nose.  Hold direct prressure on the soft part of the end of the nose for 5-10 minutes.  If some continuing bleeding, clear nostrils by blowing well and repeat holding the soft tissues of the nose for another 10 minutes.       April Sanders is a 67 year old who presents for the following health issues   Chief Complaint   Patient presents with     Nose Bleeds     Musculoskeletal Problem      Intermittent nosebleeds.    He feels it is related to his CPAP.  It seems to get dried out.  He has humidity with his CPAP  Only left nostril.   He can get bleeding with blowing his nose.  Can be every other or third day.    Onset of symptoms: several months.  He " has been using vaseline at night for the past month.      He shoves kleenex in nose to stop the bleed.  Usually stops within a few minutes.     Concern - epistaxis   Onset: on and off   Description: uses C-Pap   Intensity: mild, moderate  Progression of Symptoms:  improving and intermittent  Accompanying Signs & Symptoms: none  Previous history of similar problem: none  Precipitating factors:        Worsened by: blew nose and had bleeding last night.   Alleviating factors:        Improved by:   Therapies tried and outcome:  none         He saw podiatry 5/2/22.  Was diagnosed with plantar fascitis.   Seen in ED on 5/1 for left leg and foot pain.  Venous doppler negative for DVT.     He has a history of neuropathy in feet.    Pain is dorsal left foot.   He has had redness in that foot at times and swelling.   A couple times a year with pain, redness and swelling.   Alleviating factors: ice, gabapentin   Treated in July with redness and swelling with doxycycline for possible cellulitis.    He has chronic numb toes, plantar feet and a little into legs.   Most days taking gabapentin 300mg three times daily   Concern - left foot   Onset: on going   Description: foot numbness   Intensity: 2/10  Progression of Symptoms:  improving  Accompanying Signs & Symptoms: Redness to foot and pain  Previous history of similar problem: yes  Precipitating factors:        Worsened by: when sitting down to res  Alleviating factors:        Improved by:   Therapies tried and outcome: heat, ice and gabapentin     Patient Active Problem List   Diagnosis     Osteoarthritis of knee     Gastrointestinal hemorrhage     Gastroesophageal reflux disease     Hyperlipidemia     Obstructive sleep apnea syndrome     Essential hypertension with goal blood pressure less than 140/90     Hand dermatitis     Peripheral polyneuropathy     Benign prostatic hyperplasia with incomplete bladder emptying     CHUNG (dyspnea on exertion)     Morbid obesity with BMI of  40.0-44.9, adult (H)     Medial epicondylitis of elbow, left     Acute streptococcal pharyngitis      OBJECTIVE:Blood pressure 120/70, pulse 50, temperature 98  F (36.7  C), temperature source Tympanic, resp. rate 20, height 1.829 m (6'), weight 140.2 kg (309 lb), SpO2 97 %. BMI=Body mass index is 41.91 kg/m .  GENERAL APPEARANCE ADULT: Alert, no acute distress, morbidly obese  HENT: Nose appears normal there are a couple small red macules on the left nasal septum.  I did rub these with a Q-tip and also had him blow his nose but there was no bleeding.  MS: No visible swelling in feet, no erythema today.  Mild tenderness at the base of the great toe and midfoot dorsal aspect.  He did not have any tenderness in his plantar heel a little bit at the arch.  No swelling or redness in the ankle and good range of motion.

## 2022-05-09 NOTE — PATIENT INSTRUCTIONS
"ASSESSMENT:   (O20.467) Left foot pain  (primary encounter diagnosis)  Comment: You have some chronic neuropathy and numbness/pain.  You also have episodes of pain, redness and swelling which may be gout.   Plan: Uric acid, predniSONE (DELTASONE) 20 MG tablet          Check uric acid blood test for the chemical that causes gout.  Take prednisone 20mg 2 pills daily for 6 days, then 1 pill daily for another 4 days.  This is an anti-inflammatory medication what should help for pain.      (R04.0) Epistaxis  Comment: left nostril  Plan: Avoid any irritation to the nose; blowing, picking or other injury.   Increase humidity in the household and/or the bedroom at night.   Try antibiotic ointment twice daily to nasal septum to keep moist and prevent further bleed.  Bacitracin or Triple antibiotic ointment or similar generic ointment works well.   Recheck with further bleeding problems.  Sometimes we do \"cautery\" with chemicals to burn the lining of the nose where the bleeding is coming from.  This can help bleeding from one persistent spot but can also cause scarring of the lining tissue of the nose.  It is sometimes difficult to do.   How to stop a nosebleed?  Most bleeding from nosebleeds comes from the nasal septum (partition between the nostrils) near the opening of the nose.  Hold direct prressure on the soft part of the end of the nose for 5-10 minutes.  If some continuing bleeding, clear nostrils by blowing well and repeat holding the soft tissues of the nose for another 10 minutes.   "

## 2022-05-10 NOTE — RESULT ENCOUNTER NOTE
Coy Sanders,   Your uric acid level is upper normal range.  This is consistent with gout as a cause for the foot pains.   PLAN:  If you have frequent gout, we could start daily preventative medication-allopurinol.   ELIAZAR ROY MD

## 2022-05-17 ENCOUNTER — TELEPHONE (OUTPATIENT)
Dept: UROLOGY | Facility: CLINIC | Age: 67
End: 2022-05-17
Payer: MEDICARE

## 2022-05-17 DIAGNOSIS — R31.0 GROSS HEMATURIA: Primary | ICD-10-CM

## 2022-05-17 NOTE — TELEPHONE ENCOUNTER
Patient calling office. Patient has 6/8 appointment for CT results. Per Dr. Domingo, appointment not needed. Patient does need to arrange lab for UA and cytol in June. Orders entered. Patient will arrange lab.  6/8 canceled.  Tashia Jenkins, CMA

## 2022-05-18 ENCOUNTER — DOCUMENTATION ONLY (OUTPATIENT)
Dept: OTHER | Facility: CLINIC | Age: 67
End: 2022-05-18
Payer: MEDICARE

## 2022-06-01 ENCOUNTER — LAB (OUTPATIENT)
Dept: LAB | Facility: CLINIC | Age: 67
End: 2022-06-01
Payer: MEDICARE

## 2022-06-01 DIAGNOSIS — R31.0 GROSS HEMATURIA: ICD-10-CM

## 2022-06-01 LAB
ALBUMIN UR-MCNC: NEGATIVE MG/DL
APPEARANCE UR: CLEAR
BILIRUB UR QL STRIP: NEGATIVE
COLOR UR AUTO: YELLOW
GLUCOSE UR STRIP-MCNC: NEGATIVE MG/DL
HGB UR QL STRIP: NEGATIVE
KETONES UR STRIP-MCNC: NEGATIVE MG/DL
LEUKOCYTE ESTERASE UR QL STRIP: NEGATIVE
NITRATE UR QL: NEGATIVE
PH UR STRIP: 6.5 [PH] (ref 5–7)
SP GR UR STRIP: 1.01 (ref 1–1.03)
UROBILINOGEN UR STRIP-ACNC: 0.2 E.U./DL

## 2022-06-01 PROCEDURE — 81003 URINALYSIS AUTO W/O SCOPE: CPT

## 2022-10-10 ENCOUNTER — HEALTH MAINTENANCE LETTER (OUTPATIENT)
Age: 67
End: 2022-10-10

## 2022-12-16 ENCOUNTER — TELEPHONE (OUTPATIENT)
Dept: ORTHOPEDICS | Facility: CLINIC | Age: 67
End: 2022-12-16

## 2022-12-16 NOTE — TELEPHONE ENCOUNTER
[facility] Call Center    Phone Message    May a detailed message be left on voicemail: yes     Reason for Call: Other: Patient wants to know if he can keep his appt on 12/22. He tested positive for COVID on 12/12. Symptoms loss of smell/taste, slight cough, highest temp 99.3.  He would like to keep the appt because he really needs the injection, but if he has to reschedule would like to know ASAP. Or if he needs to wait and see how he feels closer to the appt.  Please leave very detailed msg on what he needs to do.

## 2022-12-16 NOTE — TELEPHONE ENCOUNTER
Spoke to patient discussed his current low grade symptoms and general COVID quarantine.  Reviewed that it general would limit steroid injection during recovery from viral illness.  Given that he is already improving and his appointment would be 10 days from + test, would be ok to proceed with injection.  He will contact clinic to reschedule if his symptoms worsen prior to his appointment.    Discussed with Dr Aly and he agrees with above recommendations.    Dilshad Riggs ATC

## 2022-12-26 ENCOUNTER — TELEPHONE (OUTPATIENT)
Dept: FAMILY MEDICINE | Facility: CLINIC | Age: 67
End: 2022-12-26

## 2022-12-26 NOTE — TELEPHONE ENCOUNTER
Got a headache last night and neck and shoulder pain, took Advil and it got better, said he was short of breath at the time as well. He took off his C-pap and felt better. He did go back to sleep after an hour but today is really exhausted. Says he has increased work of breathing when doing chores today. Of note he was diagnosed with Covid 14 days ago. He has an oximeter at home but has not checked it. He is going to come to [facility] for evaluation of this shortness of breath

## 2022-12-28 ENCOUNTER — APPOINTMENT (OUTPATIENT)
Dept: GENERAL RADIOLOGY | Facility: CLINIC | Age: 67
End: 2022-12-28
Attending: FAMILY MEDICINE
Payer: MEDICARE

## 2022-12-28 ENCOUNTER — HOSPITAL ENCOUNTER (EMERGENCY)
Facility: CLINIC | Age: 67
Discharge: HOME | End: 2022-12-29
Attending: FAMILY MEDICINE | Admitting: FAMILY MEDICINE
Payer: MEDICARE

## 2022-12-28 ENCOUNTER — TELEPHONE (OUTPATIENT)
Dept: FAMILY MEDICINE | Facility: CLINIC | Age: 67
End: 2022-12-28

## 2022-12-28 ENCOUNTER — APPOINTMENT (OUTPATIENT)
Dept: CT IMAGING | Facility: CLINIC | Age: 67
End: 2022-12-28
Attending: FAMILY MEDICINE
Payer: MEDICARE

## 2022-12-28 VITALS
WEIGHT: 310 LBS | BODY MASS INDEX: 41.99 KG/M2 | RESPIRATION RATE: 15 BRPM | SYSTOLIC BLOOD PRESSURE: 162 MMHG | OXYGEN SATURATION: 97 % | HEIGHT: 72 IN | TEMPERATURE: 97.4 F | DIASTOLIC BLOOD PRESSURE: 97 MMHG | HEART RATE: 48 BPM

## 2022-12-28 DIAGNOSIS — R51.9 ACUTE NONINTRACTABLE HEADACHE, UNSPECIFIED HEADACHE TYPE: ICD-10-CM

## 2022-12-28 DIAGNOSIS — R09.1 PLEURISY: ICD-10-CM

## 2022-12-28 LAB
ALBUMIN SERPL BCG-MCNC: 4.2 G/DL (ref 3.5–5.2)
ALP SERPL-CCNC: 81 U/L (ref 40–129)
ALT SERPL W P-5'-P-CCNC: 38 U/L (ref 10–50)
ANION GAP SERPL CALCULATED.3IONS-SCNC: 10 MMOL/L (ref 7–15)
APAP SERPL-MCNC: <5 UG/ML (ref 10–30)
AST SERPL W P-5'-P-CCNC: 32 U/L (ref 10–50)
BASOPHILS # BLD AUTO: 0.1 10E3/UL (ref 0–0.2)
BASOPHILS NFR BLD AUTO: 1 %
BILIRUB DIRECT SERPL-MCNC: <0.2 MG/DL (ref 0–0.3)
BILIRUB SERPL-MCNC: 0.5 MG/DL
BUN SERPL-MCNC: 14.7 MG/DL (ref 8–23)
CALCIUM SERPL-MCNC: 9.5 MG/DL (ref 8.8–10.2)
CHLORIDE SERPL-SCNC: 99 MMOL/L (ref 98–107)
CREAT SERPL-MCNC: 0.75 MG/DL (ref 0.67–1.17)
D DIMER PPP FEU-MCNC: 0.32 UG/ML FEU (ref 0–0.5)
DEPRECATED HCO3 PLAS-SCNC: 25 MMOL/L (ref 22–29)
EOSINOPHIL # BLD AUTO: 0.4 10E3/UL (ref 0–0.7)
EOSINOPHIL NFR BLD AUTO: 6 %
ERYTHROCYTE [DISTWIDTH] IN BLOOD BY AUTOMATED COUNT: 12.8 % (ref 10–15)
GFR SERPL CREATININE-BSD FRML MDRD: >90 ML/MIN/1.73M2
GLUCOSE SERPL-MCNC: 99 MG/DL (ref 70–99)
HCT VFR BLD AUTO: 43.1 % (ref 40–53)
HGB BLD-MCNC: 14.7 G/DL (ref 13.3–17.7)
HOLD SPECIMEN: NORMAL
HOLD SPECIMEN: NORMAL
IMM GRANULOCYTES # BLD: 0 10E3/UL
IMM GRANULOCYTES NFR BLD: 0 %
LYMPHOCYTES # BLD AUTO: 2.3 10E3/UL (ref 0.8–5.3)
LYMPHOCYTES NFR BLD AUTO: 32 %
MCH RBC QN AUTO: 30.4 PG (ref 26.5–33)
MCHC RBC AUTO-ENTMCNC: 34.1 G/DL (ref 31.5–36.5)
MCV RBC AUTO: 89 FL (ref 78–100)
MONOCYTES # BLD AUTO: 0.5 10E3/UL (ref 0–1.3)
MONOCYTES NFR BLD AUTO: 7 %
NEUTROPHILS # BLD AUTO: 4 10E3/UL (ref 1.6–8.3)
NEUTROPHILS NFR BLD AUTO: 54 %
NRBC # BLD AUTO: 0 10E3/UL
NRBC BLD AUTO-RTO: 0 /100
PLATELET # BLD AUTO: 205 10E3/UL (ref 150–450)
POTASSIUM SERPL-SCNC: 3.5 MMOL/L (ref 3.4–5.3)
PROT SERPL-MCNC: 7.4 G/DL (ref 6.4–8.3)
RBC # BLD AUTO: 4.84 10E6/UL (ref 4.4–5.9)
SODIUM SERPL-SCNC: 134 MMOL/L (ref 136–145)
TROPONIN T SERPL HS-MCNC: 11 NG/L
TROPONIN T SERPL HS-MCNC: 11 NG/L
WBC # BLD AUTO: 7.3 10E3/UL (ref 4–11)

## 2022-12-28 PROCEDURE — 82248 BILIRUBIN DIRECT: CPT | Performed by: FAMILY MEDICINE

## 2022-12-28 PROCEDURE — 250N000009 HC RX 250: Performed by: FAMILY MEDICINE

## 2022-12-28 PROCEDURE — 70496 CT ANGIOGRAPHY HEAD: CPT | Mod: MF

## 2022-12-28 PROCEDURE — 71046 X-RAY EXAM CHEST 2 VIEWS: CPT

## 2022-12-28 PROCEDURE — 82310 ASSAY OF CALCIUM: CPT | Performed by: FAMILY MEDICINE

## 2022-12-28 PROCEDURE — 93005 ELECTROCARDIOGRAM TRACING: CPT | Performed by: FAMILY MEDICINE

## 2022-12-28 PROCEDURE — 93010 ELECTROCARDIOGRAM REPORT: CPT | Performed by: FAMILY MEDICINE

## 2022-12-28 PROCEDURE — 70498 CT ANGIOGRAPHY NECK: CPT | Mod: MF

## 2022-12-28 PROCEDURE — 250N000011 HC RX IP 250 OP 636: Performed by: FAMILY MEDICINE

## 2022-12-28 PROCEDURE — 99285 EMERGENCY DEPT VISIT HI MDM: CPT | Mod: 25 | Performed by: FAMILY MEDICINE

## 2022-12-28 PROCEDURE — 85379 FIBRIN DEGRADATION QUANT: CPT | Performed by: FAMILY MEDICINE

## 2022-12-28 PROCEDURE — 84484 ASSAY OF TROPONIN QUANT: CPT | Performed by: FAMILY MEDICINE

## 2022-12-28 PROCEDURE — 36415 COLL VENOUS BLD VENIPUNCTURE: CPT | Performed by: FAMILY MEDICINE

## 2022-12-28 PROCEDURE — 85004 AUTOMATED DIFF WBC COUNT: CPT | Performed by: FAMILY MEDICINE

## 2022-12-28 PROCEDURE — G1010 CDSM STANSON: HCPCS

## 2022-12-28 PROCEDURE — 80143 DRUG ASSAY ACETAMINOPHEN: CPT | Performed by: FAMILY MEDICINE

## 2022-12-28 RX ORDER — OXYCODONE HYDROCHLORIDE 5 MG/1
5 TABLET ORAL EVERY 6 HOURS PRN
Qty: 10 TABLET | Refills: 0 | Status: SHIPPED | OUTPATIENT
Start: 2022-12-28 | End: 2023-01-10

## 2022-12-28 RX ORDER — IOPAMIDOL 755 MG/ML
68 INJECTION, SOLUTION INTRAVASCULAR ONCE
Status: COMPLETED | OUTPATIENT
Start: 2022-12-28 | End: 2022-12-28

## 2022-12-28 RX ADMIN — IOPAMIDOL 68 ML: 755 INJECTION, SOLUTION INTRAVENOUS at 23:18

## 2022-12-28 RX ADMIN — SODIUM CHLORIDE 100 ML: 9 INJECTION, SOLUTION INTRAVENOUS at 23:18

## 2022-12-28 ASSESSMENT — ACTIVITIES OF DAILY LIVING (ADL): ADLS_ACUITY_SCORE: 35

## 2022-12-28 NOTE — TELEPHONE ENCOUNTER
"S-(situation): Patient reports speaking for 45 minutes with nurse from his insurance company. It was advised he be seen today.     B-(background): Covid + 12/12/22. Telephone encounter for the same on 12/26/22.    A-(assessment): Patient had major headache and neck pain 12/25, he was ok 12/26. 12/27 he had the worst headache and neck pain of his whole life. He has been taking \"way more\" otc pain relievers than he should be. Today he took 4 Advil at 8:30 Am and 2 more a while ago. His chest feel tight, no arm or jaw pain so he does not think it is cardiac related. His chest tightness was so bad Sunday night he had to take his CPAP off because it made the tightness around his neck worse. Deep breathing makes the chest tightness worse.     R-(recommendations): Advised patient go to ER for evaluation. Patient is driving to [place] planning to go to Urgent care at this time. He agrees to continue to [place] in the event cardiac labs or imaging needed.  Soni RYDER RN      "

## 2022-12-28 NOTE — ED TRIAGE NOTES
Pt here with chest tightness and headache that started Sunday. Pt recently had COVID. Pt states that chest pain and headache have improved slightly since Sunday, but pt has been taking OTC meds for pain with some relief. Pt has been using CPAP machine.      Triage Assessment     Row Name 12/28/22 9503       Triage Assessment (Adult)    Airway WDL WDL       Respiratory WDL    Respiratory WDL WDL       Skin Circulation/Temperature WDL    Skin Circulation/Temperature WDL WDL       Cardiac WDL    Cardiac WDL WDL       Peripheral/Neurovascular WDL    Peripheral Neurovascular WDL WDL       Cognitive/Neuro/Behavioral WDL    Cognitive/Neuro/Behavioral WDL WDL

## 2022-12-28 NOTE — TELEPHONE ENCOUNTER
Reason for Call:  Other appointment    Detailed comments: Patient wants to be seen today he is having on and off bad headaches and chest pain stated he talked to a nurse through [employer] and was told to see primary today.     Phone Number Patient can be reached at: Cell number on file:    Telephone Information:   Mobile [phone]       Best Time: Anytime    Can we leave a detailed message on this number? YES    Call taken on 12/28/2022 at 10:40 AM by Nina Michaels

## 2022-12-29 NOTE — DISCHARGE INSTRUCTIONS
Push fluids, rest.  Tylenol/ibuprofen as needed for pain.  Oxycodone for breakthrough pain as needed.  Follow-up in clinic with your primary care provider if persistent symptoms beyond the next 7 to 10 days.  Return to the emergency department if worse or changes.  Please follow-up to establish care with Dr. Nicolas Harris.

## 2022-12-29 NOTE — ED PROVIDER NOTES
History     Chief Complaint   Patient presents with     Chest Pain     Headache     HPI  Tommy De La O is a 67 year old male, past medical history is significant for peripheral neuropathy, BPH, hypertension, morbid obesity, osteoarthritis, GI bleed, GERD, hyperlipidemia, MARTA, presents to the emergency department with concerns of headache and chest pain beginning 3 days prior to presentation.  At the time of initial evaluation the patient has been in the department for 7 hours and 46 minutes.  History is obtained from the patient who identifies multiple nonexertional chest pressure/tightness episodes over the past 2 to 3 days.  Central chest pressure does not radiate to the back or interscapular area.  No associated shortness of breath.  Associates with headache across the brow forehead area secondarily into the occipital area.  No visual change.  No nausea or vomiting.  He has tried both Tylenol and ibuprofen with minimal relief of his discomfort.  He states that he is concerned that he may have a aneurysm or heart disease as he has multiple relatives affected with this.  He also states that a friend of his is a nurse practitioner and he was advised to come in to get a CT of the head and a CTA of the head and neck.  He notes that he is approximately 16 days post COVID diagnosis.  He did not really feel that he got that sick with it but wonders if the headache and the chest pain may be related to it.  There is certainly a pleuritic component to the chest pain when it is present but it is present intermittently had does not correlate with exertion.  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      Allergies:  Allergies   Allergen Reactions     Cefzil [Cefprozil] Hives and Itching     Darvocet [Propoxyphene N-Apap] Hives     Penicillins Hives       Problem List:    Patient Active Problem List    Diagnosis Date Noted      Acute streptococcal pharyngitis 09/29/2021     Priority: Medium     Medial epicondylitis of elbow, left 10/16/2020     Priority: Medium     CHUNG (dyspnea on exertion) 10/05/2020     Priority: Medium     PFT 2/9/2021 results:EKD=078%, FEV1=84%, FEV1/FVC=63%, VJZC=252%.   The FEV1 and  FVC are normal but the FEV1/FVC ratio is reduced.   The inspiratory flow rates are within normal limits.  Lung volumes are within normal limits.  Following administration of bronchodilators, there is a significant response.  The diffusing   capacity is normal.  However, the diffusing capacity was not corrected for the patient's hemoglobin.   Mild  airway obstruction and a response to bronchodilators indicates asthma.   IMPRESSION: Mild  Airflow Obstruction  -Asthmatic Type   2/10/2021:PFT suggests asthma.  He has a 30 pack year smoking history.        Hand dermatitis 12/30/2019     Priority: Medium     12/30/2019:Uses Clobetasol cream.  He uses on fingers for rash as needed.  Uses intermittently.  Aggravating factors: dry weather.        Peripheral polyneuropathy 12/30/2019     Priority: Medium     12/30/2019:Has pains in legs and feet.  TAking gabapentin chronically.        Benign prostatic hyperplasia with incomplete bladder emptying 11/28/2018     Priority: Medium     Essential hypertension with goal blood pressure less than 140/90 06/15/2018     Priority: Medium     Morbid obesity with BMI of 40.0-44.9, adult (H) 03/31/2016     Priority: Medium     Osteoarthritis of knee 11/03/2014     Priority: Medium     He has had bilateral knee replacements.        Gastrointestinal hemorrhage 11/03/2014     Priority: Medium     11/19/2018:He had colonoscopy.  Presumed from hemorrhoids.        Gastroesophageal reflux disease 11/03/2014     Priority: Medium     Hyperlipidemia 11/03/2014     Priority: Medium     Obstructive sleep apnea syndrome 11/03/2014     Priority: Medium     Diagnosed in 2014 with sleep study and sleep consultation.    3/18/2020:Uses CPAP nightly.  Sleeps much better with use.           Past Medical History:    No past medical history on file.    Past Surgical History:    Past Surgical History:   Procedure Laterality Date     ARTHROSCOPY KNEE Bilateral     both knees with arthroscopy in the past.      ARTHROSCOPY KNEE       ARTHROSCOPY SHOULDER ROTATOR CUFF REPAIR       AS TOTAL KNEE ARTHROPLASTY Bilateral     Both total knees.      LENGTHEN TENDON ACHILLES Left      REPAIR TENDON ACHILLES      1980s two separate surgeries.     ROTATOR CUFF REPAIR RT/LT Left      [facility] TOTAL KNEE ARTHROPLASTY Right 3/17/2015    Procedure: RIGHT KNEE TOTAL ARTHROPLASTY;  Surgeon: Jaiden Barnes MD;  Location: [facility];  Service: Orthopedics     [facility] TOTAL KNEE ARTHROPLASTY Left 2015    Procedure: LEFT KNEE TOTAL ARTHROPLASTY;  Surgeon: Jaiden Barnes MD;  Location: [facility];  Service: Orthopedics       Family History:    Family History   Problem Relation Age of Onset     Coronary Artery Disease Father      Hyperlipidemia Mother      Prostate Cancer No family hx of      Colon Cancer No family hx of      Hypertension Mother      Heart Disease Mother      Heart Disease Father      Hypertension Sister      Cancer Brother      No Known Problems Daughter      No Known Problems Son      Cancer Maternal Grandmother      Vision Loss Maternal Grandmother      No Known Problems Sister      Cancer Brother      Hypertension Brother      Hypertension Brother      Cancer Brother      Sleep Apnea Brother      No Known Problems Brother      No Known Problems Daughter        Social History:  Marital Status:  Single [1]  Social History     Tobacco Use     Smoking status: Former     Packs/day: 1.00     Years: 30.00     Pack years: 30.00     Types: Cigarettes     Quit date: 2011     Years since quittin.9     Smokeless tobacco: Never     Tobacco comments:     started at age 22   Substance Use Topics     Alcohol use: Yes     Drug use:  No        Medications:    oxyCODONE (ROXICODONE) 5 MG tablet  amLODIPine (NORVASC) 5 MG tablet  chlorthalidone (HYGROTON) 25 MG tablet  clobetasol (TEMOVATE) 0.05 % external ointment  gabapentin (NEURONTIN) 300 MG capsule  neomycin-polymyxin-hydrocortisone (CORTISPORIN) 3.5-46685-4 otic suspension  omeprazole (PRILOSEC) 40 MG DR capsule  predniSONE (DELTASONE) 20 MG tablet  simvastatin (ZOCOR) 20 MG tablet          Review of Systems   All other systems reviewed and are negative.      Physical Exam   BP: (!) 183/103  Pulse: 57  Temp: 97.4  F (36.3  C)  Resp: 18  Height: 182.9 cm (6')  Weight: 140.6 kg (310 lb)  SpO2: 97 %      Physical Exam  Vitals and nursing note reviewed.   Constitutional:       General: He is not in acute distress.     Appearance: He is well-developed. He is not ill-appearing.   HENT:      Head: Normocephalic and atraumatic.   Eyes:      Extraocular Movements: Extraocular movements intact.      Pupils: Pupils are equal, round, and reactive to light.   Cardiovascular:      Rate and Rhythm: Normal rate and regular rhythm.      Heart sounds: Normal heart sounds.   Pulmonary:      Effort: Pulmonary effort is normal.      Breath sounds: Normal breath sounds.   Abdominal:      General: Bowel sounds are normal.      Palpations: Abdomen is soft.   Musculoskeletal:         General: Normal range of motion.      Cervical back: Normal range of motion and neck supple.   Skin:     General: Skin is warm and dry.      Capillary Refill: Capillary refill takes less than 2 seconds.   Neurological:      General: No focal deficit present.      Mental Status: He is alert.   Psychiatric:         Mood and Affect: Mood normal.         Behavior: Behavior normal.         ED Course               EKG Interpretation:      Interpreted by Zach Lew MD  Time reviewed: Time obtained 2141 time interpreted 21 4956 bpm sinus bradycardia, normal axis, normal intervals, no acute ST-T wave changes.  Impression sinus  bradycardia.     Procedures              Critical Care time:  none               Results for orders placed or performed during the hospital encounter of 12/28/22 (from the past 24 hour(s))   [place] Draw    Narrative    The following orders were created for panel order [place] Draw.  Procedure                               Abnormality         Status                     ---------                               -----------         ------                     Extra Blue Top Tube[[id number]]                              Final result               Extra Red Top Tube[[id number]]                               Final result                 Please view results for these tests on the individual orders.   CBC with Platelets & Differential    Narrative    The following orders were created for panel order CBC with Platelets & Differential.  Procedure                               Abnormality         Status                     ---------                               -----------         ------                     CBC with platelets and d...[[id number]]                      Final result                 Please view results for these tests on the individual orders.   Basic metabolic panel   Result Value Ref Range    Sodium 134 (L) 136 - 145 mmol/L    Potassium 3.5 3.4 - 5.3 mmol/L    Chloride 99 98 - 107 mmol/L    Carbon Dioxide (CO2) 25 22 - 29 mmol/L    Anion Gap 10 7 - 15 mmol/L    Urea Nitrogen 14.7 8.0 - 23.0 mg/dL    Creatinine 0.75 0.67 - 1.17 mg/dL    Calcium 9.5 8.8 - 10.2 mg/dL    Glucose 99 70 - 99 mg/dL    GFR Estimate >90 >60 mL/min/1.73m2   Troponin T, High Sensitivity   Result Value Ref Range    Troponin T, High Sensitivity 11 <=22 ng/L   Extra Blue Top Tube   Result Value Ref Range    Hold Specimen JIC    Extra Red Top Tube   Result Value Ref Range    Hold Specimen JIC    CBC with platelets and differential   Result Value Ref Range    WBC Count 7.3 4.0 - 11.0 10e3/uL    RBC Count 4.84 4.40 - 5.90 10e6/uL    Hemoglobin 14.7  13.3 - 17.7 g/dL    Hematocrit 43.1 40.0 - 53.0 %    MCV 89 78 - 100 fL    MCH 30.4 26.5 - 33.0 pg    MCHC 34.1 31.5 - 36.5 g/dL    RDW 12.8 10.0 - 15.0 %    Platelet Count 205 150 - 450 10e3/uL    % Neutrophils 54 %    % Lymphocytes 32 %    % Monocytes 7 %    % Eosinophils 6 %    % Basophils 1 %    % Immature Granulocytes 0 %    NRBCs per 100 WBC 0 <1 /100    Absolute Neutrophils 4.0 1.6 - 8.3 10e3/uL    Absolute Lymphocytes 2.3 0.8 - 5.3 10e3/uL    Absolute Monocytes 0.5 0.0 - 1.3 10e3/uL    Absolute Eosinophils 0.4 0.0 - 0.7 10e3/uL    Absolute Basophils 0.1 0.0 - 0.2 10e3/uL    Absolute Immature Granulocytes 0.0 <=0.4 10e3/uL    Absolute NRBCs 0.0 10e3/uL   D dimer quantitative   Result Value Ref Range    D-Dimer Quantitative 0.32 0.00 - 0.50 ug/mL FEU    Narrative    This D-dimer assay is intended for use in conjunction with a clinical pretest probability assessment model to exclude pulmonary embolism (PE) and deep venous thrombosis (DVT) in outpatients suspected of PE or DVT. The cut-off value is 0.50 ug/mL FEU.   Hepatic panel   Result Value Ref Range    Protein Total 7.4 6.4 - 8.3 g/dL    Albumin 4.2 3.5 - 5.2 g/dL    Bilirubin Total 0.5 <=1.2 mg/dL    Alkaline Phosphatase 81 40 - 129 U/L    AST 32 10 - 50 U/L    ALT 38 10 - 50 U/L    Bilirubin Direct <0.20 0.00 - 0.30 mg/dL   Acetaminophen level   Result Value Ref Range    Acetaminophen <5.0 (L) 10.0 - 30.0 ug/mL   Troponin T, High Sensitivity   Result Value Ref Range    Troponin T, High Sensitivity 11 <=22 ng/L   CT Head w/o Contrast    Narrative    EXAM: CT HEAD W/O CONTRAST, CTA HEAD NECK W CONTRAST  LOCATION: [facility]  DATE/TIME: 12/28/2022 11:07 PM    INDICATION: Headache; Acute HA (< 3 months), no complicating features  COMPARISON: None.  CONTRAST: 68 mi Isovue 370  TECHNIQUE: Head and neck CT angiogram with IV contrast. Noncontrast head CT followed by axial helical CT images of the head and neck vessels obtained during  the arterial phase of intravenous contrast administration. Axial 2D reconstructed images and   multiplanar 3D MIP reconstructed images of the head and neck vessels were performed by the technologist. Dose reduction techniques were used. All stenosis measurements made according to NASCET criteria unless otherwise specified.    FINDINGS:   NONCONTRAST HEAD CT:   INTRACRANIAL CONTENTS: No intracranial hemorrhage, extraaxial collection, or mass effect.  No CT evidence of acute infarct. Normal parenchymal attenuation. Normal ventricles and sulci.     VISUALIZED ORBITS/SINUSES/MASTOIDS: No intraorbital abnormality. Mild mucosal thickening scattered about the paranasal sinuses. No middle ear or mastoid effusion.    BONES/SOFT TISSUES: No acute abnormality.    HEAD CTA:  ANTERIOR CIRCULATION: No stenosis/occlusion, aneurysm, or high flow vascular malformation. Fetal origin of the right posterior cerebral artery from the anterior circulation.    POSTERIOR CIRCULATION: No stenosis/occlusion, aneurysm, or high flow vascular malformation. Balanced vertebral arteries supply a normal basilar artery.     DURAL VENOUS SINUSES: Expected enhancement of the major dural venous sinuses.    NECK CTA:  RIGHT CAROTID: No measurable stenosis or dissection.    LEFT CAROTID: No measurable stenosis or dissection.    VERTEBRAL ARTERIES: No focal stenosis or dissection. Balanced vertebral arteries.    AORTIC ARCH: Classic aortic arch anatomy with no significant stenosis at the origin of the great vessels.    NONVASCULAR STRUCTURES: Unremarkable.      Impression    IMPRESSION:   HEAD CT:  1.  No acute intracranial process.    HEAD CTA:   1.  No large vessel occlusion or hemodynamically significant stenosis.    NECK CTA:  1.  No large vessel occlusion or hemodynamically significant stenosis.     Chest XR,  PA & LAT    Narrative    EXAM: XR CHEST 2 VIEWS  LOCATION: [facility]  DATE/TIME: 12/28/2022 11:11  PM    INDICATION: CP, SOB, cough  COMPARISON: 02/01/2018      Impression    IMPRESSION: Heart size and pulmonary vascularity normal. The lungs are clear. Hypertrophic ages lower thoracic spine.   CTA Head Neck with Contrast    Narrative    EXAM: CT HEAD W/O CONTRAST, CTA HEAD NECK W CONTRAST  LOCATION: [facility]  DATE/TIME: 12/28/2022 11:07 PM    INDICATION: Headache; Acute HA (< 3 months), no complicating features  COMPARISON: None.  CONTRAST: 68 mi Isovue 370  TECHNIQUE: Head and neck CT angiogram with IV contrast. Noncontrast head CT followed by axial helical CT images of the head and neck vessels obtained during the arterial phase of intravenous contrast administration. Axial 2D reconstructed images and   multiplanar 3D MIP reconstructed images of the head and neck vessels were performed by the technologist. Dose reduction techniques were used. All stenosis measurements made according to NASCET criteria unless otherwise specified.    FINDINGS:   NONCONTRAST HEAD CT:   INTRACRANIAL CONTENTS: No intracranial hemorrhage, extraaxial collection, or mass effect.  No CT evidence of acute infarct. Normal parenchymal attenuation. Normal ventricles and sulci.     VISUALIZED ORBITS/SINUSES/MASTOIDS: No intraorbital abnormality. Mild mucosal thickening scattered about the paranasal sinuses. No middle ear or mastoid effusion.    BONES/SOFT TISSUES: No acute abnormality.    HEAD CTA:  ANTERIOR CIRCULATION: No stenosis/occlusion, aneurysm, or high flow vascular malformation. Fetal origin of the right posterior cerebral artery from the anterior circulation.    POSTERIOR CIRCULATION: No stenosis/occlusion, aneurysm, or high flow vascular malformation. Balanced vertebral arteries supply a normal basilar artery.     DURAL VENOUS SINUSES: Expected enhancement of the major dural venous sinuses.    NECK CTA:  RIGHT CAROTID: No measurable stenosis or dissection.    LEFT CAROTID: No measurable stenosis or  dissection.    VERTEBRAL ARTERIES: No focal stenosis or dissection. Balanced vertebral arteries.    AORTIC ARCH: Classic aortic arch anatomy with no significant stenosis at the origin of the great vessels.    NONVASCULAR STRUCTURES: Unremarkable.      Impression    IMPRESSION:   HEAD CT:  1.  No acute intracranial process.    HEAD CTA:   1.  No large vessel occlusion or hemodynamically significant stenosis.    NECK CTA:  1.  No large vessel occlusion or hemodynamically significant stenosis.     12:14 AM  Results reviewed in the room with the patient and answered his questions.  He requested a new primary care provider since he has not had 1 since his previous provider Dr. Suarez retired recently.  We discussed the appropriate use of Tylenol/ibuprofen for his symptoms which I think are most likely COVID-related.  He may use oxycodone for breakthrough pain as prescribed tonight through [id number].  Return to the emergency department if worse or changes.    Medications   iopamidol (ISOVUE-370) solution 68 mL (68 mLs Intravenous Given 12/28/22 2318)   sodium chloride 0.9 % bag 500mL for CT scan flush use (100 mLs As instructed Given 12/28/22 2318)       Assessments & Plan (with Medical Decision Making)   Assessment and plan with medical decision making at the time stamp above.    Disclaimer: This note consists of symbols derived from keyboarding, dictation and/or voice recognition software. As a result, there may be errors in the script that have gone undetected. Please consider this when interpreting information found in this chart.      I have reviewed the nursing notes.    I have reviewed the findings, diagnosis, plan and need for follow up with the patient.       Medical Decision Making  The patient presented with a problem that is acute and uncomplicated.    The patient's evaluation involved:  ordering and review of 3+ test(s) (see separate area of note for details)    The patient's management involved only simple  and very low risk treatment.        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

## 2022-12-29 NOTE — ED NOTES
In bed. Chest pain on/off since Sunday. Seems worse with his cough.    Reports a HX of Covid positive from home test 14-16 days ago.     Lungs are clear bilateral. Pain is present with a slight tightness.     No calf pain.

## 2023-01-09 ENCOUNTER — TELEPHONE (OUTPATIENT)
Dept: FAMILY MEDICINE | Facility: CLINIC | Age: 68
End: 2023-01-09

## 2023-01-09 DIAGNOSIS — E78.5 HYPERLIPIDEMIA, UNSPECIFIED HYPERLIPIDEMIA TYPE: Chronic | ICD-10-CM

## 2023-01-09 RX ORDER — SIMVASTATIN 20 MG
20 TABLET ORAL AT BEDTIME
Qty: 30 TABLET | Refills: 0 | Status: SHIPPED | OUTPATIENT
Start: 2023-01-09 | End: 2023-01-10

## 2023-01-09 NOTE — TELEPHONE ENCOUNTER
Medication Question or Refill      What medication are you calling about (include dose and sig)?: Simvastatin 20mg      Do you need a refill? Yes: Pt will be out of this by tomorrow night. Has appt to establish care with Dr. Deluna on 1/11. Wondering if this can be refilled by Dr. Harris so he does not have to go without.       Preferred Pharmacy:   [id number] - [place], [place] - [address] [place] [address] AT [facility] [address] [facility] [address] [address]  Phone: [phone] Fax: [phone]        [facility] we [facility] you [facility] or would you prefer to receive a phone call?:   Patient would prefer a phone call   Okay to leave a detailed message?: Yes at Home number on file [phone] (home)

## 2023-01-10 ENCOUNTER — OFFICE VISIT (OUTPATIENT)
Dept: ORTHOPEDICS | Facility: CLINIC | Age: 68
End: 2023-01-10
Payer: MEDICARE

## 2023-01-10 ENCOUNTER — ANCILLARY PROCEDURE (OUTPATIENT)
Dept: GENERAL RADIOLOGY | Facility: CLINIC | Age: 68
End: 2023-01-10
Attending: FAMILY MEDICINE
Payer: MEDICARE

## 2023-01-10 VITALS
WEIGHT: 313 LBS | HEIGHT: 72 IN | BODY MASS INDEX: 42.39 KG/M2 | DIASTOLIC BLOOD PRESSURE: 75 MMHG | SYSTOLIC BLOOD PRESSURE: 130 MMHG

## 2023-01-10 DIAGNOSIS — M75.82 ROTATOR CUFF TENDINITIS, LEFT: ICD-10-CM

## 2023-01-10 DIAGNOSIS — M19.012 GLENOHUMERAL ARTHRITIS, LEFT: ICD-10-CM

## 2023-01-10 DIAGNOSIS — M25.512 PAIN IN JOINT OF LEFT SHOULDER: ICD-10-CM

## 2023-01-10 DIAGNOSIS — M25.512 PAIN IN JOINT OF LEFT SHOULDER: Primary | ICD-10-CM

## 2023-01-10 PROCEDURE — 73030 X-RAY EXAM OF SHOULDER: CPT | Mod: TC | Performed by: RADIOLOGY

## 2023-01-10 PROCEDURE — 20611 DRAIN/INJ JOINT/BURSA W/US: CPT | Mod: LT | Performed by: FAMILY MEDICINE

## 2023-01-10 PROCEDURE — 99213 OFFICE O/P EST LOW 20 MIN: CPT | Mod: 25 | Performed by: FAMILY MEDICINE

## 2023-01-10 RX ORDER — SIMVASTATIN 20 MG
TABLET ORAL
Qty: 90 TABLET | Refills: 0 | Status: SHIPPED | OUTPATIENT
Start: 2023-01-10 | End: 2023-01-26 | Stop reason: ALTCHOICE

## 2023-01-10 RX ADMIN — TRIAMCINOLONE ACETONIDE 40 MG: 40 INJECTION, SUSPENSION INTRA-ARTICULAR; INTRAMUSCULAR at 16:00

## 2023-01-10 RX ADMIN — ROPIVACAINE HYDROCHLORIDE 3 ML: 5 INJECTION, SOLUTION EPIDURAL; INFILTRATION; PERINEURAL at 16:00

## 2023-01-10 NOTE — PROGRESS NOTES
Tommy De La O  :  1955  DOS: 1/10/2023  MRN: [id number]    Sports Medicine Clinic Visit    PCP: Nicolas Harris    Tommy De La O is a 67 year old Right hand dominant male who is seen as a self referral presenting with acute left shoulder pain.    Injury: Gradual onset of pain over the past ~ 2+ months, first noted with shoveling and using skid-steer .  Pain located over left anterior glenohumeral joint, lateral shoulder, nonradiating.  Additional Features:  Positive: popping.  Symptoms are better with Tylenol and sleeping with arm overhead.  Symptoms are worse with: shoveling, driving skid-steer, shoulder flexion/abduction, lying supine.  Other evaluation and/or treatments so far consists of: Ice, Tylenol and Rest.  Recent imaging completed: No recent imaging completed.  Prior History of related problems: history of intermittent left shoulder pain over past 20+ years, not previously treated.  Similar right shoulder ~ one year ago that improved with steroid injection.    Social History: hobby     Review of Systems  Musculoskeletal: as above  Remainder of review of systems is negative including constitutional, CV, pulmonary, GI, Skin and Neurologic except as noted in HPI or medical history.    No past medical history on file.  Past Surgical History:   Procedure Laterality Date     ARTHROSCOPY KNEE Bilateral     both knees with arthroscopy in the past.      ARTHROSCOPY KNEE       ARTHROSCOPY SHOULDER ROTATOR CUFF REPAIR       AS TOTAL KNEE ARTHROPLASTY Bilateral     Both total knees.      LENGTHEN TENDON ACHILLES Left      REPAIR TENDON ACHILLES      1980s two separate surgeries.     ROTATOR CUFF REPAIR RT/LT Left      [facility] TOTAL KNEE ARTHROPLASTY Right 3/17/2015    Procedure: RIGHT KNEE TOTAL ARTHROPLASTY;  Surgeon: Jaiden Barnes MD;  Location: [facility];  Service: Orthopedics     [facility] TOTAL KNEE ARTHROPLASTY Left 2015    Procedure: LEFT KNEE TOTAL  ARTHROPLASTY;  Surgeon: Jaiden Barnes MD;  Location: [facility] OR;  Service: Orthopedics     Family History   Problem Relation Age of Onset     Coronary Artery Disease Father      Hyperlipidemia Mother      Prostate Cancer No family hx of      Colon Cancer No family hx of      Hypertension Mother      Heart Disease Mother      Heart Disease Father      Hypertension Sister      Cancer Brother      No Known Problems Daughter      No Known Problems Son      Cancer Maternal Grandmother      Vision Loss Maternal Grandmother      No Known Problems Sister      Cancer Brother      Hypertension Brother      Hypertension Brother      Cancer Brother      Sleep Apnea Brother      No Known Problems Brother      No Known Problems Daughter        Objective  /75   Ht 1.829 m (6')   Wt 142 kg (313 lb)   BMI 42.45 kg/m        General: healthy, alert and in no distress      HEENT: no scleral icterus or conjunctival erythema     Skin: no suspicious lesions or rash. No jaundice.     CV: regular rhythm by palpation, 2+ distal pulses, no pedal edema      Resp: normal respiratory effort without conversational dyspnea     Psych: normal mood and affect      Gait: nonantalgic, appropriate coordination and balance     Neuro: normal light touch sensory exam of the extremities. Motor strength as noted below     Right Shoulder exam    ROM:        Full active and passive ROM with flexion, extension, abduction, internal and external rotation.       asymmetric scapular motion on R       Painful terminal flexion and abduction, mildly in ER    Tender:        subacromial space       Lateral deltoid    Non Tender:       remainder of shoulder       sternoclavicular joint       acromioclavicular joint       posterior shoulder       periscapular region    Strength:        abduction 4+/5       internal rotation 5/5       external rotation 5-/5       adduction 5/5    Impingement testing:        positive (+) Leana       positive (+) Abiodun        positive (+) empty can       neg (-) crossover       neg (-) crank    Stability testing:       neg (-) relocation       neg (-) anterior glide       neg (-) sulcus sign    Skin:       no visible deformities       well perfused       capillary refill brisk    Sensation:        normal sensation over shoulder and upper extremity     Radiology  Recent Results (from the past 744 hour(s))   XR Shoulder Left G/E 3 Views    Narrative    XR SHOULDER LEFT G/E 3 VIEWS 1/10/2023 3:50 PM     HISTORY: Pain in joint of left shoulder    COMPARISON: None.         Impression    IMPRESSION: Degenerative change at the left glenohumeral joint without  evidence for fracture or dislocation. The left AC joint is negative.    VICTORINA MANNING MD         SYSTEM ID:  [id number]         Large Joint Injection/Arthocentesis: L subacromial bursa    Date/Time: 1/10/2023 4:00 PM  Performed by: Abdulaziz Aly DO  Authorized by: Abdulaziz Aly DO     Indications:  Pain  Needle Size:  21 G  Guidance: ultrasound    Approach:  Lateral  Location:  Shoulder      Site:  L subacromial bursa  Medications:  3 mL ropivacaine 5 MG/ML; 40 mg triamcinolone 40 MG/ML  Outcome:  Tolerated well, no immediate complications  Procedure discussed: discussed risks, benefits, and alternatives    Consent Given by:  Patient  Timeout: timeout called immediately prior to procedure    Prep: patient was prepped and draped in usual sterile fashion     Ultrasound images of procedure were permanently stored.         Assessment:  1. Pain in joint of left shoulder    2. Rotator cuff tendinitis, left    3. Glenohumeral arthritis, left        Plan:  Discussed the assessment with the patient.  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, can consider that location for sx that do not improve but hopeful to avoid and will minimize CSI as much as possible  Activity modification strategies reviewed  XR images independently visualized and reviewed with patient today in clinic  Oral Tylenol and topical Voltaren gel reviewed as safe OTC options, reviewed safe dosing strategies  PT options reviewed  Expectations and goals of CSI reviewed  Often 2-3 days for steroid effect, and can take up to two weeks for maximum effect  We discussed modified progressive pain-free activity as tolerated  Do not overuse in first two weeks if feeling better due to concern for vulnerability while steroid is working  Supportive care reviewed  All questions were answered today  Contact us with additional questions or concerns  Signs and sx of concern reviewed      Abdulaziz Aly DO, CACORDELIA  Sports Medicine Physician  [facility]          Disclaimer: This note consists of symbols derived from keyboarding, dictation and/or voice recognition software. As a result, there may be errors in the script that have gone undetected. Please consider this when interpreting information found in this chart.

## 2023-01-10 NOTE — LETTER
1/10/2023         RE: Tommy De La O   [address] [facility] [address]        Dear Colleague,    Thank you for referring your patient, Tommy De La O, to the [facility] [place]. Please see a copy of my visit note below.    Tommy De La O  :  1955  DOS: 1/10/2023  MRN: [id number]    Sports Medicine Clinic Visit    PCP: Nicolas Harris    Tommy De La O is a 67 year old Right hand dominant male who is seen as a self referral presenting with acute left shoulder pain.    Injury: Gradual onset of pain over the past ~ 2+ months, first noted with shoveling and using skid-steer .  Pain located over left anterior glenohumeral joint, lateral shoulder, nonradiating.  Additional Features:  Positive: popping.  Symptoms are better with Tylenol and sleeping with arm overhead.  Symptoms are worse with: shoveling, driving skid-steer, shoulder flexion/abduction, lying supine.  Other evaluation and/or treatments so far consists of: Ice, Tylenol and Rest.  Recent imaging completed: No recent imaging completed.  Prior History of related problems: history of intermittent left shoulder pain over past 20+ years, not previously treated.  Similar right shoulder ~ one year ago that improved with steroid injection.    Social History: hobby     Review of Systems  Musculoskeletal: as above  Remainder of review of systems is negative including constitutional, CV, pulmonary, GI, Skin and Neurologic except as noted in HPI or medical history.    No past medical history on file.  Past Surgical History:   Procedure Laterality Date     ARTHROSCOPY KNEE Bilateral     both knees with arthroscopy in the past.      ARTHROSCOPY KNEE       ARTHROSCOPY SHOULDER ROTATOR CUFF REPAIR       AS TOTAL KNEE ARTHROPLASTY Bilateral     Both total knees.      LENGTHEN TENDON ACHILLES Left      REPAIR TENDON ACHILLES      1980s two separate surgeries.     ROTATOR CUFF REPAIR RT/LT Left      [facility]  TOTAL KNEE ARTHROPLASTY Right 3/17/2015    Procedure: RIGHT KNEE TOTAL ARTHROPLASTY;  Surgeon: Jaiden Barnes MD;  Location: [facility];  Service: Orthopedics     [facility] TOTAL KNEE ARTHROPLASTY Left 6/30/2015    Procedure: LEFT KNEE TOTAL ARTHROPLASTY;  Surgeon: Jaiden Barnes MD;  Location: [facility];  Service: Orthopedics     Family History   Problem Relation Age of Onset     Coronary Artery Disease Father      Hyperlipidemia Mother      Prostate Cancer No family hx of      Colon Cancer No family hx of      Hypertension Mother      Heart Disease Mother      Heart Disease Father      Hypertension Sister      Cancer Brother      No Known Problems Daughter      No Known Problems Son      Cancer Maternal Grandmother      Vision Loss Maternal Grandmother      No Known Problems Sister      Cancer Brother      Hypertension Brother      Hypertension Brother      Cancer Brother      Sleep Apnea Brother      No Known Problems Brother      No Known Problems Daughter        Objective  /75   Ht 1.829 m (6')   Wt 142 kg (313 lb)   BMI 42.45 kg/m        General: healthy, alert and in no distress      HEENT: no scleral icterus or conjunctival erythema     Skin: no suspicious lesions or rash. No jaundice.     CV: regular rhythm by palpation, 2+ distal pulses, no pedal edema      Resp: normal respiratory effort without conversational dyspnea     Psych: normal mood and affect      Gait: nonantalgic, appropriate coordination and balance     Neuro: normal light touch sensory exam of the extremities. Motor strength as noted below     Right Shoulder exam    ROM:        Full active and passive ROM with flexion, extension, abduction, internal and external rotation.       asymmetric scapular motion on R       Painful terminal flexion and abduction, mildly in ER    Tender:        subacromial space       Lateral deltoid    Non Tender:       remainder of shoulder       sternoclavicular joint       acromioclavicular  joint       posterior shoulder       periscapular region    Strength:        abduction 4+/5       internal rotation 5/5       external rotation 5-/5       adduction 5/5    Impingement testing:        positive (+) Neer       positive (+) Rascon       positive (+) empty can       neg (-) crossover       neg (-) crank    Stability testing:       neg (-) relocation       neg (-) anterior glide       neg (-) sulcus sign    Skin:       no visible deformities       well perfused       capillary refill brisk    Sensation:        normal sensation over shoulder and upper extremity     Radiology  Recent Results (from the past 744 hour(s))   XR Shoulder Left G/E 3 Views    Narrative    XR SHOULDER LEFT G/E 3 VIEWS 1/10/2023 3:50 PM     HISTORY: Pain in joint of left shoulder    COMPARISON: None.         Impression    IMPRESSION: Degenerative change at the left glenohumeral joint without  evidence for fracture or dislocation. The left AC joint is negative.    VICTORINA MANNING MD         SYSTEM ID:  [id number]         Large Joint Injection/Arthocentesis: L subacromial bursa    Date/Time: 1/10/2023 4:00 PM  Performed by: Abdulaziz Aly DO  Authorized by: Abdulaziz Aly DO     Indications:  Pain  Needle Size:  21 G  Guidance: ultrasound    Approach:  Lateral  Location:  Shoulder      Site:  L subacromial bursa  Medications:  3 mL ropivacaine 5 MG/ML; 40 mg triamcinolone 40 MG/ML  Outcome:  Tolerated well, no immediate complications  Procedure discussed: discussed risks, benefits, and alternatives    Consent Given by:  Patient  Timeout: timeout called immediately prior to procedure    Prep: patient was prepped and draped in usual sterile fashion     Ultrasound images of procedure were permanently stored.         Assessment:  1. Pain in joint of left shoulder    2. Rotator cuff tendinitis, left    3. Glenohumeral arthritis, left        Plan:  Discussed the assessment with the patient.  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, can consider that location for sx that do not improve but hopeful to avoid and will minimize CSI as much as possible  Activity modification strategies reviewed  XR images independently visualized and reviewed with patient today in clinic  Oral Tylenol and topical Voltaren gel reviewed as safe OTC options, reviewed safe dosing strategies  PT options reviewed  Expectations and goals of CSI reviewed  Often 2-3 days for steroid effect, and can take up to two weeks for maximum effect  We discussed modified progressive pain-free activity as tolerated  Do not overuse in first two weeks if feeling better due to concern for vulnerability while steroid is working  Supportive care reviewed  All questions were answered today  Contact us with additional questions or concerns  Signs and sx of concern reviewed      Abdulaziz Aly DO, CAQ  Sports Medicine Physician  [facility]          Disclaimer: This note consists of symbols derived from keyboarding, dictation and/or voice recognition software. As a result, there may be errors in the script that have gone undetected. Please consider this when interpreting information found in this chart.      Again, thank you for allowing me to participate in the care of your patient.        Sincerely,        Abdulaziz Aly DO

## 2023-01-11 ENCOUNTER — OFFICE VISIT (OUTPATIENT)
Dept: FAMILY MEDICINE | Facility: CLINIC | Age: 68
End: 2023-01-11
Payer: MEDICARE

## 2023-01-11 VITALS
SYSTOLIC BLOOD PRESSURE: 134 MMHG | HEIGHT: 70 IN | BODY MASS INDEX: 44.21 KG/M2 | HEART RATE: 59 BPM | WEIGHT: 308.8 LBS | RESPIRATION RATE: 16 BRPM | OXYGEN SATURATION: 95 % | DIASTOLIC BLOOD PRESSURE: 80 MMHG | TEMPERATURE: 98.9 F

## 2023-01-11 DIAGNOSIS — R51.9 NONINTRACTABLE HEADACHE, UNSPECIFIED CHRONICITY PATTERN, UNSPECIFIED HEADACHE TYPE: ICD-10-CM

## 2023-01-11 DIAGNOSIS — E66.01 MORBID OBESITY WITH BMI OF 40.0-44.9, ADULT (H): ICD-10-CM

## 2023-01-11 DIAGNOSIS — F32.A ANXIETY AND DEPRESSION: Primary | ICD-10-CM

## 2023-01-11 DIAGNOSIS — E78.5 HYPERLIPIDEMIA, UNSPECIFIED HYPERLIPIDEMIA TYPE: Chronic | ICD-10-CM

## 2023-01-11 DIAGNOSIS — F41.9 ANXIETY AND DEPRESSION: Primary | ICD-10-CM

## 2023-01-11 DIAGNOSIS — J44.9 CHRONIC OBSTRUCTIVE PULMONARY DISEASE, UNSPECIFIED COPD TYPE (H): ICD-10-CM

## 2023-01-11 PROCEDURE — 99214 OFFICE O/P EST MOD 30 MIN: CPT | Performed by: FAMILY MEDICINE

## 2023-01-11 RX ORDER — FLUOXETINE 10 MG/1
10 CAPSULE ORAL DAILY
Qty: 30 CAPSULE | Refills: 0 | Status: SHIPPED | OUTPATIENT
Start: 2023-01-11 | End: 2023-01-26

## 2023-01-11 ASSESSMENT — ANXIETY QUESTIONNAIRES
GAD7 TOTAL SCORE: 5
5. BEING SO RESTLESS THAT IT IS HARD TO SIT STILL: NOT AT ALL
GAD7 TOTAL SCORE: 5
IF YOU CHECKED OFF ANY PROBLEMS ON THIS QUESTIONNAIRE, HOW DIFFICULT HAVE THESE PROBLEMS MADE IT FOR YOU TO DO YOUR WORK, TAKE CARE OF THINGS AT HOME, OR GET ALONG WITH OTHER PEOPLE: SOMEWHAT DIFFICULT
3. WORRYING TOO MUCH ABOUT DIFFERENT THINGS: SEVERAL DAYS
1. FEELING NERVOUS, ANXIOUS, OR ON EDGE: NOT AT ALL
6. BECOMING EASILY ANNOYED OR IRRITABLE: SEVERAL DAYS
7. FEELING AFRAID AS IF SOMETHING AWFUL MIGHT HAPPEN: SEVERAL DAYS
2. NOT BEING ABLE TO STOP OR CONTROL WORRYING: SEVERAL DAYS

## 2023-01-11 ASSESSMENT — PATIENT HEALTH QUESTIONNAIRE - PHQ9
5. POOR APPETITE OR OVEREATING: SEVERAL DAYS
SUM OF ALL RESPONSES TO PHQ QUESTIONS 1-9: 9

## 2023-01-11 ASSESSMENT — PAIN SCALES - GENERAL: PAINLEVEL: MILD PAIN (2)

## 2023-01-11 NOTE — PROGRESS NOTES
"  Assessment & Plan     Anxiety and depression  Discussed course and treatment of anxiety/depressive disorder.  Advised options for medical treatment.  Recommended multimodal approach to management: medication and behvior therapy; patient defers behavior therapy for later.  Discussed possible side effects of meds.  Discussed maintenance med may take a few weeks before noticing significant benefit..  Advised suicidal and homicidal precautions.  - FLUoxetine (PROZAC) 10 MG capsule  Dispense: 30 capsule; Refill: 0    Nonintractable headache, unspecified chronicity pattern, unspecified headache type  Improved per patient.  No abnormal neurologic sign today.  Advised safe use of otc analgesics prn.  Return precautions discussed and given to patient.     Chronic obstructive pulmonary disease, unspecified COPD type (H)  Denies recent exacerbations.    Morbid obesity with BMI of 40.0-44.9, adult (H)  Discussed risks of obesity: heart disease, stroke, end-organ damage,  DM, decreased quality of life  Counselled on diet, exercise and weight loss.  Patient is not receptive yet to increasing exercise citing pain on shoulders and knees has limited his ability to exercise, but cites he walks his land and tends to chores like shoveling.    Hyperlipidemia, unspecified hyperlipidemia type  Reinforced heart healthy lifestyle.   - Lipid panel reflex to direct LDL Fasting   MED REC REQUIRED  Post Medication Reconciliation Status: discharge medications reconciled, continue medications without change  BMI:   Estimated body mass index is 44.63 kg/m  as calculated from the following:    Height as of this encounter: 1.772 m (5' 9.75\").    Weight as of this encounter: 140.1 kg (308 lb 12.8 oz).   Weight management plan: Discussed healthy diet and exercise guidelines    Patient Instructions   Start fluoxetine for treatment of anxiety and depression.    Take your medication as directed.  Full effect/benefit of the medications may not be " evident until a few weeks after start.      May consider behavior therapy.    Take adequate sleep, and exercise regularly.      If you experience suicidal thoughts, thoughts of hurting others or hallucinations, see a doctor right away.       Acetaminophen 500 mg orally 1-2 tabs every 4-6 hrs as needed for pain, or Ibuprofen 200 mg 1-3 tablets with food every 8 hrs as needed for pain.    If headache persists or worsens after the next 1 month, follow up with a provider.      Return in about 1 month (around 2/11/2023) for Virtual visit for follow up on your anxiety/depression medication..    Nicolas Harris MD  [facility] MARIUM Sanders is a 67 year old  presenting for the following health issues:  ER F/U, Anxiety, and NEUROPATHY (Getting worse, was wearing compression stockings in the past.  )      HPI     ED/UC Followup:    Facility:  [facility]  Date of visit: 12/28/22  Reason for visit: headache, chest pain, pleurisy  Current Status: continues to have mild headaches, chest pain is resolved.  Was told could be residual covid-19 symptoms.    Abnormal Mood Symptoms  Onset/Duration: 6 years, getting worse, been on and off per patient.  Description: anxiety  Depression (if yes, do PHQ-9): YES  Anxiety (if yes, do FRANCI-7): YES  Accompanying Signs & Symptoms:  Still participating in activities that you used to enjoy: YES  Fatigue: YES  Irritability: YES  Difficulty concentrating: YES  Changes in appetite: YES  Problems with sleep: YES  Heart racing/beating fast: No  Abnormally elevated, expansive, or irritable mood: No  Persistently increased activity or energy: No  Thoughts of hurting yourself or others: No  History:  Recent stress or major life event: YES- dog passed away  Prior depression or anxiety: None  Family history of depression or anxiety: YES  Alcohol/drug use: YES- rare alcohol  Difficulty sleeping: YES   in 2007; somewhat started to have mood changes then  "but not bad.  Precipitating or alleviating factors: None  Therapies tried and outcome: none  PHQ 6/15/2018 2/28/2022   PHQ-9 Total Score 4 6   Q9: Thoughts of better off dead/self-harm past 2 weeks Not at all Not at all     FRANCI-7 SCORE 6/15/2018 2/28/2022   Total Score 0 3     Never sought help for mental health until now.  He is worried about himself not taking good care of his health.  Has adopted new puppy - but caused anxiety due to cost of vet bill.  Does not smoke tobacco now.  Little alcohol use per patient.  Denies use of other recreational substances.    Review of Systems   Constitutional, HEENT, cardiovascular, pulmonary, GI, , musculoskeletal, neuro, skin, endocrine and psych systems are negative, except as otherwise noted.      Objective    BP (!) 142/72 (BP Location: Right arm, Patient Position: Chair, Cuff Size: Adult Large)   Pulse 59   Temp 98.9  F (37.2  C) (Tympanic)   Resp 16   Ht 1.772 m (5' 9.75\")   Wt 140.1 kg (308 lb 12.8 oz)   SpO2 95%   BMI 44.63 kg/m    Body mass index is 44.63 kg/m .  Physical Exam   GENERAL: alert and no distress, morbidly obese  EYES: no icterus, PERRLA  NECK: no tenderness, no adenopathy,  Thyroid not enlarged  RESP: lungs clear to auscultation - no rales, no rhonchi, no wheezes  CV: regular rates and rhythm, no murmur  MS: no edema  SKIN: no jaundice or rash  NEURO: Patient is A and O X 3; Cranial nerves 2-12 intact;  Strength 5/5 all extremities; DTR: ++ x 4;  Sensory no radha deficit; no tremors No problems with motor coordination  PSYCH: well-kempt,  linear thought process, normal  speech, slightly anxious mood, appropriate affect, no suicidality, no aggression, no hallucination  ABD: flat, nontender    Admission on 12/28/2022, Discharged on 12/29/2022   Component Date Value Ref Range Status     Sodium 12/28/2022 134 (L)  136 - 145 mmol/L Final     Potassium 12/28/2022 3.5  3.4 - 5.3 mmol/L Final     Chloride 12/28/2022 99  98 - 107 mmol/L Final     Carbon " Dioxide (CO2) 12/28/2022 25  22 - 29 mmol/L Final     Anion Gap 12/28/2022 10  7 - 15 mmol/L Final     Urea Nitrogen 12/28/2022 14.7  8.0 - 23.0 mg/dL Final     Creatinine 12/28/2022 0.75  0.67 - 1.17 mg/dL Final     Calcium 12/28/2022 9.5  8.8 - 10.2 mg/dL Final     Glucose 12/28/2022 99  70 - 99 mg/dL Final     GFR Estimate 12/28/2022 >90  >60 mL/min/1.73m2 Final    Effective December 21, 2021 eGFRcr in adults is calculated using the 2021 CKD-EPI creatinine equation which includes age and gender (Angie et al., [place], DOI: 10.1056/XHNUau2608409)     Troponin T, High Sensitivity 12/28/2022 11  <=22 ng/L Final    Either a High Sensitivity Troponin T baseline (0 hours) value = 100 ng/mL, or an increase in High Sensitivity Troponin T = 7 ng/mL at 2 hours compared to 0 hours (2-0 hours), suggests myocardial injury, and urgent clinical attention is required.    If the 2-0 hours increase is<7 ng/mL, a High Sensitivity Troponin T result above gender-specific reference ranges warrants further evaluation.   Recommendations for further evaluation include correlation with clinical decision-making tool (e.g., HEART), a 3rd High Sensitivity Troponin T test 2 hours after the 2nd (a 20% change from baseline would represent concern), admission for observation, close PCC/cardiology follow-up, or urgent outpatient provocative testing.     Hold Specimen 12/28/2022 JIC   Final     Hold Specimen 12/28/2022 JIC   Final     WBC Count 12/28/2022 7.3  4.0 - 11.0 10e3/uL Final     RBC Count 12/28/2022 4.84  4.40 - 5.90 10e6/uL Final     Hemoglobin 12/28/2022 14.7  13.3 - 17.7 g/dL Final     Hematocrit 12/28/2022 43.1  40.0 - 53.0 % Final     MCV 12/28/2022 89  78 - 100 fL Final     MCH 12/28/2022 30.4  26.5 - 33.0 pg Final     MCHC 12/28/2022 34.1  31.5 - 36.5 g/dL Final     RDW 12/28/2022 12.8  10.0 - 15.0 % Final     Platelet Count 12/28/2022 205  150 - 450 10e3/uL Final     % Neutrophils 12/28/2022 54  % Final     % Lymphocytes 12/28/2022  32  % Final     % Monocytes 12/28/2022 7  % Final     % Eosinophils 12/28/2022 6  % Final     % Basophils 12/28/2022 1  % Final     % Immature Granulocytes 12/28/2022 0  % Final     NRBCs per 100 WBC 12/28/2022 0  <1 /100 Final     Absolute Neutrophils 12/28/2022 4.0  1.6 - 8.3 10e3/uL Final     Absolute Lymphocytes 12/28/2022 2.3  0.8 - 5.3 10e3/uL Final     Absolute Monocytes 12/28/2022 0.5  0.0 - 1.3 10e3/uL Final     Absolute Eosinophils 12/28/2022 0.4  0.0 - 0.7 10e3/uL Final     Absolute Basophils 12/28/2022 0.1  0.0 - 0.2 10e3/uL Final     Absolute Immature Granulocytes 12/28/2022 0.0  <=0.4 10e3/uL Final     Absolute NRBCs 12/28/2022 0.0  10e3/uL Final     D-Dimer Quantitative 12/28/2022 0.32  0.00 - 0.50 ug/mL FEU Final     Troponin T, High Sensitivity 12/28/2022 11  <=22 ng/L Final    Either a High Sensitivity Troponin T baseline (0 hours) value = 100 ng/mL, or an increase in High Sensitivity Troponin T = 7 ng/mL at 2 hours compared to 0 hours (2-0 hours), suggests myocardial injury, and urgent clinical attention is required.    If the 2-0 hours increase is<7 ng/mL, a High Sensitivity Troponin T result above gender-specific reference ranges warrants further evaluation.   Recommendations for further evaluation include correlation with clinical decision-making tool (e.g., HEART), a 3rd High Sensitivity Troponin T test 2 hours after the 2nd (a 20% change from baseline would represent concern), admission for observation, close PCC/cardiology follow-up, or urgent outpatient provocative testing.     Protein Total 12/28/2022 7.4  6.4 - 8.3 g/dL Final     Albumin 12/28/2022 4.2  3.5 - 5.2 g/dL Final     Bilirubin Total 12/28/2022 0.5  <=1.2 mg/dL Final     Alkaline Phosphatase 12/28/2022 81  40 - 129 U/L Final     AST 12/28/2022 32  10 - 50 U/L Final     ALT 12/28/2022 38  10 - 50 U/L Final     Bilirubin Direct 12/28/2022 <0.20  0.00 - 0.30 mg/dL Final     Acetaminophen 12/28/2022 <5.0 (L)  10.0 - 30.0 ug/mL Final

## 2023-01-11 NOTE — PATIENT INSTRUCTIONS
Start fluoxetine for treatment of anxiety and depression.    Take your medication as directed.  Full effect/benefit of the medications may not be evident until a few weeks after start.      May consider behavior therapy.    Take adequate sleep, and exercise regularly.      If you experience suicidal thoughts, thoughts of hurting others or hallucinations, see a doctor right away.       Acetaminophen 500 mg orally 1-2 tabs every 4-6 hrs as needed for pain, or Ibuprofen 200 mg 1-3 tablets with food every 8 hrs as needed for pain.    If headache persists or worsens after the next 1 month, follow up with a provider.

## 2023-01-21 ENCOUNTER — LAB (OUTPATIENT)
Dept: LAB | Facility: CLINIC | Age: 68
End: 2023-01-21
Payer: MEDICARE

## 2023-01-21 DIAGNOSIS — E78.5 HYPERLIPIDEMIA, UNSPECIFIED HYPERLIPIDEMIA TYPE: Chronic | ICD-10-CM

## 2023-01-21 LAB
CHOLEST SERPL-MCNC: 160 MG/DL
HDLC SERPL-MCNC: 46 MG/DL
LDLC SERPL CALC-MCNC: 89 MG/DL
NONHDLC SERPL-MCNC: 114 MG/DL
TRIGL SERPL-MCNC: 125 MG/DL

## 2023-01-21 PROCEDURE — 36415 COLL VENOUS BLD VENIPUNCTURE: CPT

## 2023-01-21 PROCEDURE — 80061 LIPID PANEL: CPT

## 2023-01-24 ASSESSMENT — ENCOUNTER SYMPTOMS
PARESTHESIAS: 1
DIARRHEA: 0
NERVOUS/ANXIOUS: 0
MYALGIAS: 1
EYE PAIN: 0
COUGH: 0
HEARTBURN: 1
ARTHRALGIAS: 1
HEMATURIA: 0
HEMATOCHEZIA: 0
DIZZINESS: 0
PALPITATIONS: 0
WEAKNESS: 1
FREQUENCY: 0
SORE THROAT: 0
HEADACHES: 0
FEVER: 0
JOINT SWELLING: 1
SHORTNESS OF BREATH: 1
ABDOMINAL PAIN: 0
CONSTIPATION: 0
NAUSEA: 0
DYSURIA: 0
CHILLS: 0

## 2023-01-24 ASSESSMENT — ACTIVITIES OF DAILY LIVING (ADL): CURRENT_FUNCTION: NO ASSISTANCE NEEDED

## 2023-01-26 ENCOUNTER — TELEPHONE (OUTPATIENT)
Dept: CARDIOLOGY | Facility: CLINIC | Age: 68
End: 2023-01-26

## 2023-01-26 ENCOUNTER — OFFICE VISIT (OUTPATIENT)
Dept: FAMILY MEDICINE | Facility: CLINIC | Age: 68
End: 2023-01-26
Payer: MEDICARE

## 2023-01-26 VITALS
OXYGEN SATURATION: 96 % | BODY MASS INDEX: 43.81 KG/M2 | DIASTOLIC BLOOD PRESSURE: 82 MMHG | HEART RATE: 50 BPM | SYSTOLIC BLOOD PRESSURE: 122 MMHG | WEIGHT: 306 LBS | RESPIRATION RATE: 16 BRPM | HEIGHT: 70 IN

## 2023-01-26 DIAGNOSIS — R06.09 DOE (DYSPNEA ON EXERTION): ICD-10-CM

## 2023-01-26 DIAGNOSIS — I49.3 VENTRICULAR ECTOPY: ICD-10-CM

## 2023-01-26 DIAGNOSIS — G62.9 PERIPHERAL POLYNEUROPATHY: ICD-10-CM

## 2023-01-26 DIAGNOSIS — J45.20 MILD INTERMITTENT ASTHMA WITHOUT COMPLICATION: ICD-10-CM

## 2023-01-26 DIAGNOSIS — E78.5 HYPERLIPIDEMIA, UNSPECIFIED HYPERLIPIDEMIA TYPE: Chronic | ICD-10-CM

## 2023-01-26 DIAGNOSIS — F32.A ANXIETY AND DEPRESSION: ICD-10-CM

## 2023-01-26 DIAGNOSIS — F41.9 ANXIETY AND DEPRESSION: ICD-10-CM

## 2023-01-26 DIAGNOSIS — K21.9 GASTROESOPHAGEAL REFLUX DISEASE WITHOUT ESOPHAGITIS: ICD-10-CM

## 2023-01-26 DIAGNOSIS — Z00.00 ENCOUNTER FOR MEDICARE ANNUAL WELLNESS EXAM: Primary | ICD-10-CM

## 2023-01-26 DIAGNOSIS — I10 ESSENTIAL HYPERTENSION WITH GOAL BLOOD PRESSURE LESS THAN 140/90: ICD-10-CM

## 2023-01-26 DIAGNOSIS — Z82.49 FAMILY HISTORY OF ISCHEMIC HEART DISEASE: ICD-10-CM

## 2023-01-26 DIAGNOSIS — Z87.891 PERSONAL HISTORY OF TOBACCO USE: ICD-10-CM

## 2023-01-26 PROCEDURE — G0009 ADMIN PNEUMOCOCCAL VACCINE: HCPCS | Performed by: FAMILY MEDICINE

## 2023-01-26 PROCEDURE — G0296 VISIT TO DETERM LDCT ELIG: HCPCS | Performed by: FAMILY MEDICINE

## 2023-01-26 PROCEDURE — 90677 PCV20 VACCINE IM: CPT | Performed by: FAMILY MEDICINE

## 2023-01-26 PROCEDURE — 99214 OFFICE O/P EST MOD 30 MIN: CPT | Mod: 25 | Performed by: FAMILY MEDICINE

## 2023-01-26 PROCEDURE — G0439 PPPS, SUBSEQ VISIT: HCPCS | Performed by: FAMILY MEDICINE

## 2023-01-26 RX ORDER — ALBUTEROL SULFATE 90 UG/1
2 AEROSOL, METERED RESPIRATORY (INHALATION) EVERY 6 HOURS PRN
Qty: 18 G | Refills: 3 | Status: SHIPPED | OUTPATIENT
Start: 2023-01-26

## 2023-01-26 RX ORDER — OMEPRAZOLE 40 MG/1
CAPSULE, DELAYED RELEASE ORAL
Qty: 90 CAPSULE | Refills: 3 | Status: SHIPPED | OUTPATIENT
Start: 2023-01-26 | End: 2023-03-29

## 2023-01-26 RX ORDER — AMLODIPINE BESYLATE 5 MG/1
5 TABLET ORAL DAILY
Qty: 90 TABLET | Refills: 3 | Status: SHIPPED | OUTPATIENT
Start: 2023-01-26 | End: 2023-01-31 | Stop reason: SINTOL

## 2023-01-26 RX ORDER — ROSUVASTATIN CALCIUM 20 MG/1
20 TABLET, COATED ORAL DAILY
Qty: 90 TABLET | Refills: 3 | Status: SHIPPED | OUTPATIENT
Start: 2023-01-26 | End: 2023-01-31

## 2023-01-26 RX ORDER — SIMVASTATIN 20 MG
20 TABLET ORAL AT BEDTIME
Qty: 90 TABLET | Refills: 0 | Status: CANCELLED | OUTPATIENT
Start: 2023-01-26

## 2023-01-26 RX ORDER — GABAPENTIN 300 MG/1
CAPSULE ORAL
Qty: 270 CAPSULE | Refills: 3 | Status: SHIPPED | OUTPATIENT
Start: 2023-01-26 | End: 2023-03-29

## 2023-01-26 RX ORDER — FLUOXETINE 10 MG/1
10 CAPSULE ORAL DAILY
Qty: 90 CAPSULE | Refills: 3 | Status: SHIPPED | OUTPATIENT
Start: 2023-01-26 | End: 2023-03-21

## 2023-01-26 RX ORDER — CHLORTHALIDONE 25 MG/1
25 TABLET ORAL DAILY
Qty: 90 TABLET | Refills: 3 | Status: SHIPPED | OUTPATIENT
Start: 2023-01-26 | End: 2023-03-29

## 2023-01-26 ASSESSMENT — ENCOUNTER SYMPTOMS
PALPITATIONS: 0
HEADACHES: 0
NERVOUS/ANXIOUS: 0
ABDOMINAL PAIN: 0
DIARRHEA: 0
PARESTHESIAS: 1
EYE PAIN: 0
HEARTBURN: 1
HEMATOCHEZIA: 0
FREQUENCY: 0
ARTHRALGIAS: 1
CONSTIPATION: 0
COUGH: 0
FEVER: 0
MYALGIAS: 1
DIZZINESS: 0
SHORTNESS OF BREATH: 1
NAUSEA: 0
WEAKNESS: 1
DYSURIA: 0
SORE THROAT: 0
JOINT SWELLING: 1
CHILLS: 0
HEMATURIA: 0

## 2023-01-26 ASSESSMENT — ANXIETY QUESTIONNAIRES
8. IF YOU CHECKED OFF ANY PROBLEMS, HOW DIFFICULT HAVE THESE MADE IT FOR YOU TO DO YOUR WORK, TAKE CARE OF THINGS AT HOME, OR GET ALONG WITH OTHER PEOPLE?: SOMEWHAT DIFFICULT
7. FEELING AFRAID AS IF SOMETHING AWFUL MIGHT HAPPEN: NOT AT ALL
7. FEELING AFRAID AS IF SOMETHING AWFUL MIGHT HAPPEN: NOT AT ALL
6. BECOMING EASILY ANNOYED OR IRRITABLE: SEVERAL DAYS
2. NOT BEING ABLE TO STOP OR CONTROL WORRYING: SEVERAL DAYS
GAD7 TOTAL SCORE: 3
IF YOU CHECKED OFF ANY PROBLEMS ON THIS QUESTIONNAIRE, HOW DIFFICULT HAVE THESE PROBLEMS MADE IT FOR YOU TO DO YOUR WORK, TAKE CARE OF THINGS AT HOME, OR GET ALONG WITH OTHER PEOPLE: SOMEWHAT DIFFICULT
4. TROUBLE RELAXING: NOT AT ALL
1. FEELING NERVOUS, ANXIOUS, OR ON EDGE: NOT AT ALL
3. WORRYING TOO MUCH ABOUT DIFFERENT THINGS: SEVERAL DAYS
GAD7 TOTAL SCORE: 3
5. BEING SO RESTLESS THAT IT IS HARD TO SIT STILL: NOT AT ALL
GAD7 TOTAL SCORE: 3

## 2023-01-26 ASSESSMENT — PAIN SCALES - GENERAL: PAINLEVEL: NO PAIN (0)

## 2023-01-26 ASSESSMENT — PATIENT HEALTH QUESTIONNAIRE - PHQ9
10. IF YOU CHECKED OFF ANY PROBLEMS, HOW DIFFICULT HAVE THESE PROBLEMS MADE IT FOR YOU TO DO YOUR WORK, TAKE CARE OF THINGS AT HOME, OR GET ALONG WITH OTHER PEOPLE: NOT DIFFICULT AT ALL
SUM OF ALL RESPONSES TO PHQ QUESTIONS 1-9: 3
SUM OF ALL RESPONSES TO PHQ QUESTIONS 1-9: 3

## 2023-01-26 ASSESSMENT — ACTIVITIES OF DAILY LIVING (ADL): CURRENT_FUNCTION: NO ASSISTANCE NEEDED

## 2023-01-26 NOTE — PATIENT INSTRUCTIONS
Change simvastatin to rosuvastatin.  All medications to remain the same.    Albuterol inhaler 2 puffs every 4 hrs as needed for wheezing or coughing spells or tightness in breathing while active.     Schedule stress echocardiogram.  Contact [facility]  at [phone], to schedule this appointment.   If you have persistent chest pain, persistent or worsening breathing difficulty, palpitation, lightheadedness or other concerning symptoms, you should be brought to the ER.     To schedule the los dose CT scan of the chest for lung cancer screening, call [phone].     Be consistent with low salt, low trans fat and low saturated fat diet.  Eat food rich in omega-3-fatty acids as you tolerate. (salmon, olive oil)  Eat 5 cups of vegetables, fruits and whole grains per day.  Limit starchy food (white rice, white bread, white pasta, white potatoes) to less than a cup per meal.  Minimize sweets, junk food and fastfood. Limit soda beverages to one serving per day; best to avoid it altogether though.    Exercise: moderate intensity sustained for at least 30 mins per episode, goal of 150 mins per week at least  Combine cardiovascular and resistance exercises.  These exercise recommendations are in addition to your daily activity at work or home.  Work on losing weight.    Consider referral to weight management clinic.    Preventative Care Visits include: Yearly physicals, Well-child visits, Welcome to Medicare visits, & Medicare yearly wellness exams.    The purpose of these visits is to discuss your medical history and prevent health problems before you are sick.  You may need to pay a copay, coinsurance or deductible if your visit today includes testing or treating for a new or existing condition.    Additional charges may be incurred for today's visit. If you have questions about what your insurance plan covers, please contact your Insurance Company's member service department.  If you have questions  specific to a bill you have already received from [employer], please contact the [facility] at [phone].    Preventive Health Recommendations:     See your health care provider every year to  Review health changes.   Discuss preventive care.    Review your medicines if your doctor has prescribed any.    Talk with your health care provider about whether you should have a test to screen for prostate cancer (PSA).  Every 3 years, have a diabetes test (fasting glucose). If you are at risk for diabetes, you should have this test more often.  Every 5 years, have a cholesterol test. Have this test more often if you are at risk for high cholesterol or heart disease.   Every 10 years, have a colonoscopy. Or, have a yearly FIT test (stool test). These exams will check for colon cancer.  Talk to with your health care provider about screening for Abdominal Aortic Aneurysm if you have a family history of AAA or have a history of smoking.    Shots:   Get a flu shot each year.   Get a tetanus shot every 10 years.   Talk to your doctor about your pneumonia vaccines. There are now two you should receive - Pneumovax (PPSV 23) and Prevnar (PCV 13).   Talk to your pharmacist about a shingles vaccine.   Talk to your doctor about the hepatitis B vaccine.  Nutrition:   Eat at least 5 servings of fruits and vegetables each day.   Eat whole-grain bread, whole-wheat pasta and brown rice instead of white grains and rice.   Get adequate Calcium and Vitamin D.   Lifestyle  Exercise for at least 150 minutes a week (30 minutes a day, 5 days a week). This will help you control your weight and prevent disease.   Limit alcohol to one drink per day.   No smoking.   Wear sunscreen to prevent skin cancer.  See your dentist every six months for an exam and cleaning.  See your eye doctor every 1 to 2 years to screen for conditions such as glaucoma, macular degeneration, cataracts, etc.    Personalized Prevention Plan  You are due for the  preventive services outlined below.  Your care team is available to assist you in scheduling these services.  If you have already completed any of these items, please share that information with your care team to update in your medical record.  Health Maintenance Due   Topic Date Due    URINE DRUG SCREEN  Never done    COPD Action Plan  Never done    Pneumococcal Vaccine (1 - PCV) Never done    Zoster (Shingles) Vaccine (1 of 2) Never done    LUNG CANCER SCREENING  Never done     Patient Education   Personalized Prevention Plan  You are due for the preventive services outlined below.  Your care team is available to assist you in scheduling these services.  If you have already completed any of these items, please share that information with your care team to update in your medical record.  Health Maintenance Due   Topic Date Due    URINE DRUG SCREEN  Never done    COPD Action Plan  Never done    Zoster (Shingles) Vaccine (1 of 2) Never done    LUNG CANCER SCREENING  Never done     Preventive Health Recommendations  See your health care provider every year to  Review health changes.   Discuss preventive care.    Review your medicines if your doctor has prescribed any.  Talk with your health care provider about whether you should have a test to screen for prostate cancer (PSA).  Every 3 years, have a diabetes test (fasting glucose). If you are at risk for diabetes, you should have this test more often.  Every 5 years, have a cholesterol test. Have this test more often if you are at risk for high cholesterol or heart disease.   Every 10 years, have a colonoscopy. Or, have a yearly FIT test (stool test). These exams will check for colon cancer.  Talk to with your health care provider about screening for Abdominal Aortic Aneurysm if you have a family history of AAA or have a history of smoking.    Shots:   Get a flu shot each year.   Get a tetanus shot every 10 years.   Talk to your doctor about your pneumonia vaccines.  There are now two you should receive - Pneumovax (PPSV 23) and Prevnar (PCV 13).  Talk to your pharmacist about a shingles vaccine.   Talk to your doctor about the hepatitis B vaccine.    Nutrition:   Eat at least 5 servings of fruits and vegetables each day.   Eat whole-grain bread, whole-wheat pasta and brown rice instead of white grains and rice.   Get adequate Calcium and Vitamin D.     Lifestyle  Exercise for at least 150 minutes a week (30 minutes a day, 5 days a week). This will help you control your weight and prevent disease.   Limit alcohol to one drink per day.   No smoking.   Wear sunscreen to prevent skin cancer.   See your dentist every six months for an exam and cleaning.   See your eye doctor every 1 to 2 years to screen for conditions such as glaucoma, macular degeneration and cataracts.    Personalized Prevention Plan  You are due for the preventive services outlined below.  Your care team is available to assist you in scheduling these services.  If you have already completed any of these items, please share that information with your care team to update in your medical record.  Health Maintenance   Topic Date Due    URINE DRUG SCREEN  Never done    COPD ACTION PLAN  Never done    ZOSTER IMMUNIZATION (1 of 2) Never done    LUNG CANCER SCREENING  Never done    ANNUAL REVIEW OF HM ORDERS  02/28/2023    MEDICARE ANNUAL WELLNESS VISIT  01/26/2024    FALL RISK ASSESSMENT  01/26/2024    COLORECTAL CANCER SCREENING  11/04/2024    DTAP/TDAP/TD IMMUNIZATION (4 - Td or Tdap) 03/07/2027    LIPID  01/21/2028    ADVANCE CARE PLANNING  01/26/2028    SPIROMETRY  Completed    HEPATITIS C SCREENING  Completed    PHQ-2 (once per calendar year)  Completed    INFLUENZA VACCINE  Completed    Pneumococcal Vaccine: 65+ Years  Completed    AORTIC ANEURYSM SCREENING (SYSTEM ASSIGNED)  Completed    COVID-19 Vaccine  Completed    IPV IMMUNIZATION  Aged Out    MENINGITIS IMMUNIZATION  Aged Out     Your Health Risk Assessment  indicates you feel you are not in good health    A healthy lifestyle helps keep the body fit and the mind alert. It helps protect you from disease, helps you fight disease, and helps prevent chronic disease (disease that doesn't go away) from getting worse. This is important as you get older and begin to notice twinges in muscles and joints and a decline in the strength and stamina you once took for granted. A healthy lifestyle includes good healthcare, good nutrition, weight control, recreation, and regular exercise. Avoid harmful substances and do what you can to keep safe. Another part of a healthy lifestyle is stay mentally active and socially involved.    Good healthcare   Have a wellness visit every year.   If you have new symptoms, let us know right away. Don't wait until the next checkup.   Take medicines exactly as prescribed and keep your medicines in a safe place. Tell us if your medicine causes problems.   Healthy diet and weight control   Eat 3 or 4 small, nutritious, low-fat, high-fiber meals a day. Include a variety of fruits, vegetables, and whole-grain foods.   Make sure you get enough calcium in your diet. Calcium, vitamin D, and exercise help prevent osteoporosis (bone thinning).   If you live alone, try eating with others when you can. That way you get a good meal and have company while you eat it.   Try to keep a healthy weight. If you eat more calories than your body uses for energy, it will be stored as fat and you will gain weight.     Recreation   Recreation is not limited to sports and team events. It includes any activity that provides relaxation, interest, enjoyment, and exercise. Recreation provides an outlet for physical, mental, and social energy. It can give a sense of worth and achievement. It can help you stay healthy.    Mental Exercise and Social Involvement  Mental and emotional health is as important as physical health. Keep in touch with friends and family. Stay as active as  possible. Continue to learn and challenge yourself.   Things you can do to stay mentally active are:  Learn something new, like a foreign language or musical instrument.   Play SCRABBLE or do crossword puzzles. If you cannot find people to play these games with you at home, you can play them with others on your computer through the Internet.   Join a games club--anything from card games to chess or checkers or lawn bowling.   Start a new hobby.   Go back to school.   Volunteer.   Read.   Keep up with world events.    Exercise for a Healthier Heart  You may wonder how you can improve the health of your heart. If you re thinking about exercise, you re on the right track. You don t need to become an athlete. But you do need a certain amount of brisk exercise to help strengthen your heart. If you have been diagnosed with a heart condition, your healthcare provider may advise exercise to help stabilize your condition. To help make exercise a habit, choose safe, fun activities.      Exercise with a friend. When activity is fun, you're more likely to stick with it.   Before you start  Check with your healthcare provider before starting an exercise program. This is especially important if you have not been active for a while. It's also important if you have a long-term (chronic) health problem such as heart disease, diabetes, or obesity. Or if you are at high risk for having these problems.   Why exercise?  Exercising regularly offers many healthy rewards. It can help you do all of the following:   Improve your blood cholesterol level to help prevent further heart trouble  Lower your blood pressure to help prevent a stroke or heart attack  Control diabetes, or reduce your risk of getting this disease  Improve your heart and lung function  Reach and stay at a healthy weight  Make your muscles stronger so you can stay active  Prevent falls and fractures by slowing the loss of bone mass (osteoporosis)  Manage stress  better  Reduce your blood pressure  Improve your sense of self and your body image  Exercise tips    Ease into your routine. Set small goals. Then build on them. If you are not sure what your activity level should be, talk with your healthcare provider first before starting an exercise routine.  Exercise on most days. Aim for a total of 150 minutes (2 hours and 30 minutes) or more of moderate-intensity aerobic activity each week. Or 75 minutes (1 hour and 15 minutes) or more of vigorous-intensity aerobic activity each week. Or try for a combination of both. Moderate activity means that you breathe heavier and your heart rate increases but you can still talk. Think about doing 40 minutes of moderate exercise, 3 to 4 times a week. For best results, activity should last for about 40 minutes to lower blood pressure and cholesterol. It's OK to work up to the 40-minute period over time. Examples of moderate-intensity activity are walking 1 mile in 15 minutes. Or doing 30 to 45 minutes of yard work.  Step up your daily activity level.  Along with your exercise program, try being more active the whole day. Walk instead of drive. Or park further away so that you take more steps each day. Do more household tasks or yard work. You may not be able to meet the advised mount of physical activity. But doing some moderate- or vigorous-intensity aerobic activity can help reduce your risk for heart disease. Your healthcare provider can help you figure out what is best for you.  Choose 1 or more activities you enjoy.  Walking is one of the easiest things you can do. You can also try swimming, riding a bike, dancing, or taking an exercise class.    When to call your healthcare provider  Call your healthcare provider if you have any of these:   Chest pain or feel dizzy or lightheaded  Burning, tightness, pressure, or heaviness in your chest, neck, shoulders, back, or arms  Abnormal shortness of breath  More joint or muscle pain  A very  fast or irregular heartbeat (palpitations)  [employer] last reviewed this educational content on 7/1/2019 2000-2021 [employer]. All rights reserved. This information is not intended as a substitute for professional medical care. Always follow your healthcare professional's instructions.          Understanding USDA MyPlate  The USDA has guidelines to help you make healthy food choices. These are called MyPlate. MyPlate shows the food groups that make up healthy meals using the image of a place setting. Before you eat, think about the healthiest choices for what to put on your plate or in your cup or bowl. To learn more about building a healthy plate, visit www.choosemyplate.gov.    The food groups  Fruits. Any fruit or 100% fruit juice counts as part of the Fruit Group. Fruits may be fresh, canned, frozen, or dried, and may be whole, cut-up, or pureed. Make 1/2 of your plate fruits and vegetables.  Vegetables. Any vegetable or 100% vegetable juice counts as a member of the Vegetable Group. Vegetables may be fresh, frozen, canned, or dried. They can be served raw or cooked and may be whole, cut-up, or mashed. Make 1/2 of your plate fruits and vegetables.  Grains. All foods made from grains are part of the Grains Group. These include wheat, rice, oats, cornmeal, and barley. Grains are often used to make foods such as bread, pasta, oatmeal, cereal, tortillas, and grits. Grains should be no more than 1/4 of your plate. At least half of your grains should be whole grains.  Protein. This group includes meat, poultry, seafood, beans and peas, eggs, processed soy products (such as tofu), nuts (including nut butters), and seeds. Make protein choices no more than 1/4 of your plate. Meat and poultry choices should be lean or low fat.  Dairy. The Dairy Group includes all fluid milk products and foods made from milk that contain calcium, such as yogurt and cheese. (Foods that have little calcium, such as cream,  butter, and cream cheese, are not part of this group.) Most dairy choices should be low-fat or fat-free.  Oils. Oils aren't a food group, but they do contain essential nutrients. However it's important to watch your intake of oils. These are fats that are liquid at room temperature. They include canola, corn, olive, soybean, vegetable, and sunflower oil. Foods that are mainly oil include mayonnaise, certain salad dressings, and soft margarines. You likely already get your daily oil allowance from the foods you eat.  Things to limit  Eating healthy also means limiting these things in your diet:     Salt (sodium). Many processed foods have a lot of sodium. To keep sodium intake down, eat fresh vegetables, meats, poultry, and seafood when possible. Purchase low-sodium, reduced-sodium, or no-salt-added food products at the store. And don't add salt to your meals at home. Instead, season them with herbs and spices such as dill, oregano, cumin, and paprika. Or try adding flavor with lemon or lime zest and juice.  Saturated fat. Saturated fats are most often found in animal products such as beef, pork, and chicken. They are often solid at room temperature, such as butter. To reduce your saturated fat intake, choose leaner cuts of meat and poultry. And try healthier cooking methods such as grilling, broiling, roasting, or baking. For a simple lower-fat swap, use plain nonfat yogurt instead of mayonnaise when making potato salad or macaroni salad.  Added sugars. These are sugars added to foods. They are in foods such as ice cream, candy, soda, fruit drinks, sports drinks, energy drinks, cookies, pastries, jams, and syrups. Cut down on added sugars by sharing sweet treats with a family member or friend. You can also choose fruit for dessert, and drink water or other unsweetened beverages.     [employer] last reviewed this educational content on 6/1/2020 2000-2021 [employer]. All rights reserved. This  information is not intended as a substitute for professional medical care. Always follow your healthcare professional's instructions.           Lung Cancer Screening   Frequently Asked Questions  If you are at high-risk for lung cancer, getting screened with low-dose computed tomography (LDCT) every year can help save your life. This handout offers answers to some of the most common questions about lung cancer screening. If you have other questions, please call [phone] ([phone]).     What is it?  Lung cancer screening uses special X-ray technology to create an image of your lung tissue. The exam is quick and easy and takes less than 10 seconds. We don t give you any medicine or use any needles. You can eat before and after the exam. You don t need to change your clothes as long as the clothing on your chest doesn t contain metal. But, you do need to be able to hold your breath for at least 6 seconds during the exam.    What is the goal of lung cancer screening?  The goal of lung cancer screening is to save lives. Many times, lung cancer is not found until a person starts having physical symptoms. Lung cancer screening can help detect lung cancer in the earliest stages when it may be easier to treat.    Who should be screened for lung cancer?  We suggest lung cancer screening for anyone who is at high-risk for lung cancer. You are in the high-risk group if you:     are between the ages of 55 and 79, and   have smoked at least 1 pack of cigarettes a day for 20 or more years, and   still smoke or have quit within the past 15 years.    However, if you have a new cough or shortness of breath, you should talk to your doctor before being screened.    Why does it matter if I have symptoms?  Certain symptoms can be a sign that you have a condition in your lungs that should be checked and treated by your doctor. These symptoms include fever, chest pain, a new or changing cough, shortness of breath that you  have never felt before, coughing up blood or unexplained weight loss. Having any of these symptoms can greatly affect the results of lung cancer screening.       Should all smokers get an LDCT lung cancer screening exam?  It depends. Lung cancer screening is for a very specific group of men and women who have a history of heavy smoking over a long period of time (see  Who should be screened for lung cancer  above).  I am in the high-risk group, but have been diagnosed with cancer in the past. Is LDCT lung cancer screening right for me?  In some cases, you should not have LDCT lung screening, such as when your doctor is already following your cancer with CT scan studies. Your doctor will help you decide if LDCT lung screening is right for you.  Do I need to have a screening exam every year?  Yes. If you are in the high-risk group described earlier, you should get an LDCT lung cancer screening exam every year until you are 79, or are no longer willing or able to undergo screening and possible procedures to diagnose and treat lung cancer.  How effective is LDCT at preventing death from lung cancer?  Studies have shown that LDCT lung cancer screening can lower the risk of death from lung cancer by 20 percent in people who are at high-risk.  What are the risks?  There are some risks and limitations of LDCT lung cancer screening. We want to make sure you understand the risks and benefits, so please let us know if you have any questions. Your doctor may want to talk with you more about these risks.   Radiation exposure: As with any exam that uses radiation, there is a very small increased risk of cancer. The amount of radiation in LDCT is small--about the same amount a person would get from a mammogram. Your doctor orders the exam when he or she feels the potential benefits outweigh the risks.   False negatives: No test is perfect, including LDCT. It is possible that you may have a medical condition, including lung cancer,  that is not found during your exam. This is called a false negative result.   False positives and more testing: LDCT very often finds something in the lung that could be cancer, but in fact is not. This is called a false positive result. False positive tests often cause anxiety. To make sure these findings are not cancer, you may need to have more tests. These tests will be done only if you give us permission. Sometimes patients need a treatment that can have side effects, such as a biopsy. For more information on false positives, see  What can I expect from the results?    Findings not related to lung cancer: Your LDCT exam also takes pictures of areas of your body next to your lungs. In a very small number of cases, the CT scan will show an abnormal finding in one of these areas, such as your kidneys, adrenal glands, liver or thyroid. This finding may not be serious, but you may need more tests. Your doctor can help you decide what other tests you may need, if any.  What can I expect from the results?  About 1 out of 4 LDCT exams will find something that may need more tests. Most of the time, these findings are lung nodules. Lung nodules are very small collections of tissue in the lung. These nodules are very common, and the vast majority--more than 97 percent--are not cancer (benign). Most are normal lymph nodes or small areas of scarring from past infections.  But, if a small lung nodule is found to be cancer, the cancer can be cured more than 90 percent of the time. To know if the nodule is cancer, we may need to get more images before your next yearly screening exam. If the nodule has suspicious features (for example, it is large, has an odd shape or grows over time), we will refer you to a specialist for further testing.  Will my doctor also get the results?  Yes. Your doctor will get a copy of your results.  Is it okay to keep smoking now that there s a cancer screening exam?  No. Tobacco is one of the  strongest cancer-causing agents. It causes not only lung cancer, but other cancers and cardiovascular (heart) diseases as well. The damage caused by smoking builds over time. This means that the longer you smoke, the higher your risk of disease. While it is never too late to quit, the sooner you quit, the better.  Where can I find help to quit smoking?  The best way to prevent lung cancer is to stop smoking. If you have already quit smoking, congratulations and keep it up! For help on quitting smoking, please call [employer] at 6-880-QUITNOW (1-983.689.4093) or the American [employer] at [phone] to find local resources near you.  One-on-one health coaching:  If you d prefer to work individually with a health care provider on tobacco cessation, we offer:     Medication Therapy Management:  Our specially trained pharmacists work closely with you and your doctor to help you quit smoking.  Call [phone] or [phone] (toll free).

## 2023-01-26 NOTE — PROGRESS NOTES
Prior to immunization administration, verified patients identity using patient s name and date of birth. Please see Immunization Activity for additional information.     Screening Questionnaire for Adult Immunization    Are you sick today?   No   Do you have allergies to medications, food, a vaccine component or latex?   No   Have you ever had a serious reaction after receiving a vaccination?   No   Do you have a long-term health problem with heart, lung, kidney, or metabolic disease (e.g., diabetes), asthma, a blood disorder, no spleen, complement component deficiency, a cochlear implant, or a spinal fluid leak?  Are you on long-term aspirin therapy?   No   Do you have cancer, leukemia, HIV/AIDS, or any other immune system problem?   No   Do you have a parent, brother, or sister with an immune system problem?   No   In the past 3 months, have you taken medications that affect  your immune system, such as prednisone, other steroids, or anticancer drugs; drugs for the treatment of rheumatoid arthritis, Crohn s disease, or psoriasis; or have you had radiation treatments?   No   Have you had a seizure, or a brain or other nervous system problem?   No   During the past year, have you received a transfusion of blood or blood    products, or been given immune (gamma) globulin or antiviral drug?   No   For women: Are you pregnant or is there a chance you could become       pregnant during the next month?   No   Have you received any vaccinations in the past 4 weeks?   No     Immunization questionnaire answers were all negative.        Patient instructed to remain in clinic for 15 minutes afterwards, and to report any adverse reaction to me immediately.       Screening performed by Jb Norris on 1/26/2023 at 10:02 AM.

## 2023-01-26 NOTE — PROGRESS NOTES
"SUBJECTIVE:   Tommy is a 67 year old who presents for Preventive Visit.  Patient has been advised of split billing requirements and indicates understanding: Yes  Are you in the first 12 months of your Medicare coverage?  No    Healthy Habits:     In general, how would you rate your overall health?  Fair    Frequency of exercise:  1 day/week    Do you usually eat at least 4 servings of fruit and vegetables a day, include whole grains    & fiber and avoid regularly eating high fat or \"junk\" foods?  No    Taking medications regularly:  Yes    Medication side effects:  None    Ability to successfully perform activities of daily living:  No assistance needed    Home Safety:  No safety concerns identified    Hearing Impairment:  No hearing concerns    In the past 6 months, have you been bothered by leaking of urine?  No    In general, how would you rate your overall mental or emotional health?  Good      PHQ-2 Total Score: 0    Additional concerns today:  Yes    Medication Followup of all medications    Taking Medication as prescribed: yes    Side Effects:  None    Medication Helping Symptoms:  Yes    Up to date with labs.    Patient will schedule urology follow up for hx of BPH and LUTS.    Patient states he sees neurology in March 2023 due to the history of numbness in legs.    Family hx of ischemic heart disease in multiple family members.    The 10-year ASCVD risk score (Bekah DK, et al., 2019) is: 14.4%    Values used to calculate the score:      Age: 67 years      Sex: Male      Is Non- : No      Diabetic: No      Tobacco smoker: No      Systolic Blood Pressure: 122 mmHg      Is BP treated: Yes      HDL Cholesterol: 46 mg/dL      Total Cholesterol: 160 mg/dL    Patient reports he has had hx of dyspnea when he walks up stairs x 1 flight. This has been for a few years.   EKG on record did not show ischemic tracings.  No previous stress testing done.  pft done before showing asthmatic type mild " airflow obstruction. Was given albuterol inhaler but patient states he has not used it due to thinking it is not due to asthma.  Patient has not had progressive weight gain.  Patient has had previous episodes of edema.  Patient admits to no regular exercise aside from various activities of daily living.    Have you ever done Advance Care Planning? (For example, a Health Directive, POLST, or a discussion with a medical provider or your loved ones about your wishes): Yes, advance care planning is on file.  Fall risk  Fallen 2 or more times in the past year?: No  Any fall with injury in the past year?: No    Cognitive Screening   1) Repeat 3 items (Leader, Season, Table)    2) Clock draw: NORMAL  3) 3 item recall: Recalls 1 object   Results: NORMAL clock, 1-2 items recalled: COGNITIVE IMPAIRMENT LESS LIKELY    Mini-CogTM Copyright S Alex. Licensed by the author for use in [facility]; reprinted with permission (júnior@[facility]). All rights reserved.      Do you have sleep apnea, excessive snoring or daytime drowsiness?: yes    Reviewed and updated as needed this visit by clinical staff   Tobacco  Allergies  Meds  Problems             Reviewed and updated as needed this visit by Provider    Allergies  Meds  Problems            Social History     Tobacco Use     Smoking status: Former     Packs/day: 1.00     Years: 30.00     Pack years: 30.00     Types: Cigarettes     Quit date: 2011     Years since quittin.9     Smokeless tobacco: Never     Tobacco comments:     started at age 22   Substance Use Topics     Alcohol use: Yes     If you drink alcohol do you typically have >3 drinks per day or >7 drinks per week? No    Alcohol Use 2023   Prescreen: >3 drinks/day or >7 drinks/week? -   Prescreen: >3 drinks/day or >7 drinks/week? No         Current providers sharing in care for this patient include:   Patient Care Team:  Nicolas Harris MD as PCP - General (Family Medicine)  Winters,  Tim Barlow MD as Assigned PCP  Abdulaziz Aly DO as Assigned Musculoskeletal Provider  Patricio Domingo MD as Assigned Surgical Provider    The following health maintenance items are reviewed in Epic and correct as of today:  Health Maintenance   Topic Date Due     URINE DRUG SCREEN  Never done     COPD ACTION PLAN  Never done     ZOSTER IMMUNIZATION (1 of 2) Never done     LUNG CANCER SCREENING  Never done     MEDICARE ANNUAL WELLNESS VISIT  02/28/2023     ANNUAL REVIEW OF HM ORDERS  02/28/2023     FALL RISK ASSESSMENT  01/26/2024     COLORECTAL CANCER SCREENING  11/04/2024     DTAP/TDAP/TD IMMUNIZATION (4 - Td or Tdap) 03/07/2027     LIPID  01/21/2028     ADVANCE CARE PLANNING  01/26/2028     SPIROMETRY  Completed     HEPATITIS C SCREENING  Completed     PHQ-2 (once per calendar year)  Completed     INFLUENZA VACCINE  Completed     Pneumococcal Vaccine: 65+ Years  Completed     AORTIC ANEURYSM SCREENING (SYSTEM ASSIGNED)  Completed     COVID-19 Vaccine  Completed     IPV IMMUNIZATION  Aged Out     MENINGITIS IMMUNIZATION  Aged Out     Patient Active Problem List   Diagnosis     Osteoarthritis of knee     Gastrointestinal hemorrhage     Gastroesophageal reflux disease     Hyperlipidemia     Obstructive sleep apnea syndrome     Essential hypertension with goal blood pressure less than 140/90     Hand dermatitis     Peripheral polyneuropathy     Benign prostatic hyperplasia with incomplete bladder emptying     CHUNG (dyspnea on exertion)     Morbid obesity with BMI of 40.0-44.9, adult (H)     Medial epicondylitis of elbow, left     Acute streptococcal pharyngitis     Chronic obstructive pulmonary disease, unspecified COPD type (H)     Past Surgical History:   Procedure Laterality Date     ARTHROSCOPY KNEE Bilateral     both knees with arthroscopy in the past.      ARTHROSCOPY KNEE       ARTHROSCOPY SHOULDER ROTATOR CUFF REPAIR       AS TOTAL KNEE ARTHROPLASTY Bilateral     Both total knees.      LENGTHEN  TENDON ACHILLES Left      REPAIR TENDON ACHILLES      1980s two separate surgeries.     ROTATOR CUFF REPAIR RT/LT Left      [facility] TOTAL KNEE ARTHROPLASTY Right 3/17/2015    Procedure: RIGHT KNEE TOTAL ARTHROPLASTY;  Surgeon: Jaiden Barnes MD;  Location: [facility] OR;  Service: Orthopedics     [facility] TOTAL KNEE ARTHROPLASTY Left 2015    Procedure: LEFT KNEE TOTAL ARTHROPLASTY;  Surgeon: Jaiden Barnes MD;  Location: [facility];  Service: Orthopedics       Social History     Tobacco Use     Smoking status: Former     Packs/day: 1.00     Years: 30.00     Pack years: 30.00     Types: Cigarettes     Quit date: 2011     Years since quittin.9     Smokeless tobacco: Never     Tobacco comments:     started at age 22   Substance Use Topics     Alcohol use: Yes     Family History   Problem Relation Age of Onset     Coronary Artery Disease Father      Hyperlipidemia Mother      Prostate Cancer No family hx of      Colon Cancer No family hx of      Hypertension Mother      Heart Disease Mother      Heart Disease Father      Hypertension Sister      Cancer Brother      No Known Problems Daughter      No Known Problems Son      Cancer Maternal Grandmother      Vision Loss Maternal Grandmother      No Known Problems Sister      Cancer Brother      Hypertension Brother      Hypertension Brother      Cancer Brother      Sleep Apnea Brother      No Known Problems Brother      No Known Problems Daughter          Current Outpatient Medications   Medication Sig Dispense Refill     amLODIPine (NORVASC) 5 MG tablet Take 1 tablet (5 mg) by mouth daily 90 tablet 3     chlorthalidone (HYGROTON) 25 MG tablet Take 1 tablet (25 mg) by mouth daily 90 tablet 3     clobetasol (TEMOVATE) 0.05 % external ointment Apply topically daily as needed For itching 60 g 0     FLUoxetine (PROZAC) 10 MG capsule Take 1 capsule (10 mg) by mouth daily 30 capsule 0     gabapentin (NEURONTIN) 300 MG capsule TAKE 1 CAPSULE THREE TIMES A  " capsule 3     omeprazole (PRILOSEC) 40 MG DR capsule TAKE 1 CAPSULE DAILY 30 TO 60 MINUTES BEFORE A MEAL 90 capsule 3     simvastatin (ZOCOR) 20 MG tablet TAKE 1 TABLET(20 MG) BY MOUTH AT BEDTIME 90 tablet 0     neomycin-polymyxin-hydrocortisone (CORTISPORIN) 3.5-82418-1 otic suspension Place 3 drops Into the left ear 4 times daily as needed (Patient not taking: Reported on 1/11/2023) 10 mL 1     Allergies   Allergen Reactions     Cefzil [Cefprozil] Hives and Itching     Darvocet [Propoxyphene N-Apap] Hives     Penicillins Hives     Pneumonia Vaccine: completed today        Review of Systems   Constitutional: Negative for chills and fever.   HENT: Negative for congestion, ear pain, hearing loss and sore throat.    Eyes: Negative for pain and visual disturbance.   Respiratory: Positive for shortness of breath. Negative for cough.    Cardiovascular: Positive for peripheral edema. Negative for chest pain and palpitations.   Gastrointestinal: Positive for heartburn. Negative for abdominal pain, constipation, diarrhea, hematochezia and nausea.   Genitourinary: Negative for dysuria, frequency, genital sores, hematuria, impotence, penile discharge and urgency.   Musculoskeletal: Positive for arthralgias, joint swelling and myalgias.   Skin: Negative for rash.   Neurological: Positive for weakness and paresthesias. Negative for dizziness and headaches.   Psychiatric/Behavioral: Negative for mood changes. The patient is not nervous/anxious.          OBJECTIVE:   /82   Pulse 50   Resp 16   Ht 1.772 m (5' 9.75\")   Wt 138.8 kg (306 lb)   SpO2 96%   BMI 44.22 kg/m   Estimated body mass index is 44.22 kg/m  as calculated from the following:    Height as of this encounter: 1.772 m (5' 9.75\").    Weight as of this encounter: 138.8 kg (306 lb).  Physical Exam  GENERAL APPEARANCE: morbidly obese, ambulatory w/o assist,  alert and no distress  EYES: pink conj, no icterus, PERRL, EOMI  HENT: ear canals and TM's " normal, nose and mouth without ulcers or lesions, oropharynx clear and oral mucous membranes moist  NECK: no adenopathy, no asymmetry, masses, or scars and thyroid normal to palpation  RESP: lungs clear to auscultation - no rales, rhonchi or wheezes  CV: regular rates and rhythm, normal S1 S2, no S3 or S4, no murmur, click or rub, no peripheral edema and peripheral pulses strong  ABDOMEN: soft, nontender, no hepatosplenomegaly, no masses and bowel sounds normal  RECTAL: deferred  MS: no musculoskeletal defects are noted and gait is age appropriate without ataxia  SKIN: no suspicious lesions or rashes  NEURO: Normal strength and tone, sensory exam grossly normal, mentation intact and speech normal    Diagnostic Test Results:  none     ASSESSMENT / PLAN:   Tommy was seen today for physical.    Diagnoses and all orders for this visit:    Encounter for Medicare annual wellness exam  Patient was advised on recommended screening and preventive health recommendations.  He verbalized understanding and agreed to the plans below.    Essential hypertension with goal blood pressure less than 140/90  -     amLODIPine (NORVASC) 5 MG tablet; Take 1 tablet (5 mg) by mouth daily  -     chlorthalidone (HYGROTON) 25 MG tablet; Take 1 tablet (25 mg) by mouth daily  -     OFFICE/OUTPT VISIT,EST,LEVL IV  Controlled.  Low salt, low fat diet.   Exercise as tolerated.  Take meds as prescribed; call if with side effects.     Anxiety and depression  -     FLUoxetine (PROZAC) 10 MG capsule; Take 1 capsule (10 mg) by mouth daily  -     OFFICE/OUTPT VISIT,EST,LEVL IV  Improving on fluoxetine.  Patient prefers to stay on same dose for a little longer.  Reassess in 2 months.  Return precautions discussed and given to patient.     Peripheral polyneuropathy  -     gabapentin (NEURONTIN) 300 MG capsule; TAKE 1 CAPSULE THREE TIMES A DAY  -     OFFICE/OUTPT VISIT,EST,LEVL IV  Stable on gabapentin.    Gastroesophageal reflux disease without  esophagitis  -     omeprazole (PRILOSEC) 40 MG DR capsule; TAKE 1 CAPSULE DAILY 30 TO 60 MINUTES BEFORE A MEAL  -     OFFICE/OUTPT VISIT,EST,LEVL IV  Stable on PPI.  Reinforced preventive measures.    Hyperlipidemia, unspecified hyperlipidemia type  -     rosuvastatin (CRESTOR) 20 MG tablet; Take 1 tablet (20 mg) by mouth daily  -     OFFICE/OUTPT VISIT,EST,LEVL IV  Reinforced heart healthy lifestyle.    Mild intermittent asthma without complication  -     albuterol (PROAIR HFA/PROVENTIL HFA/VENTOLIN HFA) 108 (90 Base) MCG/ACT inhaler; Inhale 2 puffs into the lungs every 6 hours as needed for shortness of breath, wheezing or cough  -     OFFICE/OUTPT VISIT,ESTLEVL IV  Reviewed previous PFT result. Reviewed pathophysiology of asthma.  Very well be the cause of his recurrent CHUNG.  Advised patient on proper use of albuterol inhaler and expected desired outcome.  Return precautions discussed and given to patient.     CHUGN (dyspnea on exertion)  -     Echocardiogram Exercise Stress; Future  -     OFFICE/OUTPT VISIT,HERMINIOLEVL IV  While can be asthma, should also rule out occult cardiac pathology.  fam hx of ischemic cardiac disease also places patient at risk.  Hence stress echo is ordered. Ruling out occult cardiac pathology for his dyspnea on exertion is medically necessary.    Family history of ischemic heart disease  -     Echocardiogram Exercise Stress; Future  -     OFFICE/OUTPT VISIT,HERMINIOLEVL IV    Personal history of tobacco use  -     Prof fee: Shared Decision Making for Lung Cancer Screening  -     CT Chest Lung Cancer Scrn Low Dose wo; Future    Other orders  -     Pneumococcal 20 Valent Conjugate (PCV20)        Patient has been advised of split billing requirements and indicates understanding: Yes      COUNSELING:  Reviewed preventive health counseling, as reflected in patient instructions        He reports that he quit smoking about 11 years ago. His smoking use included cigarettes. He has a 30.00 pack-year  smoking history. He has never used smokeless tobacco.      Appropriate preventive services were discussed with this patient, including applicable screening as appropriate for cardiovascular disease, diabetes, osteopenia/osteoporosis, and glaucoma.  As appropriate for age/gender, discussed screening for colorectal cancer, prostate cancer, breast cancer, and cervical cancer. Checklist reviewing preventive services available has been given to the patient.    Reviewed patients plan of care and provided an AVS. The Basic Care Plan (routine screening as documented in Health Maintenance) and Complex Care Plan (for patients with higher acuity and needing more deliberate coordination of services) for Tommy meets the Care Plan requirement. This Care Plan has been established and reviewed with the Patient.      Nicolas Harris MD  [facility]    Identified Health Risks:    The patient was provided with suggestions to help him develop a healthy physical lifestyle.  He is at risk for lack of exercise and has been provided with information to increase physical activity for the benefit of his well-being.  The patient was counseled and encouraged to consider modifying their diet and eating habits. He was provided with information on recommended healthy diet options.  Lung Cancer Screening Shared Decision Making Visit     Tommy De La O, a 67 year old male, is eligible for lung cancer screening    History   Smoking Status     Former     Packs/day: 1.00     Years: 30.00     Types: Cigarettes     Quit date: 2/1/2011   Smokeless Tobacco     Never       I have discussed with patient the risks and benefits of screening for lung cancer with low-dose CT.     The risks include:    radiation exposure: one low dose chest CT has as much ionizing radiation as about 15 chest x-rays, or 6 months of background radiation living in [place]      false positives: most findings/nodules are NOT cancer, but some might still  require additional diagnostic evaluation, including biopsy    over-diagnosis: some slow growing cancers that might never have been clinically significant will be detected and treated unnecessarily     The benefit of early detection of lung cancer is contingent upon adherence to annual screening or more frequent follow up if indicated.     Furthermore, to benefit from screening, Tommy must be willing and able to undergo diagnostic procedures, if indicated. Although no specific guide is available for determining severity of comorbidities, it is reasonable to withhold screening in patients who have greater mortality risk from other diseases.     We did discuss that the best way to prevent lung cancer is to not smoke.    Some patients may value a numeric estimation of lung cancer risk when evaluating if lung cancer screening is right for them, here is one calculator:    ShouldIScreen

## 2023-01-26 NOTE — TELEPHONE ENCOUNTER
[facility] Call Center    Phone Message    May a detailed message be left on voicemail: yes     Reason for Call: Other: Patient called in to schedule ECHO Excerise Stress appt. Please reach out to patient at [phone] to get scheduled.     Action Taken: Other: Cardiology    Travel Screening: Not Applicable     Thank you!  [facility]

## 2023-01-30 ENCOUNTER — TELEPHONE (OUTPATIENT)
Dept: FAMILY MEDICINE | Facility: CLINIC | Age: 68
End: 2023-01-30
Payer: MEDICARE

## 2023-01-30 DIAGNOSIS — E78.5 HYPERLIPIDEMIA, UNSPECIFIED HYPERLIPIDEMIA TYPE: ICD-10-CM

## 2023-01-30 DIAGNOSIS — I10 ESSENTIAL HYPERTENSION WITH GOAL BLOOD PRESSURE LESS THAN 140/90: Primary | ICD-10-CM

## 2023-01-30 NOTE — TELEPHONE ENCOUNTER
Dr. Harris,    The patient calls and his insurance Medicare will not cover the echo stress without wording is medically necessary for this procedure.  Wondering if your office visit notes reflect this being medically necessary and that it is charted that way. Ella DEVI RN

## 2023-01-31 RX ORDER — SIMVASTATIN 20 MG
20 TABLET ORAL AT BEDTIME
Qty: 90 TABLET | Refills: 3 | Status: SHIPPED | OUTPATIENT
Start: 2023-01-31 | End: 2023-02-06

## 2023-01-31 RX ORDER — LOSARTAN POTASSIUM 25 MG/1
25 TABLET ORAL DAILY
Qty: 90 TABLET | Refills: 0 | Status: SHIPPED | OUTPATIENT
Start: 2023-01-31 | End: 2023-03-21

## 2023-01-31 NOTE — TELEPHONE ENCOUNTER
Stop amlodipine. Discontinued rosuvastatin.  Restarted simvastatin. Prescribed losartan 25 mg.  Please schedule patient to recheck BP with a nurse visit 2-3 weeks after he starts losartan.    Neuropathy is not a commonly reported side effect of fluoxetine. If he has more symptoms, schedule a clinic appointment for reassessment.

## 2023-02-01 RX ORDER — TRIAMCINOLONE ACETONIDE 40 MG/ML
40 INJECTION, SUSPENSION INTRA-ARTICULAR; INTRAMUSCULAR
Status: DISCONTINUED | OUTPATIENT
Start: 2023-01-10 | End: 2023-09-22

## 2023-02-01 RX ORDER — ROPIVACAINE HYDROCHLORIDE 5 MG/ML
3 INJECTION, SOLUTION EPIDURAL; INFILTRATION; PERINEURAL
Status: DISCONTINUED | OUTPATIENT
Start: 2023-01-10 | End: 2023-09-22

## 2023-02-03 ENCOUNTER — HOSPITAL ENCOUNTER (OUTPATIENT)
Dept: CT IMAGING | Facility: CLINIC | Age: 68
Discharge: HOME | End: 2023-02-03
Attending: FAMILY MEDICINE | Admitting: FAMILY MEDICINE
Payer: MEDICARE

## 2023-02-03 DIAGNOSIS — Z87.891 PERSONAL HISTORY OF TOBACCO USE: ICD-10-CM

## 2023-02-03 PROCEDURE — 71271 CT THORAX LUNG CANCER SCR C-: CPT

## 2023-02-05 DIAGNOSIS — E78.5 HYPERLIPIDEMIA, UNSPECIFIED HYPERLIPIDEMIA TYPE: ICD-10-CM

## 2023-02-06 RX ORDER — SIMVASTATIN 20 MG
TABLET ORAL
Qty: 90 TABLET | Refills: 3 | Status: SHIPPED | OUTPATIENT
Start: 2023-02-06 | End: 2023-03-21

## 2023-02-07 NOTE — PROGRESS NOTES
HPI: I am consulted in this 62 y.o. male regarding an iliac aortic aneurysm. This has been asymptomatic.   He has not had severe abdominal or back pain, or left leg pain.   He had a CT that demonstrated a 2.7cm AAA and a 2.3 cm left common iliac artery aneurysm.     Allergies   Allergen Reactions     Cefzil [Cefprozil] Hives     Penicillins Hives     Propoxyphene N-Acetaminophen Itching     Propoxyphene-Acetaminophen Itching         Current Outpatient Prescriptions:      amLODIPine (NORVASC) 5 MG tablet, TAKE 1 TABLET EVERY DAY, Disp: 90 tablet, Rfl: 3     aspirin 325 MG tablet, Take 325 mg by mouth daily., Disp: , Rfl:      chlorthalidone (HYGROTEN) 25 MG tablet, TAKE 1 TABLET EVERY DAY, Disp: 90 tablet, Rfl: 3     clindamycin (CLEOCIN) 300 MG capsule, TK 2 CS PO 1 HOUR PRIOR TO DENTAL APPOINTMENT, Disp: , Rfl: 3     clobetasol (TEMOVATE) 0.05 % ointment, Apply 1 application topically 2 (two) times a day as needed. To hands., Disp: , Rfl:      omeprazole (PRILOSEC) 40 MG capsule, Take 1 capsule (40 mg total) by mouth daily., Disp: 90 capsule, Rfl: 3     simvastatin (ZOCOR) 20 MG tablet, Take 1 tablet (20 mg total) by mouth at bedtime., Disp: 90 tablet, Rfl: 3    Current Facility-Administered Medications:      diazePAM tablet 10 mg (VALIUM), 10 mg, Oral, Once, Jarrod Fischer MD     lidocaine 10 mg/mL (1 %) injection 10 mL, 10 mL, Other, Once, Jarrod Fischer MD    Past Medical History:   Diagnosis Date     Acute blood loss anemia 3/18/2015     GERD (gastroesophageal reflux disease)      Hypertension      Iliac artery aneurysm      Polycystic kidney disease      Sleep apnea        Past Surgical History:   Procedure Laterality Date     ACHILLES TENDON LENGTHENING Left      KNEE ARTHROSCOPY       IN TOTAL KNEE ARTHROPLASTY Right 3/17/2015    Procedure: RIGHT KNEE TOTAL ARTHROPLASTY;  Surgeon: Jaiden Barnes MD;  Location: [facility];  Service: Orthopedics     IN TOTAL KNEE ARTHROPLASTY Left 6/30/2015     "Procedure: LEFT KNEE TOTAL ARTHROPLASTY;  Surgeon: Jaiden Barnes MD;  Location: [facility];  Service: Orthopedics     ROTATOR CUFF REPAIR         Social History     Social History     Marital status: Single     Spouse name: N/A     Number of children: N/A     Years of education: N/A     Occupational History     Not on file.     Social History Main Topics     Smoking status: Former Smoker     Packs/day: 1.00     Years: 30.00     Quit date: 1/1/2010     Smokeless tobacco: Never Used     Alcohol use Yes     Drug use: No     Sexual activity: No     Other Topics Concern     Not on file     Social History Narrative    .  3 kids.  Retired [employer].       Review of Systems - Negative except he has bilateral leg numbness which is been present for the last 2 years.  This has been worsening.  He had total knee arthroplasties and that has not helped the leg numbness.  He is currently being worked up by the neurologists with EMGs.  He is also concerned about his weight gain, and the fact that he is unable to exercise due to his knee issues.    /82  Pulse 76  Resp 16  Ht 5' 11\" (1.803 m)  Wt (!) 310 lb (140.6 kg)  BMI 43.24 kg/m2  Body mass index is 43.24 kg/(m^2).    EXAM:  GENERAL: This is a morbidly obese male in no distress.  SKIN: Grossly intact  HEAD AND NECK: Cranial nerves intact. No neck masses or bruits.  CARDIAC: RRR w/out murmur  CHEST/LUNG: Clear  ABDOMEN: Soft, non-tender, B/S present, no pulsatile aortic mass  GROINS: Both femoral pulses palpable  EXTREM: Pulses palpable: right dorsalis pedis, right posterior tibial, left dorsalis pedis and left posterior tibial        IMAGES:   I reviewed the CT abdomen done at [facility] on 5/8/2017 with him.  This shows infrarenal aortic ectasia measuring 2.7 centimeters in diameter, and a left common iliac artery aneurysm measuring 2.3 cm.    Assessment/Plan: Mr. De La O has a 2.7 cm abdominal aortic aneurysm and a 2.3 cm left common iliac artery " aneurysm. At this size, the risk of repair approximates the risk of rupture, and there is no advantage to intervention.  We will observe the aneurysms until they reach 5.5cm, at which time we will intervene. He will return in 2 years for a repeat CTA. We discussed symptoms of rupture, and if they occur he will seek help immediately.  I did discuss with him that he needs to lose weight, and he has already investigated bariatric surgery and decided against it.  I advised him to use portion control and also to exercise in the swimming pool where he will not be fighting gravity and the exercise will be low impact.  He will do so.      Devon Pritchett MD     Cimzia Counseling:  I discussed with the patient the risks of Cimzia including but not limited to immunosuppression, allergic reactions and infections.  The patient understands that monitoring is required including a PPD at baseline and must alert us or the primary physician if symptoms of infection or other concerning signs are noted.

## 2023-02-14 ENCOUNTER — HOSPITAL ENCOUNTER (OUTPATIENT)
Dept: CARDIOLOGY | Facility: CLINIC | Age: 68
Discharge: HOME | End: 2023-02-14
Attending: FAMILY MEDICINE | Admitting: FAMILY MEDICINE
Payer: MEDICARE

## 2023-02-14 DIAGNOSIS — Z82.49 FAMILY HISTORY OF ISCHEMIC HEART DISEASE: ICD-10-CM

## 2023-02-14 DIAGNOSIS — R06.09 DOE (DYSPNEA ON EXERTION): ICD-10-CM

## 2023-02-14 PROCEDURE — 93018 CV STRESS TEST I&R ONLY: CPT | Performed by: INTERNAL MEDICINE

## 2023-02-14 PROCEDURE — 93321 DOPPLER ECHO F-UP/LMTD STD: CPT | Mod: 26 | Performed by: INTERNAL MEDICINE

## 2023-02-14 PROCEDURE — 93350 STRESS TTE ONLY: CPT | Mod: 26 | Performed by: INTERNAL MEDICINE

## 2023-02-14 PROCEDURE — 93325 DOPPLER ECHO COLOR FLOW MAPG: CPT | Mod: 26 | Performed by: INTERNAL MEDICINE

## 2023-02-14 PROCEDURE — 255N000002 HC RX 255 OP 636: Performed by: FAMILY MEDICINE

## 2023-02-14 PROCEDURE — 93016 CV STRESS TEST SUPVJ ONLY: CPT | Performed by: INTERNAL MEDICINE

## 2023-02-14 PROCEDURE — 93325 DOPPLER ECHO COLOR FLOW MAPG: CPT | Mod: TC

## 2023-02-14 RX ADMIN — HUMAN ALBUMIN MICROSPHERES AND PERFLUTREN 2 ML: 10; .22 INJECTION, SOLUTION INTRAVENOUS at 10:25

## 2023-02-20 ENCOUNTER — ALLIED HEALTH/NURSE VISIT (OUTPATIENT)
Dept: FAMILY MEDICINE | Facility: CLINIC | Age: 68
End: 2023-02-20
Payer: MEDICARE

## 2023-02-20 VITALS — DIASTOLIC BLOOD PRESSURE: 64 MMHG | HEART RATE: 70 BPM | SYSTOLIC BLOOD PRESSURE: 130 MMHG

## 2023-02-20 DIAGNOSIS — I10 ESSENTIAL HYPERTENSION WITH GOAL BLOOD PRESSURE LESS THAN 140/90: Primary | ICD-10-CM

## 2023-02-20 PROCEDURE — 99207 PR NO CHARGE NURSE ONLY: CPT

## 2023-02-20 NOTE — PROGRESS NOTES
Tommy De La O is a 67 year old year old patient who comes in today for a Blood Pressure check because of medication change.  Vital Signs as repeated by /64  Patient is taking medication as prescribed  Patient is tolerating medications well.  Patient is monitoring Blood Pressure at home.  Average readings if yes are 1330/70  Current complaints: none  Disposition:  patient to continue with the same medication

## 2023-02-21 ENCOUNTER — ANCILLARY PROCEDURE (OUTPATIENT)
Dept: GENERAL RADIOLOGY | Facility: CLINIC | Age: 68
End: 2023-02-21
Payer: MEDICARE

## 2023-02-21 ENCOUNTER — NURSE TRIAGE (OUTPATIENT)
Dept: NURSING | Facility: CLINIC | Age: 68
End: 2023-02-21
Payer: MEDICARE

## 2023-02-21 ENCOUNTER — OFFICE VISIT (OUTPATIENT)
Dept: URGENT CARE | Facility: URGENT CARE | Age: 68
End: 2023-02-21
Payer: MEDICARE

## 2023-02-21 VITALS
TEMPERATURE: 98.2 F | HEART RATE: 55 BPM | WEIGHT: 306 LBS | OXYGEN SATURATION: 96 % | DIASTOLIC BLOOD PRESSURE: 69 MMHG | SYSTOLIC BLOOD PRESSURE: 139 MMHG | BODY MASS INDEX: 44.22 KG/M2

## 2023-02-21 DIAGNOSIS — S69.92XA INJURY OF LEFT LITTLE FINGER, INITIAL ENCOUNTER: ICD-10-CM

## 2023-02-21 DIAGNOSIS — S63.277A CLOSED DISLOCATION OF INTERPHALANGEAL JOINT OF LEFT LITTLE FINGER: Primary | ICD-10-CM

## 2023-02-21 PROCEDURE — 73140 X-RAY EXAM OF FINGER(S): CPT | Mod: TC | Performed by: RADIOLOGY

## 2023-02-21 PROCEDURE — 99213 OFFICE O/P EST LOW 20 MIN: CPT | Mod: 25

## 2023-02-21 PROCEDURE — 73130 X-RAY EXAM OF HAND: CPT | Mod: TC | Performed by: RADIOLOGY

## 2023-02-21 PROCEDURE — 26770 TREAT FINGER DISLOCATION: CPT | Mod: F9

## 2023-02-21 RX ORDER — MELOXICAM 15 MG/1
15 TABLET ORAL DAILY
Qty: 15 TABLET | Refills: 0 | Status: SHIPPED | OUTPATIENT
Start: 2023-02-21 | End: 2023-03-21

## 2023-02-21 RX ORDER — CYCLOBENZAPRINE HCL 10 MG
10 TABLET ORAL 3 TIMES DAILY PRN
Qty: 15 TABLET | Refills: 0 | Status: SHIPPED | OUTPATIENT
Start: 2023-02-21 | End: 2023-06-23

## 2023-02-21 NOTE — PATIENT INSTRUCTIONS
"    Diagnosis: Orthopedic injury;  Today we did:  Xray: dislocation of left little finger at PIP and DIP joint     Plan:   Avoid or restrict any activities that may aggravate your symptoms. Cease exacerbating activity for 2 to 6 weeks, then gradually return to exercise/sport as tolerated.  Work/school/activity note ?   Recommend wearing brace for the next few weeks to assist with support.}  Recommending light stretching (when able to tolerate) to reduce your risks of developing a frozen joint or stiffness in joint   This will also help to increase strength and flexibility over time related to injury   Recovery expected within 2-6 weeks depending on severity, but some may take up to 6-12 months.  If symptoms fail to resolve or worsen recommending follow-up with orthopedics/physical therapy after allowing adequate time for healing. - will place referral today   P.R.I.C.E.  For musculoskeletal injuries including: sprains, strains, bruises - use the acronym P.R.I.C.E. for symptomatic treatment:   Prevent & Protect further injury.  Rest affected area   Ice applied (or heat, or can alternate)   Compression of injured area (ACE bandage/splint).  Elevation of injured area.  Pain  Ibuprofen / tylenol for pain   - Ibuprofen 600mg Q6hr as needed  (can use celebrex if GI upset or GI bleed risk)    - tylenol 500mg Q8hr   - Can use aleve / naproxen / naprosyn also     No narcotics - no evidence to support this use and people tend to over use \"injured\" extremity when not having pain    (Pain is body's way of making you take it easy for awhile)   - orthopedic speciality will discuss stronger medications if needed indicated at that time  Monitor for:   Worsening pain   Worsening numbness and tingling   Loss of range of motion or strength   Redness, warmth or infection at site   Fevers, chills    "

## 2023-02-21 NOTE — TELEPHONE ENCOUNTER
"Patient calling stating he fell 30 minutes ago and landed on left hand.     Reporting left hand pinky finger \"is bent back.\" Stating he would like to know where to go?    Finger is \"throbbing.\"     Per triage guidelines ED/UC advised.    Patient agrees to go to [facility].    Caller verbalized understanding. Denies further questions.    Linda Becker RN  [facility]        Reason for Disposition    Looks like a broken bone or dislocated joint (e.g., crooked or deformed)    Additional Information    Negative: Major bleeding (actively dripping or spurting) that can't be stopped    Negative: Sounds like a life-threatening emergency to the triager    Negative: Amputation    Negative: High-pressure injection injury (e.g., from paint gun, usually work-related)    Protocols used: FINGER INJURY-A-OH      "

## 2023-02-21 NOTE — PROGRESS NOTES
URGENT CARE  Assessment & Plan   Assessment:   Tommy De La O is a 67 year old male who's clinical presentation today is consistent with:   1. Closed dislocation of interphalangeal joint of left little finger  - Orthopedic  Referral; Future  - meloxicam (MOBIC) 15 MG tablet; Take 1 tablet (15 mg) by mouth daily   - cyclobenzaprine (FLEXERIL) 10 MG tablet;     2. Injury of left little finger, initial encounter  - lidocaine (PF) (XYLOCAINE) injection 6 mg  - XR Finger Left G/E 2 Views; Future    No alarm signs or symptoms present   Differential Diagnoses for this patient's CC include    fracture, dislocation  Ligamentous vs tendon pathology, musculoskeletal injury, soft tissue injury    Plan:    Postreduction patient endorsed decreased pain and increased ability to move finger, will treat patient supportively and symptomatically with finger splint along with rest, elevation, ice and NSAIDs as needed.  Additionally will refer patient to orthopedics for further evaluation and treatment should he continue to have pain in the next few weeks..    Additionally we discussed if symptoms do not improve after starting today's treatment (or if symptoms worsen) to follow up in 7-10 days.    Patient  is  agreeable to treatment plan and state they will follow-up if symptoms do not improve and/or if symptoms worsen (see patient's AVS 'monitor for' section for specific patient instructions given and discussed regarding what to watch for and when to follow up)    Medications ordered are listed above, please see AVS for patient's specific and personalized discharge instructions given     JUAN LUIS Aguilar [facility] [place]      ______________________________________________________________________        Subjective  Subjective     HPI: Tommy De La O  is a 67 year old  male who presents today for evaluation the following concerns:   Patient presents endorsing left little finger/pinky injury and pain  which started today on 2/21/2023  Patient states this pain is  related to a traumatic injury/accident and is acute.  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  Patient endorses he landed awkwardly and thinks he bent his finger the wrong way.  Patient localizes the pain to the entire left pinky finger, and states there is no radiation of the pain and  patient endorses associated symptoms such as swelling, redness but no  Bruising yet    Patient states their: Skin is intact}. ROM is unable to move the finger at PIP or DIP joints, and their strength is unable to make a fist or grasp anything   Patient reports sensation is without numbness or tingling.   Self care to this point includes: nothing .       Allergies   Allergen Reactions     Amlodipine Swelling     Cefzil [Cefprozil] Hives and Itching     Darvocet [Propoxyphene N-Apap] Hives     Penicillins Hives     Patient Active Problem List   Diagnosis     Osteoarthritis of knee     Gastrointestinal hemorrhage     Gastroesophageal reflux disease     Hyperlipidemia     Obstructive sleep apnea syndrome     Essential hypertension with goal blood pressure less than 140/90     Hand dermatitis     Peripheral polyneuropathy     Benign prostatic hyperplasia with incomplete bladder emptying     CHUNG (dyspnea on exertion)     Morbid obesity with BMI of 40.0-44.9, adult (H)     Medial epicondylitis of elbow, left     Acute streptococcal pharyngitis     Chronic obstructive pulmonary disease, unspecified COPD type (H)       Review of Systems:  Pertinent review of systems as reflected in HPI, otherwise negative.     Objective  Objective    Physical Exam:  Vitals:    02/21/23 1447   BP: 139/69   Pulse: 55   Temp: 98.2  F (36.8  C)   TempSrc: Tympanic   SpO2: 96%   Weight: 138.8 kg (306 lb)      General:   alert and oriented, no acute distress, nonill-appearing   Vital signs reviewed: afebrile and  normotensive     Psy/mental status: cooperative and pleasant   SKIN: intact   MSK:    Left  digits #5/ pinky : no  erythema, ecchymosis, bruising, but slight  inflammation present on  the dorsal aspect(s) at the PIP and PID .    Increased tenderness/pain with palpation at the PIP and DIP .    loss of range of motion with flexion or extension.    Reduction procedure:   -Risks and benefits of  blood vessel damage, tendon damage, nerve damage, damage to local structures, missed foreign bodies, and scar(s) were discussed with patient.   -After obtaining informed verbal consent, a reduction was performed on the patient's 5th digit on the left hand   Analgesia in the form of a digital block was used with Xylocaine 2% without epinepherine 6 mls  The reduction was performed by using longitudinal manual traction and gently hyperextending the joint while pushing the base of the dislocated phalanx into place   A click was felt over the PIP and DIP} as the displacement was reduced.  Patient tolerated procedure well and post procedure xray was obtained to confirm reduction. Patient endorsed experiencing significant pain relief and achieving full range of motion again in the finger       Imaging:   All images were personally read by this provider (myself).   Per my independent interpretation the original xray shows dorsal dislocation at the PIP joint and DIP joint of the left small finger, no evidence of a fracture.  Repeat films post reduction show successful reduction with the IP joints    I explained my diagnostic considerations and recommendations to the patient, who voiced understanding and agreement with the treatment plan.   All questions were answered.   We discussed potential side effects, risks and benefits of any prescribed or recommended therapies, as well as expectations for response to treatments.  Please see AVS for any patient instructions & handouts given.   Patient was advised to contact the Nurse Care Line,  "their Primary Care provider, Urgent Care, or the Emergency Department if there are new or worsening symptoms, or call 911 for emergencies.        ______________________________________________________________________          Patient Instructions       Diagnosis: Orthopedic injury;  Today we did:  Xray: dislocation of left little finger at PIP and DIP joint     Plan:   1. Avoid or restrict any activities that may aggravate your symptoms. Cease exacerbating activity for 2 to 6 weeks, then gradually return to exercise/sport as tolerated.    Work/school/activity note ?     Recommend wearing brace for the next few weeks to assist with support.}    Recommending light stretching (when able to tolerate) to reduce your risks of developing a frozen joint or stiffness in joint     This will also help to increase strength and flexibility over time related to injury     Recovery expected within 2-6 weeks depending on severity, but some may take up to 6-12 months.    If symptoms fail to resolve or worsen recommending follow-up with orthopedics/physical therapy after allowing adequate time for healing. - will place referral today   P.R.I.C.E.  For musculoskeletal injuries including: sprains, strains, bruises - use the acronym P.R.I.C.E. for symptomatic treatment:   1. Prevent & Protect further injury.  2. Rest affected area   3. Ice applied (or heat, or can alternate)   4. Compression of injured area (ACE bandage/splint).  5. Elevation of injured area.  Pain  Ibuprofen / tylenol for pain   - Ibuprofen 600mg Q6hr as needed  (can use celebrex if GI upset or GI bleed risk)    - tylenol 500mg Q8hr   - Can use aleve / naproxen / naprosyn also     No narcotics - no evidence to support this use and people tend to over use \"injured\" extremity when not having pain    (Pain is body's way of making you take it easy for awhile)   - orthopedic speciality will discuss stronger medications if needed indicated at that time  Monitor for: "     Worsening pain     Worsening numbness and tingling     Loss of range of motion or strength     Redness, warmth or infection at site     Fevers, chills

## 2023-02-27 ENCOUNTER — HOSPITAL ENCOUNTER (OUTPATIENT)
Dept: CARDIOLOGY | Facility: CLINIC | Age: 68
Discharge: HOME | End: 2023-02-27
Attending: FAMILY MEDICINE | Admitting: FAMILY MEDICINE
Payer: MEDICARE

## 2023-02-27 DIAGNOSIS — Z82.49 FAMILY HISTORY OF ISCHEMIC HEART DISEASE: ICD-10-CM

## 2023-02-27 DIAGNOSIS — R06.09 DOE (DYSPNEA ON EXERTION): ICD-10-CM

## 2023-02-27 DIAGNOSIS — I49.3 VENTRICULAR ECTOPY: ICD-10-CM

## 2023-02-27 DIAGNOSIS — Z87.891 PERSONAL HISTORY OF TOBACCO USE: ICD-10-CM

## 2023-02-27 PROCEDURE — 93248 EXT ECG>7D<15D REV&INTERPJ: CPT | Performed by: INTERNAL MEDICINE

## 2023-02-27 PROCEDURE — 93246 EXT ECG>7D<15D RECORDING: CPT

## 2023-03-06 DIAGNOSIS — S63.277A CLOSED DISLOCATION OF INTERPHALANGEAL JOINT OF LEFT LITTLE FINGER: ICD-10-CM

## 2023-03-06 RX ORDER — MELOXICAM 15 MG/1
15 TABLET ORAL DAILY
OUTPATIENT
Start: 2023-03-06

## 2023-03-06 NOTE — TELEPHONE ENCOUNTER
This script request Provider was Jaye zaidi.  Pt's family Provider is Dr. Kimberly ernst  [facility]

## 2023-03-10 ENCOUNTER — ANCILLARY PROCEDURE (OUTPATIENT)
Dept: GENERAL RADIOLOGY | Facility: CLINIC | Age: 68
End: 2023-03-10
Attending: FAMILY MEDICINE
Payer: MEDICARE

## 2023-03-10 ENCOUNTER — OFFICE VISIT (OUTPATIENT)
Dept: ORTHOPEDICS | Facility: CLINIC | Age: 68
End: 2023-03-10
Payer: MEDICARE

## 2023-03-10 VITALS — BODY MASS INDEX: 43.81 KG/M2 | HEIGHT: 70 IN | WEIGHT: 306 LBS

## 2023-03-10 DIAGNOSIS — M25.512 CHRONIC LEFT SHOULDER PAIN: Primary | ICD-10-CM

## 2023-03-10 DIAGNOSIS — S63.277A CLOSED DISLOCATION OF INTERPHALANGEAL JOINT OF LEFT LITTLE FINGER: ICD-10-CM

## 2023-03-10 DIAGNOSIS — G89.29 CHRONIC LEFT SHOULDER PAIN: Primary | ICD-10-CM

## 2023-03-10 PROCEDURE — 99214 OFFICE O/P EST MOD 30 MIN: CPT | Mod: 25 | Performed by: FAMILY MEDICINE

## 2023-03-10 PROCEDURE — 20611 DRAIN/INJ JOINT/BURSA W/US: CPT | Mod: LT | Performed by: FAMILY MEDICINE

## 2023-03-10 PROCEDURE — 73140 X-RAY EXAM OF FINGER(S): CPT | Mod: TC | Performed by: RADIOLOGY

## 2023-03-10 RX ORDER — BETAMETHASONE SODIUM PHOSPHATE AND BETAMETHASONE ACETATE 3; 3 MG/ML; MG/ML
6 INJECTION, SUSPENSION INTRA-ARTICULAR; INTRALESIONAL; INTRAMUSCULAR; SOFT TISSUE
Status: DISCONTINUED | OUTPATIENT
Start: 2023-03-10 | End: 2023-09-22

## 2023-03-10 RX ORDER — ROPIVACAINE HYDROCHLORIDE 5 MG/ML
4 INJECTION, SOLUTION EPIDURAL; INFILTRATION; PERINEURAL
Status: DISCONTINUED | OUTPATIENT
Start: 2023-03-10 | End: 2023-09-22

## 2023-03-10 RX ADMIN — BETAMETHASONE SODIUM PHOSPHATE AND BETAMETHASONE ACETATE 6 MG: 3; 3 INJECTION, SUSPENSION INTRA-ARTICULAR; INTRALESIONAL; INTRAMUSCULAR; SOFT TISSUE at 10:24

## 2023-03-10 RX ADMIN — ROPIVACAINE HYDROCHLORIDE 4 ML: 5 INJECTION, SOLUTION EPIDURAL; INFILTRATION; PERINEURAL at 10:24

## 2023-03-10 NOTE — LETTER
3/10/2023         RE: Tommy De La O  [address] [place] [id number]        Dear Colleague,    Thank you for referring your patient, Tommy De La O, to the [facility] [place]. Please see a copy of my visit note below.    ASSESSMENT & PLAN    Tommy was seen today for pain and pain.    Diagnoses and all orders for this visit:    Chronic left shoulder pain  -     Large Joint Injection/Arthocentesis: L glenohumeral joint    Closed dislocation of interphalangeal joint of left little finger  -     Orthopedic  Referral  -     XR Finger LT G/E 2 vw; Future  -     Occupational Therapy Referral; Future      This issue is acute on chronic and Worsening.    # Left 5th Digit Dislocation: Tommy De La O  was seen today for left fifth digit dislocation. Symptoms had been going on for 2.5 weeks. On examination there are positive findings of swelling over the fifth digit with mild internal rotation on finger flexion, stiffness with finger flexion. Imaging findings showed concern for volar avulsion fractures over the fifth digit, no displaced fracture. Likely cause of patient's condition due to healing fifth digit dislocation at the DIP and PIP joints with persisting swelling. Instructed patient to continue working on range of motion at home, referral to hand therapy place as well. Counseled patient on nature of condition and treatment options.  Given this plan as below, follow-up as needed in one month if not improving    # Chronic Left Shoulder Pain: long-standing condition for patient has had condition for 3+ months. Last visit in January 2023 with Dr. Aly had subacromial steroid injection that did not help symptoms significantly. He does have pain in anterior/posterior portions of his shoulder with intact range of motion and no weakness on rotator cuff testing. Reviewed previous x-rays showing no significant arthritis though may have some early degeneration contributed pain. Given this  "plan to treat as below and follow-up as needed with Dr. Aly in two weeks.     Image Findings: stable alignment of fifth digit, volar avulsion fractures of the DIP and PIP joints, finger arthritis noted as well  Treatment: Activities as tolerated, home exercises given today, referral to hand therapy  Job: as tolerated   Medications/Injections: Limited tylenol/ibuprofen for pain for 1-2 weeks, Topical Voltaren gel, left shoulder joint steroid injection  Follow-up: In one month if symptoms do not improve, sooner if worsening  Can consider further evaluation of left finger         Noah Heller MD  [facility]    -----  Chief Complaint   Patient presents with     Left Little Finger - Pain     Left Shoulder - Pain       SUBJECTIVE  Tommy De La O is a/an 67 year old male who is seen in consultation at the request of  Jaye Quinn C.N.P. for evaluation of left finger injury.     The patient is seen by themselves.  The patient is Ambidextrous handed    Onset: 2/21/23, 2.5 weeks. Patient describes injury as working on farm, tripped with bucket and shovel.  Seen in UC 2/21/23 and finger was reduced.    Location of Pain: left little finger   Worsened by: gripping, flexion   Better with: rest  Treatments tried: ice, Mobic, Flexeril   Associated symptoms: limited ROM     Orthopedic/Surgical history: YES - finger fracture while playing football in 7th grade  Social History/Occupation: Retired, hobby farm      No family history pertinent to patient's problem today.      REVIEW OF SYSTEMS:  Review of Systems  Constitutional, HEENT, cardiovascular, pulmonary, gi and gu systems are negative, except as otherwise noted.    OBJECTIVE:  Ht 1.772 m (5' 9.75\")   Wt 138.8 kg (306 lb)   BMI 44.22 kg/m     General: healthy, alert and in no distress  HEENT: no scleral icterus or conjunctival erythema  Skin: no suspicious lesions or rash. No jaundice.  CV: distal perfusion intact    Resp: normal " respiratory effort without conversational dyspnea   Psych: normal mood and affect  Gait: normal steady gait with appropriate coordination and balance    Neuro: Normal light sensory exam of left upper extremity     Ortho Exam   LEFT HAND  Inspection:  Swelling over 5th digit  Palpation:   Carpals: normal   Metacarpals: normal   Thumb: normal   Fingers: PIP joint, DIP joint  Range of Motion:    Mild internal rotation, pain with end flexion 5th digit  Strength:     full, extension full, flexion full, abduction full, adduction full, opposition full  Special Tests:    Positive: none    Negative: flexor digitorum superficialis testing, flexor digitorum profundus testing    LEFT SHOULDER  Inspection:    no swelling, bruising, discoloration, or obvious deformity or asymmetry  Palpation:    Tender about the anterior/posterior shoulder. Remainder of bony and tendinous landmarks are nontender.  Active Range of Motion:     Abduction 1650, FF 1650, , IR L1.    Strength:    Scapular plane abduction 5/5,  ER 5/5, IR 5/5, biceps 5/5, triceps 5/5  Special Tests:    Positive: Neer's and Rascon'    Negative: supraspinatus (empty can)    CERVICAL SPINE  Inspection:    normal cervical lordosis present, rounded shoulders, forward head posture  Palpation:    Nontender.  Range of Motion:     Flexion full    Extension full    Right side bend full    Left side bend full    Right rotation full    Left rotation full  Strength:    Full strength throughout all neck muscles  Special Tests:    Positive: None    Negative: Spurling's (bilateral)        RADIOLOGY:  I independently ordered, visualized and reviewed these images with the patient  Stable 5th digit avulsion fractures.      Review of external notes as documented elsewhere in note  Review of the result(s) of each unique test - reviewed left 5th digit x-rays       Disclaimer: This note consists of symbols derived from keyboarding, dictation and/or voice recognition software. As a  result, there may be errors in the script that have gone undetected. Please consider this when interpreting information found in this chart.    Large Joint Injection/Arthocentesis: L glenohumeral joint    Date/Time: 3/10/2023 10:24 AM  Performed by: Noah Heller MD  Authorized by: Noah Heller MD     Indications:  Pain and osteoarthritis  Needle Size:  25 G  Guidance: ultrasound    Approach:  Posterolateral  Location:  Shoulder      Site:  L glenohumeral joint  Medications:  6 mg betamethasone acet & sod phos 6 (3-3) MG/ML; 4 mL ropivacaine 5 MG/ML  Outcome:  Tolerated well, no immediate complications  Procedure discussed: discussed risks, benefits, and alternatives    Consent Given by:  Patient  Timeout: timeout called immediately prior to procedure    Prep: patient was prepped and draped in usual sterile fashion     Ultrasound images of procedure were permanently stored.     Patient reported some improvement of pain after the numbing portion left shoulder joint steroid injection.  Ultrasound guided images were permanently stored.   Aftercare instructions given to patient.  Plan to follow-up as discussed above.     Noah Heller MD [facility]      Patient's conditions were thoroughly discussed during today's visit with greater than 50% of the visit spent counseling the patient with total time spent face-to-face with the patient being 30 minutes.          Again, thank you for allowing me to participate in the care of your patient.        Sincerely,        Noah Heller MD

## 2023-03-10 NOTE — PROGRESS NOTES
ASSESSMENT & PLAN    Tommy was seen today for pain and pain.    Diagnoses and all orders for this visit:    Chronic left shoulder pain  -     Large Joint Injection/Arthocentesis: L glenohumeral joint    Closed dislocation of interphalangeal joint of left little finger  -     Orthopedic  Referral  -     XR Finger LT G/E 2 vw; Future  -     Occupational Therapy Referral; Future      This issue is acute on chronic and Worsening.    # Left 5th Digit Dislocation: Tommy De La O  was seen today for left fifth digit dislocation. Symptoms had been going on for 2.5 weeks. On examination there are positive findings of swelling over the fifth digit with mild internal rotation on finger flexion, stiffness with finger flexion. Imaging findings showed concern for volar avulsion fractures over the fifth digit, no displaced fracture. Likely cause of patient's condition due to healing fifth digit dislocation at the DIP and PIP joints with persisting swelling. Instructed patient to continue working on range of motion at home, referral to hand therapy place as well. Counseled patient on nature of condition and treatment options.  Given this plan as below, follow-up as needed in one month if not improving    # Chronic Left Shoulder Pain: long-standing condition for patient has had condition for 3+ months. Last visit in January 2023 with Dr. Aly had subacromial steroid injection that did not help symptoms significantly. He does have pain in anterior/posterior portions of his shoulder with intact range of motion and no weakness on rotator cuff testing. Reviewed previous x-rays showing no significant arthritis though may have some early degeneration contributed pain. Given this plan to treat as below and follow-up as needed with Dr. Aly in two weeks.     Image Findings: stable alignment of fifth digit, volar avulsion fractures of the DIP and PIP joints, finger arthritis noted as well  Treatment: Activities as tolerated, home  "exercises given today, referral to hand therapy  Job: as tolerated   Medications/Injections: Limited tylenol/ibuprofen for pain for 1-2 weeks, Topical Voltaren gel, left shoulder joint steroid injection  Follow-up: In one month if symptoms do not improve, sooner if worsening  Can consider further evaluation of left finger         Noah Heller MD  [facility]    -----  Chief Complaint   Patient presents with     Left Little Finger - Pain     Left Shoulder - Pain       SUBJECTIVE  Tommy De La O is a/an 67 year old male who is seen in consultation at the request of  Jaye Quinn C.N.P. for evaluation of left finger injury.     The patient is seen by themselves.  The patient is Ambidextrous handed    Onset: 2/21/23, 2.5 weeks. Patient describes injury as working on farm, tripped with bucket and shovel.  Seen in  2/21/23 and finger was reduced.    Location of Pain: left little finger   Worsened by: gripping, flexion   Better with: rest  Treatments tried: ice, Mobic, Flexeril   Associated symptoms: limited ROM     Orthopedic/Surgical history: YES - finger fracture while playing football in 7th grade  Social History/Occupation: Retired, hobby farm      No family history pertinent to patient's problem today.      REVIEW OF SYSTEMS:  Review of Systems  Constitutional, HEENT, cardiovascular, pulmonary, gi and gu systems are negative, except as otherwise noted.    OBJECTIVE:  Ht 1.772 m (5' 9.75\")   Wt 138.8 kg (306 lb)   BMI 44.22 kg/m     General: healthy, alert and in no distress  HEENT: no scleral icterus or conjunctival erythema  Skin: no suspicious lesions or rash. No jaundice.  CV: distal perfusion intact    Resp: normal respiratory effort without conversational dyspnea   Psych: normal mood and affect  Gait: normal steady gait with appropriate coordination and balance    Neuro: Normal light sensory exam of left upper extremity     Ortho Exam   LEFT " HAND  Inspection:  Swelling over 5th digit  Palpation:   Carpals: normal   Metacarpals: normal   Thumb: normal   Fingers: PIP joint, DIP joint  Range of Motion:    Mild internal rotation, pain with end flexion 5th digit  Strength:     full, extension full, flexion full, abduction full, adduction full, opposition full  Special Tests:    Positive: none    Negative: flexor digitorum superficialis testing, flexor digitorum profundus testing    LEFT SHOULDER  Inspection:    no swelling, bruising, discoloration, or obvious deformity or asymmetry  Palpation:    Tender about the anterior/posterior shoulder. Remainder of bony and tendinous landmarks are nontender.  Active Range of Motion:     Abduction 1650, FF 1650, , IR L1.    Strength:    Scapular plane abduction 5/5,  ER 5/5, IR 5/5, biceps 5/5, triceps 5/5  Special Tests:    Positive: Neer's and Rascon'    Negative: supraspinatus (empty can)    CERVICAL SPINE  Inspection:    normal cervical lordosis present, rounded shoulders, forward head posture  Palpation:    Nontender.  Range of Motion:     Flexion full    Extension full    Right side bend full    Left side bend full    Right rotation full    Left rotation full  Strength:    Full strength throughout all neck muscles  Special Tests:    Positive: None    Negative: Spurling's (bilateral)        RADIOLOGY:  I independently ordered, visualized and reviewed these images with the patient  Stable 5th digit avulsion fractures.      Review of external notes as documented elsewhere in note  Review of the result(s) of each unique test - reviewed left 5th digit x-rays       Disclaimer: This note consists of symbols derived from keyboarding, dictation and/or voice recognition software. As a result, there may be errors in the script that have gone undetected. Please consider this when interpreting information found in this chart.    Large Joint Injection/Arthocentesis: L glenohumeral joint    Date/Time: 3/10/2023 10:24  AM  Performed by: Noah Heller MD  Authorized by: Noah Heller MD     Indications:  Pain and osteoarthritis  Needle Size:  25 G  Guidance: ultrasound    Approach:  Posterolateral  Location:  Shoulder      Site:  L glenohumeral joint  Medications:  6 mg betamethasone acet & sod phos 6 (3-3) MG/ML; 4 mL ropivacaine 5 MG/ML  Outcome:  Tolerated well, no immediate complications  Procedure discussed: discussed risks, benefits, and alternatives    Consent Given by:  Patient  Timeout: timeout called immediately prior to procedure    Prep: patient was prepped and draped in usual sterile fashion     Ultrasound images of procedure were permanently stored.     Patient reported some improvement of pain after the numbing portion left shoulder joint steroid injection.  Ultrasound guided images were permanently stored.   Aftercare instructions given to patient.  Plan to follow-up as discussed above.     Noah Heller MD [facility]      Patient's conditions were thoroughly discussed during today's visit with greater than 50% of the visit spent counseling the patient with total time spent face-to-face with the patient being 30 minutes.

## 2023-03-10 NOTE — PATIENT INSTRUCTIONS
# Left 5th Digit Dislocation: Tommy De La O  was seen today for left fifth digit dislocation. Symptoms had been going on for 2.5 weeks. On examination there are positive findings of swelling over the fifth digit with mild internal rotation on finger flexion, stiffness with finger flexion. Imaging findings showed concern for volar avulsion fractures over the fifth digit, no displaced fracture. Likely cause of patient's condition due to healing fifth digit dislocation at the DIP and PIP joints with persisting swelling. Instructed patient to continue working on range of motion at home, referral to hand therapy place as well. Counseled patient on nature of condition and treatment options.  Given this plan as below, follow-up as needed in one month if not improving    # Chronic Left Shoulder Pain: long-standing condition for patient has had condition for 3+ months. Last visit in January 2023 with Dr. Aly had subacromial steroid injection that did not help symptoms significantly. He does have pain in anterior/posterior portions of his shoulder with intact range of motion and no weakness on rotator cuff testing. Reviewed previous x-rays showing no significant arthritis though may have some early degeneration contributed pain. Given this plan to treat as below and follow-up as needed with Dr. Aly in two weeks.     Image Findings: stable alignment of fifth digit, volar avulsion fractures of the DIP and PIP joints, finger arthritis noted as well  Treatment: Activities as tolerated, home exercises given today, referral to hand therapy  Job: as tolerated   Medications/Injections: Limited tylenol/ibuprofen for pain for 1-2 weeks, Topical Voltaren gel, left shoulder joint steroid injection  Follow-up: In one month if symptoms do not improve, sooner if worsening  Can consider further evaluation of left finger    Please call [phone]   Ask for my team if you have any questions or concerns    If you have not yet received the  influenza vaccine but would like to get one, please call  [phone] or you can schedule via [facility]    It was great seeing you today!    Noah Heller MD, [facility] Injection Discharge Instructions    Procedure: Left shoulder joint steroid injection    You may shower, however avoid swimming, tub baths or hot tubs for 24 hours following your procedure  You may have a mild to moderate increase in pain for several days following the injection.  It may take up to 14 days for the steroid medication to start working although you may feel the effect as early as a few days after the procedure.  You may use ice packs for 10-15 minutes, 3 to 4 times a day at the injection site for comfort  You may use anti-inflammatory medications (such as Ibuprofen or Aleve or Advil) or Tylenol for pain control if necessary  If you were fasting, you may resume your normal diet and medications after the procedure  If you have diabetes, check your blood sugar more frequently than usual as your blood sugar may be higher than normal for 10-14 days following a steroid injection. Contact your doctor who manages your diabetes if your blood sugar is higher than usual    If you experience any of the following, call [facility] @ [phone] or [phone]  -Fever over 100 degree F  -Swelling, bleeding, redness, drainage, warmth at the injection site  - New or worsening pain

## 2023-03-20 NOTE — TELEPHONE ENCOUNTER
RN reached out to patient for more information, as it appears this medication was prescribed by urgent care, and patient has been followed by sports medicine for his shoulder issue (reason for medication).  Patient states he doesn't need the medication.      He does also bring up that he doesn't think his Prozac is working and would like a dose increase.  RN notes that in January visit, PCP advised 2 month f/u for reassessment.  RN advised and assisted with scheduling virtual visit in available slot tomorrow.   Closing encounter.     Sujata Jack RN  [facility]

## 2023-03-21 ENCOUNTER — VIRTUAL VISIT (OUTPATIENT)
Dept: FAMILY MEDICINE | Facility: CLINIC | Age: 68
End: 2023-03-21
Payer: MEDICARE

## 2023-03-21 DIAGNOSIS — F32.A ANXIETY AND DEPRESSION: Primary | ICD-10-CM

## 2023-03-21 DIAGNOSIS — E78.5 HYPERLIPIDEMIA, UNSPECIFIED HYPERLIPIDEMIA TYPE: ICD-10-CM

## 2023-03-21 DIAGNOSIS — F41.9 ANXIETY AND DEPRESSION: Primary | ICD-10-CM

## 2023-03-21 DIAGNOSIS — I10 ESSENTIAL HYPERTENSION WITH GOAL BLOOD PRESSURE LESS THAN 140/90: ICD-10-CM

## 2023-03-21 PROCEDURE — 99441 PR PHYSICIAN TELEPHONE EVALUATION 5-10 MIN: CPT | Mod: 95 | Performed by: FAMILY MEDICINE

## 2023-03-21 RX ORDER — GABAPENTIN 300 MG/1
CAPSULE ORAL
Qty: 270 CAPSULE | Refills: 3 | Status: CANCELLED | OUTPATIENT
Start: 2023-03-21

## 2023-03-21 RX ORDER — SIMVASTATIN 20 MG
20 TABLET ORAL AT BEDTIME
Qty: 90 TABLET | Refills: 3 | Status: SHIPPED | OUTPATIENT
Start: 2023-03-21 | End: 2023-06-07 | Stop reason: ALTCHOICE

## 2023-03-21 RX ORDER — CHLORTHALIDONE 25 MG/1
25 TABLET ORAL DAILY
Qty: 90 TABLET | Refills: 3 | Status: CANCELLED | OUTPATIENT
Start: 2023-03-21

## 2023-03-21 RX ORDER — OMEPRAZOLE 40 MG/1
CAPSULE, DELAYED RELEASE ORAL
Qty: 90 CAPSULE | Refills: 3 | Status: CANCELLED | OUTPATIENT
Start: 2023-03-21

## 2023-03-21 RX ORDER — LOSARTAN POTASSIUM 25 MG/1
25 TABLET ORAL DAILY
Qty: 90 TABLET | Refills: 3 | Status: SHIPPED | OUTPATIENT
Start: 2023-03-21 | End: 2024-04-08

## 2023-03-21 ASSESSMENT — ANXIETY QUESTIONNAIRES
7. FEELING AFRAID AS IF SOMETHING AWFUL MIGHT HAPPEN: SEVERAL DAYS
6. BECOMING EASILY ANNOYED OR IRRITABLE: MORE THAN HALF THE DAYS
1. FEELING NERVOUS, ANXIOUS, OR ON EDGE: SEVERAL DAYS
2. NOT BEING ABLE TO STOP OR CONTROL WORRYING: SEVERAL DAYS
GAD7 TOTAL SCORE: 6
IF YOU CHECKED OFF ANY PROBLEMS ON THIS QUESTIONNAIRE, HOW DIFFICULT HAVE THESE PROBLEMS MADE IT FOR YOU TO DO YOUR WORK, TAKE CARE OF THINGS AT HOME, OR GET ALONG WITH OTHER PEOPLE: SOMEWHAT DIFFICULT
GAD7 TOTAL SCORE: 6
3. WORRYING TOO MUCH ABOUT DIFFERENT THINGS: SEVERAL DAYS
5. BEING SO RESTLESS THAT IT IS HARD TO SIT STILL: NOT AT ALL

## 2023-03-21 ASSESSMENT — PATIENT HEALTH QUESTIONNAIRE - PHQ9
5. POOR APPETITE OR OVEREATING: NOT AT ALL
SUM OF ALL RESPONSES TO PHQ QUESTIONS 1-9: 6

## 2023-03-21 NOTE — PROGRESS NOTES
"Tommy is a 67 year old who is being evaluated via a billable telephone visit.      What phone number would you like to be contacted at? [phone]  How would you like to obtain your AVS? Klaus  Distant Location (provider location):  On-site    Assessment & Plan     Anxiety and depression  Still uncontrolled.  Discussed possible increase in ssri dose. Patient concurred.  Reinforced behavior methods to cope with stress and mood changes.  Reassess in 2 months or sooner, and if still struggling with irritability, refer to collaborative psychiatry.  Return precautions discussed and given to patient.   - FLUoxetine (PROZAC) 20 MG capsule  Dispense: 90 capsule; Refill: 0    Essential hypertension with goal blood pressure less than 140/90  Reinforced low salt diet.  - losartan (COZAAR) 25 MG tablet  Dispense: 90 tablet; Refill: 3    Hyperlipidemia, unspecified hyperlipidemia type  Reinforced heart healthy lifestyle.   - simvastatin (ZOCOR) 20 MG tablet  Dispense: 90 tablet; Refill: 3       There are no Patient Instructions on file for this visit.    No follow-ups on file.    Nicolas Harris MD  [facility]    Subjective   Tommy is a 67 year old, presenting for the following health issues:  Anxiety and Depression      HPI     Depression and Anxiety Follow-Up    How are you doing with your depression since your last visit? No change     How are you doing with your anxiety since your last visit?  No change     Are you having other symptoms that might be associated with depression or anxiety? Lack of being able to sleep, neuropathy.     Have you had a significant life event? Health Concerns, feels that he \"flys off the handle at his 10 month old puppy and knows he shouldn't. Feels that his neuropathy is getting worse and he is not able to lose weight. Wonders if he needs a increase on fluoxetine  medication  But also is not sure if his worsening of his neuropathy could be a side effect from " medication.      Do you have any concerns with your use of alcohol or other drugs? No     Current Outpatient Medications   Medication Sig Dispense Refill     albuterol (PROAIR HFA/PROVENTIL HFA/VENTOLIN HFA) 108 (90 Base) MCG/ACT inhaler Inhale 2 puffs into the lungs every 6 hours as needed for shortness of breath, wheezing or cough 18 g 3     chlorthalidone (HYGROTON) 25 MG tablet Take 1 tablet (25 mg) by mouth daily 90 tablet 3     clobetasol (TEMOVATE) 0.05 % external ointment Apply topically daily as needed For itching 60 g 0     FLUoxetine (PROZAC) 10 MG capsule Take 1 capsule (10 mg) by mouth daily 90 capsule 3     gabapentin (NEURONTIN) 300 MG capsule TAKE 1 CAPSULE THREE TIMES A  capsule 3     losartan (COZAAR) 25 MG tablet Take 1 tablet (25 mg) by mouth daily 90 tablet 0     omeprazole (PRILOSEC) 40 MG DR capsule TAKE 1 CAPSULE DAILY 30 TO 60 MINUTES BEFORE A MEAL 90 capsule 3     simvastatin (ZOCOR) 20 MG tablet TAKE 1 TABLET(20 MG) BY MOUTH AT BEDTIME 90 tablet 3     cyclobenzaprine (FLEXERIL) 10 MG tablet Take 1 tablet (10 mg) by mouth 3 times daily as needed for muscle spasms (Patient not taking: Reported on 3/21/2023) 15 tablet 0     meloxicam (MOBIC) 15 MG tablet Take 1 tablet (15 mg) by mouth daily (Patient not taking: Reported on 3/21/2023) 15 tablet 0       Social History     Tobacco Use     Smoking status: Former     Packs/day: 1.00     Years: 30.00     Pack years: 30.00     Types: Cigarettes     Quit date: 2011     Years since quittin.1     Smokeless tobacco: Never     Tobacco comments:     started at age 22   Vaping Use     Vaping Use: Never used   Substance Use Topics     Alcohol use: Yes     Drug use: No     PHQ 2023 2023 3/21/2023   PHQ-9 Total Score 9 3 6   Q9: Thoughts of better off dead/self-harm past 2 weeks Not at all Not at all Not at all     FRANCI-7 SCORE 2023 2023 3/21/2023   Total Score - 3 (minimal anxiety) -   Total Score 5 3 6     Last PHQ-9  3/21/2023   1.  Little interest or pleasure in doing things 1   2.  Feeling down, depressed, or hopeless 1   3.  Trouble falling or staying asleep, or sleeping too much 1   4.  Feeling tired or having little energy 1   5.  Poor appetite or overeating 2   6.  Feeling bad about yourself 0   7.  Trouble concentrating 0   8.  Moving slowly or restless 0   Q9: Thoughts of better off dead/self-harm past 2 weeks 0   PHQ-9 Total Score 6   Difficulty at work, home, or with people Not difficult at all     FRANCI-7  3/21/2023   1. Feeling nervous, anxious, or on edge 1   2. Not being able to stop or control worrying 1   3. Worrying too much about different things 1   4. Trouble relaxing 0   5. Being so restless that it is hard to sit still 0   6. Becoming easily annoyed or irritable 2   7. Feeling afraid, as if something awful might happen 1   FRANCI-7 Total Score 6   If you checked any problems, how difficult have they made it for you to do your work, take care of things at home, or get along with other people? Somewhat difficult       Suicide Assessment Five-step Evaluation and Treatment (SAFE-T)      How many servings of fruits and vegetables do you eat daily?  2    On average, how many sweetened beverages do you drink each day (Examples: soda, juice, sweet tea, etc.  Do NOT count diet or artificially sweetened beverages)?   0    How many days per week do you exercise enough to make your heart beat faster? 3 or less    How many minutes a day do you exercise enough to make your heart beat faster? 9 or less    How many days per week do you miss taking your medication? 0    Medication Followup of losartan and simvastatin    Taking Medication as prescribed: yes    Side Effects:  None    Medication Helping Symptoms:  yes      Review of Systems   Constitutional, HEENT, cardiovascular, pulmonary, GI, , musculoskeletal, neuro, skin, endocrine and psych systems are negative, except as otherwise noted.      Objective            Vitals:  No vitals were obtained today due to virtual visit.    Physical Exam   alert and no distress  PSYCH: Alert and oriented times 3; coherent speech, normal   rate and volume, able to articulate logical thoughts, able   to abstract reason, no tangential thoughts, no hallucinations   or delusions  His affect is slightly anxious  RESP: No cough, no audible wheezing, able to talk in full sentences  Remainder of exam unable to be completed due to telephone visits    No results found for any visits on 03/21/23.            Phone call duration: 10 minutes

## 2023-03-22 ENCOUNTER — TELEPHONE (OUTPATIENT)
Dept: CARDIOLOGY | Facility: CLINIC | Age: 68
End: 2023-03-22

## 2023-03-22 DIAGNOSIS — Z82.49 FAMILY HISTORY OF ISCHEMIC HEART DISEASE: ICD-10-CM

## 2023-03-22 DIAGNOSIS — I10 ESSENTIAL HYPERTENSION WITH GOAL BLOOD PRESSURE LESS THAN 140/90: ICD-10-CM

## 2023-03-22 DIAGNOSIS — Z87.891 PERSONAL HISTORY OF TOBACCO USE: ICD-10-CM

## 2023-03-22 DIAGNOSIS — R06.09 DOE (DYSPNEA ON EXERTION): ICD-10-CM

## 2023-03-22 DIAGNOSIS — I49.3 VENTRICULAR ECTOPY: ICD-10-CM

## 2023-03-22 DIAGNOSIS — I47.10 SVT (SUPRAVENTRICULAR TACHYCARDIA) (H): Primary | ICD-10-CM

## 2023-03-22 NOTE — TELEPHONE ENCOUNTER
[facility] Call Center    Phone Message    May a detailed message be left on voicemail: yes     Reason for Call: Appointment Intake    Referring Provider Name: Nicolas Harris MD  Diagnosis and/or Symptoms: SVT (supraventricular tachycardia) (H) [I47.1]; CHUNG (dyspnea on exertion) [R06.09]; Family history of ischemic heart disease [Z82.49]; Personal history of tobacco use [Z87.891]; Ventricular ectopy [I49.3]; Essential hypertension with goal blood pressure less than 140/90 [I10]. Based on primary diagnosis patient to see EP but no schedule for providers. Please review and reach out to patient to coordinate.   Action Taken: Other: Cardio    Travel Screening: Not Applicable

## 2023-03-29 ENCOUNTER — TELEPHONE (OUTPATIENT)
Dept: FAMILY MEDICINE | Facility: CLINIC | Age: 68
End: 2023-03-29
Payer: MEDICARE

## 2023-03-29 DIAGNOSIS — G62.9 PERIPHERAL POLYNEUROPATHY: ICD-10-CM

## 2023-03-29 DIAGNOSIS — K21.9 GASTROESOPHAGEAL REFLUX DISEASE WITHOUT ESOPHAGITIS: ICD-10-CM

## 2023-03-29 DIAGNOSIS — I10 ESSENTIAL HYPERTENSION WITH GOAL BLOOD PRESSURE LESS THAN 140/90: ICD-10-CM

## 2023-03-29 RX ORDER — CHLORTHALIDONE 25 MG/1
25 TABLET ORAL DAILY
Qty: 90 TABLET | Refills: 2 | Status: SHIPPED | OUTPATIENT
Start: 2023-03-29 | End: 2024-01-22

## 2023-03-29 RX ORDER — OMEPRAZOLE 40 MG/1
CAPSULE, DELAYED RELEASE ORAL
Qty: 90 CAPSULE | Refills: 3 | Status: SHIPPED | OUTPATIENT
Start: 2023-03-29 | End: 2024-04-08

## 2023-03-29 RX ORDER — GABAPENTIN 300 MG/1
CAPSULE ORAL
Qty: 270 CAPSULE | Refills: 3 | Status: SHIPPED | OUTPATIENT
Start: 2023-03-29 | End: 2024-04-08

## 2023-03-29 NOTE — TELEPHONE ENCOUNTER
[facility]     Patient requesting Meloxicam 15mg tablets QTY: 15 SIG: take 1 tablet by mouth daily.       Preferred Pharmacy:  [id number] - [place], [place] - [address] [place] [address] AT [facility] [address] [facility] [facility] [address]  [address] [place] [id number]  Phone: [phone] Fax: [phone]      Could we send this information to you in [facility] or would you prefer to receive a phone call?:   Patient would prefer a phone call   Okay to leave a detailed message?: Yes at Home number on file [phone] (home)

## 2023-03-29 NOTE — TELEPHONE ENCOUNTER
Dr. Harris,    Patient is reached and he is in the process of [facility] [place]  I have sent over several to that pharmacy for him.  We need you to do the gabapentin.  Patient wanted prozac not the muscle relaxer that is in the original message.  This was done days ago and patient know this. Gabapentin is pended for your consideration. Ella DEVI RN

## 2023-04-04 ENCOUNTER — OFFICE VISIT (OUTPATIENT)
Dept: CARDIOLOGY | Facility: CLINIC | Age: 68
End: 2023-04-04
Attending: FAMILY MEDICINE
Payer: MEDICARE

## 2023-04-04 VITALS
OXYGEN SATURATION: 97 % | SYSTOLIC BLOOD PRESSURE: 121 MMHG | BODY MASS INDEX: 44.22 KG/M2 | WEIGHT: 306 LBS | HEART RATE: 56 BPM | DIASTOLIC BLOOD PRESSURE: 73 MMHG

## 2023-04-04 DIAGNOSIS — I49.3 VENTRICULAR ECTOPY: ICD-10-CM

## 2023-04-04 DIAGNOSIS — R06.09 DOE (DYSPNEA ON EXERTION): ICD-10-CM

## 2023-04-04 DIAGNOSIS — I47.10 SVT (SUPRAVENTRICULAR TACHYCARDIA) (H): ICD-10-CM

## 2023-04-04 DIAGNOSIS — I20.9 ANGINA PECTORIS, UNSPECIFIED (H): ICD-10-CM

## 2023-04-04 DIAGNOSIS — M79.89 LEG SWELLING: Primary | ICD-10-CM

## 2023-04-04 DIAGNOSIS — M79.604 LEG PAIN, BILATERAL: ICD-10-CM

## 2023-04-04 DIAGNOSIS — M79.605 LEG PAIN, BILATERAL: ICD-10-CM

## 2023-04-04 DIAGNOSIS — I73.9 PERIPHERAL VASCULAR DISEASE, UNSPECIFIED (H): ICD-10-CM

## 2023-04-04 DIAGNOSIS — Z87.891 PERSONAL HISTORY OF TOBACCO USE: ICD-10-CM

## 2023-04-04 DIAGNOSIS — I10 ESSENTIAL HYPERTENSION WITH GOAL BLOOD PRESSURE LESS THAN 140/90: ICD-10-CM

## 2023-04-04 DIAGNOSIS — Z82.49 FAMILY HISTORY OF ISCHEMIC HEART DISEASE: ICD-10-CM

## 2023-04-04 PROCEDURE — 99204 OFFICE O/P NEW MOD 45 MIN: CPT | Performed by: INTERNAL MEDICINE

## 2023-04-04 NOTE — Clinical Note
4/4/2023    Nicolas Harris MD  [address] [facility] [address]    RE: Tommy De La O       Dear Colleague,     I had the pleasure of seeing Tommy De La O in the [facility].  HPI: Tommy De La O is a 68 year old year old patient who receives primary care from Nicolas Harris.  {CARD VISIT SOURCE:000671}    PAST MEDICAL HISTORY      CURRENT MEDICATIONS  Current Outpatient Medications   Medication Sig Dispense Refill     albuterol (PROAIR HFA/PROVENTIL HFA/VENTOLIN HFA) 108 (90 Base) MCG/ACT inhaler Inhale 2 puffs into the lungs every 6 hours as needed for shortness of breath, wheezing or cough 18 g 3     chlorthalidone (HYGROTON) 25 MG tablet Take 1 tablet (25 mg) by mouth daily 90 tablet 2     clobetasol (TEMOVATE) 0.05 % external ointment Apply topically daily as needed For itching 60 g 0     cyclobenzaprine (FLEXERIL) 10 MG tablet Take 1 tablet (10 mg) by mouth 3 times daily as needed for muscle spasms 15 tablet 0     FLUoxetine (PROZAC) 20 MG capsule Take 1 capsule (20 mg) by mouth daily 90 capsule 0     gabapentin (NEURONTIN) 300 MG capsule TAKE 1 CAPSULE THREE TIMES A  capsule 3     losartan (COZAAR) 25 MG tablet Take 1 tablet (25 mg) by mouth daily Hold until patient calls for refill. 90 tablet 3     omeprazole (PRILOSEC) 40 MG DR capsule TAKE 1 CAPSULE DAILY 30 TO 60 MINUTES BEFORE A MEAL 90 capsule 3     simvastatin (ZOCOR) 20 MG tablet Take 1 tablet (20 mg) by mouth At Bedtime Hold until patient calls for refill. 90 tablet 3       PAST SURGICAL HISTORY:  Past Surgical History:   Procedure Laterality Date     ARTHROSCOPY KNEE Bilateral     both knees with arthroscopy in the past.      ARTHROSCOPY KNEE       ARTHROSCOPY SHOULDER ROTATOR CUFF REPAIR       AS TOTAL KNEE ARTHROPLASTY Bilateral     Both total knees.      LENGTHEN TENDON ACHILLES Left      REPAIR TENDON ACHILLES      1980s two separate surgeries.     ROTATOR CUFF REPAIR RT/LT Left      ZZC  "TOTAL KNEE ARTHROPLASTY Right 3/17/2015    Procedure: RIGHT KNEE TOTAL ARTHROPLASTY;  Surgeon: Jaiden Barnes MD;  Location: [facility] Main OR;  Service: Orthopedics     [facility] TOTAL KNEE ARTHROPLASTY Left 2015    Procedure: LEFT KNEE TOTAL ARTHROPLASTY;  Surgeon: Jaiden Barnes MD;  Location: [facility] Main OR;  Service: Orthopedics       ALLERGIES     Allergies   Allergen Reactions     Amlodipine Swelling     Cefzil [Cefprozil] Hives and Itching     Darvocet [Propoxyphene N-Apap] Hives     Penicillins Hives       FAMILY HISTORY  Family History   Problem Relation Age of Onset     Coronary Artery Disease Father      Hyperlipidemia Mother      Prostate Cancer No family hx of      Colon Cancer No family hx of      Hypertension Mother      Heart Disease Mother      Heart Disease Father      Hypertension Sister      Cancer Brother      No Known Problems Daughter      No Known Problems Son      Cancer Maternal Grandmother      Vision Loss Maternal Grandmother      No Known Problems Sister      Cancer Brother      Hypertension Brother      Hypertension Brother      Cancer Brother      Sleep Apnea Brother      No Known Problems Brother      No Known Problems Daughter        VASCULAR FAMILY HISTORY  1st order relative with atherosclerotic PAD: {YES / NO:256361::\"Yes\"}  1st order relative with AAA: {YES / NO:665085::\"Yes\"}    SOCIAL HISTORY  Social History     Socioeconomic History     Marital status: Single     Spouse name: Not on file     Number of children: Not on file     Years of education: Not on file     Highest education level: Not on file   Occupational History     Not on file   Tobacco Use     Smoking status: Former     Packs/day: 1.00     Years: 30.00     Pack years: 30.00     Types: Cigarettes     Quit date: 2011     Years since quittin.1     Smokeless tobacco: Never     Tobacco comments:     started at age 22   Vaping Use     Vaping status: Never Used   Substance and Sexual Activity     Alcohol " use: Yes     Drug use: No     Sexual activity: Yes     Partners: Female   Other Topics Concern     Parent/sibling w/ CABG, MI or angioplasty before 65F 55M? Not Asked   Social History Narrative     Not on file     Social Determinants of Health     Financial Resource Strain: Not on file   Food Insecurity: Not on file   Transportation Needs: Not on file   Physical Activity: Not on file   Stress: Not on file   Social Connections: Not on file   Intimate Partner Violence: Not on file   Housing Stability: Not on file       ROS:   Constitutional: No fever, chills, or sweats. No weight gain/loss   ENT: No visual disturbance, ear ache, epistaxis, sore throat  Allergies/Immunologic: Negative  Respiratory: No cough, hemoptysia  Cardiovascular: As per HPI  GI: No nausea, vomiting, hematemesis, melena, or hematochezia  : No urinary frequency, dysuria, or hematuria  Integument: Negative  Psychiatric: Negative  Neuro: Negative  Endocrinology: Negative   Musculoskeletal: Negative  Vascular: No walking impairment, claudication, ischemic rest pain or nonhealing wounds    EXAM:  /73 (BP Location: Right arm, Patient Position: Sitting, Cuff Size: Adult Large)   Pulse 56   Wt 138.8 kg (306 lb)   SpO2 97%   BMI 44.22 kg/m    In general, the patient is a pleasant male in no apparent distress.    HEENT: NC/AT.  PERRLA.  EOMI.  Sclerae white, not injected.  Nares clear.  Pharynx without erythema or exudate.  Dentition intact.    Neck: No adenopathy.  No thyromegaly. Carotids +2/2 bilaterally without bruits.  No jugular venous distension.   Heart: RRR. Normal S1, S2 splits physiologically. No murmur, rub, click, or gallop. The PMI is in the 5th ICS in the midclavicular line. There is no heave.    Lungs: CTA.  No ronchi, wheezes, rales.  No dullness to percussion.   Abdomen: Soft, nontender, nondistended. No organomegaly. No AAA.  No bruits.   Extremities: No clubbing, cyanosis, or edema.  No wounds. No varicose veins signs of  chronic venous insufficiency.   Vascular: No bruits are noted.   Brachial Radial Ulnar Femoral Popliteal DP PT   Left ***/2 ***/2 ***/2 ***/2 ***/2 ***/2 ***/2   Right ***/2 ***/2 ***/2 ***/2 ***/2 ***/2 ***/2     Labs:  LIPID RESULTS:  Lab Results   Component Value Date    CHOL 160 01/21/2023    CHOL 158 02/18/2021    HDL 46 01/21/2023    HDL 46 02/18/2021    LDL 89 01/21/2023    LDL 76 02/18/2021    TRIG 125 01/21/2023    TRIG 181 (H) 02/18/2021    NHDL 114 01/21/2023    NHDL 112 02/18/2021       LIVER ENZYME RESULTS:  Lab Results   Component Value Date    AST 32 12/28/2022    AST 28 06/08/2018    ALT 38 12/28/2022    ALT 45 06/08/2018       CBC RESULTS:  Lab Results   Component Value Date    WBC 7.3 12/28/2022    WBC 6.4 05/08/2017    RBC 4.84 12/28/2022    RBC 4.97 05/08/2017    HGB 14.7 12/28/2022    HGB 14.9 05/08/2017    HCT 43.1 12/28/2022    HCT 43.1 05/08/2017    MCV 89 12/28/2022    MCV 87 05/08/2017    MCH 30.4 12/28/2022    MCH 30.0 05/08/2017    MCHC 34.1 12/28/2022    MCHC 34.6 05/08/2017    RDW 12.8 12/28/2022    RDW 13.0 05/08/2017     12/28/2022     05/08/2017       BMP RESULTS:  Lab Results   Component Value Date     (L) 12/28/2022     02/18/2021    POTASSIUM 3.5 12/28/2022    POTASSIUM 3.2 (L) 02/23/2022    POTASSIUM 3.3 (L) 02/18/2021    CHLORIDE 99 12/28/2022    CHLORIDE 101 02/23/2022    CHLORIDE 104 02/18/2021    CO2 25 12/28/2022    CO2 32 02/23/2022    CO2 28 02/18/2021    ANIONGAP 10 12/28/2022    ANIONGAP 2 (L) 02/23/2022    ANIONGAP 6 02/18/2021    GLC 99 12/28/2022     (H) 02/23/2022    GLC 91 02/18/2021    BUN 14.7 12/28/2022    BUN 16 02/23/2022    BUN 18 02/18/2021    CR 0.75 12/28/2022    CR 0.79 02/18/2021    GFRESTIMATED >90 12/28/2022    GFRESTIMATED >90 02/18/2021    GFRESTBLACK >90 02/18/2021    KIMBERLY 9.5 12/28/2022    KIMBERLY 9.1 02/18/2021        A1C RESULTS:  Lab Results   Component Value Date    A1C 5.6 03/07/2017       Procedures:      Assessment  and Plan: ***    HPI: This is a pleasant 68 yr old male with no major PMH here for evaluation of cardiac history.     Father - had bypass and then  57 yrs old of MI  Brother - had bypass   Mother - lived to 93   P. Uncle  60s of MI  P. Grandmother  of MI  Multiple cousins with heart attacks     Had some CHUNG and had ziopatch with SVT, longest run 14 minutes. Some ventricular ectopy. Had stress echocardiogram, with lots of ventricular ectopy, no chest pain. Borderline size of the aorta at 3.8 cm.     On ROS: no lens dislocation, normal eye-width, normal uvula, normal palate, hypermobility in thumb joints, no skin translucency, normal skin, no vaginal prolapse or hernias, no abnormal wound healing, easy bruising or bleeding, no chest wall deformities, no dental crowding, normal stature.     Has history of chest pressure in the past, especially at night when having to do the firewood. Was having winded sensation as well.     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 Cutting out carbs and sugar as prediabetic. Had ABIs in the past in 2016 that were normal.     Has bulging veins. H/o vein ablation.     ASSESSMENT/PLAN:     1. CT angiogram of the chest -will look at aorta as well   2. Echocardiogram (stress echo only did limited views)  3. Vein duplex - comp study  4. DARINEL with exercise  5. SVT - monitior, likely start beta blocker    Follow up Yasmeen in one month       PAST MEDICAL HISTORY      CURRENT MEDICATIONS  Current Outpatient Medications   Medication Sig Dispense Refill     albuterol (PROAIR HFA/PROVENTIL HFA/VENTOLIN HFA) 108 (90 Base) MCG/ACT inhaler Inhale 2 puffs into the lungs every 6 hours as needed for shortness of breath, wheezing or cough 18 g 3     chlorthalidone (HYGROTON) 25 MG tablet Take 1 tablet (25 mg) by mouth daily 90 tablet 2     clobetasol (TEMOVATE) 0.05 % external ointment Apply topically daily as needed For itching 60 g 0     cyclobenzaprine  (FLEXERIL) 10 MG tablet Take 1 tablet (10 mg) by mouth 3 times daily as needed for muscle spasms 15 tablet 0     FLUoxetine (PROZAC) 20 MG capsule Take 1 capsule (20 mg) by mouth daily 90 capsule 0     gabapentin (NEURONTIN) 300 MG capsule TAKE 1 CAPSULE THREE TIMES A  capsule 3     losartan (COZAAR) 25 MG tablet Take 1 tablet (25 mg) by mouth daily Hold until patient calls for refill. 90 tablet 3     omeprazole (PRILOSEC) 40 MG DR capsule TAKE 1 CAPSULE DAILY 30 TO 60 MINUTES BEFORE A MEAL 90 capsule 3     simvastatin (ZOCOR) 20 MG tablet Take 1 tablet (20 mg) by mouth At Bedtime Hold until patient calls for refill. 90 tablet 3       PAST SURGICAL HISTORY:  Past Surgical History:   Procedure Laterality Date     ARTHROSCOPY KNEE Bilateral     both knees with arthroscopy in the past.      ARTHROSCOPY KNEE       ARTHROSCOPY SHOULDER ROTATOR CUFF REPAIR       AS TOTAL KNEE ARTHROPLASTY Bilateral     Both total knees.      LENGTHEN TENDON ACHILLES Left      REPAIR TENDON ACHILLES      1980s two separate surgeries.     ROTATOR CUFF REPAIR RT/LT Left      [facility] TOTAL KNEE ARTHROPLASTY Right 3/17/2015    Procedure: RIGHT KNEE TOTAL ARTHROPLASTY;  Surgeon: Jaiden Barnes MD;  Location: [facility] OR;  Service: Orthopedics     [facility] TOTAL KNEE ARTHROPLASTY Left 6/30/2015    Procedure: LEFT KNEE TOTAL ARTHROPLASTY;  Surgeon: Jaiden Barnes MD;  Location: [facility] OR;  Service: Orthopedics       ALLERGIES     Allergies   Allergen Reactions     Amlodipine Swelling     Cefzil [Cefprozil] Hives and Itching     Darvocet [Propoxyphene N-Apap] Hives     Penicillins Hives       FAMILY HISTORY  Family History   Problem Relation Age of Onset     Coronary Artery Disease Father      Hyperlipidemia Mother      Prostate Cancer No family hx of      Colon Cancer No family hx of      Hypertension Mother      Heart Disease Mother      Heart Disease Father      Hypertension Sister      Cancer Brother      No Known Problems  Daughter      No Known Problems Son      Cancer Maternal Grandmother      Vision Loss Maternal Grandmother      No Known Problems Sister      Cancer Brother      Hypertension Brother      Hypertension Brother      Cancer Brother      Sleep Apnea Brother      No Known Problems Brother      No Known Problems Daughter            SOCIAL HISTORY  Social History     Socioeconomic History     Marital status: Single     Spouse name: Not on file     Number of children: Not on file     Years of education: Not on file     Highest education level: Not on file   Occupational History     Not on file   Tobacco Use     Smoking status: Former     Packs/day: 1.00     Years: 30.00     Pack years: 30.00     Types: Cigarettes     Quit date: 2011     Years since quittin.1     Smokeless tobacco: Never     Tobacco comments:     started at age 22   Vaping Use     Vaping status: Never Used   Substance and Sexual Activity     Alcohol use: Yes     Drug use: No     Sexual activity: Yes     Partners: Female   Other Topics Concern     Parent/sibling w/ CABG, MI or angioplasty before 65F 55M? Not Asked   Social History Narrative     Not on file     Social Determinants of Health     Financial Resource Strain: Not on file   Food Insecurity: Not on file   Transportation Needs: Not on file   Physical Activity: Not on file   Stress: Not on file   Social Connections: Not on file   Intimate Partner Violence: Not on file   Housing Stability: Not on file       ROS:   Constitutional: No fever, chills, or sweats. No weight gain/loss   ENT: No visual disturbance, ear ache, epistaxis, sore throat  Allergies/Immunologic: Negative  Respiratory: No cough, hemoptysia  Cardiovascular: As per HPI  GI: No nausea, vomiting, hematemesis, melena, or hematochezia  : No urinary frequency, dysuria, or hematuria  Integument: Negative  Psychiatric: Negative  Neuro: Negative  Endocrinology: Negative   Musculoskeletal: Negative  Vascular: No walking impairment,  claudication, ischemic rest pain or nonhealing wounds    EXAM:  /73 (BP Location: Right arm, Patient Position: Sitting, Cuff Size: Adult Large)   Pulse 56   Wt 138.8 kg (306 lb)   SpO2 97%   BMI 44.22 kg/m    In general, the patient is a pleasant male in no apparent distress.    HEENT: NC/AT.  PERRLA.  EOMI.  Sclerae white, not injected.  Nares clear.  Pharynx without erythema or exudate.  Dentition intact.    Neck: No adenopathy.  No thyromegaly. Carotids +2/2 bilaterally without bruits.  No jugular venous distension.   Heart: RRR. Normal S1, S2 splits physiologically. No murmur, rub, click, or gallop. The PMI is in the 5th ICS in the midclavicular line. There is no heave.    Lungs: CTA.  No ronchi, wheezes, rales.  No dullness to percussion.   Abdomen: Soft, nontender, nondistended. No organomegaly. No AAA.  No bruits.   Extremities: No clubbing, cyanosis, or edema.  No wounds. No varicose veins signs of chronic venous insufficiency.   Vascular: No bruits are noted.   Brachial Radial Ulnar Femoral Popliteal DP PT   Left ***/2 ***/2 ***/2 ***/2 ***/2 ***/2 ***/2   Right ***/2 ***/2 ***/2 ***/2 ***/2 ***/2 ***/2     Labs:  LIPID RESULTS:  Lab Results   Component Value Date    CHOL 160 01/21/2023    CHOL 158 02/18/2021    HDL 46 01/21/2023    HDL 46 02/18/2021    LDL 89 01/21/2023    LDL 76 02/18/2021    TRIG 125 01/21/2023    TRIG 181 (H) 02/18/2021    NHDL 114 01/21/2023    NHDL 112 02/18/2021       LIVER ENZYME RESULTS:  Lab Results   Component Value Date    AST 32 12/28/2022    AST 28 06/08/2018    ALT 38 12/28/2022    ALT 45 06/08/2018       CBC RESULTS:  Lab Results   Component Value Date    WBC 7.3 12/28/2022    WBC 6.4 05/08/2017    RBC 4.84 12/28/2022    RBC 4.97 05/08/2017    HGB 14.7 12/28/2022    HGB 14.9 05/08/2017    HCT 43.1 12/28/2022    HCT 43.1 05/08/2017    MCV 89 12/28/2022    MCV 87 05/08/2017    MCH 30.4 12/28/2022    MCH 30.0 05/08/2017    MCHC 34.1 12/28/2022    MCHC 34.6 05/08/2017     RDW 12.8 12/28/2022    RDW 13.0 05/08/2017     12/28/2022     05/08/2017       BMP RESULTS:  Lab Results   Component Value Date     (L) 12/28/2022     02/18/2021    POTASSIUM 3.5 12/28/2022    POTASSIUM 3.2 (L) 02/23/2022    POTASSIUM 3.3 (L) 02/18/2021    CHLORIDE 99 12/28/2022    CHLORIDE 101 02/23/2022    CHLORIDE 104 02/18/2021    CO2 25 12/28/2022    CO2 32 02/23/2022    CO2 28 02/18/2021    ANIONGAP 10 12/28/2022    ANIONGAP 2 (L) 02/23/2022    ANIONGAP 6 02/18/2021    GLC 99 12/28/2022     (H) 02/23/2022    GLC 91 02/18/2021    BUN 14.7 12/28/2022    BUN 16 02/23/2022    BUN 18 02/18/2021    CR 0.75 12/28/2022    CR 0.79 02/18/2021    GFRESTIMATED >90 12/28/2022    GFRESTIMATED >90 02/18/2021    GFRESTBLACK >90 02/18/2021    KIMBERLY 9.5 12/28/2022    KIMBERLY 9.1 02/18/2021        A1C RESULTS:  Lab Results   Component Value Date    A1C 5.6 03/07/2017       Procedures:      Assessment and Plan: ***      Thank you for allowing me to participate in the care of your patient.      Sincerely,     Abbey Ortega MD     [facility]  cc:   Nicolas Harris MD  [address] [place] [id number]

## 2023-04-04 NOTE — NURSING NOTE
Chief Complaint   Patient presents with     Heart Problem     Consult:SVT,CHUNG,Fhx IHD,Past smoker,Ventricular Ectopy,HTN (See OV 01/26/2023)       Vitals:    04/04/23 1500   Pulse: 56   SpO2: 97%     Wt Readings from Last 1 Encounters:   03/10/23 138.8 kg (306 lb)       Otilia Crews MA

## 2023-04-04 NOTE — PATIENT INSTRUCTIONS
CT angiogram of the chest at [facility]   Echocardiogram   Vein competency study   ABIs with exercise for leg pain   Follow up Yasmeen or Irene in 2 months

## 2023-04-12 ENCOUNTER — HOSPITAL ENCOUNTER (OUTPATIENT)
Dept: CARDIOLOGY | Facility: CLINIC | Age: 68
Discharge: HOME | End: 2023-04-12
Attending: INTERNAL MEDICINE | Admitting: INTERNAL MEDICINE
Payer: MEDICARE

## 2023-04-12 ENCOUNTER — TELEPHONE (OUTPATIENT)
Dept: CARDIOLOGY | Facility: CLINIC | Age: 68
End: 2023-04-12

## 2023-04-12 VITALS — OXYGEN SATURATION: 97 % | DIASTOLIC BLOOD PRESSURE: 79 MMHG | SYSTOLIC BLOOD PRESSURE: 134 MMHG | HEART RATE: 53 BPM

## 2023-04-12 DIAGNOSIS — R06.09 DOE (DYSPNEA ON EXERTION): ICD-10-CM

## 2023-04-12 DIAGNOSIS — I20.9 ANGINA PECTORIS, UNSPECIFIED (H): ICD-10-CM

## 2023-04-12 PROCEDURE — 75574 CT ANGIO HRT W/3D IMAGE: CPT | Mod: 26 | Performed by: INTERNAL MEDICINE

## 2023-04-12 PROCEDURE — G1010 CDSM STANSON: HCPCS | Performed by: INTERNAL MEDICINE

## 2023-04-12 PROCEDURE — G1010 CDSM STANSON: HCPCS

## 2023-04-12 PROCEDURE — 250N000013 HC RX MED GY IP 250 OP 250 PS 637: Performed by: INTERNAL MEDICINE

## 2023-04-12 PROCEDURE — 250N000011 HC RX IP 250 OP 636: Performed by: INTERNAL MEDICINE

## 2023-04-12 RX ORDER — DIPHENHYDRAMINE HYDROCHLORIDE 50 MG/ML
25-50 INJECTION INTRAMUSCULAR; INTRAVENOUS
Status: DISCONTINUED | OUTPATIENT
Start: 2023-04-12 | End: 2023-04-13 | Stop reason: HOSPADM

## 2023-04-12 RX ORDER — ONDANSETRON 2 MG/ML
4 INJECTION INTRAMUSCULAR; INTRAVENOUS
Status: DISCONTINUED | OUTPATIENT
Start: 2023-04-12 | End: 2023-04-13 | Stop reason: HOSPADM

## 2023-04-12 RX ORDER — IOPAMIDOL 755 MG/ML
500 INJECTION, SOLUTION INTRAVASCULAR ONCE
Status: COMPLETED | OUTPATIENT
Start: 2023-04-12 | End: 2023-04-12

## 2023-04-12 RX ORDER — DILTIAZEM HYDROCHLORIDE 5 MG/ML
10-15 INJECTION INTRAVENOUS
Status: DISCONTINUED | OUTPATIENT
Start: 2023-04-12 | End: 2023-04-13 | Stop reason: HOSPADM

## 2023-04-12 RX ORDER — IVABRADINE 5 MG/1
5-15 TABLET, FILM COATED ORAL
Status: DISCONTINUED | OUTPATIENT
Start: 2023-04-12 | End: 2023-04-13 | Stop reason: HOSPADM

## 2023-04-12 RX ORDER — METHYLPREDNISOLONE SODIUM SUCCINATE 125 MG/2ML
125 INJECTION, POWDER, LYOPHILIZED, FOR SOLUTION INTRAMUSCULAR; INTRAVENOUS
Status: DISCONTINUED | OUTPATIENT
Start: 2023-04-12 | End: 2023-04-13 | Stop reason: HOSPADM

## 2023-04-12 RX ORDER — METOPROLOL TARTRATE 25 MG/1
25-100 TABLET, FILM COATED ORAL
Status: DISCONTINUED | OUTPATIENT
Start: 2023-04-12 | End: 2023-04-13 | Stop reason: HOSPADM

## 2023-04-12 RX ORDER — ACYCLOVIR 200 MG/1
0-1 CAPSULE ORAL
Status: DISCONTINUED | OUTPATIENT
Start: 2023-04-12 | End: 2023-04-13 | Stop reason: HOSPADM

## 2023-04-12 RX ORDER — NITROGLYCERIN 0.4 MG/1
0.4 TABLET SUBLINGUAL
Status: DISCONTINUED | OUTPATIENT
Start: 2023-04-12 | End: 2023-04-13 | Stop reason: HOSPADM

## 2023-04-12 RX ORDER — DIPHENHYDRAMINE HCL 25 MG
25 CAPSULE ORAL
Status: DISCONTINUED | OUTPATIENT
Start: 2023-04-12 | End: 2023-04-13 | Stop reason: HOSPADM

## 2023-04-12 RX ORDER — DILTIAZEM HCL 60 MG
120 TABLET ORAL
Status: DISCONTINUED | OUTPATIENT
Start: 2023-04-12 | End: 2023-04-13 | Stop reason: HOSPADM

## 2023-04-12 RX ORDER — METOPROLOL TARTRATE 1 MG/ML
5-15 INJECTION, SOLUTION INTRAVENOUS
Status: DISCONTINUED | OUTPATIENT
Start: 2023-04-12 | End: 2023-04-13 | Stop reason: HOSPADM

## 2023-04-12 RX ADMIN — NITROGLYCERIN 0.4 MG: 0.4 TABLET SUBLINGUAL at 12:01

## 2023-04-12 RX ADMIN — IOPAMIDOL 120 ML: 755 INJECTION, SOLUTION INTRAVENOUS at 12:11

## 2023-04-12 NOTE — TELEPHONE ENCOUNTER
"Significant family hx CAD. Has history of chest pressure in the past, especially at night when having to do the firewood. Was having winded sensation as well.     Recommend CT angiogram of the chest -will look at aorta as well - eval for premature CAD, if positive then invasive cor angiogram    CTA showing- \"Total Agatston score 241, placing the patient in the 63 percentile.  Left main: 0, left anterior descendin,  circumflex: 32.7, right  coronary artery: 80.6.  Mild to mod obstructive plaque throughout.\"    Pt currently on low intensity statin 20mg simvastatin , NO ASA.    Pt scheduled to see Yasmeen Singleton CNP 23. Okay to wait till this visit or should pt be seen sooner?    Casie Esparza RN  "

## 2023-04-17 ENCOUNTER — TELEPHONE (OUTPATIENT)
Dept: URGENT CARE | Facility: URGENT CARE | Age: 68
End: 2023-04-17
Payer: MEDICARE

## 2023-04-17 ENCOUNTER — TELEPHONE (OUTPATIENT)
Dept: FAMILY MEDICINE | Facility: CLINIC | Age: 68
End: 2023-04-17
Payer: MEDICARE

## 2023-04-17 NOTE — TELEPHONE ENCOUNTER
Medication Question or Refill        What medication are you calling about (include dose and sig)?: Meloxicam 15mg tablets    Preferred Pharmacy:   [id number] - [place], [place] - [address]  [address] [address]  Phone: [phone] Fax: [phone]      Could we send this information to you in [employer] or would you prefer to receive a phone call?:   Patient would prefer a phone call   Okay to leave a detailed message?: Yes at Home number on file [phone] (home)

## 2023-04-18 NOTE — TELEPHONE ENCOUNTER
Reached out to patient for more information re: request for meloxicam, as it's not on current list.  Appears this was prescribed back in February for dislocated finger, then discontinued by PCP as therapy completed on 3/21/23.   [facility] he didn't [facility]. He states he doesn't need it and cancel the request.  He states he also already told [facility] this awhile back.    Sujata Jack RN  [facility]

## 2023-04-18 NOTE — TELEPHONE ENCOUNTER
OK to wait for Yasmeen - mild to moderate CAD. If symptoms get worse we can move up appointment, or if he'd like a sooner appointment for any concerns.     Dr. Ortega

## 2023-04-18 NOTE — TELEPHONE ENCOUNTER
[facility] sent [facility] with results and providers recommendations.     Casie Esparza RN

## 2023-05-24 ENCOUNTER — HOSPITAL ENCOUNTER (OUTPATIENT)
Dept: CARDIOLOGY | Facility: CLINIC | Age: 68
Discharge: HOME | End: 2023-05-24
Attending: INTERNAL MEDICINE
Payer: MEDICARE

## 2023-05-24 DIAGNOSIS — M79.604 LEG PAIN, BILATERAL: ICD-10-CM

## 2023-05-24 DIAGNOSIS — I73.9 PERIPHERAL VASCULAR DISEASE, UNSPECIFIED (H): ICD-10-CM

## 2023-05-24 DIAGNOSIS — M79.89 LEG SWELLING: ICD-10-CM

## 2023-05-24 DIAGNOSIS — M79.605 LEG PAIN, BILATERAL: ICD-10-CM

## 2023-05-24 PROCEDURE — 93970 EXTREMITY STUDY: CPT | Mod: 26 | Performed by: INTERNAL MEDICINE

## 2023-05-24 PROCEDURE — 93924 LWR XTR VASC STDY BILAT: CPT | Mod: 26 | Performed by: INTERNAL MEDICINE

## 2023-05-24 PROCEDURE — 93924 LWR XTR VASC STDY BILAT: CPT

## 2023-05-24 PROCEDURE — 93970 EXTREMITY STUDY: CPT

## 2023-05-30 ENCOUNTER — TELEPHONE (OUTPATIENT)
Dept: CARDIOLOGY | Facility: CLINIC | Age: 68
End: 2023-05-30
Payer: MEDICARE

## 2023-05-30 NOTE — TELEPHONE ENCOUNTER
Routing venous US to Dr. Ortega, per last clinic note -  Chronic vein insufficiency: obtain vein comp study   Claudication vs neuropathic pain - DARINEL with exercise for leg pain, do not suspect severe PAD. OK with compression stockings    DARINEL's normal    Venous US showing: SEVER REFLUX  Impression:  1. Right leg: No DVT. Severe femoral vein 2.7 seconds, popliteal vein  6.3 seconds reflux. Greater saphenous vein in the thigh is not  visualized. Pelvic source varicose veins 3.8 mm, 4.9 seconds with  severe reflux cascading down the medial thigh and reconnecting to the  distal greater saphenous vein stump.     2. Left leg: No DVT. Severe femoral vein 4.9 seconds, popliteal vein  6.9 seconds reflux. Competent small saphenous vein. Absent greater  saphenous vein. Calf  measuring 5.0 mm, 1.9 seconds with  associated severely refluxing varicose veins in the medial aspect 4.6  mm, 7.3 seconds.     Depending on symptoms. Patient could qualify for right thigh  sclerotherapy and phlebectomy of varicose veins, left radiofrequency   treatment in the calf and sclerotherapy phlebectomy of  associated varicose veins in the left lower calf.    Do you recommend vein clinic with Dr. Logan    Also has f/u with Yasmeen Singleton, CNP 6/7/23    Casie Esparza RN

## 2023-05-31 ENCOUNTER — HOSPITAL ENCOUNTER (OUTPATIENT)
Dept: CARDIOLOGY | Facility: CLINIC | Age: 68
Discharge: HOME | End: 2023-05-31
Attending: INTERNAL MEDICINE | Admitting: INTERNAL MEDICINE
Payer: MEDICARE

## 2023-05-31 DIAGNOSIS — R06.09 DOE (DYSPNEA ON EXERTION): ICD-10-CM

## 2023-05-31 LAB — LVEF ECHO: NORMAL

## 2023-05-31 PROCEDURE — 255N000002 HC RX 255 OP 636: Performed by: INTERNAL MEDICINE

## 2023-05-31 PROCEDURE — 999N000208 ECHOCARDIOGRAM COMPLETE

## 2023-05-31 PROCEDURE — 93306 TTE W/DOPPLER COMPLETE: CPT | Mod: 26 | Performed by: INTERNAL MEDICINE

## 2023-05-31 RX ADMIN — HUMAN ALBUMIN MICROSPHERES AND PERFLUTREN 2 ML: 10; .22 INJECTION, SOLUTION INTRAVENOUS at 15:23

## 2023-06-06 ENCOUNTER — TELEPHONE (OUTPATIENT)
Dept: VASCULAR SURGERY | Facility: CLINIC | Age: 68
End: 2023-06-06
Payer: MEDICARE

## 2023-06-06 DIAGNOSIS — M79.605 LEG PAIN, BILATERAL: Primary | ICD-10-CM

## 2023-06-06 DIAGNOSIS — M79.604 LEG PAIN, BILATERAL: Primary | ICD-10-CM

## 2023-06-06 DIAGNOSIS — M79.89 LEG SWELLING: ICD-10-CM

## 2023-06-06 DIAGNOSIS — I73.9 PERIPHERAL VASCULAR DISEASE, UNSPECIFIED (H): ICD-10-CM

## 2023-06-06 NOTE — TELEPHONE ENCOUNTER
Referral placed. Pt scheduled to see Yasmeen Singleton CNP tomorrow 6/7/23. Will review with pt tomorrow.    Casie Esparza RN

## 2023-06-06 NOTE — TELEPHONE ENCOUNTER
6-6-23 I talked w/pt; he is seeing Dr Ortega's assistant tomorrow and will determine if he does or doesn't have to see Dr Logan   sm

## 2023-06-07 ENCOUNTER — OFFICE VISIT (OUTPATIENT)
Dept: CARDIOLOGY | Facility: CLINIC | Age: 68
End: 2023-06-07
Attending: INTERNAL MEDICINE
Payer: MEDICARE

## 2023-06-07 VITALS
SYSTOLIC BLOOD PRESSURE: 118 MMHG | OXYGEN SATURATION: 98 % | RESPIRATION RATE: 16 BRPM | WEIGHT: 297.6 LBS | HEART RATE: 51 BPM | DIASTOLIC BLOOD PRESSURE: 66 MMHG | BODY MASS INDEX: 43.01 KG/M2

## 2023-06-07 DIAGNOSIS — I47.10 SVT (SUPRAVENTRICULAR TACHYCARDIA) (H): ICD-10-CM

## 2023-06-07 DIAGNOSIS — I87.2 VENOUS (PERIPHERAL) INSUFFICIENCY: ICD-10-CM

## 2023-06-07 DIAGNOSIS — R06.09 DOE (DYSPNEA ON EXERTION): ICD-10-CM

## 2023-06-07 DIAGNOSIS — I47.10 PAROXYSMAL SUPRAVENTRICULAR TACHYCARDIA (H): ICD-10-CM

## 2023-06-07 DIAGNOSIS — I25.10 NONOBSTRUCTIVE ATHEROSCLEROSIS OF CORONARY ARTERY: Primary | ICD-10-CM

## 2023-06-07 PROCEDURE — 99215 OFFICE O/P EST HI 40 MIN: CPT | Performed by: NURSE PRACTITIONER

## 2023-06-07 RX ORDER — ROSUVASTATIN CALCIUM 40 MG/1
40 TABLET, COATED ORAL DAILY
Qty: 30 TABLET | Refills: 11 | Status: SHIPPED | OUTPATIENT
Start: 2023-06-07 | End: 2024-04-08

## 2023-06-07 RX ORDER — MULTIVIT WITH MINERALS/LUTEIN
1000 TABLET ORAL DAILY
COMMUNITY

## 2023-06-07 RX ORDER — ASPIRIN 81 MG/1
81 TABLET, CHEWABLE ORAL DAILY
COMMUNITY
Start: 2023-06-07

## 2023-06-07 NOTE — PATIENT INSTRUCTIONS
Medication Changes:  Start aspirin 81 mg daily   STOP simvastatin   START rosuvastatin 40 mg daily     Recommendations:  Check blood pressure at least 1 hour after medications. Call the clinic if your blood pressure is consistently greater than 130/80.     Follow-up:  Cardiology follow up at [facility]: Dr. Ortega oct 2023  Consultation with Dr. Logan in the vein clinic  Fasting lab in 2 months (lipid/ALT)  Call 6 months prior, to schedule.     Cardiology Scheduling~[phone]  Cardiology Clinic RN~[phone] (Luz Maria RN, Casie RN, Ethel RN)

## 2023-06-07 NOTE — LETTER
2023    Nicolas Harris MD  [address] [facility] [address]    RE: Tommy De La O       Dear Colleague,     I had the pleasure of seeing Tommy De La O in the [facility].  Cardiology Clinic Progress Note  Tommy De La O MRN# [id number]   YOB: 1955 Age: 68 year old      Primary Cardiologist:   Dr. Ortega          History of Presenting Illness:      Tommy De La O is a pleasant 68 year old patient with a past cardiac history significant for    Chronic venous insufficiency  S/p vein ablation  Abnormal venous competency ultrasound 2023  Nonobstructive CAD  CT coronary angiogram 2023 with calcium score 241 in the LAD, LCx and RCA, 63rd percentile, mild to moderate nonobstructive disease  PSVT  14 minutes on Zio patch  Past medical history significant for hypertension.   Family history:   Father - had bypass and then  57 yrs old of MI  Brother - had bypass   Mother - lived to 93   P. Uncle  60s of MI  P. Grandmother  of MI  Multiple cousins with heart attacks     Patient previously had dyspnea on exertion Zio patch showed SVT with the longest run lasting 14 minutes and some ventricular ectopy.  Stress echo showed frequent ventricular ectopy and had no chest pain, aorta 3.8 cm.  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    Patient presents today for testing follow-up. Most recent lipid profile, BMP, ALT reviewed today. Echo May 2023 normal LVEF, no WMA, no valvular disease.  CT coronary angiogram reviewed. Exercise ABIs 2023 were normal.  Venous competency ultrasound showed severe reflux bilaterally.  This was reviewed by Dr. Ortega who recommended vein consultation with Dr. Louis. Results reviewed today.     He actually denies any lower extremity edema.  He uses compression stockings once a week but states that the tightness causes discomfort  with his neuropathy.  We discussed leg elevation and ambulating to help if he develops any lower extremity edema.  At this time, he does not want to follow-up with vein clinic as he is not symptomatic.  He has not had any further chest pressure.  He is agreeable to starting aspirin and switching simvastatin to rosuvastatin.  He declines starting beta-blocker at this time but we can consider adding this in the future for SVT and nonobstructive coronary disease.  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 Patient reports no chest pain, shortness of breath, PND, orthopnea, presyncope, syncope, edema, heart racing, or palpitations.        Current Cardiac Medications   Chlorthalidone 25 mg daily  Losartan 25 mg daily  Simvastatin 20 mg daily                   Assessment and Plan:       Plan  Patient Instructions   Medication Changes:  Start aspirin 81 mg daily   STOP simvastatin   START rosuvastatin 40 mg daily     Recommendations:  Check blood pressure at least 1 hour after medications. Call the clinic if your blood pressure is consistently greater than 130/80.     Follow-up:  Cardiology follow up at [facility]: Dr. Ortega oct 2023  Consultation with Dr. Logan in the vein clinic  Fasting lab in 2 months (lipid/ALT)  Call 6 months prior, to schedule.     Cardiology Scheduling~[phone]  Cardiology Clinic RN~[phone] (Luz Maria RN, Casie RN, Ethel RN)               Nonobstructive CAD  Chronic venous insufficiency  PSVT      Regards to nonobstructive coronary disease he has no further anginal symptoms.  He had nonobstructive disease on CT coronary angiogram.  He will continue on statin, ARB, and start aspirin.  We can consider adding beta-blocker in the future.    Regards to chronic venous insufficiency he does not report any lower extremity edema even though he has severe reflux.  He does not wish to follow-up with vein clinic at this time.  He will continue with  compression stockings, leg elevation, ambulation.    In regards to PSVT he denies any significant lightheadedness or heart racing.  In the future, can consider adding beta-blocker, if needed.           Respiratory:  clear to auscultation; normal symmetry        Cardiac: regular rhythm, bradycardic    GI:  abdomen nondistended     Extremities and Muscular Skeletal:  no edema          Thank you for allowing me to participate in this delightful patient's care.      This note was completed in part using Dragon voice recognition software. Although reviewed after completion, some word and grammatical errors may occur.    JUAN LUIS Camara CNP      Total time spent today was 43 mins, reviewing labs, testing, notes, documenting notes, and seeing patient.    Thank you for allowing me to participate in the care of your patient.      Sincerely,     JUAN LUIS Camara CNP   [facility]  cc:   Abbey Ortega MD  [address] [place] [id number]

## 2023-06-07 NOTE — PROGRESS NOTES
Cardiology Clinic Progress Note  Tommy De La O MRN# [id number]   YOB: 1955 Age: 68 year old      Primary Cardiologist:   Dr. Ortega          History of Presenting Illness:      Tommy De La O is a pleasant 68 year old patient with a past cardiac history significant for    Chronic venous insufficiency    S/p vein ablation    Abnormal venous competency ultrasound 2023  Nonobstructive CAD    CT coronary angiogram 2023 with calcium score 241 in the LAD, LCx and RCA, 63rd percentile, mild to moderate nonobstructive disease  PSVT    14 minutes on Zio patch  Past medical history significant for hypertension.   Family history:   Father - had bypass and then  57 yrs old of MI  Brother - had bypass   Mother - lived to 93   P. Uncle  60s of MI  P. Grandmother  of MI  Multiple cousins with heart attacks     Patient previously had dyspnea on exertion Zio patch showed SVT with the longest run lasting 14 minutes and some ventricular ectopy.  Stress echo showed frequent ventricular ectopy and had no chest pain, aorta 3.8 cm.  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    Patient presents today for testing follow-up. Most recent lipid profile, BMP, ALT reviewed today. Echo May 2023 normal LVEF, no WMA, no valvular disease.  CT coronary angiogram reviewed. Exercise ABIs 2023 were normal.  Venous competency ultrasound showed severe reflux bilaterally.  This was reviewed by Dr. Ortega who recommended vein consultation with Dr. Louis. Results reviewed today.     He actually denies any lower extremity edema.  He uses compression stockings once a week but states that the tightness causes discomfort with his neuropathy.  We discussed leg elevation and ambulating to help if he develops any lower extremity edema.  At this time, he does not want to follow-up with vein clinic as he is not symptomatic.  He has  not had any further chest pressure.  He is agreeable to starting aspirin and switching simvastatin to rosuvastatin.  He declines starting beta-blocker at this time but we can consider adding this in the future for SVT and nonobstructive coronary disease.  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 Patient reports no chest pain, shortness of breath, PND, orthopnea, presyncope, syncope, edema, heart racing, or palpitations.        Current Cardiac Medications   Chlorthalidone 25 mg daily  Losartan 25 mg daily  Simvastatin 20 mg daily                   Assessment and Plan:       Plan  Patient Instructions   Medication Changes:  1. Start aspirin 81 mg daily   2. STOP simvastatin   3. START rosuvastatin 40 mg daily     Recommendations:  1. Check blood pressure at least 1 hour after medications. Call the clinic if your blood pressure is consistently greater than 130/80.     Follow-up:  1. Cardiology follow up at [facility]: Dr. Ortega oct 2023  2. Consultation with Dr. Logan in the vein clinic  3. Fasting lab in 2 months (lipid/ALT)  Call 6 months prior, to schedule.     Cardiology Scheduling~[phone]  Cardiology Clinic RN~[phone] (Luz Maria RN, Casie RN, Ehtel RN)               Nonobstructive CAD  Chronic venous insufficiency  PSVT      Regards to nonobstructive coronary disease he has no further anginal symptoms.  He had nonobstructive disease on CT coronary angiogram.  He will continue on statin, ARB, and start aspirin.  We can consider adding beta-blocker in the future.    Regards to chronic venous insufficiency he does not report any lower extremity edema even though he has severe reflux.  He does not wish to follow-up with vein clinic at this time.  He will continue with compression stockings, leg elevation, ambulation.    In regards to PSVT he denies any significant lightheadedness or heart racing.  In the future, can consider adding beta-blocker, if  needed.           Respiratory:  clear to auscultation; normal symmetry        Cardiac: regular rhythm, bradycardic    GI:  abdomen nondistended     Extremities and Muscular Skeletal:  no edema          Thank you for allowing me to participate in this delightful patient's care.      This note was completed in part using Dragon voice recognition software. Although reviewed after completion, some word and grammatical errors may occur.    JUAN LUIS Camara CNP      Total time spent today was 43 mins, reviewing labs, testing, notes, documenting notes, and seeing patient.

## 2023-06-09 NOTE — TELEPHONE ENCOUNTER
I called and talked to the pt.  Look below for notes from his visit with Yasmeen Rascon CNP (Dr. Ortega's assistant) on 6-7-23:    He actually denies any lower extremity edema.  He uses compression stockings once a week but states that the tightness causes discomfort with his neuropathy.  We discussed leg elevation and ambulating to help if he develops any lower extremity edema.  At this time, he does not want to follow-up with vein clinic as he is not symptomatic.

## 2023-06-15 ENCOUNTER — TELEPHONE (OUTPATIENT)
Dept: FAMILY MEDICINE | Facility: CLINIC | Age: 68
End: 2023-06-15
Payer: MEDICARE

## 2023-06-15 NOTE — TELEPHONE ENCOUNTER
Received a call from Ann-Marie out of [employer] asking if fax for CPAP supplies was received that was sent on 6-7-23, do not see that it has been received per chart review. Checked with nAn-Marie and fax number she had was incorrect, correct fax number provided, will have the fax reviewed by care team once received.      JESS Ramírez

## 2023-06-23 ENCOUNTER — OFFICE VISIT (OUTPATIENT)
Dept: URGENT CARE | Facility: URGENT CARE | Age: 68
End: 2023-06-23
Payer: MEDICARE

## 2023-06-23 VITALS
DIASTOLIC BLOOD PRESSURE: 70 MMHG | RESPIRATION RATE: 18 BRPM | SYSTOLIC BLOOD PRESSURE: 114 MMHG | OXYGEN SATURATION: 96 % | HEART RATE: 48 BPM | TEMPERATURE: 97.9 F

## 2023-06-23 DIAGNOSIS — A69.20 ERYTHEMA MIGRANS (LYME DISEASE): Primary | ICD-10-CM

## 2023-06-23 PROCEDURE — 99213 OFFICE O/P EST LOW 20 MIN: CPT | Performed by: STUDENT IN AN ORGANIZED HEALTH CARE EDUCATION/TRAINING PROGRAM

## 2023-06-23 RX ORDER — DOXYCYCLINE HYCLATE 100 MG
100 TABLET ORAL 2 TIMES DAILY
Qty: 20 TABLET | Refills: 0 | Status: SHIPPED | OUTPATIENT
Start: 2023-06-23 | End: 2023-07-03

## 2023-06-23 NOTE — PROGRESS NOTES
Assessment & Plan     Erythema migrans (Lyme disease)  Tommy had an engorged tick attached to his back but has had multiple tick bites recently. Will plan for treatment with doxycycline x 10 days. Recommended OTC hydrocortisone for the itching over the area.   - doxycycline hyclate (VIBRA-TABS) 100 MG tablet  Dispense: 20 tablet; Refill: 0     23 minutes spent by me on the date of the encounter doing chart review, history and exam, documentation and further activities per the note    No follow-ups on file.    Belinda Hyman MD  [facility]    Subjective     Tommy is a 68 year old male who presents to clinic today for the following health issues:  Chief Complaint   Patient presents with     Tick Removal     Tick on left middle back. Unknown for how long its been there. Did have one on neck 1 week ago. Has been itching for the past 2 days and very red around the tick. He has been more tired as well.      HPI    Has a lot of moles, tick found on his back by the nurse. Daughter pulled a tick off his neck on the 13th of June.     Rash    Onset of rash was 2 day(s) ago. Started with itching  Course of illness is worsening.  Severity moderate  Current and Associated symptoms: itching and red   Location of the rash: back.  Previous history of a similar rash? No  Recent exposure history: animals and tick (unknown type)  Denies exposure to: none known  Associated symptoms include: rhinorrhea.  Treatment measures tried include: none    Review of Systems  Constitutional, HEENT, cardiovascular, pulmonary, gi and gu systems are negative, except as otherwise noted.      Objective    /70 (BP Location: Right arm, Patient Position: Sitting, Cuff Size: Adult Large)   Pulse (!) 48   Temp 97.9  F (36.6  C) (Tympanic)   Resp 18   SpO2 96%   Physical Exam   GENERAL: healthy, alert and no distress  EYES: Eyes grossly normal to inspection, PERRL and conjunctivae and sclerae normal  NECK: no  adenopathy, no asymmetry, masses, or scars and thyroid normal to palpation  MS: no gross musculoskeletal defects noted, no edema  SKIN: multiple moles and seborrheic keratoses over the back. Erythematous rash with central clearing. Engorged tick attached to left mid back.   BACK: no CVA tenderness, no paralumbar tenderness

## 2023-07-26 ENCOUNTER — OFFICE VISIT (OUTPATIENT)
Dept: URGENT CARE | Facility: URGENT CARE | Age: 68
End: 2023-07-26
Payer: MEDICARE

## 2023-07-26 ENCOUNTER — NURSE TRIAGE (OUTPATIENT)
Dept: NURSING | Facility: CLINIC | Age: 68
End: 2023-07-26
Payer: MEDICARE

## 2023-07-26 VITALS
TEMPERATURE: 98 F | SYSTOLIC BLOOD PRESSURE: 115 MMHG | HEART RATE: 45 BPM | DIASTOLIC BLOOD PRESSURE: 74 MMHG | WEIGHT: 242 LBS | OXYGEN SATURATION: 95 % | RESPIRATION RATE: 16 BRPM | BODY MASS INDEX: 34.97 KG/M2

## 2023-07-26 DIAGNOSIS — B35.6 TINEA CRURIS: Primary | ICD-10-CM

## 2023-07-26 DIAGNOSIS — Z87.2 HISTORY OF ECZEMA: ICD-10-CM

## 2023-07-26 PROCEDURE — 99213 OFFICE O/P EST LOW 20 MIN: CPT | Performed by: NURSE PRACTITIONER

## 2023-07-26 RX ORDER — FLUCONAZOLE 200 MG/1
200 TABLET ORAL WEEKLY
Qty: 4 TABLET | Refills: 0 | Status: SHIPPED | OUTPATIENT
Start: 2023-07-26

## 2023-07-26 RX ORDER — CLOTRIMAZOLE 1 %
CREAM (GRAM) TOPICAL 2 TIMES DAILY
Qty: 85 G | Refills: 0 | Status: SHIPPED | OUTPATIENT
Start: 2023-07-26 | End: 2023-08-09

## 2023-07-26 NOTE — PROGRESS NOTES
Assessment & Plan      Diagnosis Comments   1. Tinea cruris  clotrimazole (LOTRIMIN) 1 % external cream, fluconazole (DIFLUCAN) 200 MG tablet       2. History of eczema          Discussed home care plan with patient discussed importance of cleaning area twice daily patting dry with clean towel not reusing towels or we wearing close as this may spread the infection.  We discussed using topical Lotrimin twice daily for 2 weeks if symptoms not improving after 1 week may start Diflucan or could start Diflucan now however patient is concerned about cost of treatment.  We discussed red flags that would warrant emergent evaluation   Home care reviewed. Patient verbalized understanding; will monitor symptoms closely. Reviewed s/e to medications.   Follow up with primary care in 1 week if symptoms not improving.     Handout given from epic and reviewed.      JUAN LUIS [facility]    April Sanders is a 68 year old male who presents to clinic today for the following health issues:  Chief Complaint   Patient presents with    Derm Problem     Pt has a terrible rash in his groin area and on the inside of his legs, gets this sometimes then goes away, this time it has gotten worse.     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  Denies fever or malaise.  Patient does have a history of eczema.      Review of Systems  Constitutional, HEENT, cardiovascular, pulmonary, gi and gu systems are negative, except as otherwise noted.      Objective    /74   Pulse (!) 45   Temp 98  F (36.7  C) (Tympanic)   Resp 16   Wt 109.8 kg (242 lb)   SpO2 95%   BMI 34.97 kg/m     Physical Exam   GENERAL: healthy, alert and no distress  NECK: no adenopathy, no asymmetry, masses, or scars and thyroid normal to palpation  RESP: lungs clear to auscultation - no rales, rhonchi or wheezes  CV: regular rate and rhythm, normal S1 S2, no S3 or S4, no murmur, click or rub, no peripheral edema and peripheral pulses strong  ABDOMEN: soft, nontender, no hepatosplenomegaly, no masses and bowel sounds normal  MS: no gross musculoskeletal defects noted, no edema  SKIN: Bilateral upper inner thighs and bottom between scrotum areas of erythema, mild warmth, flat, mild tenderness and irritation noted by patient.  Negative for fluctuance or drainage.

## 2023-08-15 ENCOUNTER — LAB (OUTPATIENT)
Dept: LAB | Facility: CLINIC | Age: 68
End: 2023-08-15
Payer: MEDICARE

## 2023-08-15 DIAGNOSIS — I25.10 NONOBSTRUCTIVE ATHEROSCLEROSIS OF CORONARY ARTERY: ICD-10-CM

## 2023-08-15 LAB
ALT SERPL W P-5'-P-CCNC: 25 U/L (ref 0–70)
CHOLEST SERPL-MCNC: 119 MG/DL
HDLC SERPL-MCNC: 36 MG/DL
LDLC SERPL CALC-MCNC: 55 MG/DL
NONHDLC SERPL-MCNC: 83 MG/DL
TRIGL SERPL-MCNC: 140 MG/DL

## 2023-08-15 PROCEDURE — 84460 ALANINE AMINO (ALT) (SGPT): CPT

## 2023-08-15 PROCEDURE — 80061 LIPID PANEL: CPT

## 2023-08-15 PROCEDURE — 36415 COLL VENOUS BLD VENIPUNCTURE: CPT

## 2023-09-22 ENCOUNTER — OFFICE VISIT (OUTPATIENT)
Dept: ORTHOPEDICS | Facility: CLINIC | Age: 68
End: 2023-09-22
Payer: MEDICARE

## 2023-09-22 VITALS
DIASTOLIC BLOOD PRESSURE: 66 MMHG | HEIGHT: 70 IN | WEIGHT: 299 LBS | BODY MASS INDEX: 42.8 KG/M2 | SYSTOLIC BLOOD PRESSURE: 110 MMHG

## 2023-09-22 DIAGNOSIS — M25.512 CHRONIC LEFT SHOULDER PAIN: Primary | ICD-10-CM

## 2023-09-22 DIAGNOSIS — G89.29 CHRONIC LEFT SHOULDER PAIN: Primary | ICD-10-CM

## 2023-09-22 PROCEDURE — 20611 DRAIN/INJ JOINT/BURSA W/US: CPT | Mod: LT | Performed by: FAMILY MEDICINE

## 2023-09-22 RX ADMIN — ROPIVACAINE HYDROCHLORIDE 4 ML: 5 INJECTION, SOLUTION EPIDURAL; INFILTRATION; PERINEURAL at 16:10

## 2023-09-22 RX ADMIN — BETAMETHASONE SODIUM PHOSPHATE AND BETAMETHASONE ACETATE 6 MG: 3; 3 INJECTION, SUSPENSION INTRA-ARTICULAR; INTRALESIONAL; INTRAMUSCULAR; SOFT TISSUE at 16:10

## 2023-09-22 NOTE — LETTER
9/22/2023         RE: Tommy De La O  1964 [address] [facility] [address]        Dear Colleague,    Thank you for referring your patient, Tommy De La O, to the [facility]. Please see a copy of my visit note below.    ASSESSMENT & PLAN    Tommy was seen today for follow up and pain.    Diagnoses and all orders for this visit:    Chronic left shoulder pain  -     Large Joint Injection/Arthocentesis: L glenohumeral joint      This issue is acute on chronic and Worsening.    # Chronic Left Shoulder Pain: long-standing condition for patient has had condition for 9+ months. Last shoulder joint steroid injection on 3/10/23 provided 5+ months of pain relief. He does have pain in anterior/posterior portions of his shoulder with intact range of motion and no weakness on rotator cuff testing. Reviewed previous x-rays showing no significant arthritis though may have some early degeneration contributed pain. Given this plan to treat as below and follow-up as needed in 3-4 weeks.     Image Findings: reviewed previous shoulder x-rays  Treatment: Activities as tolerated, home exercises given today   Job: as tolerated   Medications/Injections: Limited tylenol/ibuprofen for pain for 1-2 weeks, Topical Voltaren gel, repeat left shoulder joint steroid injection  Follow-up: In one month if symptoms do not improve, sooner if worsening  Can consider steroid injection in different location in shoulder         Noah Heller MD  [facility]    -----  Chief Complaint   Patient presents with     Left Shoulder - Follow Up, Pain       Interim History - September 22, 2023  Since last visit on March 10, 2023 patient has moderate-severe left shoulder glenohumeral joint.  Left shoulder glenohumeral steroid injection completed on 3/10/23 provided relief for ~ 5+ months.  Patient notes worsening pain with lying on left shoulder and reaching overhead.  No new injury in the  "interim.    REVIEW OF SYSTEMS:  Review of Systems  Constitutional, HEENT, cardiovascular, pulmonary, gi and gu systems are negative, except as otherwise noted.    OBJECTIVE:  /66   Ht 1.772 m (5' 9.75\")   Wt 135.6 kg (299 lb)   BMI 43.21 kg/m     General: healthy, alert and in no distress  HEENT: no scleral icterus or conjunctival erythema  Skin: no suspicious lesions or rash. No jaundice.  CV: distal perfusion intact    Resp: normal respiratory effort without conversational dyspnea   Psych: normal mood and affect  Gait: normal steady gait with appropriate coordination and balance    Neuro: Normal light sensory exam of left upper extremity     Ortho Exam   LEFT HAND  Inspection:  Swelling over 5th digit  Palpation:   Carpals: normal   Metacarpals: normal   Thumb: normal   Fingers: PIP joint, DIP joint  Range of Motion:    Mild internal rotation, pain with end flexion 5th digit  Strength:     full, extension full, flexion full, abduction full, adduction full, opposition full  Special Tests:    Positive: none    Negative: flexor digitorum superficialis testing, flexor digitorum profundus testing    LEFT SHOULDER  Inspection:    no swelling, bruising, discoloration, or obvious deformity or asymmetry  Palpation:    Tender about the anterior/posterior shoulder. Remainder of bony and tendinous landmarks are nontender.  Active Range of Motion:     Abduction 1650, FF 1650, , IR L1.    Strength:    Scapular plane abduction 5/5,  ER 5/5, IR 5/5, biceps 5/5, triceps 5/5  Special Tests:    Positive: Neer's and Rascon'    Negative: supraspinatus (empty can)    CERVICAL SPINE  Inspection:    normal cervical lordosis present, rounded shoulders, forward head posture  Palpation:    Nontender.  Range of Motion:     Flexion full    Extension full    Right side bend full    Left side bend full    Right rotation full    Left rotation full  Strength:    Full strength throughout all neck muscles  Special Tests:    " Positive: None    Negative: Spurling's (bilateral)        RADIOLOGY:  I independently ordered, visualized and reviewed these images with the patient  Stable 5th digit avulsion fractures.      Review of external notes as documented elsewhere in note  Review of the result(s) of each unique test - reviewed left 5th digit x-rays       Disclaimer: This note consists of symbols derived from keyboarding, dictation and/or voice recognition software. As a result, there may be errors in the script that have gone undetected. Please consider this when interpreting information found in this chart.    Large Joint Injection/Arthocentesis: L glenohumeral joint    Date/Time: 9/22/2023 4:10 PM    Performed by: Noah Heller MD  Authorized by: Noah Heller MD    Indications:  Pain  Needle Size:  25 G  Guidance: ultrasound    Approach:  Posterolateral  Location:  Shoulder      Site:  L glenohumeral joint  Medications:  6 mg betamethasone acet & sod phos 6 (3-3) MG/ML; 4 mL ROPivacaine 5 MG/ML  Outcome:  Tolerated well, no immediate complications  Procedure discussed: discussed risks, benefits, and alternatives    Consent Given by:  Patient  Timeout: timeout called immediately prior to procedure    Prep: patient was prepped and draped in usual sterile fashion     Ultrasound images of procedure were permanently stored.     Patient reported some improvement of pain after the numbing portion left glenohumeral joint steroid injection.  Ultrasound guided images were permanently stored.   Aftercare instructions given to patient.  Plan to follow-up as discussed above.     Noah Heller MD [facility]          Patient's conditions were thoroughly discussed during today's visit with greater than 50% of the visit spent counseling the patient with total time spent face-to-face with the patient being 30 minutes.              Again, thank you for allowing me to participate in the care of your patient.         Sincerely,        Noah Heller MD

## 2023-09-22 NOTE — PROGRESS NOTES
"ASSESSMENT & PLAN    Tommy was seen today for follow up and pain.    Diagnoses and all orders for this visit:    Chronic left shoulder pain  -     Large Joint Injection/Arthocentesis: L glenohumeral joint      This issue is acute on chronic and Worsening.    # Chronic Left Shoulder Pain: long-standing condition for patient has had condition for 9+ months. Last shoulder joint steroid injection on 3/10/23 provided 5+ months of pain relief. He does have pain in anterior/posterior portions of his shoulder with intact range of motion and no weakness on rotator cuff testing. Reviewed previous x-rays showing no significant arthritis though may have some early degeneration contributed pain. Given this plan to treat as below and follow-up as needed in 3-4 weeks.     Image Findings: reviewed previous shoulder x-rays  Treatment: Activities as tolerated, home exercises given today   Job: as tolerated   Medications/Injections: Limited tylenol/ibuprofen for pain for 1-2 weeks, Topical Voltaren gel, repeat left shoulder joint steroid injection  Follow-up: In one month if symptoms do not improve, sooner if worsening  Can consider steroid injection in different location in shoulder         Noah Heller MD  [facility] [place]    -----  Chief Complaint   Patient presents with    Left Shoulder - Follow Up, Pain       Interim History - September 22, 2023  Since last visit on March 10, 2023 patient has moderate-severe left shoulder glenohumeral joint.  Left shoulder glenohumeral steroid injection completed on 3/10/23 provided relief for ~ 5+ months.  Patient notes worsening pain with lying on left shoulder and reaching overhead.  No new injury in the interim.    REVIEW OF SYSTEMS:  Review of Systems  Constitutional, HEENT, cardiovascular, pulmonary, gi and gu systems are negative, except as otherwise noted.    OBJECTIVE:  /66   Ht 1.772 m (5' 9.75\")   Wt 135.6 kg (299 lb)   BMI 43.21 kg/m   "   General: healthy, alert and in no distress  HEENT: no scleral icterus or conjunctival erythema  Skin: no suspicious lesions or rash. No jaundice.  CV: distal perfusion intact    Resp: normal respiratory effort without conversational dyspnea   Psych: normal mood and affect  Gait: normal steady gait with appropriate coordination and balance    Neuro: Normal light sensory exam of left upper extremity     Ortho Exam   LEFT HAND  Inspection:  Swelling over 5th digit  Palpation:   Carpals: normal   Metacarpals: normal   Thumb: normal   Fingers: PIP joint, DIP joint  Range of Motion:    Mild internal rotation, pain with end flexion 5th digit  Strength:     full, extension full, flexion full, abduction full, adduction full, opposition full  Special Tests:    Positive: none    Negative: flexor digitorum superficialis testing, flexor digitorum profundus testing    LEFT SHOULDER  Inspection:    no swelling, bruising, discoloration, or obvious deformity or asymmetry  Palpation:    Tender about the anterior/posterior shoulder. Remainder of bony and tendinous landmarks are nontender.  Active Range of Motion:     Abduction 1650, FF 1650, , IR L1.    Strength:    Scapular plane abduction 5/5,  ER 5/5, IR 5/5, biceps 5/5, triceps 5/5  Special Tests:    Positive: Neer's and Rascon'    Negative: supraspinatus (empty can)    CERVICAL SPINE  Inspection:    normal cervical lordosis present, rounded shoulders, forward head posture  Palpation:    Nontender.  Range of Motion:     Flexion full    Extension full    Right side bend full    Left side bend full    Right rotation full    Left rotation full  Strength:    Full strength throughout all neck muscles  Special Tests:    Positive: None    Negative: Spurling's (bilateral)        RADIOLOGY:  I independently ordered, visualized and reviewed these images with the patient  Stable 5th digit avulsion fractures.      Review of external notes as documented elsewhere in note  Review  of the result(s) of each unique test - reviewed left 5th digit x-rays       Disclaimer: This note consists of symbols derived from keyboarding, dictation and/or voice recognition software. As a result, there may be errors in the script that have gone undetected. Please consider this when interpreting information found in this chart.    Large Joint Injection/Arthocentesis: L glenohumeral joint    Date/Time: 9/22/2023 4:10 PM    Performed by: Noah Heller MD  Authorized by: Noah Heller MD    Indications:  Pain  Needle Size:  25 G  Guidance: ultrasound    Approach:  Posterolateral  Location:  Shoulder      Site:  L glenohumeral joint  Medications:  6 mg betamethasone acet & sod phos 6 (3-3) MG/ML; 4 mL ROPivacaine 5 MG/ML  Outcome:  Tolerated well, no immediate complications  Procedure discussed: discussed risks, benefits, and alternatives    Consent Given by:  Patient  Timeout: timeout called immediately prior to procedure    Prep: patient was prepped and draped in usual sterile fashion     Ultrasound images of procedure were permanently stored.     Patient reported some improvement of pain after the numbing portion left glenohumeral joint steroid injection.  Ultrasound guided images were permanently stored.   Aftercare instructions given to patient.  Plan to follow-up as discussed above.     Noah Heller MD [facility]          Patient's conditions were thoroughly discussed during today's visit with greater than 50% of the visit spent counseling the patient with total time spent face-to-face with the patient being 30 minutes.

## 2023-09-22 NOTE — PATIENT INSTRUCTIONS
# Chronic Left Shoulder Pain: long-standing condition for patient has had condition for 9+ months. Last shoulder joint steroid injection on 3/10/23 provided 5+ months of pain relief. He does have pain in anterior/posterior portions of his shoulder with intact range of motion and no weakness on rotator cuff testing. Reviewed previous x-rays showing no significant arthritis though may have some early degeneration contributed pain. Given this plan to treat as below and follow-up as needed in 3-4 weeks.     Image Findings: reviewed previous shoulder x-rays  Treatment: Activities as tolerated, home exercises given today   Job: as tolerated   Medications/Injections: Limited tylenol/ibuprofen for pain for 1-2 weeks, Topical Voltaren gel, repeat left shoulder joint steroid injection  Follow-up: In one month if symptoms do not improve, sooner if worsening  Can consider steroid injection in different location in shoulder    Please call [phone]   Ask for my team if you have any questions or concerns    If you have not yet received the influenza vaccine but would like to get one, please call  [phone] or you can schedule via [employer]    It was great seeing you again today!    Noah Heller MD, [facility]     FS Injection Discharge Instructions    Procedure: left shoulder joint steroid injection    You may shower, however avoid swimming, tub baths or hot tubs for 24 hours following your procedure  You may have a mild to moderate increase in pain for several days following the injection.  It may take up to 14 days for the steroid medication to start working although you may feel the effect as early as a few days after the procedure.  You may use ice packs for 10-15 minutes, 3 to 4 times a day at the injection site for comfort  You may use anti-inflammatory medications (such as Ibuprofen or Aleve or Advil) or Tylenol for pain control if necessary  If you were fasting, you may resume your normal diet and medications  after the procedure  If you have diabetes, check your blood sugar more frequently than usual as your blood sugar may be higher than normal for 10-14 days following a steroid injection. Contact your doctor who manages your diabetes if your blood sugar is higher than usual    If you experience any of the following, [place] [facility] [place] [phone] or [phone]  -Fever over 100 degree F  -Swelling, bleeding, redness, drainage, warmth at the injection site  - New or worsening pain

## 2023-09-25 RX ORDER — ROPIVACAINE HYDROCHLORIDE 5 MG/ML
4 INJECTION, SOLUTION EPIDURAL; INFILTRATION; PERINEURAL
Status: SHIPPED | OUTPATIENT
Start: 2023-09-22

## 2023-09-25 RX ORDER — BETAMETHASONE SODIUM PHOSPHATE AND BETAMETHASONE ACETATE 3; 3 MG/ML; MG/ML
6 INJECTION, SUSPENSION INTRA-ARTICULAR; INTRALESIONAL; INTRAMUSCULAR; SOFT TISSUE
Status: SHIPPED | OUTPATIENT
Start: 2023-09-22

## 2023-10-09 ENCOUNTER — TELEPHONE (OUTPATIENT)
Dept: CARDIOLOGY | Facility: CLINIC | Age: 68
End: 2023-10-09

## 2023-10-09 ENCOUNTER — OFFICE VISIT (OUTPATIENT)
Dept: CARDIOLOGY | Facility: CLINIC | Age: 68
End: 2023-10-09
Attending: NURSE PRACTITIONER
Payer: MEDICARE

## 2023-10-09 VITALS
OXYGEN SATURATION: 96 % | BODY MASS INDEX: 43.23 KG/M2 | HEART RATE: 51 BPM | WEIGHT: 302 LBS | DIASTOLIC BLOOD PRESSURE: 74 MMHG | RESPIRATION RATE: 20 BRPM | SYSTOLIC BLOOD PRESSURE: 118 MMHG | HEIGHT: 70 IN

## 2023-10-09 DIAGNOSIS — I87.2 VENOUS (PERIPHERAL) INSUFFICIENCY: ICD-10-CM

## 2023-10-09 DIAGNOSIS — I25.10 NONOBSTRUCTIVE ATHEROSCLEROSIS OF CORONARY ARTERY: ICD-10-CM

## 2023-10-09 DIAGNOSIS — I47.10 PAROXYSMAL SUPRAVENTRICULAR TACHYCARDIA (H): ICD-10-CM

## 2023-10-09 DIAGNOSIS — R06.09 DOE (DYSPNEA ON EXERTION): ICD-10-CM

## 2023-10-09 DIAGNOSIS — I47.10 SVT (SUPRAVENTRICULAR TACHYCARDIA) (H): ICD-10-CM

## 2023-10-09 DIAGNOSIS — N94.89 PELVIC CONGESTION SYNDROME: Primary | ICD-10-CM

## 2023-10-09 PROCEDURE — 99215 OFFICE O/P EST HI 40 MIN: CPT | Performed by: INTERNAL MEDICINE

## 2023-10-09 ASSESSMENT — PAIN SCALES - GENERAL: PAINLEVEL: NO PAIN (0)

## 2023-10-09 NOTE — LETTER
10/9/2023    Nicolas Harris MD  [address]              [place] [address]    RE: Tommy De La O       Dear Colleague,     I had the pleasure of seeing Tommy De La O in the [facility].           Vascular Cardiology       HPI:     This is a pleasant 68 yr old male with no major PMH here for evaluation of cardiac history.      Father - had bypass and then  57 yrs old of MI  Brother - had bypass   Mother - lived to 93   P. Uncle  60s of MI  P. Grandmother  of MI  Multiple cousins with heart attacks      Had some CHUNG and had ziopatch with SVT, longest run 14 minutes. Some ventricular ectopy. Had stress echocardiogram, with lots of ventricular ectopy, no chest pain. Borderline size of the aorta at 3.8 cm.      On ROS: no lens dislocation, normal eye-width, normal uvula, normal palate, hypermobility in thumb joints, no skin translucency, normal skin, no vaginal prolapse or hernias, no abnormal wound healing, easy bruising or bleeding, no chest wall deformities, no dental crowding, normal stature.      Has history of chest pressure in the past, especially at night when having to do the firewood. Was having winded sensation as well.      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 Had ABIs in the past in 2016 that were normal. Had reflux studies, severely abnormal with proximal deep vein reflux, superficial reflux, no known history of DVT. Has thigh and pelvic varicose veins.     He saw carolyn and she recommended aspirin and statin. He started statin but not aspirin. He was also recommend compression which he stopped doing.    He is here today and we are reviewing his coronary CT scan as below.     The total Agatston calcium score is 241, Left  main: 0, left anterior descendin,  circumflex: 32.7, right  coronary artery: 80.6. This places the patient in the 63rd percentile  when compared to age and gender  matched control group.  LEFT ANTERIOR DESCENDING: The left anterior descending has a mildly  obstructive calcified plaque in the proximal segment. The remainder of  the LAD has minimal irregularities.  CIRCUMFLEX: There is a ramius intermedius or high first diagonal  branch which appears moderately obstructive with calcified plaque. The  mid circumflex has mildly obstructive soft plaque. The obtuse marginal  branches have no significant disease.  RIGHT CORONARY ARTERY: Right coronary is dominant. In the mid segment  is a moderately obstructive mixed plaque. The distal RCA has a mild to  moderately obstructive calcified plaque. PDA and PL branches are free  segment of significant disease.    Having a problem with sugar. Craving it everyday. Weight fluctuating as a result.     We reviewed vein competency study as below.    Narrative & Impression   US VENOUS COMPETENCY BILATERAL 5/24/2023                           Impression:  1. Right leg: No DVT. Severe femoral vein 2.7 seconds, popliteal vein  6.3 seconds reflux. Greater saphenous vein in the thigh is not  visualized. Pelvic source varicose veins 3.8 mm, 4.9 seconds with  severe reflux cascading down the medial thigh and reconnecting to the  distal greater saphenous vein stump.  2. Left leg: No DVT. Severe femoral vein 4.9 seconds, popliteal vein  6.9 seconds reflux. Competent small saphenous vein. Absent greater  saphenous vein. Calf  measuring 5.0 mm, 1.9 seconds with  associated severely refluxing varicose veins in the medial aspect 4.6  mm, 7.3 seconds.     Has collapsed bladder as well.     We also obtained stress test which was negative for ischemia, had frequent PVCs. Not on beta blocker.      ASSESSMENT/PLAN:      1. Chest pressure: reviewed stress test and cor cta, moderate disease, LAD burden, will follow closely   -has mildly dilated aorta to 3.8   -stress test was normal  -risk factor modification  -no longer on aspirin per preference    -discussed dietary changes     2. Echocardiogram overall wnl, no CMY     3. Chronic vein insufficiency: deep vein reflux, varicose veins in the pelvis. Would consider proximal vein obstruction or IVC obstruction. Has collapsed bladder as well. Obtain CT venogram.      4. Claudication vs neuropathic pain - DARINEL with exercise for leg pain, do not suspect severe PAD. OK with compression stockings, suspect venous reflux causing symptoms.     5. SVT / PVCs- monitor, likely start beta blocker in future if sxs and depending on results above      Follow up in 6 months. If ct venogram is abnormal then will obtain consult with vascular IR if proximal obstruction present.     Abbey Ortega MD [facility]  [facility]       Total time spent on complex clinic patient visit is more than 55 minutes, which includes face-to-face patient evaluation and physical exam, review of imaging studies and laboratory data, counseling with patient, review of outside records, and documentation of patient encounter       PAST MEDICAL HISTORY    CAD  Vein reflux     CURRENT MEDICATIONS  Current Outpatient Medications   Medication Sig Dispense Refill    albuterol (PROAIR HFA/PROVENTIL HFA/VENTOLIN HFA) 108 (90 Base) MCG/ACT inhaler Inhale 2 puffs into the lungs every 6 hours as needed for shortness of breath, wheezing or cough 18 g 3    chlorthalidone (HYGROTON) 25 MG tablet Take 1 tablet (25 mg) by mouth daily 90 tablet 2    clobetasol (TEMOVATE) 0.05 % external ointment Apply topically daily as needed For itching 60 g 0    fluconazole (DIFLUCAN) 200 MG tablet Take 1 tablet (200 mg) by mouth once a week 4 tablet 0    FLUoxetine (PROZAC) 20 MG capsule TAKE 1 CAPSULE(20 MG) BY MOUTH DAILY 90 capsule 1    gabapentin (NEURONTIN) 300 MG capsule TAKE 1 CAPSULE THREE TIMES A  capsule 3    losartan (COZAAR) 25 MG tablet Take 1 tablet (25 mg) by mouth daily Hold until patient calls for refill. 90 tablet 3    omeprazole (PRILOSEC) 40 MG   capsule TAKE 1 CAPSULE DAILY 30 TO 60 MINUTES BEFORE A MEAL 90 capsule 3    rosuvastatin (CRESTOR) 40 MG tablet Take 1 tablet (40 mg) by mouth daily 30 tablet 11    vitamin C (ASCORBIC ACID) 1000 MG TABS Take 1,000 mg by mouth daily      Vitamin D3 (CHOLECALCIFEROL) 125 MCG (5000 UT) tablet Take 1 tablet by mouth daily      aspirin (ASA) 81 MG chewable tablet Take 1 tablet (81 mg) by mouth daily         PAST SURGICAL HISTORY:  Past Surgical History:   Procedure Laterality Date    ARTHROSCOPY KNEE Bilateral     both knees with arthroscopy in the past.     ARTHROSCOPY KNEE      ARTHROSCOPY SHOULDER ROTATOR CUFF REPAIR      AS TOTAL KNEE ARTHROPLASTY Bilateral     Both total knees.     LENGTHEN TENDON ACHILLES Left     REPAIR TENDON ACHILLES      1980s two separate surgeries.    ROTATOR CUFF REPAIR RT/LT Left     [facility] TOTAL KNEE ARTHROPLASTY Right 3/17/2015    Procedure: RIGHT KNEE TOTAL ARTHROPLASTY;  Surgeon: Jaiden Barnes MD;  Location: [facility];  Service: Orthopedics    [facility] TOTAL KNEE ARTHROPLASTY Left 6/30/2015    Procedure: LEFT KNEE TOTAL ARTHROPLASTY;  Surgeon: Jaiden Barnes MD;  Location: [facility];  Service: Orthopedics       ALLERGIES     Allergies   Allergen Reactions    Amlodipine Swelling    Cefzil [Cefprozil] Hives and Itching    Darvocet [Propoxyphene N-Apap] Hives    Penicillins Hives       FAMILY HISTORY  Family History   Problem Relation Age of Onset    Coronary Artery Disease Father     Hyperlipidemia Mother     Prostate Cancer No family hx of     Colon Cancer No family hx of     Hypertension Mother     Heart Disease Mother     Heart Disease Father     Hypertension Sister     Cancer Brother     No Known Problems Daughter     No Known Problems Son     Cancer Maternal Grandmother     Vision Loss Maternal Grandmother     No Known Problems Sister     Cancer Brother     Hypertension Brother     Hypertension Brother     Cancer Brother     Sleep Apnea Brother     No Known Problems  "Brother     No Known Problems Daughter        SOCIAL HISTORY  Social History     Socioeconomic History    Marital status: Single     Spouse name: Not on file    Number of children: Not on file    Years of education: Not on file    Highest education level: Not on file   Occupational History    Not on file   Tobacco Use    Smoking status: Former     Packs/day: 1.00     Years: 30.00     Pack years: 30.00     Types: Cigarettes     Quit date: 2011     Years since quittin.6    Smokeless tobacco: Never    Tobacco comments:     started at age 22   Vaping Use    Vaping Use: Never used   Substance and Sexual Activity    Alcohol use: Yes    Drug use: No    Sexual activity: Yes     Partners: Female   Other Topics Concern    Parent/sibling w/ CABG, MI or angioplasty before 65F 55M? Not Asked   Social History Narrative    Not on file     Social Determinants of Health     Financial Resource Strain: Not on file   Food Insecurity: Not on file   Transportation Needs: Not on file   Physical Activity: Not on file   Stress: Not on file   Social Connections: Not on file   Interpersonal Safety: Not on file   Housing Stability: Not on file       ROS:   Constitutional: No fever, chills, or sweats. No weight gain/loss   ENT: No visual disturbance, ear ache, epistaxis, sore throat  Allergies/Immunologic: Negative  Respiratory: No cough, hemoptysia  Cardiovascular: As per HPI  GI: No nausea, vomiting, hematemesis, melena, or hematochezia  : No urinary frequency, dysuria, or hematuria  Integument: Negative  Psychiatric: Negative  Neuro: Negative  Endocrinology: Negative   Musculoskeletal: Negative  Vascular: No walking impairment, claudication, ischemic rest pain or nonhealing wounds    EXAM:  /74 (BP Location: Right arm, Patient Position: Sitting, Cuff Size: Adult Large)   Pulse 51   Resp 20   Ht 1.772 m (5' 9.75\")   Wt 137 kg (302 lb)   SpO2 96%   BMI 43.64 kg/m    In general, the patient is a pleasant male in no " apparent distress.    HEENT: NC/AT.  PERRLA.  EOMI.  Sclerae white, not injected.  Nares clear.  Pharynx without erythema or exudate.  Dentition intact.    Neck: No adenopathy.  No thyromegaly. Carotids +2/2 bilaterally without bruits.  No jugular venous distension.   Heart: RRR. Normal S1, S2 splits physiologically. No murmur, rub, click, or gallop. The PMI is in the 5th ICS in the midclavicular line. There is no heave.    Lungs: CTA.  No ronchi, wheezes, rales.  No dullness to percussion.   Abdomen: Soft, nontender, nondistended. No organomegaly. No AAA.  No bruits.   Extremities: No clubbing, cyanosis, trace edema.  No wounds. + varicose veins signs of chronic venous insufficiency.   Vascular: No bruits are noted.    Labs:  LIPID RESULTS:  Lab Results   Component Value Date    CHOL 119 08/15/2023    CHOL 158 02/18/2021    HDL 36 (L) 08/15/2023    HDL 46 02/18/2021    LDL 55 08/15/2023    LDL 76 02/18/2021    TRIG 140 08/15/2023    TRIG 181 (H) 02/18/2021    NHDL 83 08/15/2023    NHDL 112 02/18/2021       LIVER ENZYME RESULTS:  Lab Results   Component Value Date    AST 32 12/28/2022    AST 28 06/08/2018    ALT 25 08/15/2023    ALT 45 06/08/2018       CBC RESULTS:  Lab Results   Component Value Date    WBC 7.3 12/28/2022    WBC 6.4 05/08/2017    RBC 4.84 12/28/2022    RBC 4.97 05/08/2017    HGB 14.7 12/28/2022    HGB 14.9 05/08/2017    HCT 43.1 12/28/2022    HCT 43.1 05/08/2017    MCV 89 12/28/2022    MCV 87 05/08/2017    MCH 30.4 12/28/2022    MCH 30.0 05/08/2017    MCHC 34.1 12/28/2022    MCHC 34.6 05/08/2017    RDW 12.8 12/28/2022    RDW 13.0 05/08/2017     12/28/2022     05/08/2017       BMP RESULTS:  Lab Results   Component Value Date     (L) 12/28/2022     02/18/2021    POTASSIUM 3.5 12/28/2022    POTASSIUM 3.2 (L) 02/23/2022    POTASSIUM 3.3 (L) 02/18/2021    CHLORIDE 99 12/28/2022    CHLORIDE 101 02/23/2022    CHLORIDE 104 02/18/2021    CO2 25 12/28/2022    CO2 32 02/23/2022    CO2  28 02/18/2021    ANIONGAP 10 12/28/2022    ANIONGAP 2 (L) 02/23/2022    ANIONGAP 6 02/18/2021    GLC 99 12/28/2022     (H) 02/23/2022    GLC 91 02/18/2021    BUN 14.7 12/28/2022    BUN 16 02/23/2022    BUN 18 02/18/2021    CR 0.75 12/28/2022    CR 0.79 02/18/2021    GFRESTIMATED >90 12/28/2022    GFRESTIMATED >90 02/18/2021    GFRESTBLACK >90 02/18/2021    KIMBERLY 9.5 12/28/2022    KIMBERLY 9.1 02/18/2021        A1C RESULTS:  Lab Results   Component Value Date    A1C 5.6 03/07/2017         Thank you for allowing me to participate in the care of your patient.      Sincerely,     Abbey Ortega MD     [facility]  cc:   JUAN LUIS Alcala CNP  [address] [place] [address]

## 2023-10-09 NOTE — TELEPHONE ENCOUNTER
[facility] Call Center    Phone Message    May a detailed message be left on voicemail: yes     Reason for Call: Other: Please call pt to reschedule 10/12/23 CTV Abdomen Pelvis w Contrast     Action Taken: Other: cardio    Travel Screening: Not Applicable    Thank you!  [facility]

## 2023-10-09 NOTE — PROGRESS NOTES
Vascular Cardiology       HPI:     This is a pleasant 68 yr old male with no major PMH here for evaluation of cardiac history.      Father - had bypass and then  57 yrs old of MI  Brother - had bypass   Mother - lived to 93   P. Uncle  60s of MI  P. Grandmother  of MI  Multiple cousins with heart attacks      Had some CHUNG and had ziopatch with SVT, longest run 14 minutes. Some ventricular ectopy. Had stress echocardiogram, with lots of ventricular ectopy, no chest pain. Borderline size of the aorta at 3.8 cm.      On ROS: no lens dislocation, normal eye-width, normal uvula, normal palate, hypermobility in thumb joints, no skin translucency, normal skin, no vaginal prolapse or hernias, no abnormal wound healing, easy bruising or bleeding, no chest wall deformities, no dental crowding, normal stature.      Has history of chest pressure in the past, especially at night when having to do the firewood. Was having winded sensation as well.      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 Had ABIs in the past in 2016 that were normal. Had reflux studies, severely abnormal with proximal deep vein reflux, superficial reflux, no known history of DVT. Has thigh and pelvic varicose veins.     He saw carolyn and she recommended aspirin and statin. He started statin but not aspirin. He was also recommend compression which he stopped doing.    He is here today and we are reviewing his coronary CT scan as below.     The total Agatston calcium score is 241, Left  main: 0, left anterior descendin,  circumflex: 32.7, right  coronary artery: 80.6. This places the patient in the 63rd percentile  when compared to age and gender matched control group.  LEFT ANTERIOR DESCENDING: The left anterior descending has a mildly  obstructive calcified plaque in the proximal segment. The remainder of  the LAD has minimal irregularities.  CIRCUMFLEX: There  is a ramius intermedius or high first diagonal  branch which appears moderately obstructive with calcified plaque. The  mid circumflex has mildly obstructive soft plaque. The obtuse marginal  branches have no significant disease.  RIGHT CORONARY ARTERY: Right coronary is dominant. In the mid segment  is a moderately obstructive mixed plaque. The distal RCA has a mild to  moderately obstructive calcified plaque. PDA and PL branches are free  segment of significant disease.    Having a problem with sugar. Craving it everyday. Weight fluctuating as a result.     We reviewed vein competency study as below.    Narrative & Impression   US VENOUS COMPETENCY BILATERAL 5/24/2023                           Impression:  1. Right leg: No DVT. Severe femoral vein 2.7 seconds, popliteal vein  6.3 seconds reflux. Greater saphenous vein in the thigh is not  visualized. Pelvic source varicose veins 3.8 mm, 4.9 seconds with  severe reflux cascading down the medial thigh and reconnecting to the  distal greater saphenous vein stump.  2. Left leg: No DVT. Severe femoral vein 4.9 seconds, popliteal vein  6.9 seconds reflux. Competent small saphenous vein. Absent greater  saphenous vein. Calf  measuring 5.0 mm, 1.9 seconds with  associated severely refluxing varicose veins in the medial aspect 4.6  mm, 7.3 seconds.     Has collapsed bladder as well.     We also obtained stress test which was negative for ischemia, had frequent PVCs. Not on beta blocker.      ASSESSMENT/PLAN:      1. Chest pressure: reviewed stress test and cor cta, moderate disease, LAD burden, will follow closely   -has mildly dilated aorta to 3.8   -stress test was normal  -risk factor modification  -no longer on aspirin per preference   -discussed dietary changes     2. Echocardiogram overall wnl, no CMY     3. Chronic vein insufficiency: deep vein reflux, varicose veins in the pelvis. Would consider proximal vein obstruction or IVC obstruction. Has collapsed  bladder as well. Obtain CT venogram.      4. Claudication vs neuropathic pain - DARINEL with exercise for leg pain, do not suspect severe PAD. OK with compression stockings, suspect venous reflux causing symptoms.     5. SVT / PVCs- monitor, likely start beta blocker in future if sxs and depending on results above      Follow up in 6 months. If ct venogram is abnormal then will obtain consult with vascular IR if proximal obstruction present.     Abbey Ortega MD MSC  [facility]       Total time spent on complex clinic patient visit is more than 55 minutes, which includes face-to-face patient evaluation and physical exam, review of imaging studies and laboratory data, counseling with patient, review of outside records, and documentation of patient encounter       PAST MEDICAL HISTORY    CAD  Vein reflux     CURRENT MEDICATIONS  Current Outpatient Medications   Medication Sig Dispense Refill    albuterol (PROAIR HFA/PROVENTIL HFA/VENTOLIN HFA) 108 (90 Base) MCG/ACT inhaler Inhale 2 puffs into the lungs every 6 hours as needed for shortness of breath, wheezing or cough 18 g 3    chlorthalidone (HYGROTON) 25 MG tablet Take 1 tablet (25 mg) by mouth daily 90 tablet 2    clobetasol (TEMOVATE) 0.05 % external ointment Apply topically daily as needed For itching 60 g 0    fluconazole (DIFLUCAN) 200 MG tablet Take 1 tablet (200 mg) by mouth once a week 4 tablet 0    FLUoxetine (PROZAC) 20 MG capsule TAKE 1 CAPSULE(20 MG) BY MOUTH DAILY 90 capsule 1    gabapentin (NEURONTIN) 300 MG capsule TAKE 1 CAPSULE THREE TIMES A  capsule 3    losartan (COZAAR) 25 MG tablet Take 1 tablet (25 mg) by mouth daily Hold until patient calls for refill. 90 tablet 3    omeprazole (PRILOSEC) 40 MG DR capsule TAKE 1 CAPSULE DAILY 30 TO 60 MINUTES BEFORE A MEAL 90 capsule 3    rosuvastatin (CRESTOR) 40 MG tablet Take 1 tablet (40 mg) by mouth daily 30 tablet 11    vitamin C (ASCORBIC ACID) 1000 MG TABS Take 1,000 mg by mouth daily       Vitamin D3 (CHOLECALCIFEROL) 125 MCG (5000 UT) tablet Take 1 tablet by mouth daily      aspirin (ASA) 81 MG chewable tablet Take 1 tablet (81 mg) by mouth daily         PAST SURGICAL HISTORY:  Past Surgical History:   Procedure Laterality Date    ARTHROSCOPY KNEE Bilateral     both knees with arthroscopy in the past.     ARTHROSCOPY KNEE      ARTHROSCOPY SHOULDER ROTATOR CUFF REPAIR      AS TOTAL KNEE ARTHROPLASTY Bilateral     Both total knees.     LENGTHEN TENDON ACHILLES Left     REPAIR TENDON ACHILLES      1980s two separate surgeries.    ROTATOR CUFF REPAIR RT/LT Left     [facility] TOTAL KNEE ARTHROPLASTY Right 3/17/2015    Procedure: RIGHT KNEE TOTAL ARTHROPLASTY;  Surgeon: Jaiden Barnes MD;  Location: [facility] OR;  Service: Orthopedics    [facility] TOTAL KNEE ARTHROPLASTY Left 6/30/2015    Procedure: LEFT KNEE TOTAL ARTHROPLASTY;  Surgeon: Jaiden Barnes MD;  Location: [facility];  Service: Orthopedics       ALLERGIES     Allergies   Allergen Reactions    Amlodipine Swelling    Cefzil [Cefprozil] Hives and Itching    Darvocet [Propoxyphene N-Apap] Hives    Penicillins Hives       FAMILY HISTORY  Family History   Problem Relation Age of Onset    Coronary Artery Disease Father     Hyperlipidemia Mother     Prostate Cancer No family hx of     Colon Cancer No family hx of     Hypertension Mother     Heart Disease Mother     Heart Disease Father     Hypertension Sister     Cancer Brother     No Known Problems Daughter     No Known Problems Son     Cancer Maternal Grandmother     Vision Loss Maternal Grandmother     No Known Problems Sister     Cancer Brother     Hypertension Brother     Hypertension Brother     Cancer Brother     Sleep Apnea Brother     No Known Problems Brother     No Known Problems Daughter        SOCIAL HISTORY  Social History     Socioeconomic History    Marital status: Single     Spouse name: Not on file    Number of children: Not on file    Years of education: Not on file     "Highest education level: Not on file   Occupational History    Not on file   Tobacco Use    Smoking status: Former     Packs/day: 1.00     Years: 30.00     Pack years: 30.00     Types: Cigarettes     Quit date: 2011     Years since quittin.6    Smokeless tobacco: Never    Tobacco comments:     started at age 22   Vaping Use    Vaping Use: Never used   Substance and Sexual Activity    Alcohol use: Yes    Drug use: No    Sexual activity: Yes     Partners: Female   Other Topics Concern    Parent/sibling w/ CABG, MI or angioplasty before 65F 55M? Not Asked   Social History Narrative    Not on file     Social Determinants of Health     Financial Resource Strain: Not on file   Food Insecurity: Not on file   Transportation Needs: Not on file   Physical Activity: Not on file   Stress: Not on file   Social Connections: Not on file   Interpersonal Safety: Not on file   Housing Stability: Not on file       ROS:   Constitutional: No fever, chills, or sweats. No weight gain/loss   ENT: No visual disturbance, ear ache, epistaxis, sore throat  Allergies/Immunologic: Negative  Respiratory: No cough, hemoptysia  Cardiovascular: As per HPI  GI: No nausea, vomiting, hematemesis, melena, or hematochezia  : No urinary frequency, dysuria, or hematuria  Integument: Negative  Psychiatric: Negative  Neuro: Negative  Endocrinology: Negative   Musculoskeletal: Negative  Vascular: No walking impairment, claudication, ischemic rest pain or nonhealing wounds    EXAM:  /74 (BP Location: Right arm, Patient Position: Sitting, Cuff Size: Adult Large)   Pulse 51   Resp 20   Ht 1.772 m (5' 9.75\")   Wt 137 kg (302 lb)   SpO2 96%   BMI 43.64 kg/m    In general, the patient is a pleasant male in no apparent distress.    HEENT: NC/AT.  PERRLA.  EOMI.  Sclerae white, not injected.  Nares clear.  Pharynx without erythema or exudate.  Dentition intact.    Neck: No adenopathy.  No thyromegaly. Carotids +2/2 bilaterally without bruits.  " No jugular venous distension.   Heart: RRR. Normal S1, S2 splits physiologically. No murmur, rub, click, or gallop. The PMI is in the 5th ICS in the midclavicular line. There is no heave.    Lungs: CTA.  No ronchi, wheezes, rales.  No dullness to percussion.   Abdomen: Soft, nontender, nondistended. No organomegaly. No AAA.  No bruits.   Extremities: No clubbing, cyanosis, trace edema.  No wounds. + varicose veins signs of chronic venous insufficiency.   Vascular: No bruits are noted.    Labs:  LIPID RESULTS:  Lab Results   Component Value Date    CHOL 119 08/15/2023    CHOL 158 02/18/2021    HDL 36 (L) 08/15/2023    HDL 46 02/18/2021    LDL 55 08/15/2023    LDL 76 02/18/2021    TRIG 140 08/15/2023    TRIG 181 (H) 02/18/2021    NHDL 83 08/15/2023    NHDL 112 02/18/2021       LIVER ENZYME RESULTS:  Lab Results   Component Value Date    AST 32 12/28/2022    AST 28 06/08/2018    ALT 25 08/15/2023    ALT 45 06/08/2018       CBC RESULTS:  Lab Results   Component Value Date    WBC 7.3 12/28/2022    WBC 6.4 05/08/2017    RBC 4.84 12/28/2022    RBC 4.97 05/08/2017    HGB 14.7 12/28/2022    HGB 14.9 05/08/2017    HCT 43.1 12/28/2022    HCT 43.1 05/08/2017    MCV 89 12/28/2022    MCV 87 05/08/2017    MCH 30.4 12/28/2022    MCH 30.0 05/08/2017    MCHC 34.1 12/28/2022    MCHC 34.6 05/08/2017    RDW 12.8 12/28/2022    RDW 13.0 05/08/2017     12/28/2022     05/08/2017       BMP RESULTS:  Lab Results   Component Value Date     (L) 12/28/2022     02/18/2021    POTASSIUM 3.5 12/28/2022    POTASSIUM 3.2 (L) 02/23/2022    POTASSIUM 3.3 (L) 02/18/2021    CHLORIDE 99 12/28/2022    CHLORIDE 101 02/23/2022    CHLORIDE 104 02/18/2021    CO2 25 12/28/2022    CO2 32 02/23/2022    CO2 28 02/18/2021    ANIONGAP 10 12/28/2022    ANIONGAP 2 (L) 02/23/2022    ANIONGAP 6 02/18/2021    GLC 99 12/28/2022     (H) 02/23/2022    GLC 91 02/18/2021    BUN 14.7 12/28/2022    BUN 16 02/23/2022    BUN 18 02/18/2021    CR  0.75 12/28/2022    CR 0.79 02/18/2021    GFRESTIMATED >90 12/28/2022    GFRESTIMATED >90 02/18/2021    GFRESTBLACK >90 02/18/2021    KIMBERLY 9.5 12/28/2022    KIMBERLY 9.1 02/18/2021        A1C RESULTS:  Lab Results   Component Value Date    A1C 5.6 03/07/2017

## 2023-10-24 ENCOUNTER — HOSPITAL ENCOUNTER (OUTPATIENT)
Dept: CT IMAGING | Facility: CLINIC | Age: 68
Discharge: HOME | End: 2023-10-24
Attending: INTERNAL MEDICINE | Admitting: INTERNAL MEDICINE
Payer: MEDICARE

## 2023-10-24 DIAGNOSIS — N94.89 PELVIC CONGESTION SYNDROME: ICD-10-CM

## 2023-10-24 DIAGNOSIS — I87.2 VENOUS (PERIPHERAL) INSUFFICIENCY: ICD-10-CM

## 2023-10-24 PROCEDURE — G1010 CDSM STANSON: HCPCS

## 2023-10-24 PROCEDURE — 250N000009 HC RX 250: Performed by: INTERNAL MEDICINE

## 2023-10-24 PROCEDURE — 250N000011 HC RX IP 250 OP 636: Performed by: INTERNAL MEDICINE

## 2023-10-24 RX ORDER — IOPAMIDOL 755 MG/ML
72 INJECTION, SOLUTION INTRAVASCULAR ONCE
Status: COMPLETED | OUTPATIENT
Start: 2023-10-24 | End: 2023-10-24

## 2023-10-24 RX ADMIN — SODIUM CHLORIDE 80 ML: 9 INJECTION, SOLUTION INTRAVENOUS at 11:14

## 2023-10-24 RX ADMIN — IOPAMIDOL 72 ML: 755 INJECTION, SOLUTION INTRAVENOUS at 11:14

## 2023-10-25 ENCOUNTER — TELEPHONE (OUTPATIENT)
Dept: CARDIOLOGY | Facility: CLINIC | Age: 68
End: 2023-10-25
Payer: MEDICARE

## 2023-10-25 NOTE — TELEPHONE ENCOUNTER
"Routing CTV to Dr. Ortega-    Per last clinic note - \"Chronic vein insufficiency: deep vein reflux, varicose veins in the pelvis. Would consider proximal vein obstruction or IVC obstruction. Has collapsed bladder as well. Obtain CT venogram.\"    CTV results-  Limited evaluation of the deep veins of the abdomen and  pelvis. No definite significant extrinsic compression noted.    Looks to be normal but study was limited.     Any further testing or change in plan?    Casie Esparza RN  "

## 2023-10-30 NOTE — TELEPHONE ENCOUNTER
[facility] msg sent to patient with results under result note for CTV abdomen/pelvis from 10/24/23    Casie Esparza RN

## 2024-01-04 ENCOUNTER — PATIENT OUTREACH (OUTPATIENT)
Dept: CARE COORDINATION | Facility: CLINIC | Age: 69
End: 2024-01-04
Payer: MEDICARE

## 2024-01-05 ENCOUNTER — OFFICE VISIT (OUTPATIENT)
Dept: ORTHOPEDICS | Facility: CLINIC | Age: 69
End: 2024-01-05
Payer: MEDICARE

## 2024-01-05 VITALS
WEIGHT: 306 LBS | HEART RATE: 49 BPM | HEIGHT: 71 IN | DIASTOLIC BLOOD PRESSURE: 91 MMHG | BODY MASS INDEX: 42.84 KG/M2 | SYSTOLIC BLOOD PRESSURE: 143 MMHG

## 2024-01-05 DIAGNOSIS — M25.512 CHRONIC LEFT SHOULDER PAIN: Primary | ICD-10-CM

## 2024-01-05 DIAGNOSIS — G89.29 CHRONIC LEFT SHOULDER PAIN: Primary | ICD-10-CM

## 2024-01-05 PROCEDURE — 20611 DRAIN/INJ JOINT/BURSA W/US: CPT | Mod: LT | Performed by: FAMILY MEDICINE

## 2024-01-05 RX ORDER — BETAMETHASONE SODIUM PHOSPHATE AND BETAMETHASONE ACETATE 3; 3 MG/ML; MG/ML
6 INJECTION, SUSPENSION INTRA-ARTICULAR; INTRALESIONAL; INTRAMUSCULAR; SOFT TISSUE
Status: SHIPPED | OUTPATIENT
Start: 2024-01-05

## 2024-01-05 RX ORDER — ROPIVACAINE HYDROCHLORIDE 5 MG/ML
4 INJECTION, SOLUTION EPIDURAL; INFILTRATION; PERINEURAL
Status: SHIPPED | OUTPATIENT
Start: 2024-01-05

## 2024-01-05 RX ADMIN — BETAMETHASONE SODIUM PHOSPHATE AND BETAMETHASONE ACETATE 6 MG: 3; 3 INJECTION, SUSPENSION INTRA-ARTICULAR; INTRALESIONAL; INTRAMUSCULAR; SOFT TISSUE at 11:24

## 2024-01-05 RX ADMIN — ROPIVACAINE HYDROCHLORIDE 4 ML: 5 INJECTION, SOLUTION EPIDURAL; INFILTRATION; PERINEURAL at 11:24

## 2024-01-05 ASSESSMENT — PAIN SCALES - GENERAL: PAINLEVEL: MILD PAIN (3)

## 2024-01-05 NOTE — PATIENT INSTRUCTIONS
# Chronic Left Shoulder Pain: long-standing condition for patient has had condition for 1+ years. Last shoulder joint steroid injection on 9/22/23 provided 3 months of pain relief. He does have pain in anterior/posterior portions of his shoulder with intact range of motion and no weakness on rotator cuff testing. Reviewed previous x-rays showing mild shoulder joint arthritis. Pain is more anterior now in the setting of unloading wood speaking more towards irritated rotator cuff tendon. Given this plan to treat as below and follow-up as needed.     Image Findings: reviewed previous shoulder x-rays  Treatment: Activities as tolerated, home exercises given today   Job: as tolerated   Medications/Injections: Limited tylenol/ibuprofen for pain for 1-2 weeks, Topical Voltaren gel, repeat left shoulder joint steroid injection  Follow-up: In one month if symptoms do not improve, sooner if worsening  Can consider rotator cuff steroid injection     Please call [phone]   Ask for my team if you have any questions or concerns    If you have not yet received the influenza vaccine but would like to get one, please call  [phone] or you can schedule via [employer]    It was great seeing you again today!    Noah Heller MD, CAQSM     FS Injection Discharge Instructions    Procedure: Left subacromial bursa steroid injection     You may shower, however avoid swimming, tub baths or hot tubs for 24 hours following your procedure  You may have a mild to moderate increase in pain for several days following the injection.  It may take up to 14 days for the steroid medication to start working although you may feel the effect as early as a few days after the procedure.  You may use ice packs for 10-15 minutes, 3 to 4 times a day at the injection site for comfort  You may use anti-inflammatory medications (such as Ibuprofen or Aleve or Advil) or Tylenol for pain control if necessary  If you were fasting, you may resume your  normal diet and medications after the procedure  If you have diabetes, check your blood sugar more frequently than usual as your blood sugar may be higher than normal for 10-14 days following a steroid injection. Contact your doctor who manages your diabetes if your blood sugar is higher than usual    If you experience any of the following, call [facility] @ [phone] or [phone]  -Fever over 100 degree F  -Swelling, bleeding, redness, drainage, warmth at the injection site  - New or worsening pain

## 2024-01-05 NOTE — PROGRESS NOTES
ASSESSMENT & PLAN    Tommy was seen today for pain.    Diagnoses and all orders for this visit:    Chronic left shoulder pain  -     Large Joint Injection/Arthocentesis: L subacromial bursa        # Chronic Left Shoulder Pain: long-standing condition for patient has had condition for 1+ years. Last shoulder joint steroid injection on 9/22/23 provided 3 months of pain relief. He does have pain in anterior/posterior portions of his shoulder with intact range of motion and no weakness on rotator cuff testing. Reviewed previous x-rays showing mild shoulder joint arthritis. Pain is more anterior now in the setting of unloading wood speaking more towards irritated rotator cuff tendon. Given this plan to treat as below and follow-up as needed.     Image Findings: reviewed previous shoulder x-rays  Treatment: Activities as tolerated, home exercises given today   Job: as tolerated   Medications/Injections: Limited tylenol/ibuprofen for pain for 1-2 weeks, Topical Voltaren gel, repeat left shoulder joint steroid injection  Follow-up: In one month if symptoms do not improve, sooner if worsening  Can consider rotator cuff steroid injection     -----    SUBJECTIVE:  Tommy De La O is a 68 year old male who is seen in follow-up for for the left shoulder.They were last seen 9/22/2023.  The patient is seen by themselves.    Since their last visit reports his left shoulder was very bothersome  2-3 weeks ago after loading a large portion of wood. Today his shoulder is not as bothersome, but he would like to discuss options. He does not intermittent throbbing of the shoulder with relaxing of the shoulder.  They indicate that their current pain level is 4/10. They have tried rest/activity avoidance and corticosteroid injection (most recent date: 9/22/23) that provided  6-8 week(s) of relief, topicals, massage.      He had rotator cuff repair in 2001 for the right shoulder at [facility].     Patient's past medical, surgical, social,  "and family histories were reviewed today and no changes are noted.    REVIEW OF SYSTEMS:  Constitutional: NEGATIVE for fever, chills, change in weight  Skin: NEGATIVE for worrisome rashes, moles or lesions  GI/: NEGATIVE for bowel or bladder changes  Neuro: NEGATIVE for weakness, dizziness or paresthesias    OBJECTIVE:  BP (!) 143/91   Pulse (!) 49   Ht 1.803 m (5' 11\")   Wt 138.8 kg (306 lb)   BMI 42.68 kg/m     General: healthy, alert and in no distress  HEENT: no scleral icterus or conjunctival erythema  Skin: no suspicious lesions or rash. No jaundice.  CV: regular rhythm by palpation, no pedal edema  Resp: normal respiratory effort without conversational dyspnea   Psych: normal mood and affect  Gait: normal steady gait with appropriate coordination and balance  Neuro: normal light touch sensory exam of the extremities.    MSK:    LEFT SHOULDER  Inspection:    no swelling, bruising, discoloration, or obvious deformity or asymmetry  Palpation:    Tender about the anterior/posterior shoulder. Remainder of bony and tendinous landmarks are nontender.  Active Range of Motion:     Abduction 1650, FF 1650, , IR L1.    Strength:    Scapular plane abduction 5/5,  ER 5/5, IR 5/5, biceps 5/5, triceps 5/5  Special Tests:    Positive: Neer's and Rascon', Speed's    Negative: supraspinatus (empty can), O'osmany's       Independent visualization of the below image:    IMPRESSION: Degenerative change at the left glenohumeral joint without  evidence for fracture or dislocation. The left AC joint is negative.     VICTORINA MANNING MD      Large Joint Injection/Arthocentesis: L subacromial bursa    Date/Time: 1/5/2024 11:24 AM    Performed by: Noah Heller MD  Authorized by: Noah Heller MD    Indications:  Pain  Needle Size:  25 G  Guidance: ultrasound    Approach:  Anterolateral  Location:  Shoulder      Site:  L subacromial bursa  Medications:  4 mL ROPivacaine 5 MG/ML; 6 mg betamethasone acet & sod phos " 6 (3-3) MG/ML  Outcome:  Tolerated well, no immediate complications  Procedure discussed: discussed risks, benefits, and alternatives    Consent Given by:  Patient  Timeout: timeout called immediately prior to procedure    Prep: patient was prepped and draped in usual sterile fashion     Ultrasound images of procedure were permanently stored.     Patient reported some improvement of pain after the numbing portion right subacromial bursa steroid injection.  Ultrasound guided images were permanently stored.   Aftercare instructions given to patient.  Plan to follow-up as discussed above.     Noah Heller MD [facility]            Noah Heller MD, [facility]    Disclaimer: This note consists of symbols derived from keyboarding, dictation and/or voice recognition software. As a result, there may be errors in the script that have gone undetected. Please consider this when interpreting information found in this chart.

## 2024-01-05 NOTE — LETTER
1/5/2024         RE: Tommy De La O  1964 [address] [facility] [address]        Dear Colleague,    Thank you for referring your patient, Tommy De La O, to the [facility] [place]. Please see a copy of my visit note below.    ASSESSMENT & PLAN    Tommy was seen today for pain.    Diagnoses and all orders for this visit:    Chronic left shoulder pain  -     Large Joint Injection/Arthocentesis: L subacromial bursa        # Chronic Left Shoulder Pain: long-standing condition for patient has had condition for 1+ years. Last shoulder joint steroid injection on 9/22/23 provided 3 months of pain relief. He does have pain in anterior/posterior portions of his shoulder with intact range of motion and no weakness on rotator cuff testing. Reviewed previous x-rays showing mild shoulder joint arthritis. Pain is more anterior now in the setting of unloading wood speaking more towards irritated rotator cuff tendon. Given this plan to treat as below and follow-up as needed.     Image Findings: reviewed previous shoulder x-rays  Treatment: Activities as tolerated, home exercises given today   Job: as tolerated   Medications/Injections: Limited tylenol/ibuprofen for pain for 1-2 weeks, Topical Voltaren gel, repeat left shoulder joint steroid injection  Follow-up: In one month if symptoms do not improve, sooner if worsening  Can consider rotator cuff steroid injection     -----    SUBJECTIVE:  Tommy De La O is a 68 year old male who is seen in follow-up for for the left shoulder.They were last seen 9/22/2023.  The patient is seen by themselves.    Since their last visit reports his left shoulder was very bothersome  2-3 weeks ago after loading a large portion of wood. Today his shoulder is not as bothersome, but he would like to discuss options. He does not intermittent throbbing of the shoulder with relaxing of the shoulder.  They indicate that their current pain level is 4/10. They have tried  "rest/activity avoidance and corticosteroid injection (most recent date: 9/22/23) that provided  6-8 week(s) of relief, topicals, massage.      He had rotator cuff repair in 2001 for the right shoulder at [facility].     Patient's past medical, surgical, social, and family histories were reviewed today and no changes are noted.    REVIEW OF SYSTEMS:  Constitutional: NEGATIVE for fever, chills, change in weight  Skin: NEGATIVE for worrisome rashes, moles or lesions  GI/: NEGATIVE for bowel or bladder changes  Neuro: NEGATIVE for weakness, dizziness or paresthesias    OBJECTIVE:  BP (!) 143/91   Pulse (!) 49   Ht 1.803 m (5' 11\")   Wt 138.8 kg (306 lb)   BMI 42.68 kg/m     General: healthy, alert and in no distress  HEENT: no scleral icterus or conjunctival erythema  Skin: no suspicious lesions or rash. No jaundice.  CV: regular rhythm by palpation, no pedal edema  Resp: normal respiratory effort without conversational dyspnea   Psych: normal mood and affect  Gait: normal steady gait with appropriate coordination and balance  Neuro: normal light touch sensory exam of the extremities.    MSK:    LEFT SHOULDER  Inspection:    no swelling, bruising, discoloration, or obvious deformity or asymmetry  Palpation:    Tender about the anterior/posterior shoulder. Remainder of bony and tendinous landmarks are nontender.  Active Range of Motion:     Abduction 1650, FF 1650, , IR L1.    Strength:    Scapular plane abduction 5/5,  ER 5/5, IR 5/5, biceps 5/5, triceps 5/5  Special Tests:    Positive: Neer's and Rascno', Speed's    Negative: supraspinatus (empty can), O'osmany's       Independent visualization of the below image:    IMPRESSION: Degenerative change at the left glenohumeral joint without  evidence for fracture or dislocation. The left AC joint is negative.     VICTORINA MANNING MD      Large Joint Injection/Arthocentesis: L subacromial bursa    Date/Time: 1/5/2024 11:24 AM    Performed by: Noah Heller " MD DAVID  Authorized by: Noah Heller MD    Indications:  Pain  Needle Size:  25 G  Guidance: ultrasound    Approach:  Anterolateral  Location:  Shoulder      Site:  L subacromial bursa  Medications:  4 mL ROPivacaine 5 MG/ML; 6 mg betamethasone acet & sod phos 6 (3-3) MG/ML  Outcome:  Tolerated well, no immediate complications  Procedure discussed: discussed risks, benefits, and alternatives    Consent Given by:  Patient  Timeout: timeout called immediately prior to procedure    Prep: patient was prepped and draped in usual sterile fashion     Ultrasound images of procedure were permanently stored.     Patient reported some improvement of pain after the numbing portion right subacromial bursa steroid injection.  Ultrasound guided images were permanently stored.   Aftercare instructions given to patient.  Plan to follow-up as discussed above.     Noah Heller MD [facility]            Noah Heller MD, [facility]    Disclaimer: This note consists of symbols derived from keyboarding, dictation and/or voice recognition software. As a result, there may be errors in the script that have gone undetected. Please consider this when interpreting information found in this chart.      Again, thank you for allowing me to participate in the care of your patient.        Sincerely,        Noah Heller MD

## 2024-01-09 ENCOUNTER — TELEPHONE (OUTPATIENT)
Dept: ORTHOPEDICS | Facility: CLINIC | Age: 69
End: 2024-01-09
Payer: MEDICARE

## 2024-01-09 NOTE — TELEPHONE ENCOUNTER
Talked with patient. Scheduled him for exam, procedure for possible plantar fascitis injection.     Appointment time: 1/22/24 at 10:20 in [place]     MERARI Olivas

## 2024-01-09 NOTE — TELEPHONE ENCOUNTER
[facility] Call Center    Phone Message    May a detailed message be left on voicemail: yes     Reason for Call: Pt thinks he has plantar fasciitis is wanting to know if the doctor would be able to give in an injection in his foot, please call regarding this    Action Taken: Other: [place] [facility]    Travel Screening: Not Applicable

## 2024-01-18 ENCOUNTER — PATIENT OUTREACH (OUTPATIENT)
Dept: CARE COORDINATION | Facility: CLINIC | Age: 69
End: 2024-01-18
Payer: MEDICARE

## 2024-01-19 DIAGNOSIS — I10 ESSENTIAL HYPERTENSION WITH GOAL BLOOD PRESSURE LESS THAN 140/90: ICD-10-CM

## 2024-01-19 NOTE — TELEPHONE ENCOUNTER
Pending Prescriptions:                       Disp   Refills    chlorthalidone (HYGROTON) 25 MG tablet [Ph*90 tab*0        Sig: TAKE 1 TABLET(25 MG) BY MOUTH DAILY    Routing refill request to provider for review/approval because:  Labs not current:  GFR  BP not in goal range    BP Readings from Last 3 Encounters:   01/05/24 (!) 143/91   10/09/23 118/74   09/22/23 110/66     Freida Arreola RN  [facility]

## 2024-01-21 DIAGNOSIS — F41.9 ANXIETY AND DEPRESSION: ICD-10-CM

## 2024-01-21 DIAGNOSIS — F32.A ANXIETY AND DEPRESSION: ICD-10-CM

## 2024-01-22 ENCOUNTER — ANCILLARY PROCEDURE (OUTPATIENT)
Dept: GENERAL RADIOLOGY | Facility: CLINIC | Age: 69
End: 2024-01-22
Attending: FAMILY MEDICINE
Payer: MEDICARE

## 2024-01-22 ENCOUNTER — OFFICE VISIT (OUTPATIENT)
Dept: ORTHOPEDICS | Facility: CLINIC | Age: 69
End: 2024-01-22
Payer: MEDICARE

## 2024-01-22 VITALS
DIASTOLIC BLOOD PRESSURE: 78 MMHG | HEART RATE: 47 BPM | SYSTOLIC BLOOD PRESSURE: 122 MMHG | BODY MASS INDEX: 42.68 KG/M2 | WEIGHT: 306 LBS

## 2024-01-22 DIAGNOSIS — M79.672 LEFT FOOT PAIN: ICD-10-CM

## 2024-01-22 DIAGNOSIS — M72.2 PLANTAR FASCIITIS OF LEFT FOOT: Primary | ICD-10-CM

## 2024-01-22 PROCEDURE — 73630 X-RAY EXAM OF FOOT: CPT | Mod: TC | Performed by: RADIOLOGY

## 2024-01-22 PROCEDURE — 99214 OFFICE O/P EST MOD 30 MIN: CPT | Mod: 25 | Performed by: FAMILY MEDICINE

## 2024-01-22 PROCEDURE — 20550 NJX 1 TENDON SHEATH/LIGAMENT: CPT | Mod: LT | Performed by: FAMILY MEDICINE

## 2024-01-22 PROCEDURE — 76942 ECHO GUIDE FOR BIOPSY: CPT | Performed by: FAMILY MEDICINE

## 2024-01-22 RX ORDER — ROPIVACAINE HYDROCHLORIDE 5 MG/ML
1 INJECTION, SOLUTION EPIDURAL; INFILTRATION; PERINEURAL
Status: SHIPPED | OUTPATIENT
Start: 2024-01-22

## 2024-01-22 RX ORDER — BETAMETHASONE SODIUM PHOSPHATE AND BETAMETHASONE ACETATE 3; 3 MG/ML; MG/ML
6 INJECTION, SUSPENSION INTRA-ARTICULAR; INTRALESIONAL; INTRAMUSCULAR; SOFT TISSUE
Status: SHIPPED | OUTPATIENT
Start: 2024-01-22

## 2024-01-22 RX ORDER — CHLORTHALIDONE 25 MG/1
25 TABLET ORAL DAILY
Qty: 90 TABLET | Refills: 0 | Status: SHIPPED | OUTPATIENT
Start: 2024-01-22 | End: 2024-04-08

## 2024-01-22 RX ADMIN — ROPIVACAINE HYDROCHLORIDE 1 ML: 5 INJECTION, SOLUTION EPIDURAL; INFILTRATION; PERINEURAL at 11:07

## 2024-01-22 RX ADMIN — BETAMETHASONE SODIUM PHOSPHATE AND BETAMETHASONE ACETATE 6 MG: 3; 3 INJECTION, SUSPENSION INTRA-ARTICULAR; INTRALESIONAL; INTRAMUSCULAR; SOFT TISSUE at 11:07

## 2024-01-22 NOTE — PROGRESS NOTES
ASSESSMENT & PLAN    Tommy was seen today for pain.    Diagnoses and all orders for this visit:    Left foot pain  -     XR Foot Left G/E 3 Views; Future      This issue is acute on chronic and Worsening.    # Left Plantar Fasciitis: Tommy De La O  was seen today for left heel pain. Symptoms had been going on for 4 months, pain limiting ability to work at times. On examination there are positive findings of tenderness to palpation over the heal. Imaging findings showed small heal spur. Likely cause of patient's condition due to plantar fasciitis.   Counseled patient on nature of condition and treatment options.  Given this plan as below, follow-up 1 mon as needed     Image Findings: small heel spur  Treatment: Activities as tolerated, home exercises given today, heel cups   Job: As tolerated  Medications/Injections: Limited tylenol/ibuprofen for pain for 1-2 weeks, Topical Voltaren gel, left plantar fascia steroid injection  Follow-up: In one month if symptoms do not improve, sooner if worsening  Can consider procedure to clear out irritated plantar fasciitis    Noah Heller MD  [facility]    -----  Chief Complaint   Patient presents with    Left Foot - Pain       SUBJECTIVE  Tommy De La O is a/an 68 year old male who is seen as a self referral for evaluation of left foot pain.     The patient is seen by themselves.    Onset: 1 month(s) ago. Reports insidious onset without acute precipitating event.  Location of Pain: bottom of the heel   Worsened by: walking, in the morning  Better with: stretching   Treatments tried: good quality boots   Associated symptoms: throbbing pain     Orthopedic/Surgical history: YES - Date: neuropathy   Social History/Occupation: farmer     No family history pertinent to patient's problem today.     REVIEW OF SYSTEMS:  Review of Systems  Constitutional, HEENT, cardiovascular, pulmonary, gi and gu systems are negative, except as otherwise  noted.    OBJECTIVE:  /78   Pulse (!) 47   Wt 138.8 kg (306 lb)   BMI 42.68 kg/m     General: healthy, alert and in no distress  HEENT: no scleral icterus or conjunctival erythema  Skin: no suspicious lesions or rash. No jaundice.  CV: distal perfusion intact    Resp: normal respiratory effort without conversational dyspnea   Psych: normal mood and affect  Gait: normal steady gait with appropriate coordination and balance    Neuro: Normal light sensory exam of left lower extremity     Ortho Exam   LEFT FOOT  Inspection:    no swelling over the heel   Palpation:    Tender about the plantar fascia origin    No tenderness over the proximal 5th metatarsal, navicular, or Lisfranc joint  Range of Motion:     Active toe flexion and extension  - intact    Active ankle range of motion - tightness with ankle dorsiflexion    Passive ankle range of motion - tightness with ankle dorsiflexion  Strength:    Intrinsic foot musculature strength -     Ankle strength - intact  Special Tests:    Positive: None    Negative: heel strike and Lisfranc joint tenderness    RADIOLOGY:  I independently ordered, visualized and reviewed these images with the patient    Small heel spur, no fracture      Review of external notes as documented elsewhere in note  Review of the result(s) of each unique test - left foot x-rays       Disclaimer: This note consists of symbols derived from keyboarding, dictation and/or voice recognition software. As a result, there may be errors in the script that have gone undetected. Please consider this when interpreting information found in this chart.    Small Joint Injection/Arthrocentesis    Date/Time: 1/22/2024 11:07 AM    Performed by: Noah Heller MD  Authorized by: Noah Heller MD  Indications:  Pain and diagnostic evaluation  Needle Size:  25 G  Guidance: ultrasound     Approach:  Plantar  Location:  Foot   Location comment:  Left plantar fasciitis injection                        Medications:  6 mg betamethasone acet & sod phos 6 (3-3) MG/ML; 1 mL ROPivacaine 5 MG/ML        Outcome:  Tolerated well, no immediate complications  Procedure discussed: discussed risks, benefits, and alternatives    Consent Given by:  Patient  Timeout: timeout called immediately prior to procedure    Prep: patient was prepped and draped in usual sterile fashion       Ultrasound images of procedure were permanently stored.     Patient tolerated left plantar fascia steroid injection.  Ultrasound guided images were permanently stored.   Aftercare instructions given to patient.  Plan to follow-up as discussed above.     Noah Heller MD [facility]

## 2024-01-22 NOTE — LETTER
1/22/2024         RE: Tommy De La O  1964 [facility] [address]        Dear Colleague,    Thank you for referring your patient, Tommy De La O, to the [facility]. Please see a copy of my visit note below.    ASSESSMENT & PLAN    Tommy was seen today for pain.    Diagnoses and all orders for this visit:    Left foot pain  -     XR Foot Left G/E 3 Views; Future      This issue is acute on chronic and Worsening.    # Left Plantar Fasciitis: Tommy De La O  was seen today for left heel pain. Symptoms had been going on for 4 months, pain limiting ability to work at times. On examination there are positive findings of tenderness to palpation over the heal. Imaging findings showed small heal spur. Likely cause of patient's condition due to plantar fasciitis.   Counseled patient on nature of condition and treatment options.  Given this plan as below, follow-up 1 mon as needed     Image Findings: small heel spur  Treatment: Activities as tolerated, home exercises given today, heel cups   Job: As tolerated  Medications/Injections: Limited tylenol/ibuprofen for pain for 1-2 weeks, Topical Voltaren gel, left plantar fascia steroid injection  Follow-up: In one month if symptoms do not improve, sooner if worsening  Can consider procedure to clear out irritated plantar fasciitis    Noah Heller MD  [facility]    -----  Chief Complaint   Patient presents with     Left Foot - Pain       SUBJECTIVE  Tommy De La O is a/an 68 year old male who is seen as a self referral for evaluation of left foot pain.     The patient is seen by themselves.    Onset: 1 month(s) ago. Reports insidious onset without acute precipitating event.  Location of Pain: bottom of the heel   Worsened by: walking, in the morning  Better with: stretching   Treatments tried: good quality boots   Associated symptoms: throbbing pain     Orthopedic/Surgical history: YES - Date:  neuropathy   Social History/Occupation: farmer     No family history pertinent to patient's problem today.     REVIEW OF SYSTEMS:  Review of Systems  Constitutional, HEENT, cardiovascular, pulmonary, gi and gu systems are negative, except as otherwise noted.    OBJECTIVE:  /78   Pulse (!) 47   Wt 138.8 kg (306 lb)   BMI 42.68 kg/m     General: healthy, alert and in no distress  HEENT: no scleral icterus or conjunctival erythema  Skin: no suspicious lesions or rash. No jaundice.  CV: distal perfusion intact    Resp: normal respiratory effort without conversational dyspnea   Psych: normal mood and affect  Gait: normal steady gait with appropriate coordination and balance    Neuro: Normal light sensory exam of left lower extremity     Ortho Exam   LEFT FOOT  Inspection:    no swelling over the heel   Palpation:    Tender about the plantar fascia origin    No tenderness over the proximal 5th metatarsal, navicular, or Lisfranc joint  Range of Motion:     Active toe flexion and extension  - intact    Active ankle range of motion - tightness with ankle dorsiflexion    Passive ankle range of motion - tightness with ankle dorsiflexion  Strength:    Intrinsic foot musculature strength -     Ankle strength - intact  Special Tests:    Positive: None    Negative: heel strike and Lisfranc joint tenderness    RADIOLOGY:  I independently ordered, visualized and reviewed these images with the patient    Small heel spur, no fracture      Review of external notes as documented elsewhere in note  Review of the result(s) of each unique test - left foot x-rays       Disclaimer: This note consists of symbols derived from keyboarding, dictation and/or voice recognition software. As a result, there may be errors in the script that have gone undetected. Please consider this when interpreting information found in this chart.    Small Joint Injection/Arthrocentesis    Date/Time: 1/22/2024 11:07 AM    Performed by: Noah Heller,  MD  Authorized by: Noah Heller MD  Indications:  Pain and diagnostic evaluation  Needle Size:  25 G  Guidance: ultrasound     Approach:  Plantar  Location:  Foot   Location comment:  Left plantar fasciitis injection                       Medications:  6 mg betamethasone acet & sod phos 6 (3-3) MG/ML; 1 mL ROPivacaine 5 MG/ML        Outcome:  Tolerated well, no immediate complications  Procedure discussed: discussed risks, benefits, and alternatives    Consent Given by:  Patient  Timeout: timeout called immediately prior to procedure    Prep: patient was prepped and draped in usual sterile fashion       Ultrasound images of procedure were permanently stored.     Patient tolerated left plantar fascia steroid injection.  Ultrasound guided images were permanently stored.   Aftercare instructions given to patient.  Plan to follow-up as discussed above.     Noah Heller MD [facility]              Again, thank you for allowing me to participate in the care of your patient.        Sincerely,        Noah Heller MD

## 2024-01-22 NOTE — PATIENT INSTRUCTIONS
# Left Plantar Fasciitis: Tommy De La O  was seen today for left heel pain. Symptoms had been going on for 4 months, pain limiting ability to work at times. On examination there are positive findings of tenderness to palpation over the heal. Imaging findings showed small heal spur. Likely cause of patient's condition due to plantar fasciitis.   Counseled patient on nature of condition and treatment options.  Given this plan as below, follow-up 1 mon as needed     Image Findings: small heel spur  Treatment: Activities as tolerated, home exercises given today, heel cups   Job: As tolerated  Medications/Injections: Limited tylenol/ibuprofen for pain for 1-2 weeks, Topical Voltaren gel, left plantar fascia steroid injection  Follow-up: In one month if symptoms do not improve, sooner if worsening  Can consider procedure to clear out irritated plantar fasciitis    Please call [phone]   Ask for my team if you have any questions or concerns    If you have not yet received the influenza vaccine but would like to get one, please call  [phone] or you can schedule via [facility]    It was great seeing you again today!    Noah Heller MD, CAQSM       ACE Plantar Fasciitis Sleep Support, Helps relieve symptoms of plantar fasciitis, One Size Fits Most      FSOC Injection Discharge Instructions    Procedure: Left plantar fascia steroid injection    You may shower, however avoid swimming, tub baths or hot tubs for 24 hours following your procedure  You may have a mild to moderate increase in pain for several days following the injection.  It may take up to 14 days for the steroid medication to start working although you may feel the effect as early as a few days after the procedure.  You may use ice packs for 10-15 minutes, 3 to 4 times a day at the injection site for comfort  You may use anti-inflammatory medications (such as Ibuprofen or Aleve or Advil) or Tylenol for pain control if necessary  If you were  fasting, you may resume your normal diet and medications after the procedure  If you have diabetes, check your blood sugar more frequently than usual as your blood sugar may be higher than normal for 10-14 days following a steroid injection. Contact your doctor who manages your diabetes if your blood sugar is higher than usual    If you experience any of the following, call [facility] @ [phone] or [phone]  -Fever over 100 degree F  -Swelling, bleeding, redness, drainage, warmth at the injection site  - New or worsening pain

## 2024-02-09 ENCOUNTER — MEDICAL CORRESPONDENCE (OUTPATIENT)
Dept: SCHEDULING | Facility: CLINIC | Age: 69
End: 2024-02-09
Payer: MEDICARE

## 2024-02-20 ENCOUNTER — HOSPITAL ENCOUNTER (OUTPATIENT)
Dept: MRI IMAGING | Facility: CLINIC | Age: 69
Discharge: HOME | End: 2024-02-20
Attending: ORTHOPAEDIC SURGERY | Admitting: ORTHOPAEDIC SURGERY
Payer: MEDICARE

## 2024-02-20 DIAGNOSIS — M25.551 RIGHT HIP PAIN: ICD-10-CM

## 2024-02-20 PROCEDURE — 72148 MRI LUMBAR SPINE W/O DYE: CPT

## 2024-02-27 ENCOUNTER — TRANSCRIBE ORDERS (OUTPATIENT)
Dept: OTHER | Age: 69
End: 2024-02-27

## 2024-02-27 DIAGNOSIS — M54.50 LOW BACK PAIN: Primary | ICD-10-CM

## 2024-03-12 ENCOUNTER — TRANSFERRED RECORDS (OUTPATIENT)
Dept: HEALTH INFORMATION MANAGEMENT | Facility: CLINIC | Age: 69
End: 2024-03-12
Payer: MEDICARE

## 2024-03-17 ENCOUNTER — HEALTH MAINTENANCE LETTER (OUTPATIENT)
Age: 69
End: 2024-03-17

## 2024-03-27 ENCOUNTER — TELEPHONE (OUTPATIENT)
Dept: FAMILY MEDICINE | Facility: CLINIC | Age: 69
End: 2024-03-27
Payer: MEDICARE

## 2024-03-29 NOTE — TELEPHONE ENCOUNTER
Reason for Call:  Appointment Request    Patient requesting this type of appt:  Preventive     Requested provider: Nicolas Harris    Reason patient unable to be scheduled: Needs to be scheduled by clinic    When does patient want to be seen/preferred time:       Comments: Pt is wanting to get his medicare annual wellness appt scheduled but has questions for care team before he schedules. Pt would like a call back.    Could we send this information to you in [employer] or would you prefer to receive a phone call?:   Patient would prefer a phone call   Okay to leave a detailed message?: Yes at Home number on file [phone] (home)    Call taken on 3/27/2024 at 8:10 AM by Celi Corbin  
Call returned to patient. He does not have any further questions or needs.     Kristi Wright RN    
Mother

## 2024-04-03 ENCOUNTER — OFFICE VISIT (OUTPATIENT)
Dept: URGENT CARE | Facility: URGENT CARE | Age: 69
End: 2024-04-03
Payer: MEDICARE

## 2024-04-03 VITALS
SYSTOLIC BLOOD PRESSURE: 121 MMHG | HEART RATE: 55 BPM | BODY MASS INDEX: 43.1 KG/M2 | TEMPERATURE: 98.4 F | OXYGEN SATURATION: 97 % | DIASTOLIC BLOOD PRESSURE: 73 MMHG | RESPIRATION RATE: 16 BRPM | WEIGHT: 309 LBS

## 2024-04-03 DIAGNOSIS — L03.011 PARONYCHIA OF RIGHT INDEX FINGER: Primary | ICD-10-CM

## 2024-04-03 PROCEDURE — 99213 OFFICE O/P EST LOW 20 MIN: CPT

## 2024-04-03 RX ORDER — CEPHALEXIN 500 MG/1
500 CAPSULE ORAL 3 TIMES DAILY
Qty: 21 CAPSULE | Refills: 0 | Status: SHIPPED | OUTPATIENT
Start: 2024-04-03 | End: 2024-04-10

## 2024-04-03 SDOH — HEALTH STABILITY: PHYSICAL HEALTH: ON AVERAGE, HOW MANY MINUTES DO YOU ENGAGE IN EXERCISE AT THIS LEVEL?: 0 MIN

## 2024-04-03 SDOH — HEALTH STABILITY: PHYSICAL HEALTH: ON AVERAGE, HOW MANY DAYS PER WEEK DO YOU ENGAGE IN MODERATE TO STRENUOUS EXERCISE (LIKE A BRISK WALK)?: 0 DAYS

## 2024-04-03 ASSESSMENT — SOCIAL DETERMINANTS OF HEALTH (SDOH): HOW OFTEN DO YOU GET TOGETHER WITH FRIENDS OR RELATIVES?: ONCE A WEEK

## 2024-04-03 NOTE — PATIENT INSTRUCTIONS
"Diagnosis: Paronychia - infection of the skin around the nailfold     Plan:   Antibiotics}   Clean and soak the toe or finger. Do this 2 times a day for the first 3 days.   Soak your hand in a tub of warm water for 5 minutes.  Clean any crust away with soap and water using a cotton swab.  Put antibiotic ointment on the infected area.  Change the dressing daily or any time it gets dirty.  acetaminophen or ibuprofen for  pain  Prevent recurrence:   Don't cut or play with your cuticles at home.   A healthy cuticle maintains a seal between your skin and nail and keeps out infection.  Don't bite your nails.  Don't suck on your thumbs or fingers.    Monitor for:   Redness, pain, or swelling of the finger or toe gets worse  You have trouble moving or bending the finger   Red streaks in the skin leading away from the wound  Pus or fluid draining from the nail area  Fever of 101 F          Paronychia of the Finger   Paronychia is an infection near a fingernail or toenail, usually of the nail folds next to the nail   It usually occurs when an opening in the cuticle or a \"hang nail\" lets bacteria under the skin.  The infection will need to be drained if pus is present.   if the infection has been caught early, you may need only topical antibiotic treatment.   Healing will take about 1 to 2 weeks.    "

## 2024-04-03 NOTE — PROGRESS NOTES
URGENT CARE  Assessment & Plan   Assessment:   Tommy De La O is a 69 year old male who's clinical presentation today is consistent with:   1. Paronychia of right index finger  - cephALEXin (KEFLEX) 500 MG capsule;   Plan:  Will treat patient's paronychia today, Discussed w/ patient that this condition usually heals on its own and encouraged frequent finger soaks and monitoring, but given patient has already tried home conservative treatments for a few weeks and he desires more aggressive treatment will start antibiotics today, side effects of medications reviewed)  Encouraged patient to use tylenol or ibuprofen for pain   We discussed prevention including keeping fingers out of the mouth, nail biting and avoiding cutting/playing with cuticles  Additionally we discussed, monitoring for signs of infection, and if symptoms do not improve after starting today's treatment (or if symptoms worsen) to follow up in 5-7 days.    No alarm signs or symptoms present   Differential Diagnoses for this patient's chief complaint that I considered include:  Felon, dermatitis, dermatophyte infection, infectious vs non infectious, Onychomycosis, fungal, insect sting, injury, Skin/nail malignancy, herpetic mario      Patient is} agreeable to treatment plan and state they will follow-up if symptoms do not improve and/or if symptoms worsen   see patient's AVS 'monitor for' section for specific patient instructions given and discussed regarding what to watch for and when to follow up    JUAN LUIS Aguilar [facility] URGENT CARE [place]      ______________________________________________________________________      Subjective     HPI: Tommy De La O  is a 69 year old  male who presents today for evaluation the following concerns:   Patient presents with a red, swollen, tender nodule on the tip of their right index finger, which started (was first noticed ) a few weeks ago ago.   Patient endorses he bites his nails.    Patient states he tried soapy water soaks but it is not getting better   Patient denies fever, chill, regional lymphadenopathy   Patient is worried it is infected     Review of Systems:  Pertinent review of systems as reflected in HPI, otherwise negative.     Objective    Physical Exam:  Vitals:    04/03/24 1346   BP: 121/73   Pulse: 55   Resp: 16   Temp: 98.4  F (36.9  C)   TempSrc: Tympanic   SpO2: 97%   Weight: 140.2 kg (309 lb)      General:   alert and oriented, no acute distress, non ill-appearing   Vital signs reviewed: afebrile and normotensive     Psy/mental status: pleasant   SKIN:   Index finger (2nd digit)} on right hand}: lateral aspect of nail fold:    erythematous, non-fluctuant but indurated  nodule noted   with surrounding erythematous inflammation.   No abscess seen, no Fluctuance noted, no central pus,   Nodule is painful/tender to palpation.  No fevers, chills or regional adenopathy observed.  No nail deformity noted no numbness or tingling present, signs of chronic nail biting observed     ______________________________________________________________________    I explained my diagnostic considerations and recommendations to the patient, who voiced understanding and agreement with the treatment plan.   All questions were answered.   We discussed potential side effects, risks and benefits of any prescribed or recommended therapies, as well as expectations for response to treatments.  Please see AVS for any patient instructions & handouts given.   Patient was advised to contact the Nurse Care Line, their Primary Care provider, Urgent Care, or the Emergency Department if there are new or worsening symptoms, or call 911 for emergencies.

## 2024-04-03 NOTE — COMMUNITY RESOURCES LIST (ENGLISH)
April 3, 2024           YOUR PERSONALIZED LIST OF SERVICES & PROGRAMS           & RECREATION    Sports      [facility] - Adult Enrichment  Phone: [phone]  Website: [email or web address]  Language: English  Hours: Mon 7:30 AM - 4:00 PM Tue 7:30 AM - 4:00 PM Wed 7:30 AM - 4:00 PM Thu 7:30 AM - 4:00 PM Fri 7:30 AM - 4:00 PM    Classes/Groups      [facility] - Exercise Class - Live 2B Healthy Senior Fitness Program  [address] [place] [place] [address] (Distance: 15.9 miles)  Phone: [phone]  Website: [email or web address]  Language: English  Fee: Self pay, Insurance  Accessibility: [place] accessible      [employer] [facility] [facility] - Sit and Be Fit/[employer] FLEX  [address] [place] [address] (Distance: 11.1 miles)  Website: https://physicaltherapySnaptracs/programs/sit-and-be-fit/  Language: English  Fee: Free      [facility] - Adult Enrichment  Phone: [phone]  Website: [email or web address]  Language: English  Hours: Mon 7:30 AM - 4:00 PM Tue 7:30 AM - 4:00 PM Wed 7:30 AM - 4:00 PM Thu 7:30 AM - 4:00 PM Fri 7:30 AM - 4:00 PM               IMPORTANT NUMBERS & WEBSITES        Emergency Services  911  .   United Way  211 http://211unitedway.org  .   Poison Control  (405) 844-5512 http://[place]poison.org http://wis[place]poison.org  .     Suicide and Crisis Lifeline  988 http://988lifeline.org  .   Childhelp National Child Abuse Hotline  118.163.7734 http://Childhelphotline.org   .   National Sexual Assault Hotline  (991) 321-3219 (HOPE) http://Rainn.org   .     National Runaway Safeline  (515) 901-8031 (RUNAWAY) http://1800runaSparktrend.org  .   Pregnancy & Postpartum Support  Call/text 136-031-1603  [place]: http://ppsupportmn.org  [place]: http://Nerve.com.com/wi  .   Substance Abuse National Helpline ([facility])  800-622-HELP (1138) http://Findtreatment.gov   .                 DISCLAIMER: These resources have been generated via the [employer]. [employer] does not endorse any service providers mentioned in this resource list. [employer] does not guarantee that the services mentioned in this resource list will be available to you or will improve your health or wellness.    [facility]

## 2024-04-08 ENCOUNTER — TELEPHONE (OUTPATIENT)
Dept: FAMILY MEDICINE | Facility: CLINIC | Age: 69
End: 2024-04-08

## 2024-04-08 ENCOUNTER — OFFICE VISIT (OUTPATIENT)
Dept: FAMILY MEDICINE | Facility: CLINIC | Age: 69
End: 2024-04-08
Payer: MEDICARE

## 2024-04-08 VITALS
OXYGEN SATURATION: 96 % | SYSTOLIC BLOOD PRESSURE: 118 MMHG | WEIGHT: 308 LBS | DIASTOLIC BLOOD PRESSURE: 76 MMHG | TEMPERATURE: 97.8 F | RESPIRATION RATE: 16 BRPM | HEART RATE: 54 BPM | BODY MASS INDEX: 43.12 KG/M2 | HEIGHT: 71 IN

## 2024-04-08 DIAGNOSIS — K21.9 GASTROESOPHAGEAL REFLUX DISEASE WITHOUT ESOPHAGITIS: ICD-10-CM

## 2024-04-08 DIAGNOSIS — E66.01 MORBID OBESITY WITH BMI OF 40.0-44.9, ADULT (H): ICD-10-CM

## 2024-04-08 DIAGNOSIS — Z12.11 SPECIAL SCREENING FOR MALIGNANT NEOPLASMS, COLON: ICD-10-CM

## 2024-04-08 DIAGNOSIS — J44.9 CHRONIC OBSTRUCTIVE PULMONARY DISEASE, UNSPECIFIED COPD TYPE (H): ICD-10-CM

## 2024-04-08 DIAGNOSIS — Z00.00 MEDICARE ANNUAL WELLNESS VISIT, SUBSEQUENT: Primary | ICD-10-CM

## 2024-04-08 DIAGNOSIS — G62.9 PERIPHERAL POLYNEUROPATHY: ICD-10-CM

## 2024-04-08 DIAGNOSIS — F41.9 ANXIETY AND DEPRESSION: ICD-10-CM

## 2024-04-08 DIAGNOSIS — F32.A ANXIETY AND DEPRESSION: ICD-10-CM

## 2024-04-08 DIAGNOSIS — I10 ESSENTIAL HYPERTENSION WITH GOAL BLOOD PRESSURE LESS THAN 140/90: ICD-10-CM

## 2024-04-08 PROBLEM — J02.0 ACUTE STREPTOCOCCAL PHARYNGITIS: Status: RESOLVED | Noted: 2021-09-29 | Resolved: 2024-04-08

## 2024-04-08 PROBLEM — M77.02 MEDIAL EPICONDYLITIS OF ELBOW, LEFT: Status: RESOLVED | Noted: 2020-10-16 | Resolved: 2024-04-08

## 2024-04-08 PROBLEM — R06.09 DOE (DYSPNEA ON EXERTION): Status: RESOLVED | Noted: 2020-10-05 | Resolved: 2024-04-08

## 2024-04-08 LAB
ANION GAP SERPL CALCULATED.3IONS-SCNC: 11 MMOL/L (ref 7–15)
BUN SERPL-MCNC: 18.8 MG/DL (ref 8–23)
CALCIUM SERPL-MCNC: 9.3 MG/DL (ref 8.8–10.2)
CHLORIDE SERPL-SCNC: 101 MMOL/L (ref 98–107)
CREAT SERPL-MCNC: 0.82 MG/DL (ref 0.67–1.17)
DEPRECATED HCO3 PLAS-SCNC: 27 MMOL/L (ref 22–29)
EGFRCR SERPLBLD CKD-EPI 2021: >90 ML/MIN/1.73M2
GLUCOSE SERPL-MCNC: 99 MG/DL (ref 70–99)
POTASSIUM SERPL-SCNC: 3.5 MMOL/L (ref 3.4–5.3)
SODIUM SERPL-SCNC: 139 MMOL/L (ref 135–145)

## 2024-04-08 PROCEDURE — G0439 PPPS, SUBSEQ VISIT: HCPCS | Performed by: FAMILY MEDICINE

## 2024-04-08 PROCEDURE — 36415 COLL VENOUS BLD VENIPUNCTURE: CPT | Performed by: FAMILY MEDICINE

## 2024-04-08 PROCEDURE — 80048 BASIC METABOLIC PNL TOTAL CA: CPT | Performed by: FAMILY MEDICINE

## 2024-04-08 PROCEDURE — 99214 OFFICE O/P EST MOD 30 MIN: CPT | Mod: 25 | Performed by: FAMILY MEDICINE

## 2024-04-08 RX ORDER — GABAPENTIN 300 MG/1
CAPSULE ORAL
Qty: 270 CAPSULE | Refills: 3 | Status: SHIPPED | OUTPATIENT
Start: 2024-04-08

## 2024-04-08 RX ORDER — CHLORTHALIDONE 25 MG/1
25 TABLET ORAL DAILY
Qty: 90 TABLET | Refills: 3 | Status: SHIPPED | OUTPATIENT
Start: 2024-04-08

## 2024-04-08 RX ORDER — LOSARTAN POTASSIUM 25 MG/1
25 TABLET ORAL DAILY
Qty: 90 TABLET | Refills: 3 | Status: SHIPPED | OUTPATIENT
Start: 2024-04-08

## 2024-04-08 RX ORDER — OMEPRAZOLE 40 MG/1
CAPSULE, DELAYED RELEASE ORAL
Qty: 90 CAPSULE | Refills: 3 | Status: SHIPPED | OUTPATIENT
Start: 2024-04-08

## 2024-04-08 RX ORDER — RESPIRATORY SYNCYTIAL VIRUS VACCINE 120MCG/0.5
0.5 KIT INTRAMUSCULAR ONCE
Qty: 1 EACH | Refills: 0 | Status: CANCELLED | OUTPATIENT
Start: 2024-04-08 | End: 2024-04-08

## 2024-04-08 RX ORDER — ROSUVASTATIN CALCIUM 40 MG/1
40 TABLET, COATED ORAL DAILY
Qty: 90 TABLET | Refills: 3 | Status: SHIPPED | OUTPATIENT
Start: 2024-04-08

## 2024-04-08 ASSESSMENT — PAIN SCALES - GENERAL: PAINLEVEL: MILD PAIN (2)

## 2024-04-08 NOTE — TELEPHONE ENCOUNTER
Prior Authorization Retail Medication Request    Medication/Dose: Zepbound 2.5MG/0.5ML Inj (4 PF PENS)  Diagnosis and ICD code (if different than what is on RX):    New/renewal/insurance change PA/secondary ins. PA:  Previously Tried and Failed:    Rationale:      Fax rcvd from pharmacy: Plan does not cover this medication. Please call plan at [phone] to initiate prior auth or call/fax pharmacy to change medication. Patient ID # is [id number]    Pharmacy Information (if different than what is on RX)  Name:  [facility]   Phone:  [phone]  Fax:[phone]

## 2024-04-08 NOTE — PROGRESS NOTES
"Preventive Care Visit  [facility]  Devon Alvarado MD, Family Medicine  Apr 8, 2024      Assessment & Plan   Well exam  Htn, stable  Anxiety, stable  Neuropathy, stable  Gerd, stable  Copd, stable  Morbid obesity, ongoing      Fills.  Try zepbound, likely not covered, doesn't want to pay for compounded ozempic.    fills on meds for chronic issues as above in med list and orders.   Labs ordered as appropriate for monitoring the listed medical conditions.    Continue medications for medical issues as above in med list and orders     BMI  Estimated body mass index is 42.96 kg/m  as calculated from the following:    Height as of this encounter: 1.803 m (5' 11\").    Weight as of this encounter: 139.7 kg (308 lb).   Weight management plan: diet/exercise    Counseling  Appropriate preventive services were discussed with this patient, including applicable screening as appropriate for fall prevention, nutrition, physical activity, Tobacco-use cessation, weight loss and cognition.  Checklist reviewing preventive services available has been given to the patient.  Reviewed patient's diet, addressing concerns and/or questions.   The patient was instructed to see the dentist every 6 months.   Discussed possible causes of fatigue.         April Sanders is a 69 year old, presenting for the following:  Establish Care, Wellness Visit, and ER F/U  Htn, stable, fills/labs  Anxiety, stable, fills   Neuropathy, stable on gabapentin, fills   Gerd, stable, PPI  Copd, stable, rare albuterol use   Morbid obesity, ongoing, wants to try something like ozempic        4/8/2024     9:50 AM   Additional Questions   Roomed by savanna   Accompanied by self       Health Care Directive  Patient has a Health Care Directive on file        ED/UC Followup:    Facility:  [facility] urgent care [place]   Date of visit: 4/3/24  Reason for visit: Paronychia of right index finger   Current Status: pain is less but " more swollen, still take cephalexin           4/3/2024   General Health   How would you rate your overall physical health? Good   Feel stress (tense, anxious, or unable to sleep) To some extent   (!) STRESS CONCERN      4/3/2024   Nutrition   Diet: Regular (no restrictions)         4/3/2024   Exercise   Days per week of moderate/strenous exercise 0 days   Average minutes spent exercising at this level 0 min   (!) EXERCISE CONCERN      4/3/2024   Social Factors   Frequency of gathering with friends or relatives Once a week   Worry food won't last until get money to buy more No   Food not last or not have enough money for food? No   Do you have housing?  Yes   Are you worried about losing your housing? No   Lack of transportation? No   Unable to get utilities (heat,electricity)? No         4/8/2024   Fall Risk   Fallen 2 or more times in the past year? No   Trouble with walking or balance? No          4/3/2024   Activities of Daily Living- Home Safety   Needs help with the following daily activites None of the above   Safety concerns in the home None of the above         4/3/2024   Dental   Dentist two times every year? (!) NO         4/3/2024   Hearing Screening   Hearing concerns? None of the above         4/3/2024   Driving Risk Screening   Patient/family members have concerns about driving No         4/3/2024   General Alertness/Fatigue Screening   Have you been more tired than usual lately? (!) YES         4/3/2024   Urinary Incontinence Screening   Bothered by leaking urine in past 6 months No         4/3/2024   TB Screening   Were you born outside of the [place]? No         Today's PHQ-2 Score:       4/8/2024     9:43 AM   PHQ-2 ( 1999 Pfizer)   Q1: Little interest or pleasure in doing things 0   Q2: Feeling down, depressed or hopeless 0   PHQ-2 Score 0   Q1: Little interest or pleasure in doing things Not at all   Q2: Feeling down, depressed or hopeless Not at all   PHQ-2 Score 0           4/3/2024   Substance Use    Alcohol more than 3/day or more than 7/wk No   Do you have a current opioid prescription? No   How severe/bad is pain from 1 to 10? 1/10   Do you use any other substances recreationally? No    (!) INHALANTS     Social History     Tobacco Use    Smoking status: Former     Packs/day: 1.00     Years: 30.00     Additional pack years: 0.00     Total pack years: 30.00     Types: Cigarettes     Quit date: 2011     Years since quittin.1    Smokeless tobacco: Never    Tobacco comments:     started at age 22   Vaping Use    Vaping Use: Never used   Substance Use Topics    Alcohol use: Yes    Drug use: No     Last PSA:   PSA   Date Value Ref Range Status   2021 1.04 0 - 4 ug/L Final     Comment:     Assay Method:  Chemiluminescence using Siemens Vista analyzer     Prostate Specific Antigen Screen   Date Value Ref Range Status   2022 1.08 0.00 - 4.00 ug/L Final     ASCVD Risk   The ASCVD Risk score (Bekah DELONG, et al., 2019) failed to calculate for the following reasons:    The valid total cholesterol range is 130 to 320 mg/dL          Reviewed and updated as needed this visit by Provider                      Current providers sharing in care for this patient include:  Patient Care Team:  Nicolas Harris MD as PCP - General (Family Medicine)  Nicolas Harris MD as Assigned PCP  Noah Heller MD as Assigned Musculoskeletal Provider  Abbey Ortega MD as Assigned Heart and Vascular Provider    The following health maintenance items are reviewed in Epic and correct as of today:  Health Maintenance   Topic Date Due    URINE DRUG SCREEN  Never done    COPD ACTION PLAN  Never done    RSV VACCINE (Pregnancy & 60+) (1 - 1-dose 60+ series) Never done    MEDICARE ANNUAL WELLNESS VISIT  2024    LUNG CANCER SCREENING  2024    ANNUAL REVIEW OF HM ORDERS  2024    LIPID  08/15/2024    COLORECTAL CANCER SCREENING  2024    FALL RISK ASSESSMENT  2025     "GLUCOSE  12/28/2025    DTAP/TDAP/TD IMMUNIZATION (4 - Td or Tdap) 03/07/2027    ADVANCE CARE PLANNING  01/30/2028    SPIROMETRY  Completed    HEPATITIS C SCREENING  Completed    PHQ-2 (once per calendar year)  Completed    INFLUENZA VACCINE  Completed    Pneumococcal Vaccine: 65+ Years  Completed    ZOSTER IMMUNIZATION  Completed    AORTIC ANEURYSM SCREENING (SYSTEM ASSIGNED)  Completed    COVID-19 Vaccine  Completed    IPV IMMUNIZATION  Aged Out    HPV IMMUNIZATION  Aged Out    MENINGITIS IMMUNIZATION  Aged Out    RSV MONOCLONAL ANTIBODY  Aged Out            Objective    Exam  /76   Pulse 54   Temp 97.8  F (36.6  C) (Tympanic)   Resp 16   Ht 1.803 m (5' 11\")   Wt 139.7 kg (308 lb)   SpO2 96%   BMI 42.96 kg/m     Estimated body mass index is 42.96 kg/m  as calculated from the following:    Height as of this encounter: 1.803 m (5' 11\").    Weight as of this encounter: 139.7 kg (308 lb).    Physical Exam  Gen: alert and oriented, in no acute distress, affect within normal limits  Neck: supple with no masses or nodes  Throat: oropharynx clear, no exudate or tonsillar/palate asymmetry.    CV: RRR, no murmur  Lungs: clear bilaterally with good effort  Abd: nontender, no mass  Ext: no edema or lesions   Neuro: moving all extremities, gait normal, no focal deficts noted           4/8/2024   Mini Cog   Clock Draw Score 2 Normal   3 Item Recall 2 objects recalled   Mini Cog Total Score 4              Signed Electronically by: Devon Alvarado MD    "

## 2024-04-09 ENCOUNTER — OFFICE VISIT (OUTPATIENT)
Dept: CARDIOLOGY | Facility: CLINIC | Age: 69
End: 2024-04-09
Payer: MEDICARE

## 2024-04-09 VITALS
OXYGEN SATURATION: 95 % | WEIGHT: 309.4 LBS | HEIGHT: 71 IN | BODY MASS INDEX: 43.31 KG/M2 | SYSTOLIC BLOOD PRESSURE: 123 MMHG | RESPIRATION RATE: 16 BRPM | HEART RATE: 57 BPM | DIASTOLIC BLOOD PRESSURE: 72 MMHG

## 2024-04-09 DIAGNOSIS — R06.02 SHORTNESS OF BREATH: ICD-10-CM

## 2024-04-09 DIAGNOSIS — R73.9 HYPERGLYCEMIA, UNSPECIFIED: ICD-10-CM

## 2024-04-09 DIAGNOSIS — G62.9 NEUROPATHY: Primary | ICD-10-CM

## 2024-04-09 DIAGNOSIS — I87.2 VENOUS (PERIPHERAL) INSUFFICIENCY: ICD-10-CM

## 2024-04-09 LAB — HBA1C MFR BLD: 5.8 % (ref 0–5.6)

## 2024-04-09 PROCEDURE — 99215 OFFICE O/P EST HI 40 MIN: CPT | Performed by: INTERNAL MEDICINE

## 2024-04-09 PROCEDURE — 83036 HEMOGLOBIN GLYCOSYLATED A1C: CPT | Performed by: INTERNAL MEDICINE

## 2024-04-09 PROCEDURE — 36415 COLL VENOUS BLD VENIPUNCTURE: CPT | Performed by: INTERNAL MEDICINE

## 2024-04-09 NOTE — LETTER
2024    Nicolas Harris MD  [address] [facility]              [place] [id number]    RE: Tommy De La O       Dear Colleague,     I had the pleasure of seeing Tommy De La O in the [facility].          HPI:     This is a pleasant 69 yr old male with no major PMH here for evaluation of cardiac history.      Father - had bypass and then  57 yrs old of MI  Brother - had bypass   Mother - lived to 93   P. Uncle  60s of MI  P. Grandmother  of MI  Multiple cousins with heart attacks      Had some CHUNG and had ziopatch with SVT, longest run 14 minutes. Some ventricular ectopy. Had stress echocardiogram, with lots of ventricular ectopy, no chest pain. Borderline size of the aorta at 3.8 cm.      On ROS: no lens dislocation, normal eye-width, normal uvula, normal palate, hypermobility in thumb joints, no skin translucency, normal skin, no vaginal prolapse or hernias, no abnormal wound healing, easy bruising or bleeding, no chest wall deformities, no dental crowding, normal stature.      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 Had ABIs in the past in 2016 that were normal. Had reflux studies, severely abnormal with proximal deep vein reflux, superficial reflux, no known history of DVT. Has thigh and pelvic varicose veins as well. Obtained CT venogram that showed no external compression however it did not phase right to determine reflux of the proximal veins. He stopped using compression stockings but his swelling has gotten worse.      Has history of chest pressure in the past, especially at night when having to do the firewood. Was having winded sensation as well. We obtained CT scan last year showing LAD lesion, calcium score 241. Stress test was negative last year for ischemia. Unfortunately his brother just  suddenly recently of massive MI.      Having a problem with sugar. Craving it everyday. Weight  fluctuating as a result.   Having some burning sensations of his legs. Fasting glucose has been going up.       ASSESSMENT/PLAN:      1. Chest pressure: reviewed stress test and cor cta, moderate disease, LAD burden, will follow closely. Stress test in 6 months given ongoing CHUNG.  -no longer on aspirin per preference   -discussed dietary changes     2. Echocardiogram overall wnl, no CMY  -aorta at upper limits normal      3. Chronic vein insufficiency: deep vein reflux, varicose veins in the pelvis. Would consider proximal vein obstruction or IVC obstruction. Has collapsed bladder as well. Obtained CT venogram which was minimally diagnostic. Will refer to Dr. Zabala in vascular IR. Suspect pelvic reflux given his vein comp results.     4. Claudication vs neuropathic pain - DARINEL with exercise for leg pain, do not suspect severe PAD. OK with compression stockings, suspect venous reflux causing symptoms. Screen for diabetes, AIC today.      5. SVT / PVCs- monitor, likely start beta blocker in future if sxs and depending on results above         Abbey Ortega MD MSC  [facility]    High complexity visit      PAST MEDICAL HISTORY  No past medical history on file.    CURRENT MEDICATIONS  Current Outpatient Medications   Medication Sig Dispense Refill    albuterol (PROAIR HFA/PROVENTIL HFA/VENTOLIN HFA) 108 (90 Base) MCG/ACT inhaler Inhale 2 puffs into the lungs every 6 hours as needed for shortness of breath, wheezing or cough 18 g 3    aspirin (ASA) 81 MG chewable tablet Take 1 tablet (81 mg) by mouth daily      cephALEXin (KEFLEX) 500 MG capsule Take 1 capsule (500 mg) by mouth 3 times daily for 7 days 21 capsule 0    chlorthalidone (HYGROTON) 25 MG tablet Take 1 tablet (25 mg) by mouth daily 90 tablet 3    clobetasol (TEMOVATE) 0.05 % external ointment Apply topically daily as needed For itching 60 g 0    fluconazole (DIFLUCAN) 200 MG tablet Take 1 tablet (200 mg) by mouth once a week 4 tablet 0     FLUoxetine (PROZAC) 20 MG capsule Take 1 capsule (20 mg) by mouth daily 90 capsule 3    gabapentin (NEURONTIN) 300 MG capsule TAKE 1 CAPSULE THREE TIMES A  capsule 3    losartan (COZAAR) 25 MG tablet Take 1 tablet (25 mg) by mouth daily 90 tablet 3    omeprazole (PRILOSEC) 40 MG DR capsule TAKE 1 CAPSULE DAILY 30 TO 60 MINUTES BEFORE A MEAL 90 capsule 3    rosuvastatin (CRESTOR) 40 MG tablet Take 1 tablet (40 mg) by mouth daily 90 tablet 3    tirzepatide-Weight Management (ZEPBOUND) 2.5 MG/0.5ML prefilled pen Inject 0.5 mLs (2.5 mg) Subcutaneous every 7 days 2 mL 1    vitamin C (ASCORBIC ACID) 1000 MG TABS Take 1,000 mg by mouth daily      Vitamin D3 (CHOLECALCIFEROL) 125 MCG (5000 UT) tablet Take 1 tablet by mouth daily         PAST SURGICAL HISTORY:  Past Surgical History:   Procedure Laterality Date    ARTHROSCOPY KNEE Bilateral     both knees with arthroscopy in the past.     ARTHROSCOPY KNEE      ARTHROSCOPY SHOULDER ROTATOR CUFF REPAIR      AS TOTAL KNEE ARTHROPLASTY Bilateral     Both total knees.     LENGTHEN TENDON ACHILLES Left     REPAIR TENDON ACHILLES      1980s two separate surgeries.    ROTATOR CUFF REPAIR RT/LT Left     [facility] TOTAL KNEE ARTHROPLASTY Right 3/17/2015    Procedure: RIGHT KNEE TOTAL ARTHROPLASTY;  Surgeon: Jaiden Barnes MD;  Location: [facility] OR;  Service: Orthopedics    [facility] TOTAL KNEE ARTHROPLASTY Left 6/30/2015    Procedure: LEFT KNEE TOTAL ARTHROPLASTY;  Surgeon: Jaiden Barnes MD;  Location: [facility] OR;  Service: Orthopedics       ALLERGIES     Allergies   Allergen Reactions    Amlodipine Swelling    Darvocet [Propoxyphene N-Apap] Hives    Penicillins Hives       FAMILY HISTORY  Family History   Problem Relation Age of Onset    Coronary Artery Disease Father     Hyperlipidemia Mother     Prostate Cancer No family hx of     Colon Cancer No family hx of     Hypertension Mother     Heart Disease Mother     Heart Disease Father     Hypertension Sister     Cancer  Brother     No Known Problems Daughter     No Known Problems Son     Cancer Maternal Grandmother     Vision Loss Maternal Grandmother     No Known Problems Sister     Cancer Brother     Hypertension Brother     Hypertension Brother     Cancer Brother     Sleep Apnea Brother     No Known Problems Brother     No Known Problems Daughter        SOCIAL HISTORY  Social History     Socioeconomic History    Marital status: Single     Spouse name: Not on file    Number of children: Not on file    Years of education: Not on file    Highest education level: Not on file   Occupational History    Not on file   Tobacco Use    Smoking status: Former     Packs/day: 1.00     Years: 30.00     Additional pack years: 0.00     Total pack years: 30.00     Types: Cigarettes     Quit date: 2011     Years since quittin.1    Smokeless tobacco: Never    Tobacco comments:     started at age 22   Vaping Use    Vaping Use: Never used   Substance and Sexual Activity    Alcohol use: Yes    Drug use: No    Sexual activity: Yes     Partners: Female   Other Topics Concern    Parent/sibling w/ CABG, MI or angioplasty before 65F 55M? Not Asked   Social History Narrative    Not on file     Social Determinants of Health     Financial Resource Strain: Low Risk  (4/3/2024)    Financial Resource Strain     Within the past 12 months, have you or your family members you live with been unable to get utilities (heat, electricity) when it was really needed?: No   Food Insecurity: Low Risk  (4/3/2024)    Food Insecurity     Within the past 12 months, did you worry that your food would run out before you got money to buy more?: No     Within the past 12 months, did the food you bought just not last and you didn t have money to get more?: No   Transportation Needs: Low Risk  (4/3/2024)    Transportation Needs     Within the past 12 months, has lack of transportation kept you from medical appointments, getting your medicines, non-medical meetings or  "appointments, work, or from getting things that you need?: No   Physical Activity: Inactive (4/3/2024)    Exercise Vital Sign     Days of Exercise per Week: 0 days     Minutes of Exercise per Session: 0 min   Stress: Stress Concern Present (4/3/2024)    [employer] - Occupational Stress Questionnaire     Feeling of Stress : To some extent   Social Connections: Unknown (4/3/2024)    Social Connection and Isolation Panel [NHANES]     Frequency of Communication with Friends and Family: Not on file     Frequency of Social Gatherings with Friends and Family: Once a week     Attends Sabianist Services: Not on file     Active Member of Clubs or Organizations: Not on file     Attends Club or Organization Meetings: Not on file     Marital Status: Not on file   Interpersonal Safety: Low Risk  (4/8/2024)    Interpersonal Safety     Do you feel physically and emotionally safe where you currently live?: Yes     Within the past 12 months, have you been hit, slapped, kicked or otherwise physically hurt by someone?: No     Within the past 12 months, have you been humiliated or emotionally abused in other ways by your partner or ex-partner?: No   Housing Stability: Low Risk  (4/3/2024)    Housing Stability     Do you have housing? : Yes     Are you worried about losing your housing?: No       ROS:   Constitutional: No fever, chills, or sweats. No weight gain/loss   ENT: No visual disturbance, ear ache, epistaxis, sore throat  Allergies/Immunologic: Negative  Respiratory: No cough, hemoptysia  Cardiovascular: As per HPI  GI: No nausea, vomiting, hematemesis, melena, or hematochezia  : No urinary frequency, dysuria, or hematuria  Integument: Negative  Psychiatric: Negative  Neuro: Negative  Endocrinology: Negative   Musculoskeletal: Negative  Vascular: No walking impairment, claudication, ischemic rest pain or nonhealing wounds    EXAM:  /72   Pulse 57   Resp 16   Ht 1.803 m (5' 11\")   Wt 140.3 " kg (309 lb 6.4 oz)   SpO2 95%   BMI 43.15 kg/m    In general, the patient is a pleasant male in no apparent distress.    HEENT: NC/AT.  PERRLA.  EOMI.  Sclerae white, not injected.  Nares clear.  Pharynx without erythema or exudate.  Dentition intact.    Neck: No adenopathy.  No thyromegaly. Carotids +2/2 bilaterally without bruits.  No jugular venous distension.   Heart: RRR. Normal S1, S2 splits physiologically. No murmur, rub, click, or gallop. The PMI is in the 5th ICS in the midclavicular line. There is no heave.    Lungs: CTA.  No ronchi, wheezes, rales.  No dullness to percussion.   Abdomen: Soft, nontender, nondistended. No organomegaly. No AAA.  No bruits.   Extremities: No clubbing, cyanosis, +edema  Vascular: No bruits are noted.    Labs:  LIPID RESULTS:  Lab Results   Component Value Date    CHOL 119 08/15/2023    CHOL 158 02/18/2021    HDL 36 (L) 08/15/2023    HDL 46 02/18/2021    LDL 55 08/15/2023    LDL 76 02/18/2021    TRIG 140 08/15/2023    TRIG 181 (H) 02/18/2021    NHDL 83 08/15/2023    NHDL 112 02/18/2021       LIVER ENZYME RESULTS:  Lab Results   Component Value Date    AST 32 12/28/2022    AST 28 06/08/2018    ALT 25 08/15/2023    ALT 45 06/08/2018       CBC RESULTS:  Lab Results   Component Value Date    WBC 7.3 12/28/2022    WBC 6.4 05/08/2017    RBC 4.84 12/28/2022    RBC 4.97 05/08/2017    HGB 14.7 12/28/2022    HGB 14.9 05/08/2017    HCT 43.1 12/28/2022    HCT 43.1 05/08/2017    MCV 89 12/28/2022    MCV 87 05/08/2017    MCH 30.4 12/28/2022    MCH 30.0 05/08/2017    MCHC 34.1 12/28/2022    MCHC 34.6 05/08/2017    RDW 12.8 12/28/2022    RDW 13.0 05/08/2017     12/28/2022     05/08/2017       BMP RESULTS:  Lab Results   Component Value Date     04/08/2024     02/18/2021    POTASSIUM 3.5 04/08/2024    POTASSIUM 3.2 (L) 02/23/2022    POTASSIUM 3.3 (L) 02/18/2021    CHLORIDE 101 04/08/2024    CHLORIDE 101 02/23/2022    CHLORIDE 104 02/18/2021    CO2 27 04/08/2024     CO2 32 02/23/2022    CO2 28 02/18/2021    ANIONGAP 11 04/08/2024    ANIONGAP 2 (L) 02/23/2022    ANIONGAP 6 02/18/2021    GLC 99 04/08/2024     (H) 02/23/2022    GLC 91 02/18/2021    BUN 18.8 04/08/2024    BUN 16 02/23/2022    BUN 18 02/18/2021    CR 0.82 04/08/2024    CR 0.79 02/18/2021    GFRESTIMATED >90 04/08/2024    GFRESTIMATED >90 02/18/2021    GFRESTBLACK >90 02/18/2021    KIMBERLY 9.3 04/08/2024    KIMBERLY 9.1 02/18/2021        A1C RESULTS:  Lab Results   Component Value Date    A1C 5.6 03/07/2017   Thank you for allowing me to participate in the care of your patient.      Sincerely,     Abbey Ortega MD     [facility]  cc:   Abbey Ortega MD  [address] [place] [id number]

## 2024-04-09 NOTE — PATIENT INSTRUCTIONS
1. Referral to Tashia Zabala in interventional radiology / vascular IR   2. Nuclear stress test 6 months   3. Check labs today (AIC) for diabetes screen   4. One year follow up with Echocardiogram

## 2024-04-09 NOTE — PROGRESS NOTES
HPI:     This is a pleasant 69 yr old male with no major PMH here for evaluation of cardiac history.      Father - had bypass and then  57 yrs old of MI  Brother - had bypass   Mother - lived to 93   P. Uncle  60s of MI  P. Grandmother  of MI  Multiple cousins with heart attacks      Had some CHUNG and had ziopatch with SVT, longest run 14 minutes. Some ventricular ectopy. Had stress echocardiogram, with lots of ventricular ectopy, no chest pain. Borderline size of the aorta at 3.8 cm.      On ROS: no lens dislocation, normal eye-width, normal uvula, normal palate, hypermobility in thumb joints, no skin translucency, normal skin, no vaginal prolapse or hernias, no abnormal wound healing, easy bruising or bleeding, no chest wall deformities, no dental crowding, normal stature.      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 Had ABIs in the past in 2016 that were normal. Had reflux studies, severely abnormal with proximal deep vein reflux, superficial reflux, no known history of DVT. Has thigh and pelvic varicose veins as well. Obtained CT venogram that showed no external compression however it did not phase right to determine reflux of the proximal veins. He stopped using compression stockings but his swelling has gotten worse.      Has history of chest pressure in the past, especially at night when having to do the firewood. Was having winded sensation as well. We obtained CT scan last year showing LAD lesion, calcium score 241. Stress test was negative last year for ischemia. Unfortunately his brother just  suddenly recently of massive MI.      Having a problem with sugar. Craving it everyday. Weight fluctuating as a result.   Having some burning sensations of his legs. Fasting glucose has been going up.       ASSESSMENT/PLAN:      1. Chest pressure: reviewed stress test and cor cta, moderate disease, LAD burden, will  follow closely. Stress test in 6 months given ongoing CHUNG.  -no longer on aspirin per preference   -discussed dietary changes     2. Echocardiogram overall wnl, no CMY  -aorta at upper limits normal      3. Chronic vein insufficiency: deep vein reflux, varicose veins in the pelvis. Would consider proximal vein obstruction or IVC obstruction. Has collapsed bladder as well. Obtained CT venogram which was minimally diagnostic. Will refer to Dr. Zabala in vascular IR. Suspect pelvic reflux given his vein comp results.     4. Claudication vs neuropathic pain - DARINEL with exercise for leg pain, do not suspect severe PAD. OK with compression stockings, suspect venous reflux causing symptoms. Screen for diabetes, AIC today.      5. SVT / PVCs- monitor, likely start beta blocker in future if sxs and depending on results above         Abbey Ortega MD MSC  [facility]    High complexity visit      PAST MEDICAL HISTORY  No past medical history on file.    CURRENT MEDICATIONS  Current Outpatient Medications   Medication Sig Dispense Refill    albuterol (PROAIR HFA/PROVENTIL HFA/VENTOLIN HFA) 108 (90 Base) MCG/ACT inhaler Inhale 2 puffs into the lungs every 6 hours as needed for shortness of breath, wheezing or cough 18 g 3    aspirin (ASA) 81 MG chewable tablet Take 1 tablet (81 mg) by mouth daily      cephALEXin (KEFLEX) 500 MG capsule Take 1 capsule (500 mg) by mouth 3 times daily for 7 days 21 capsule 0    chlorthalidone (HYGROTON) 25 MG tablet Take 1 tablet (25 mg) by mouth daily 90 tablet 3    clobetasol (TEMOVATE) 0.05 % external ointment Apply topically daily as needed For itching 60 g 0    fluconazole (DIFLUCAN) 200 MG tablet Take 1 tablet (200 mg) by mouth once a week 4 tablet 0    FLUoxetine (PROZAC) 20 MG capsule Take 1 capsule (20 mg) by mouth daily 90 capsule 3    gabapentin (NEURONTIN) 300 MG capsule TAKE 1 CAPSULE THREE TIMES A  capsule 3    losartan (COZAAR) 25 MG tablet Take 1 tablet (25 mg)  by mouth daily 90 tablet 3    omeprazole (PRILOSEC) 40 MG DR capsule TAKE 1 CAPSULE DAILY 30 TO 60 MINUTES BEFORE A MEAL 90 capsule 3    rosuvastatin (CRESTOR) 40 MG tablet Take 1 tablet (40 mg) by mouth daily 90 tablet 3    tirzepatide-Weight Management (ZEPBOUND) 2.5 MG/0.5ML prefilled pen Inject 0.5 mLs (2.5 mg) Subcutaneous every 7 days 2 mL 1    vitamin C (ASCORBIC ACID) 1000 MG TABS Take 1,000 mg by mouth daily      Vitamin D3 (CHOLECALCIFEROL) 125 MCG (5000 UT) tablet Take 1 tablet by mouth daily         PAST SURGICAL HISTORY:  Past Surgical History:   Procedure Laterality Date    ARTHROSCOPY KNEE Bilateral     both knees with arthroscopy in the past.     ARTHROSCOPY KNEE      ARTHROSCOPY SHOULDER ROTATOR CUFF REPAIR      AS TOTAL KNEE ARTHROPLASTY Bilateral     Both total knees.     LENGTHEN TENDON ACHILLES Left     REPAIR TENDON ACHILLES      1980s two separate surgeries.    ROTATOR CUFF REPAIR RT/LT Left     [facility] TOTAL KNEE ARTHROPLASTY Right 3/17/2015    Procedure: RIGHT KNEE TOTAL ARTHROPLASTY;  Surgeon: Jaiden Barnes MD;  Location: [facility] OR;  Service: Orthopedics    [facility] TOTAL KNEE ARTHROPLASTY Left 6/30/2015    Procedure: LEFT KNEE TOTAL ARTHROPLASTY;  Surgeon: Jaiden Barnes MD;  Location: [facility] OR;  Service: Orthopedics       ALLERGIES     Allergies   Allergen Reactions    Amlodipine Swelling    Darvocet [Propoxyphene N-Apap] Hives    Penicillins Hives       FAMILY HISTORY  Family History   Problem Relation Age of Onset    Coronary Artery Disease Father     Hyperlipidemia Mother     Prostate Cancer No family hx of     Colon Cancer No family hx of     Hypertension Mother     Heart Disease Mother     Heart Disease Father     Hypertension Sister     Cancer Brother     No Known Problems Daughter     No Known Problems Son     Cancer Maternal Grandmother     Vision Loss Maternal Grandmother     No Known Problems Sister     Cancer Brother     Hypertension Brother     Hypertension  Brother     Cancer Brother     Sleep Apnea Brother     No Known Problems Brother     No Known Problems Daughter        SOCIAL HISTORY  Social History     Socioeconomic History    Marital status: Single     Spouse name: Not on file    Number of children: Not on file    Years of education: Not on file    Highest education level: Not on file   Occupational History    Not on file   Tobacco Use    Smoking status: Former     Packs/day: 1.00     Years: 30.00     Additional pack years: 0.00     Total pack years: 30.00     Types: Cigarettes     Quit date: 2011     Years since quittin.1    Smokeless tobacco: Never    Tobacco comments:     started at age 22   Vaping Use    Vaping Use: Never used   Substance and Sexual Activity    Alcohol use: Yes    Drug use: No    Sexual activity: Yes     Partners: Female   Other Topics Concern    Parent/sibling w/ CABG, MI or angioplasty before 65F 55M? Not Asked   Social History Narrative    Not on file     Social Determinants of Health     Financial Resource Strain: Low Risk  (4/3/2024)    Financial Resource Strain     Within the past 12 months, have you or your family members you live with been unable to get utilities (heat, electricity) when it was really needed?: No   Food Insecurity: Low Risk  (4/3/2024)    Food Insecurity     Within the past 12 months, did you worry that your food would run out before you got money to buy more?: No     Within the past 12 months, did the food you bought just not last and you didn t have money to get more?: No   Transportation Needs: Low Risk  (4/3/2024)    Transportation Needs     Within the past 12 months, has lack of transportation kept you from medical appointments, getting your medicines, non-medical meetings or appointments, work, or from getting things that you need?: No   Physical Activity: Inactive (4/3/2024)    Exercise Vital Sign     Days of Exercise per Week: 0 days     Minutes of Exercise per Session: 0 min   Stress: Stress Concern  "Present (4/3/2024)    [employer] [place] [employer] - Occupational Stress Questionnaire     Feeling of Stress : To some extent   Social Connections: Unknown (4/3/2024)    Social Connection and Isolation Panel [NHANES]     Frequency of Communication with Friends and Family: Not on file     Frequency of Social Gatherings with Friends and Family: Once a week     Attends Mu-ism Services: Not on file     Active Member of Clubs or Organizations: Not on file     Attends Club or Organization Meetings: Not on file     Marital Status: Not on file   Interpersonal Safety: Low Risk  (4/8/2024)    Interpersonal Safety     Do you feel physically and emotionally safe where you currently live?: Yes     Within the past 12 months, have you been hit, slapped, kicked or otherwise physically hurt by someone?: No     Within the past 12 months, have you been humiliated or emotionally abused in other ways by your partner or ex-partner?: No   Housing Stability: Low Risk  (4/3/2024)    Housing Stability     Do you have housing? : Yes     Are you worried about losing your housing?: No       ROS:   Constitutional: No fever, chills, or sweats. No weight gain/loss   ENT: No visual disturbance, ear ache, epistaxis, sore throat  Allergies/Immunologic: Negative  Respiratory: No cough, hemoptysia  Cardiovascular: As per HPI  GI: No nausea, vomiting, hematemesis, melena, or hematochezia  : No urinary frequency, dysuria, or hematuria  Integument: Negative  Psychiatric: Negative  Neuro: Negative  Endocrinology: Negative   Musculoskeletal: Negative  Vascular: No walking impairment, claudication, ischemic rest pain or nonhealing wounds    EXAM:  /72   Pulse 57   Resp 16   Ht 1.803 m (5' 11\")   Wt 140.3 kg (309 lb 6.4 oz)   SpO2 95%   BMI 43.15 kg/m    In general, the patient is a pleasant male in no apparent distress.    HEENT: NC/AT.  PERRLA.  EOMI.  Sclerae white, not injected.  Nares clear.  Pharynx without erythema or " exudate.  Dentition intact.    Neck: No adenopathy.  No thyromegaly. Carotids +2/2 bilaterally without bruits.  No jugular venous distension.   Heart: RRR. Normal S1, S2 splits physiologically. No murmur, rub, click, or gallop. The PMI is in the 5th ICS in the midclavicular line. There is no heave.    Lungs: CTA.  No ronchi, wheezes, rales.  No dullness to percussion.   Abdomen: Soft, nontender, nondistended. No organomegaly. No AAA.  No bruits.   Extremities: No clubbing, cyanosis, +edema  Vascular: No bruits are noted.    Labs:  LIPID RESULTS:  Lab Results   Component Value Date    CHOL 119 08/15/2023    CHOL 158 02/18/2021    HDL 36 (L) 08/15/2023    HDL 46 02/18/2021    LDL 55 08/15/2023    LDL 76 02/18/2021    TRIG 140 08/15/2023    TRIG 181 (H) 02/18/2021    NHDL 83 08/15/2023    NHDL 112 02/18/2021       LIVER ENZYME RESULTS:  Lab Results   Component Value Date    AST 32 12/28/2022    AST 28 06/08/2018    ALT 25 08/15/2023    ALT 45 06/08/2018       CBC RESULTS:  Lab Results   Component Value Date    WBC 7.3 12/28/2022    WBC 6.4 05/08/2017    RBC 4.84 12/28/2022    RBC 4.97 05/08/2017    HGB 14.7 12/28/2022    HGB 14.9 05/08/2017    HCT 43.1 12/28/2022    HCT 43.1 05/08/2017    MCV 89 12/28/2022    MCV 87 05/08/2017    MCH 30.4 12/28/2022    MCH 30.0 05/08/2017    MCHC 34.1 12/28/2022    MCHC 34.6 05/08/2017    RDW 12.8 12/28/2022    RDW 13.0 05/08/2017     12/28/2022     05/08/2017       BMP RESULTS:  Lab Results   Component Value Date     04/08/2024     02/18/2021    POTASSIUM 3.5 04/08/2024    POTASSIUM 3.2 (L) 02/23/2022    POTASSIUM 3.3 (L) 02/18/2021    CHLORIDE 101 04/08/2024    CHLORIDE 101 02/23/2022    CHLORIDE 104 02/18/2021    CO2 27 04/08/2024    CO2 32 02/23/2022    CO2 28 02/18/2021    ANIONGAP 11 04/08/2024    ANIONGAP 2 (L) 02/23/2022    ANIONGAP 6 02/18/2021    GLC 99 04/08/2024     (H) 02/23/2022    GLC 91 02/18/2021    BUN 18.8 04/08/2024    BUN 16  02/23/2022    BUN 18 02/18/2021    CR 0.82 04/08/2024    CR 0.79 02/18/2021    GFRESTIMATED >90 04/08/2024    GFRESTIMATED >90 02/18/2021    GFRESTBLACK >90 02/18/2021    KIMBERLY 9.3 04/08/2024    KIMBERLY 9.1 02/18/2021        A1C RESULTS:  Lab Results   Component Value Date    A1C 5.6 03/07/2017

## 2024-04-10 ENCOUNTER — TELEPHONE (OUTPATIENT)
Dept: OTHER | Facility: CLINIC | Age: 69
End: 2024-04-10
Payer: MEDICARE

## 2024-04-10 NOTE — TELEPHONE ENCOUNTER
Reviewed IR referral with Dr. Zabala.  She discussed with Dr. Ortega.  At this time, IR has no option for treatment.    Per conversation, Dr. Ortega will be referring patient back to Dr. Fermin.    Alaina Cristobal RN  IR nurse clinician  [phone]

## 2024-04-11 DIAGNOSIS — I87.2 VENOUS (PERIPHERAL) INSUFFICIENCY: Primary | ICD-10-CM

## 2024-04-17 NOTE — TELEPHONE ENCOUNTER
Pt called to check status of this request.  Routed to PA pool.  Pt asks to be notified.  Coral Brambila RN

## 2024-04-17 NOTE — TELEPHONE ENCOUNTER
PA Initiation    Medication: ZEPBOUND 2.5 MG/0.5ML SC SOAJ  Insurance Company: [employer] - Phone [phone] Fax [phone]  Pharmacy Filling the Rx: [id number] - [place], [place] - [address] [place] [address] AT [facility] & [address]  Filling Pharmacy Phone: [phone]  Filling Pharmacy Fax:    Start Date: 4/17/2024

## 2024-04-18 NOTE — TELEPHONE ENCOUNTER
PRIOR AUTHORIZATION DENIED    Medication: ZEPBOUND 2.5 MG/0.5ML SC SOAJ  Insurance Company: [employer] - Phone [phone] Fax [phone]  Denial Date: 4/18/2024  Denial Reason(s): Weight loss Medications are Excluded from coverage under Medicare      Appeal Information: N/A  Patient Notified: No

## 2024-04-25 ENCOUNTER — TELEPHONE (OUTPATIENT)
Dept: FAMILY MEDICINE | Facility: CLINIC | Age: 69
End: 2024-04-25
Payer: MEDICARE

## 2024-04-25 NOTE — TELEPHONE ENCOUNTER
"    Forms/Letter Request    Type of form/letter: OTHER: patient called requesting an appeal letter for insurance for zepbound; he feel is his neuropathy and his history with borderline diabetes. He has bilateral knee replacements.  He reports he can not exercise due neuropathy and knee replacements.    Medicare appeals.   Fax [phone]  Ph. [phone]     PA was completed 4/8/24. Reviewed with patient and advised \"Weight loss Medications are Excluded from coverage under Medicare\"  patient feels his medical diagnosis may change things.  Unsure what next step would be?    Do we have the form/letter: No    How would you like the form/letter returned: Fax : [phone]    Patient Notified form requests are processed in 5-7 business days:Yes    Could we send this information to you in vidIQ or would you prefer to receive a phone call?:   Patient would prefer a phone call   Okay to leave a detailed message?: Yes at Cell number on file:    Telephone Information:   Mobile [phone]       "

## 2024-04-25 NOTE — LETTER
April 26, 2024      Tommy De La O  [address] [place] [address]        To whom it may concern,    My patient, Tommy De La O, has morbid obesity with a BMI of 42.  He has sleep apnea, has some early neuropathy presumed from pre-diabetes, and has had bilateral knee replacements.  This makes regular exercise difficult for him and he would benefit medically from using a weight loss medication to lower his morbidity related to his obesity.  Zepbound is an excellent choice.      Can this be reviewed as an appeal for previous denial?            Sincerely,        Devon Alvarado MD

## 2024-05-06 ENCOUNTER — OFFICE VISIT (OUTPATIENT)
Dept: VASCULAR SURGERY | Facility: CLINIC | Age: 69
End: 2024-05-06
Attending: INTERNAL MEDICINE
Payer: MEDICARE

## 2024-05-06 DIAGNOSIS — I87.2 VENOUS (PERIPHERAL) INSUFFICIENCY: ICD-10-CM

## 2024-05-06 PROCEDURE — 99215 OFFICE O/P EST HI 40 MIN: CPT | Performed by: INTERNAL MEDICINE

## 2024-05-06 NOTE — PATIENT INSTRUCTIONS
For veins:    If you get brown discoloration, pain in the varicose veins, more swelling or night time cramping then we might want to take care of those veins.        Pre-Procedure Instructions:                           VNUS Closure and Phlebectomies   You are having a Closure(s) - where one or more veins are closed and Phlebectomies - where one or more veins are removed.   Insurance  Precertification and/or referral authorization may be required by your insurance company.  We will call your insurance company to verify benefits for the medically necessary part of your procedure.    Your Current Medications and Allergies  To reduce bruising, please do not take aspirin-type medications (Motrin, Aleve, Ibuprofen, Advil, etc.) for three days before your procedure. You may take Tylenol if you need a pain reliever.  Are you on blood thinner medications? (Plavix, Coumadin, Eliquis, Xarelto) Please discuss this with your surgeon. You may resume taking your blood thinner medication after your procedure.  Are you sensitive to latex or adhesives used for fake fingernails? Please let us know!    Driving Escort and   Please arrange to have a trusted adult (18 years old or older) drive you to and from the clinic.  For your safety, we recommend you have a trusted adult to stay with you until the next morning.    Your Health  If you have a change in your health before the procedure, contact our office immediately. (For example: cold symptoms, cough, urinary tract infection, fever, flu symptoms.)  A pre-procedure physical is not required.    Note  It is sometimes necessary to adjust the procedure schedule due to emergencies. We greatly appreciate your flexibility and understanding in this matter.                  Check List: The Morning of Your Procedure  ___1. Please do not put anything on your leg(s) or shave the day of your procedure.  ___2. You may take your normal medications the day of your procedure.  ___3. It is  recommended you eat a light breakfast or lunch the day of your procedure.  ___4. Wear comfortable loose-fitting clothing and wide-fitting shoes (i.e. tennis shoes, slip-ons).  ___5. Please arrive at our clinic at the specified time given by the nurse.  ___6. You will sign an affirmation of informed consent.  ___7. Bring your pre-procedure sedation medication (lorazepam and clonidine) with you to the clinic.              One hour before your procedure, you will be instructed to take these medications. The lorazepam              (Ativan) lowers anxiety and sedates you; the clonidine makes the lorazepam more effective. Everyone's              body processes these medications differently. Therefore, reactions to these medications vary. Some              people stay awake and some people sleep through the whole procedure. You may not remember              everything about the procedure or the day. You do not want to make any big decisions for the rest of the              day.    The Day of Your Procedure:       VNUS Closure and Phlebectomies  In the Exam Room  A nurse will bring you back to an exam room with your family member or friend. This is when your informed consent will be signed, and you will take your pre-procedure medications.  You will be asked to remove everything from the waist down, including undergarments. You will then put on a hospital gown or shorts and blue booties.  Your surgeon will come in to answer any questions and jaymie any bulging varicose veins to be removed.  You will be taken to the restroom to empty your bladder before going into the procedure room.    In the Procedure Room  You will be escorted to the procedure room. You will lie on a procedure table covered with a sheet or blanket.  A nurse will put a blood pressure cuff on your arm and a pulse/oxygen monitor on a finger. Your vital signs will be monitored every 15 minutes.  Your gown will be pulled up slightly and the groin exposed for a  short period of time. The surgeon's assistant will clean your foot, leg, and groin with an antibacterial solution. We will get you covered up as quickly as possible!  Sterile towels and blue drapes will be used to cover you and the table. You will be asked to keep your hands under the blue drapes during the procedure.  The lights will be turned down. The table will be tipped so your head is higher than your feet. You may feel like you're going to slide off, but you won't.    The Procedure  The surgeon will visualize your veins with an ultrasound machine. He or she will then numb your skin and access the vein. A catheter is passed up the vein and positioned with ultrasound guidance. The table will then be tipped head down.  Once the catheter is in the correct position, medication will be injected to numb your leg. You will feel some needle sticks and may feel discomfort as the medication goes in. Once this is done, you should not experience significant discomfort. But if you do, please let us know and more numbing medication can be injected. As the catheter sends out heat, the vein closes off and the catheter is withdrawn.  For the phlebectomy part of the procedure, small incisions are made where the bulging varicose veins have been marked on your leg(s) and these veins will be removed using a small chao hook instrument.    Post-Procedure  Once the procedure is done, your leg(s) will be washed with warm water and dried. Your leg(s) will be bandaged with large soft dressings and a large ACE bandage wrapped from toes to groin.   You will be offered something to drink and a light snack.  You will rest with your leg(s) elevated for approximately 30 minutes. Your friend or family member may join you.  For your safety, you will be taken to your car in a wheelchair. If you are able to, it is good to keep your leg(s) elevated on the car ride home.    Post-Procedure Instructions:             VNUS Closure and Phlebectomies       Post-Op Day Zero - The Day of Your Procedure:0  1. Medication for Pain Control and Inflammation Control   - The numbing medication injected during your procedure will last for several hours. The pre-procedure                 tablets may make you very sleepy and you might not remember everything from the procedure or from                 the day. This will usually wear off by the next day.   - Ibuprofen:  If tolerated, take ibuprofen (e.g., Advil) to reduce inflammation whether or not you have                 pain. For three days, take two tablets (200 mg each) with every meal and at bedtime with a snack. If                 your pain is not controlled with ibuprofen, you may take prescription pain medication (such as Norco),                 if prescribed.   - You may resume taking any medications you were taking before your procedure.  2. Activity   - Rest with your leg(s) elevated above your heart. This will prevent from a lot of swelling and                 bleeding. You do not need to elevate your leg(s) while sleeping at night. You may go upstairs, sit up to                 eat, use the bathroom, and take several five-minute walks. Otherwise, keep your leg(s) elevated.                 Minimize the amount of time you are up on your feet to about 30 minutes at a time.  3. Bandages   - The incision sites will be covered with soft bandages and an ACE wrap. Keep your bandages on                 and dry for 48 hours. The ACE should provide  snug  compression but should not cause pain or                 numbness in the toes. If you have significant discomfort or your toes become cold or numb, unwrap                 your ACE and rewrap with less tension starting at the toes wrapping upward.  4. Incisions   - Bleeding: You may see some incision sites that are oozing through the bandages. This is not unusual      and can be managed with Rest, Ice, Compression and Elevation (RICE). Apply ice and firm pressure       directly to the site that is bleeding and rest with your leg(s) elevated above your heart for 20-30                 minutes.    Post-Op Day One:  1. Medication   - Ibuprofen: Continue the same as the Day of Your Procedure. If your pain is not controlled with                 ibuprofen, you may take prescription pain medication (such as Norco), if prescribed.   2. Activity   - We would like you to get up at least six times and walk around for short periods of time, unless it is      causing you pain. You should not be on your feet more than 90 minutes at a time. Elevate your leg      above your heart when you are not walking.  3. Bandages   - Your bandages must be kept on and dry for 48 hours.  4. Driving   - You may resume driving when you can do so safely. Do not drive if you are taking narcotic pain      medication.  Post-Op Day Two:   1. Medication  - Ibuprofen: Continue the same as the Day of Your Procedure.  2.  Activity  - Walk as tolerated. Elevate as much as possible when not walking.  3. Bandages and Compression  - Remove ACE wrap and padding. Shower and put on your compression hose during waking hours only       for at least 5 days. (Your doctor may instruct you to keep your bandages on until your return     appointment; please follow your doctor's instructions.)  4. Incisions  - Your leg(s) will be bruised; there may be swelling, hard knots under the skin and possibly some     numbness. These will likely resolve over time. If you see  hair-like  strings coming out of your     incisions, do not pull them (this will only cause pain/discomfort). We will trim them when you come     back for your follow-up appointment.  5. Call Us If:   - You see any areas on your leg that are red and angry in appearance.   - You notice any drainage that is milky or cloudy in appearance or that has a foul odor.   - You run a temperature of 100.5 or greater.    Post-Op Day Three:  You will have a follow up appointment 2-4 days  post-procedure. At this appointment, you will have an ultrasound and we will check your incisions.    ________________________________________________________________________________________    The Two Weeks Following Your Procedure  1.  Skin Care   - Do not use any lotions, creams or powders on your incisions for 14 days or until the incisions have                 healed.   - Do not soak in a bathtub, hot tub or go swimming for 14 days or until your incisions have healed.  2.  Medications   - You may use ibuprofen or acetaminophen (e.g., Tylenol) as needed for pain or discomfort.  3.  Activity   - Do not lift over 25 pounds. After about two weeks you may resume exercise such as aerobics,                 running, tennis or weightlifting. Use your common sense and ease back into your exercise routine                 slowly.   - You may feel a cord-like tightness along the inside of your leg. Gentle stretching can be helpful.  4. Compression Hose   - Your doctor may instruct you to wear compression for longer than seven days; please follow your                 doctor's instructions. As a comfort measure, you may choose to wear compression for longer than                 required.  5.  Travel   - Do not fly in an airplane for 14 days after your procedure. If you have a long car trip planned within                 two to three weeks following your procedure, stop and walk for a few minutes every two hours.      Periodic ankle pumps during the ride may be helpful.    Six Week Appointment  - At your six-week appointment, you will see your surgeon for an exam and evaluation. This office visit     will be scheduled when you return for post-op day three return appointment.       Return to Work  1.  If you work outside the home, you may return to work in a few days depending on the extent of your        procedure, how you tolerate it, and the type of work you perform.  2.  Paperwork: If your employer requires paperwork or you  would like a letter written to your employer, please        let us know. We will complete disability type forms at no charge. Please allow five business days for forms        to be completed.        Thigh High Compression Stockings are recommended, medical grade, 20-30 mmHg strength.    Options for purchasing Compression stockings:    You can call your insurance company and ask if compression stockings are covered.  They usually fall under your Durable Medical Equipment (DME) coverage, if covered. The DME codes if they ask are: Knee high , Thigh high , Panty .   Be sure to ask if you need a specific diagnosis for coverage, if your deductible needs to be met first, how many pairs are covered and how often.  If insurance covers and you would like to order from [email or web address], or another medical supply company: call the clinic back and we can fax a prescription to your medical supply company of choice. They will bill your insurance and ship them to you. We will ask you color choice (black or beige), and toe choice (open or closed toe).    We sell many sizes in our clinic (except specialty order or petite sizing). We can check to see if we have your size.  Knee high (Mediven brand) are $60/pair  Thigh high ([employer] brand) are $85/pair.   If you would like to purchase from the clinic, let us know, and we will set them aside for you to  and pay for at your next clinic appointment or the day of your procedure.    Online website ordering options:   [email or web address]  [email or web address]  [employer] has many options available.       Sclerotherapy: Pre-Treatment Instructions    Recommended Sessions:  ______ treatment sessions    Pricing: Full session - $407                 *Payment is due at the time of visit following the treatment    Time Required per Treatment Session - About 45 minutes  Please come in 15 minutes before your scheduled appointment.  30 min.  Sclerotherapy treatments last  approximately 30 minutes.  5 min.    A staff member will wipe your legs off with warm water and dry them with a wash cloth.                 Then you can put your compression hose on, get dressed and check out.  10 min.  After your treatment, you will be asked to walk around for 10 minutes before you get in your car.    Medications  Five days before your appointment, discontinue aspirin (Bufferin, Anacin, etc.) and Ibuprofen (Motrin, Advil, Aleve, etc.) to reduce bruising. Resume these medications the day following the treatment.    Leg Preparation  Do not shave your legs or apply any oil, lotion or powder the night before or the day of your treatment.    Clothing  Shorts:  Bring a pair of loose, comfortable shorts to wear during your treatment (or you can choose to wear ours). Shoes: Bring comfortable shoes to accommodate the compression hose after your treatment. Do not wear flip-flops or thong-style sandals unless you have open-toe compression hose.    Photographs  Photos will be taken before each treatment. This helps monitor your progress.    Injections  The physician will inject your veins with the sclerotherapy solution chosen to meet your specific needs.    Compression Hose  Please bring your compression hose if you have them. They may also be reserved for you at our clinic. Compression hose must be worn immediately after each sclerotherapy treatment.  The hose must be compression level 20-30, and they must be worn for 24 hours straight after your treatment.  If you have never worn compression hose before, a staff member will teach you how to put them on.             You cannot have a treatment without compression hose.               They are critical to the success of your treatment!    You may purchase your compression hose from us. We will measure you and have the hose available when you come for your treatment.    Cancellation and Rescheduling  If you need to cancel or reschedule your sclerotherapy  treatment, please give our office at least 24 hour notice.    Sclerotherapy: Basic Information    What is sclerotherapy?  Sclerotherapy is a treatment for  spider  veins.  Spider  veins are small veins just under your skin that can look red, blue or purple. Most  spider  veins are only a cosmetic problem.  Spider  veins are not useful and treating them will not affect your circulation.    How does sclerotherapy work?  1.  Injections: A very small needle is used to inject a solution into the  spider  veins. The solution irritates the cells that line the vein walls. This causes the veins to collapse. The vein walls to stick together and they can no longer carry blood. Different solutions are used based on the size of the veins.  2.  Compression:  The spider veins are kept collapsed by wearing compression stockings. Your body will break down and absorb the treated veins. You wear the compression hose for 24 hours after the treatment and then for 4 more days during your waking hours only.    How does the body heal after sclerotherapy?  The process is similar to how your body heals after a bad bruise. It takes 4-6 weeks or more for the healing to be complete. When the healing is complete, the vein is no longer visible. It may take more than one treatment.    How do I get the best results?  It is important to follow the post-sclerotherapy instructions. The best results require time and patience. The injection sites will continue to heal and fade for months after a treatment. Please discuss your expectations with your doctor to keep them realistic. Your doctor will do everything possible to meet or exceed your expectations.    How many treatments are needed?  After your initial exam, your doctor will give you an estimate of the number of treatments that may be required. It depends upon the size, type, and quantity of your  spider  veins and on the doctor's assessment, your history and expectations. You may end up needing  fewer or more treatments.    How soon can I have another treatment?  Additional treatments are scheduled every 4-6 weeks to allow time for the body to respond to the previous treatment.    Common Side Effects:  Itching  The areas that were injected may itch. This is usually mild and lasts less than a day. Do not use lotions or creams on your legs until the injection sites have healed over.    Pain  It is common to have some tenderness at the injection sites. Injection of the solution can be uncomfortable, but is usually well tolerated by most patients. The tenderness is temporary, lasting 24 hours at most. Tylenol or Ibuprofen can be used, if needed, following the product directions.    Bruising  This may occur at the injections sites. Bruising may be minimized by avoiding Aspirin and Ibuprofen products for five days before each treatment session.    Hyperpigmentation  A light brown discoloration of the skin may develop along the veins in the areas injected. Approximately 20-30% of patients treated note the discoloration (which is lighter and less obvious than the veins that are being treated). The hyperpigmentation usually fades in a couple of weeks, but may take several months to a year to totally resolve. There is a 1% chance of hyperpigmentation continuing after one year.    Trapped blood  A small amount of blood may become trapped and hardened in the veins. This may feel like a knot or cord and it may look dark blue or bruised. This is a common occurrence. You may need to return before your next treatment so this area can be drained to remove the trapped blood. This will reduce the hyperpigmentation that can occur. The chance of this occurring can be decreased with proper use of compression hose after your treatment.    Matting  Matting is the formation of new, fine  spider  veins in the area injected.  It occurs in approximately 10% of patients injected. The exact reason for this is unknown. If untreated, the  matting usually resolves in 3 to 12 months, but very rarely, it can be permanent. If the matting does not fade, it can be re-injected.    Rare Side Effects:  Ulceration at injection sites  Very rarely, a small ulcer will occur at the site where a vein is injected. An ulcer can take 4 to 6 weeks to completely heal. A small scar may result.    Allergic reactions  There is a very rare incidence of an allergic reaction to the solution injected. You will be observed for such reaction and will be treated appropriately should it occur. Please inform us of any allergy history.    Pulmonary embolus/Deep vein thrombosis  This is a blood clot which moves to the lungs/a blood clot in the deep vein system. There is an extraordinarily low incidence of this complication.      SCLEROTHERAPY AFTER CARE  Immediately:  After treatment, walk for 10-15 minutes before getting in your car.  If your trip home is more than 1 hour, stop and walk around for 5-10 minutes. Avoid sitting or standing for extended periods.   First 24 hours: Wear your compression continuously, even while in bed. After the 24 hours, you may shower if you want to. Put your hose back on, unless you are going to bed. You should NOT wear compression to bed after the first 24 hours. You may fly the next day, but wear your compression.   For 5 days: Wear the compression hose for waking hours only. You may continue to wear them longer than 5 days if you prefer.   For days 5-7: Walking is encouraged, as it promotes efficient circulation in your veins. You may do activities that raise your heart rate, but do NOT run, jog, do high impact aerobics, or weight lifting. After 7 days, no activity restrictions.  Shaving: Wait a few days to shave or apply lotion.   Bathing: Do NOT take hot baths or sit in a hot tub for 7-10 days.    For 1 year: Wear SPF 30 sunscreen on your legs when in the sun. This is very important! It helps prevent darkening of the skin at the injection sites.    Medications: You may resume your usual medications, including aspirin or ibuprofen.    Common Things to Expect       Compression must be worn for the first 24 hours and then during the day for 5-7 days.    If larger veins are treated with ultrasound-guided sclerotherapy, you will have redness, firmness, tenderness, and swelling.  This firmness and tenderness may take 3-6 months to resolve. Ibuprofen and compression hose will aid in this process.    You will have bruising that can last up to 3 weeks. Most fading of the veins will occur between 3 and 6 weeks after treatment.    You may notice brown discoloration (hyperpigmentation) at the treatment site.  This should fade with time, but will take 3 months to 1 year to fully heal.     Some treated veins may look darker because of trapped blood within the vein. This trapped blood can be removed at a minimum of 1 month following treatment. Larger veins are more likely to develop trapped blood.    It is very important for you to use at least SPF 30 sunscreen in order to help prevent the discoloration of your skin.    Migraines rarely occur following sclerotherapy, but are more likely in patients with a history of migraines.  Treat as you would any other migraine.

## 2024-05-06 NOTE — PROGRESS NOTES
Vein Clinic Consultation Note    HPI:  Tommy De La O is a 69 year old male who was seen today in consultation for bilateral lower extremity pain, left greater than right.    He has had previous greater saphenous vein ablations bilaterally.  On the right he has pelvic source varicose veins that cascade down the thigh and calf.    On the left, he has a calf  that spawns large, severely refluxing varicose veins.    He could not tolerate compression stockings due to increased pain.    We discussed that his neuropathy and discomfort may be exacerbated by the severe reflux as it does seem to be more associated with his left foot and calf than his right.      ROS    PHH:  No past medical history on file.     Past Surgical History:   Procedure Laterality Date    ARTHROSCOPY KNEE Bilateral     both knees with arthroscopy in the past.     ARTHROSCOPY KNEE      ARTHROSCOPY SHOULDER ROTATOR CUFF REPAIR      AS TOTAL KNEE ARTHROPLASTY Bilateral     Both total knees.     LENGTHEN TENDON ACHILLES Left     REPAIR TENDON ACHILLES      1980s two separate surgeries.    ROTATOR CUFF REPAIR RT/LT Left     [facility] TOTAL KNEE ARTHROPLASTY Right 3/17/2015    Procedure: RIGHT KNEE TOTAL ARTHROPLASTY;  Surgeon: Jaiden Barnes MD;  Location: [facility] OR;  Service: Orthopedics    [facility] TOTAL KNEE ARTHROPLASTY Left 6/30/2015    Procedure: LEFT KNEE TOTAL ARTHROPLASTY;  Surgeon: Jaiden Barnes MD;  Location: [facility] OR;  Service: Orthopedics       ALLERGIES:  Amlodipine, Darvocet [propoxyphene n-apap], and Penicillins    Allergies   Allergen Reactions    Amlodipine Swelling    Darvocet [Propoxyphene N-Apap] Hives    Penicillins Hives       MEDS:    Current Outpatient Medications:     albuterol (PROAIR HFA/PROVENTIL HFA/VENTOLIN HFA) 108 (90 Base) MCG/ACT inhaler, Inhale 2 puffs into the lungs every 6 hours as needed for shortness of breath, wheezing or cough, Disp: 18 g, Rfl: 3    aspirin (ASA) 81 MG chewable tablet,  Take 1 tablet (81 mg) by mouth daily, Disp: , Rfl:     chlorthalidone (HYGROTON) 25 MG tablet, Take 1 tablet (25 mg) by mouth daily, Disp: 90 tablet, Rfl: 3    clobetasol (TEMOVATE) 0.05 % external ointment, Apply topically daily as needed For itching, Disp: 60 g, Rfl: 0    fluconazole (DIFLUCAN) 200 MG tablet, Take 1 tablet (200 mg) by mouth once a week, Disp: 4 tablet, Rfl: 0    FLUoxetine (PROZAC) 20 MG capsule, Take 1 capsule (20 mg) by mouth daily, Disp: 90 capsule, Rfl: 3    gabapentin (NEURONTIN) 300 MG capsule, TAKE 1 CAPSULE THREE TIMES A DAY, Disp: 270 capsule, Rfl: 3    losartan (COZAAR) 25 MG tablet, Take 1 tablet (25 mg) by mouth daily, Disp: 90 tablet, Rfl: 3    omeprazole (PRILOSEC) 40 MG DR capsule, TAKE 1 CAPSULE DAILY 30 TO 60 MINUTES BEFORE A MEAL, Disp: 90 capsule, Rfl: 3    rosuvastatin (CRESTOR) 40 MG tablet, Take 1 tablet (40 mg) by mouth daily, Disp: 90 tablet, Rfl: 3    vitamin C (ASCORBIC ACID) 1000 MG TABS, Take 1,000 mg by mouth daily, Disp: , Rfl:     Vitamin D3 (CHOLECALCIFEROL) 125 MCG (5000 UT) tablet, Take 1 tablet by mouth daily, Disp: , Rfl:     tirzepatide-Weight Management (ZEPBOUND) 2.5 MG/0.5ML prefilled pen, Inject 0.5 mLs (2.5 mg) Subcutaneous every 7 days, Disp: 2 mL, Rfl: 1    Current Facility-Administered Medications:     1 mL ropivacaine (NAROPIN) injection 5 mg/mL, 1 mL, , , Noah Heller MD, 1 mL at 01/22/24 1107    4 mL ropivacaine (NAROPIN) injection 5 mg/mL, 4 mL, , , Noah Heller MD, 4 mL at 01/05/24 1124    4 mL ropivacaine (NAROPIN) injection 5 mg/mL, 4 mL, , , Noah Heller MD, 4 mL at 09/22/23 1610    betamethasone acet & sod phos (CELESTONE) injection 6 mg, 6 mg, , , Noah Heller MD, 6 mg at 01/22/24 1107    betamethasone acet & sod phos (CELESTONE) injection 6 mg, 6 mg, , , Noah Heller MD, 6 mg at 01/05/24 1124    betamethasone acet & sod phos (CELESTONE) injection 6 mg, 6 mg, , , Noah Heller MD, 6 mg at 09/22/23  1610    SOCIAL HABITS:    History   Smoking Status    Former    Packs/day: 1.00    Years: 30.00    Types: Cigarettes    Quit date: 2/1/2011   Smokeless Tobacco    Never     Social History    Substance and Sexual Activity      Alcohol use: Yes      History   Drug Use No       FAMILY HISTORY:    Family History   Problem Relation Age of Onset    Coronary Artery Disease Father     Hyperlipidemia Mother     Prostate Cancer No family hx of     Colon Cancer No family hx of     Hypertension Mother     Heart Disease Mother     Heart Disease Father     Hypertension Sister     Cancer Brother     No Known Problems Daughter     No Known Problems Son     Cancer Maternal Grandmother     Vision Loss Maternal Grandmother     No Known Problems Sister     Cancer Brother     Hypertension Brother     Hypertension Brother     Cancer Brother     Sleep Apnea Brother     No Known Problems Brother     No Known Problems Daughter        Results for orders placed or performed during the hospital encounter of 02/20/24   MR Lumbar Spine w/o Contrast    Narrative    MRI OF THE LUMBAR SPINE WITHOUT CONTRAST February 20, 2024 11:45 AM     HISTORY: Diagnosis of pain and instability limited, evaluate  radiculopathy. Right hip pain.    TECHNIQUE: Multiplanar, multisequence MRI images of the lumbar spine  were acquired without IV contrast.    COMPARISON: Abdomen/pelvis CT 4/19/2022.    FINDINGS: There are five lumbar-type vertebrae for the purposes of  this dictation.     Grade 1 degenerative anterolisthesis of L3 upon L4 and L4 upon L5  again noted. Alignment otherwise normal. Vertebral body heights are  normal. Marrow signal normal. No evidence for fracture or pathologic  bony lesion.    There is loss of disc height, disc desiccation and posterior disc  bulging/herniation to varying degrees at all levels of the lumbar  spine with the exception of the normal-appearing L1-L2 and L2-L3  discs.     The tip of the conus medullaris is at the mid L2 level  which is within  normal limits. There is no evidence for intrathecal abnormality.     Level by level:     T12-L1: Normal disc height and signal. No herniation. Normal facet  joints. No spinal canal stenosis. No foraminal stenosis on either  side.     L1-L2: Normal disc height and signal. No herniation. Mild facet  arthropathy bilaterally. No spinal canal stenosis. No foraminal  stenosis on either side.     L2-L3: Normal disc height and signal. No herniation. Mild facet  arthropathy bilaterally. No spinal canal stenosis. No foraminal  stenosis on either side.     L3-L4: Loss of disc height, disc desiccation and mild circumferential  disc bulging. No herniation. Moderate facet arthropathy bilaterally.  Minimal spinal canal narrowing. Mild bilateral neural foraminal  narrowing.    L4-L5: Loss of disc height, disc desiccation and mild circumferential  disc bulging. No herniation. Severe facet arthropathy bilaterally. No  spinal canal stenosis. Mild left foraminal narrowing. Mild right  foraminal narrowing.    L5-S1: Loss of disc height, disc desiccation and mild circumferential  disc bulging. No herniation. Circumferential endplate spurring. Severe  facet arthropathy bilaterally. No spinal canal stenosis. Mild left  foraminal narrowing. Mild right foraminal narrowing.      Impression    IMPRESSION:  1. Diffuse degenerative change of the lumbar spine as detailed above.  2. No high-grade spinal canal or high-grade neural foraminal stenosis  at any level of the lumbar spine.     EBER DELONG MD         SYSTEM ID:  [id number]       VEIN CLINIC LEG DRAWING    Patient History  If you have varicose or spider veins/legs, when did they occur?: Other:  Other:: ref. by modesto rodas, for swelling. Has had previous surgery on the veins. has worn hose. [facility] Venous Comp Study(5/24/2023)  Please describe your expectations of therapy:: discuss treatment options    Past Treatment:  Have you ever been treated for the above problem(s)?  : Yes  When and who did the treatment?: Dr Fischer, Endovenous Closure-[facility] (2016)  What method?: Surgery  Have you ever worn prescription stockings?: Yes  Do you currently wear compression stockings?: No  Do you use medications to relieve your leg pain?: Yes  How do you take your medication?: As needed  List medication:: Gabapentin 3x a day    For Varicose and Spider Veins of the Legs:  Side:: Bilateral  Do you have now:: Fatigue in legs; Pain in calf (calf pain but unkown if it's related to the vein.)  The pain is made worse with...: Standing  The pain is made better with...: Medication  Your pain feels like:: a numbness; a cramp    Do you have a history of any of the following?: Family history of varicose/spider veins; Use of anticoagulatns (Heparin/Coumadin) (family tx-Mother, and sister)  Side:: Bilateral    VCSS  PAIN:: 1  Varicose Veins:: 3  Venous Edema:: 1  Skin Pigmentation:: 0  Inflamation:: 0  Induration:: 0  Number of active ulcers:: 0  Active ulcer duration:: 0  Active ulcer diameter:: 0  Compression Therapy:: 0  VCSS Score:: 5    CEAP:  CEAP:: C3         ASSESSMENT: Left greater than right lower extremity pain and worsening neuropathy in the setting of  disease with severely refluxing large varicose veins, history of previous greater saphenous vein ablations bilaterally that were successful.  Intolerant of compression stockings due to worsening pain.    PLAN: May need updated venous competency testing but plan left  ablation with medically necessary ultrasound-guided sclerotherapy and phlebectomy of the large varicose veins.      Jean Pierre Fermin MD    40 minutes spent today reviewing chart, discussing with patient and postvisit charting.  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

## 2024-05-06 NOTE — PROGRESS NOTES
May 6, 2024    Vein Procedure Recommendation    Spoke with patient in clinic.    Dr. Fermin has recommended patient to have the following vein procedure(s):     1. Left leg VNUS closure one , <10 Phlebectomies (medically necessary), and Ultrasound Guided Sclerotherapy (medically necessary)      Patient Pre-op Questions:  Preferred Pharmacy: [facility] in [place]  Anticoagulant/ASA: No  Artificial Joint or Heart Valve:  Yes - bilateral knee replacement, PCN allergy  Open ulcer:  No  Sedation nausea: No    Patient is trying to decide if he will do sedation or not. He will need a  either way due to the length of the drive to his home.    Patient is recommended to wear Knee High compression hose following his procedure. Discussed compression hose. Explained to patient if insurance doesn't cover the compression hose there are a couple different options to getting them and to call the clinic to let us know if they need help.    Handed patient written procedure instructions to review on his own (see After Visit Summary).    Next steps:    Insurance Submission  Informed patient this process could take up to 14 business days, but once approved, the patient will be contacted by our surgery scheduler to schedule the above procedure. Gave patient our surgery scheduler's information.    Patient is in agreement with all of the above and has no further questions at this time.    Alvina Sebastian RN  [facility]  Vein Clinic

## 2024-05-06 NOTE — LETTER
5/6/2024         RE: Tommy De La O  1964 [address] [facility] [address]        Dear Colleague,    Thank you for referring your patient, Tommy De La O, to the [facility] VEIN CLINIC [place]. Please see a copy of my visit note below.        Vein Clinic Consultation Note    HPI:  Tommy De La O is a 69 year old male who was seen today in consultation for bilateral lower extremity pain, left greater than right.    He has had previous greater saphenous vein ablations bilaterally.  On the right he has pelvic source varicose veins that cascade down the thigh and calf.    On the left, he has a calf  that spawns large, severely refluxing varicose veins.    He could not tolerate compression stockings due to increased pain.    We discussed that his neuropathy and discomfort may be exacerbated by the severe reflux as it does seem to be more associated with his left foot and calf than his right.      ROS    PHH:  No past medical history on file.     Past Surgical History:   Procedure Laterality Date     ARTHROSCOPY KNEE Bilateral     both knees with arthroscopy in the past.      ARTHROSCOPY KNEE       ARTHROSCOPY SHOULDER ROTATOR CUFF REPAIR       AS TOTAL KNEE ARTHROPLASTY Bilateral     Both total knees.      LENGTHEN TENDON ACHILLES Left      REPAIR TENDON ACHILLES      1980s two separate surgeries.     ROTATOR CUFF REPAIR RT/LT Left      [facility] TOTAL KNEE ARTHROPLASTY Right 3/17/2015    Procedure: RIGHT KNEE TOTAL ARTHROPLASTY;  Surgeon: Jaiden Barnes MD;  Location: [facility] OR;  Service: Orthopedics     [facility] TOTAL KNEE ARTHROPLASTY Left 6/30/2015    Procedure: LEFT KNEE TOTAL ARTHROPLASTY;  Surgeon: Jaiden Barnes MD;  Location: [facility] OR;  Service: Orthopedics       ALLERGIES:  Amlodipine, Darvocet [propoxyphene n-apap], and Penicillins    Allergies   Allergen Reactions     Amlodipine Swelling     Darvocet [Propoxyphene N-Apap] Hives     Penicillins Hives       MEDS:    Current Outpatient  Medications:      albuterol (PROAIR HFA/PROVENTIL HFA/VENTOLIN HFA) 108 (90 Base) MCG/ACT inhaler, Inhale 2 puffs into the lungs every 6 hours as needed for shortness of breath, wheezing or cough, Disp: 18 g, Rfl: 3     aspirin (ASA) 81 MG chewable tablet, Take 1 tablet (81 mg) by mouth daily, Disp: , Rfl:      chlorthalidone (HYGROTON) 25 MG tablet, Take 1 tablet (25 mg) by mouth daily, Disp: 90 tablet, Rfl: 3     clobetasol (TEMOVATE) 0.05 % external ointment, Apply topically daily as needed For itching, Disp: 60 g, Rfl: 0     fluconazole (DIFLUCAN) 200 MG tablet, Take 1 tablet (200 mg) by mouth once a week, Disp: 4 tablet, Rfl: 0     FLUoxetine (PROZAC) 20 MG capsule, Take 1 capsule (20 mg) by mouth daily, Disp: 90 capsule, Rfl: 3     gabapentin (NEURONTIN) 300 MG capsule, TAKE 1 CAPSULE THREE TIMES A DAY, Disp: 270 capsule, Rfl: 3     losartan (COZAAR) 25 MG tablet, Take 1 tablet (25 mg) by mouth daily, Disp: 90 tablet, Rfl: 3     omeprazole (PRILOSEC) 40 MG DR capsule, TAKE 1 CAPSULE DAILY 30 TO 60 MINUTES BEFORE A MEAL, Disp: 90 capsule, Rfl: 3     rosuvastatin (CRESTOR) 40 MG tablet, Take 1 tablet (40 mg) by mouth daily, Disp: 90 tablet, Rfl: 3     vitamin C (ASCORBIC ACID) 1000 MG TABS, Take 1,000 mg by mouth daily, Disp: , Rfl:      Vitamin D3 (CHOLECALCIFEROL) 125 MCG (5000 UT) tablet, Take 1 tablet by mouth daily, Disp: , Rfl:      tirzepatide-Weight Management (ZEPBOUND) 2.5 MG/0.5ML prefilled pen, Inject 0.5 mLs (2.5 mg) Subcutaneous every 7 days, Disp: 2 mL, Rfl: 1    Current Facility-Administered Medications:      1 mL ropivacaine (NAROPIN) injection 5 mg/mL, 1 mL, , , Noah Heller MD, 1 mL at 01/22/24 1107     4 mL ropivacaine (NAROPIN) injection 5 mg/mL, 4 mL, , , Noah Heller MD, 4 mL at 01/05/24 1124     4 mL ropivacaine (NAROPIN) injection 5 mg/mL, 4 mL, , , Noah Heller MD, 4 mL at 09/22/23 1610     betamethasone acet & sod phos (CELESTONE) injection 6 mg, 6 mg, , ,  Noah Heller MD, 6 mg at 01/22/24 1107     betamethasone acet & sod phos (CELESTONE) injection 6 mg, 6 mg, , , Noah Heller MD, 6 mg at 01/05/24 1124     betamethasone acet & sod phos (CELESTONE) injection 6 mg, 6 mg, , , Noah Heller MD, 6 mg at 09/22/23 1610    SOCIAL HABITS:    History   Smoking Status     Former     Packs/day: 1.00     Years: 30.00     Types: Cigarettes     Quit date: 2/1/2011   Smokeless Tobacco     Never     Social History    Substance and Sexual Activity      Alcohol use: Yes      History   Drug Use No       FAMILY HISTORY:    Family History   Problem Relation Age of Onset     Coronary Artery Disease Father      Hyperlipidemia Mother      Prostate Cancer No family hx of      Colon Cancer No family hx of      Hypertension Mother      Heart Disease Mother      Heart Disease Father      Hypertension Sister      Cancer Brother      No Known Problems Daughter      No Known Problems Son      Cancer Maternal Grandmother      Vision Loss Maternal Grandmother      No Known Problems Sister      Cancer Brother      Hypertension Brother      Hypertension Brother      Cancer Brother      Sleep Apnea Brother      No Known Problems Brother      No Known Problems Daughter        Results for orders placed or performed during the hospital encounter of 02/20/24   MR Lumbar Spine w/o Contrast    Narrative    MRI OF THE LUMBAR SPINE WITHOUT CONTRAST February 20, 2024 11:45 AM     HISTORY: Diagnosis of pain and instability limited, evaluate  radiculopathy. Right hip pain.    TECHNIQUE: Multiplanar, multisequence MRI images of the lumbar spine  were acquired without IV contrast.    COMPARISON: Abdomen/pelvis CT 4/19/2022.    FINDINGS: There are five lumbar-type vertebrae for the purposes of  this dictation.     Grade 1 degenerative anterolisthesis of L3 upon L4 and L4 upon L5  again noted. Alignment otherwise normal. Vertebral body heights are  normal. Marrow signal normal. No evidence for  fracture or pathologic  bony lesion.    There is loss of disc height, disc desiccation and posterior disc  bulging/herniation to varying degrees at all levels of the lumbar  spine with the exception of the normal-appearing L1-L2 and L2-L3  discs.     The tip of the conus medullaris is at the mid L2 level which is within  normal limits. There is no evidence for intrathecal abnormality.     Level by level:     T12-L1: Normal disc height and signal. No herniation. Normal facet  joints. No spinal canal stenosis. No foraminal stenosis on either  side.     L1-L2: Normal disc height and signal. No herniation. Mild facet  arthropathy bilaterally. No spinal canal stenosis. No foraminal  stenosis on either side.     L2-L3: Normal disc height and signal. No herniation. Mild facet  arthropathy bilaterally. No spinal canal stenosis. No foraminal  stenosis on either side.     L3-L4: Loss of disc height, disc desiccation and mild circumferential  disc bulging. No herniation. Moderate facet arthropathy bilaterally.  Minimal spinal canal narrowing. Mild bilateral neural foraminal  narrowing.    L4-L5: Loss of disc height, disc desiccation and mild circumferential  disc bulging. No herniation. Severe facet arthropathy bilaterally. No  spinal canal stenosis. Mild left foraminal narrowing. Mild right  foraminal narrowing.    L5-S1: Loss of disc height, disc desiccation and mild circumferential  disc bulging. No herniation. Circumferential endplate spurring. Severe  facet arthropathy bilaterally. No spinal canal stenosis. Mild left  foraminal narrowing. Mild right foraminal narrowing.      Impression    IMPRESSION:  1. Diffuse degenerative change of the lumbar spine as detailed above.  2. No high-grade spinal canal or high-grade neural foraminal stenosis  at any level of the lumbar spine.     EBER DELONG MD         SYSTEM ID:  [id number]       VEIN CLINIC LEG DRAWING    Patient History  If you have varicose or spider veins/legs, when  did they occur?: Other:  Other:: ref. by modesto rodas, for swelling. Has had previous surgery on the veins. has worn hose. [facility] Venous Comp Study(5/24/2023)  Please describe your expectations of therapy:: discuss treatment options    Past Treatment:  Have you ever been treated for the above problem(s)? : Yes  When and who did the [facility]  What method?: Surgery  Have you ever worn prescription stockings?: Yes  Do you currently wear compression stockings?: No  Do you use medications to relieve your leg pain?: Yes  How do you take your medication?: As needed  List medication:: Gabapentin 3x a day    For Varicose and Spider Veins of the Legs:  Side:: Bilateral  Do you have now:: Fatigue in legs; Pain in calf (calf pain but unkown if it's related to the vein.)  The pain is made worse with...: Standing  The pain is made better with...: Medication  Your pain feels like:: a numbness; a cramp    Do you have a history of any of the following?: Family history of varicose/spider veins; Use of anticoagulatns (Heparin/Coumadin) (family tx-Mother, and sister)  Side:: Bilateral    VCSS  PAIN:: 1  Varicose Veins:: 3  Venous Edema:: 1  Skin Pigmentation:: 0  Inflamation:: 0  Induration:: 0  Number of active ulcers:: 0  Active ulcer duration:: 0  Active ulcer diameter:: 0  Compression Therapy:: 0  VCSS Score:: 5    CEAP:  CEAP:: C3         ASSESSMENT: Left greater than right lower extremity pain and worsening neuropathy in the setting of  disease with severely refluxing large varicose veins, history of previous greater saphenous vein ablations bilaterally that were successful.  Intolerant of compression stockings due to worsening pain.    PLAN: May need updated venous competency testing but plan left  ablation with medically necessary ultrasound-guided sclerotherapy and phlebectomy of the large varicose veins.      Jean Pierre Fermin MD    40 minutes spent  today reviewing chart, discussing with patient and postvisit charting.  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      Again, thank you for allowing me to participate in the care of your patient.        Sincerely,        Jean Pierre Fermin MD

## 2024-05-30 DIAGNOSIS — I87.2 VENOUS (PERIPHERAL) INSUFFICIENCY: Primary | ICD-10-CM

## 2024-05-30 DIAGNOSIS — I83.812 VARICOSE VEINS OF LEFT LOWER EXTREMITY WITH PAIN: ICD-10-CM

## 2024-07-03 ENCOUNTER — MEDICAL CORRESPONDENCE (OUTPATIENT)
Dept: HEALTH INFORMATION MANAGEMENT | Facility: CLINIC | Age: 69
End: 2024-07-03
Payer: MEDICARE

## 2024-07-08 ENCOUNTER — MEDICAL CORRESPONDENCE (OUTPATIENT)
Dept: HEALTH INFORMATION MANAGEMENT | Facility: CLINIC | Age: 69
End: 2024-07-08
Payer: MEDICARE

## 2024-08-13 ENCOUNTER — ANESTHESIA EVENT (OUTPATIENT)
Dept: GASTROENTEROLOGY | Facility: CLINIC | Age: 69
End: 2024-08-13
Payer: MEDICARE

## 2024-08-13 ASSESSMENT — COPD QUESTIONNAIRES: COPD: 1

## 2024-08-13 NOTE — ANESTHESIA PREPROCEDURE EVALUATION
Anesthesia Pre-Procedure Evaluation    Patient: Tommy De La O   MRN: [id number] : 1955        Procedure : Procedure(s):  Colonoscopy          History reviewed. No pertinent past medical history.   Past Surgical History:   Procedure Laterality Date    ARTHROSCOPY KNEE Bilateral     both knees with arthroscopy in the past.     ARTHROSCOPY KNEE      ARTHROSCOPY SHOULDER ROTATOR CUFF REPAIR      AS TOTAL KNEE ARTHROPLASTY Bilateral     Both total knees.     LENGTHEN TENDON ACHILLES Left     REPAIR TENDON ACHILLES      1980s two separate surgeries.    ROTATOR CUFF REPAIR RT/LT Left     [facility] TOTAL KNEE ARTHROPLASTY Right 3/17/2015    Procedure: RIGHT KNEE TOTAL ARTHROPLASTY;  Surgeon: Jaiden aBrnes MD;  Location: [facility] OR;  Service: Orthopedics    [facility] TOTAL KNEE ARTHROPLASTY Left 2015    Procedure: LEFT KNEE TOTAL ARTHROPLASTY;  Surgeon: Jaiden Barnes MD;  Location: [facility] OR;  Service: Orthopedics      Allergies   Allergen Reactions    Amlodipine Swelling    Darvocet [Propoxyphene N-Apap] Hives    Penicillins Hives      Social History     Tobacco Use    Smoking status: Former     Current packs/day: 0.00     Average packs/day: 1 pack/day for 30.0 years (30.0 ttl pk-yrs)     Types: Cigarettes     Start date: 1981     Quit date: 2011     Years since quittin.5    Smokeless tobacco: Never    Tobacco comments:     started at age 22   Substance Use Topics    Alcohol use: Yes      Wt Readings from Last 1 Encounters:   24 140.3 kg (309 lb 6.4 oz)        Anesthesia Evaluation        History of anesthetic complications       ROS/MED HX  ENT/Pulmonary:     (+) sleep apnea,                         COPD,              Neurologic:       Cardiovascular:     (+) Dyslipidemia hypertension- -   -  - -                                      METS/Exercise Tolerance: 4 - Raking leaves, gardening    Hematologic:       Musculoskeletal:       GI/Hepatic:     (+) GERD,                  "  Renal/Genitourinary:       Endo:     (+)               Obesity,       Psychiatric/Substance Use:       Infectious Disease:       Malignancy:       Other:            Physical Exam    Airway  airway exam normal      Mallampati: II   TM distance: > 3 FB   Neck ROM: full   Mouth opening: > 3 cm    Respiratory Devices and Support         Dental  no notable dental history     (+) Minor Abnormalities - some fillings, tiny chips      Cardiovascular   cardiovascular exam normal          Pulmonary   pulmonary exam normal                OUTSIDE LABS:  CBC:   Lab Results   Component Value Date    WBC 7.3 12/28/2022    WBC 7.2 05/01/2022    HGB 14.7 12/28/2022    HGB 14.4 05/01/2022    HCT 43.1 12/28/2022    HCT 41.6 05/01/2022     12/28/2022     05/01/2022     BMP:   Lab Results   Component Value Date     04/08/2024     (L) 12/28/2022    POTASSIUM 3.5 04/08/2024    POTASSIUM 3.5 12/28/2022    CHLORIDE 101 04/08/2024    CHLORIDE 99 12/28/2022    CO2 27 04/08/2024    CO2 25 12/28/2022    BUN 18.8 04/08/2024    BUN 14.7 12/28/2022    CR 0.82 04/08/2024    CR 0.75 12/28/2022    GLC 99 04/08/2024    GLC 99 12/28/2022     COAGS:   Lab Results   Component Value Date    INR 0.96 05/08/2017     POC: No results found for: \"BGM\", \"HCG\", \"HCGS\"  HEPATIC:   Lab Results   Component Value Date    ALBUMIN 4.2 12/28/2022    PROTTOTAL 7.4 12/28/2022    ALT 25 08/15/2023    AST 32 12/28/2022    ALKPHOS 81 12/28/2022    BILITOTAL 0.5 12/28/2022     OTHER:   Lab Results   Component Value Date    A1C 5.8 (H) 04/09/2024    KIMBERLY 9.3 04/08/2024    CRP 2.9 05/01/2022       Anesthesia Plan    ASA Status:  3    NPO Status:  NPO Appropriate    Anesthesia Type: General.   Induction: Propofol, Intravenous.   Maintenance: TIVA.        Consents    Anesthesia Plan(s) and associated risks, benefits, and realistic alternatives discussed. Questions answered and patient/representative(s) expressed understanding.     - Discussed: Risks, " Benefits and Alternatives for BOTH SEDATION and the PROCEDURE were discussed     - Discussed with:  Patient            Postoperative Care    Pain management: Multi-modal analgesia, IV analgesics, Oral pain medications.   PONV prophylaxis: Ondansetron (or other 5HT-3), Dexamethasone or Solumedrol, Background Propofol Infusion     Comments:               JUAN LUIS Falcon CRNA    I have reviewed the pertinent notes and labs in the chart from the past 30 days and (re)examined the patient.  Any updates or changes from those notes are reflected in this note.

## 2024-08-14 ENCOUNTER — ANESTHESIA (OUTPATIENT)
Dept: GASTROENTEROLOGY | Facility: CLINIC | Age: 69
End: 2024-08-14
Payer: MEDICARE

## 2024-08-14 ENCOUNTER — HOSPITAL ENCOUNTER (OUTPATIENT)
Facility: CLINIC | Age: 69
Discharge: HOME | End: 2024-08-14
Attending: SURGERY | Admitting: SURGERY
Payer: MEDICARE

## 2024-08-14 VITALS
RESPIRATION RATE: 16 BRPM | HEIGHT: 71 IN | DIASTOLIC BLOOD PRESSURE: 71 MMHG | BODY MASS INDEX: 43.26 KG/M2 | WEIGHT: 309 LBS | OXYGEN SATURATION: 95 % | TEMPERATURE: 98 F | HEART RATE: 51 BPM | SYSTOLIC BLOOD PRESSURE: 123 MMHG

## 2024-08-14 LAB — COLONOSCOPY: NORMAL

## 2024-08-14 PROCEDURE — 258N000003 HC RX IP 258 OP 636: Performed by: NURSE ANESTHETIST, CERTIFIED REGISTERED

## 2024-08-14 PROCEDURE — 88341 IMHCHEM/IMCYTCHM EA ADD ANTB: CPT | Mod: TC | Performed by: SURGERY

## 2024-08-14 PROCEDURE — 250N000011 HC RX IP 250 OP 636: Performed by: NURSE ANESTHETIST, CERTIFIED REGISTERED

## 2024-08-14 PROCEDURE — 250N000009 HC RX 250: Performed by: NURSE ANESTHETIST, CERTIFIED REGISTERED

## 2024-08-14 PROCEDURE — 370N000017 HC ANESTHESIA TECHNICAL FEE, PER MIN: Performed by: SURGERY

## 2024-08-14 PROCEDURE — 45380 COLONOSCOPY AND BIOPSY: CPT | Mod: PT | Performed by: SURGERY

## 2024-08-14 RX ORDER — LIDOCAINE HYDROCHLORIDE 20 MG/ML
INJECTION, SOLUTION INFILTRATION; PERINEURAL PRN
Status: DISCONTINUED | OUTPATIENT
Start: 2024-08-14 | End: 2024-08-14

## 2024-08-14 RX ORDER — PROPOFOL 10 MG/ML
INJECTION, EMULSION INTRAVENOUS CONTINUOUS PRN
Status: DISCONTINUED | OUTPATIENT
Start: 2024-08-14 | End: 2024-08-14

## 2024-08-14 RX ORDER — DEXAMETHASONE SODIUM PHOSPHATE 4 MG/ML
4 INJECTION, SOLUTION INTRA-ARTICULAR; INTRALESIONAL; INTRAMUSCULAR; INTRAVENOUS; SOFT TISSUE
Status: DISCONTINUED | OUTPATIENT
Start: 2024-08-14 | End: 2024-08-14 | Stop reason: HOSPADM

## 2024-08-14 RX ORDER — NALOXONE HYDROCHLORIDE 0.4 MG/ML
0.2 INJECTION, SOLUTION INTRAMUSCULAR; INTRAVENOUS; SUBCUTANEOUS
Status: DISCONTINUED | OUTPATIENT
Start: 2024-08-14 | End: 2024-08-14 | Stop reason: HOSPADM

## 2024-08-14 RX ORDER — FENTANYL CITRATE 50 UG/ML
INJECTION, SOLUTION INTRAMUSCULAR; INTRAVENOUS PRN
Status: DISCONTINUED | OUTPATIENT
Start: 2024-08-14 | End: 2024-08-14

## 2024-08-14 RX ORDER — PROPOFOL 10 MG/ML
INJECTION, EMULSION INTRAVENOUS PRN
Status: DISCONTINUED | OUTPATIENT
Start: 2024-08-14 | End: 2024-08-14

## 2024-08-14 RX ORDER — ONDANSETRON 2 MG/ML
4 INJECTION INTRAMUSCULAR; INTRAVENOUS
Status: DISCONTINUED | OUTPATIENT
Start: 2024-08-14 | End: 2024-08-14 | Stop reason: HOSPADM

## 2024-08-14 RX ORDER — NALOXONE HYDROCHLORIDE 0.4 MG/ML
0.4 INJECTION, SOLUTION INTRAMUSCULAR; INTRAVENOUS; SUBCUTANEOUS
Status: DISCONTINUED | OUTPATIENT
Start: 2024-08-14 | End: 2024-08-14 | Stop reason: HOSPADM

## 2024-08-14 RX ORDER — SODIUM CHLORIDE, SODIUM LACTATE, POTASSIUM CHLORIDE, CALCIUM CHLORIDE 600; 310; 30; 20 MG/100ML; MG/100ML; MG/100ML; MG/100ML
INJECTION, SOLUTION INTRAVENOUS CONTINUOUS
Status: DISCONTINUED | OUTPATIENT
Start: 2024-08-14 | End: 2024-08-14 | Stop reason: HOSPADM

## 2024-08-14 RX ORDER — ONDANSETRON 2 MG/ML
4 INJECTION INTRAMUSCULAR; INTRAVENOUS EVERY 30 MIN PRN
Status: DISCONTINUED | OUTPATIENT
Start: 2024-08-14 | End: 2024-08-14 | Stop reason: HOSPADM

## 2024-08-14 RX ORDER — LIDOCAINE 40 MG/G
CREAM TOPICAL
Status: DISCONTINUED | OUTPATIENT
Start: 2024-08-14 | End: 2024-08-14 | Stop reason: HOSPADM

## 2024-08-14 RX ORDER — FLUMAZENIL 0.1 MG/ML
0.2 INJECTION, SOLUTION INTRAVENOUS
Status: DISCONTINUED | OUTPATIENT
Start: 2024-08-14 | End: 2024-08-14 | Stop reason: HOSPADM

## 2024-08-14 RX ORDER — GLYCOPYRROLATE 0.2 MG/ML
INJECTION, SOLUTION INTRAMUSCULAR; INTRAVENOUS PRN
Status: DISCONTINUED | OUTPATIENT
Start: 2024-08-14 | End: 2024-08-14

## 2024-08-14 RX ORDER — ONDANSETRON 2 MG/ML
INJECTION INTRAMUSCULAR; INTRAVENOUS PRN
Status: DISCONTINUED | OUTPATIENT
Start: 2024-08-14 | End: 2024-08-14

## 2024-08-14 RX ORDER — ONDANSETRON 4 MG/1
4 TABLET, ORALLY DISINTEGRATING ORAL EVERY 30 MIN PRN
Status: DISCONTINUED | OUTPATIENT
Start: 2024-08-14 | End: 2024-08-14 | Stop reason: HOSPADM

## 2024-08-14 RX ADMIN — LIDOCAINE HYDROCHLORIDE 100 MG: 20 INJECTION, SOLUTION INFILTRATION; PERINEURAL at 13:39

## 2024-08-14 RX ADMIN — PROPOFOL 200 MCG/KG/MIN: 10 INJECTION, EMULSION INTRAVENOUS at 13:39

## 2024-08-14 RX ADMIN — SODIUM CHLORIDE, POTASSIUM CHLORIDE, SODIUM LACTATE AND CALCIUM CHLORIDE: 600; 310; 30; 20 INJECTION, SOLUTION INTRAVENOUS at 12:26

## 2024-08-14 RX ADMIN — PROPOFOL 100 MG: 10 INJECTION, EMULSION INTRAVENOUS at 13:39

## 2024-08-14 RX ADMIN — ONDANSETRON 4 MG: 2 INJECTION INTRAMUSCULAR; INTRAVENOUS at 13:33

## 2024-08-14 RX ADMIN — LIDOCAINE HYDROCHLORIDE 0.1 ML: 10 INJECTION, SOLUTION EPIDURAL; INFILTRATION; INTRACAUDAL; PERINEURAL at 12:27

## 2024-08-14 RX ADMIN — FENTANYL CITRATE 50 MCG: 50 INJECTION INTRAMUSCULAR; INTRAVENOUS at 13:47

## 2024-08-14 RX ADMIN — FENTANYL CITRATE 50 MCG: 50 INJECTION INTRAMUSCULAR; INTRAVENOUS at 13:54

## 2024-08-14 RX ADMIN — GLYCOPYRROLATE 0.1 MG: 0.2 INJECTION, SOLUTION INTRAMUSCULAR; INTRAVENOUS at 13:54

## 2024-08-14 RX ADMIN — GLYCOPYRROLATE 0.1 MG: 0.2 INJECTION, SOLUTION INTRAMUSCULAR; INTRAVENOUS at 13:33

## 2024-08-14 ASSESSMENT — ACTIVITIES OF DAILY LIVING (ADL)
ADLS_ACUITY_SCORE: 35

## 2024-08-14 NOTE — ANESTHESIA POSTPROCEDURE EVALUATION
Patient: Tommy De La O    Procedure: Procedure(s):  COLONOSCOPY, WITH POLYPECTOMY AND BIOPSY       Anesthesia Type:  General    Note:  Disposition: Outpatient   Postop Pain Control: Uneventful            Sign Out: Well controlled pain   PONV: No   Neuro/Psych: Uneventful            Sign Out: Acceptable/Baseline neuro status   Airway/Respiratory: Uneventful            Sign Out: Acceptable/Baseline resp. status   CV/Hemodynamics: Uneventful            Sign Out: Acceptable CV status; No obvious hypovolemia; No obvious fluid overload   Other NRE: NONE   DID A NON-ROUTINE EVENT OCCUR? No           Last vitals:  Vitals Value Taken Time   /82 08/14/24 1402   Temp     Pulse 55 08/14/24 1402   Resp     SpO2 95 % 08/14/24 1406   Vitals shown include unfiled device data.    Electronically Signed By: JUAN LUIS Teran CRNA  August 14, 2024  2:07 PM

## 2024-08-14 NOTE — ANESTHESIA CARE TRANSFER NOTE
Patient: Tommy De La O    Procedure: Procedure(s):  COLONOSCOPY, WITH POLYPECTOMY AND BIOPSY       Diagnosis: Special screening for malignant neoplasm of colon [Z12.11]  Diagnosis Additional Information: No value filed.    Anesthesia Type:   General     Note:    Oropharynx: oropharynx clear of all foreign objects and spontaneously breathing  Level of Consciousness: awake  Oxygen Supplementation: room air    Independent Airway: airway patency satisfactory and stable  Dentition: dentition unchanged  Vital Signs Stable: post-procedure vital signs reviewed and stable  Report to RN Given: handoff report given  Patient transferred to: Phase II    Handoff Report: Identifed the Patient, Identified the Reponsible Provider, Reviewed the pertinent medical history, Discussed the surgical course, Reviewed Intra-OP anesthesia mangement and issues during anesthesia, Set expectations for post-procedure period and Allowed opportunity for questions and acknowledgement of understanding      Vitals:  Vitals Value Taken Time   /82 08/14/24 1402   Temp     Pulse 55 08/14/24 1402   Resp     SpO2 95 % 08/14/24 1406   Vitals shown include unfiled device data.    Electronically Signed By: JUAN LUIS Teran CRNA  August 14, 2024  2:06 PM

## 2024-08-14 NOTE — LETTER
Tommy De La O  [address] [facility] [address]      August 23, 2024    Dear Tommy,  This letter is written to inform you of the results of your recent colonoscopy.  Your examination showed polyp(s) in your transverse colon. All polyps were removed in their entirety and sent for review by a pathologist. 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 Your examination was otherwise without abnormality.    This is benign.    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       Given these findings, I recommend another colonoscopy at age 75.    Please remember that this recommendation is made with the understanding that you are not experiencing persistent changes in bowel function, bleeding per rectum, and/or significant abdominal pain. If you experience these symptoms, please contact your primary care provider for a further evaluation.     If you have any questions or concerns about the results of your colonoscopy or the appropriate follow-up, please contact my assistant at [phone]    Sincerely,      Rudy Tejeda,    [facility]  ___

## 2024-08-14 NOTE — H&P
[facility]    Pre-Endoscopy History and Physical     Tommy De La O MRN# [id number]   YOB: 1955 Age: 69 year old     Date of Procedure: 8/14/2024  Primary care provider: Devon Alvarado  Type of Endoscopy: Colonoscopy with possible biopsy, possible polypectomy  Reason for Procedure: Surveillance  Type of Anesthesia Anticipated: Conscious Sedation    HPI:    Tommy is a 69 year old male who will be undergoing the above procedure.      History of polyps.  Last in 2014, negative.  Denies blood in stool, change in caliber, or family history of CRC.      A history and physical has been performed. The patient's medications and allergies have been reviewed. The risks and benefits of the procedure and the sedation options and risks were discussed with the patient.  All questions were answered and informed consent was obtained.      He denies a personal or family history of anesthesia complications or bleeding disorders.     Patient Active Problem List   Diagnosis    Osteoarthritis of knee    Gastrointestinal hemorrhage    Gastroesophageal reflux disease    Hyperlipidemia    Obstructive sleep apnea syndrome    Essential hypertension with goal blood pressure less than 140/90    Hand dermatitis    Peripheral polyneuropathy    Benign prostatic hyperplasia with incomplete bladder emptying    Morbid obesity with BMI of 40.0-44.9, adult (H)    Chronic obstructive pulmonary disease, unspecified COPD type (H)        Past Medical History:   Diagnosis Date    COPD (chronic obstructive pulmonary disease) (H)         Past Surgical History:   Procedure Laterality Date    ARTHROSCOPY KNEE Bilateral     both knees with arthroscopy in the past.     ARTHROSCOPY KNEE      ARTHROSCOPY SHOULDER ROTATOR CUFF REPAIR      AS TOTAL KNEE ARTHROPLASTY Bilateral     Both total knees.     LENGTHEN TENDON ACHILLES Left     REPAIR TENDON ACHILLES      1980s two separate surgeries.    ROTATOR CUFF REPAIR RT/LT Left      [facility] TOTAL KNEE ARTHROPLASTY Right 3/17/2015    Procedure: RIGHT KNEE TOTAL ARTHROPLASTY;  Surgeon: Jaiden Barnes MD;  Location: [facility] OR;  Service: Orthopedics    [facility] TOTAL KNEE ARTHROPLASTY Left 2015    Procedure: LEFT KNEE TOTAL ARTHROPLASTY;  Surgeon: Jaiden Barnes MD;  Location: [facility];  Service: Orthopedics       Social History     Tobacco Use    Smoking status: Former     Current packs/day: 0.00     Average packs/day: 1 pack/day for 30.0 years (30.0 ttl pk-yrs)     Types: Cigarettes     Start date: 1981     Quit date: 2011     Years since quittin.5    Smokeless tobacco: Never    Tobacco comments:     started at age 22   Substance Use Topics    Alcohol use: Yes       Family History   Problem Relation Age of Onset    Coronary Artery Disease Father     Hyperlipidemia Mother     Prostate Cancer No family hx of     Colon Cancer No family hx of     Hypertension Mother     Heart Disease Mother     Heart Disease Father     Hypertension Sister     Cancer Brother     No Known Problems Daughter     No Known Problems Son     Cancer Maternal Grandmother     Vision Loss Maternal Grandmother     No Known Problems Sister     Cancer Brother     Hypertension Brother     Hypertension Brother     Cancer Brother     Sleep Apnea Brother     No Known Problems Brother     No Known Problems Daughter        Prior to Admission medications    Medication Sig Start Date End Date Taking? Authorizing Provider   albuterol (PROAIR HFA/PROVENTIL HFA/VENTOLIN HFA) 108 (90 Base) MCG/ACT inhaler Inhale 2 puffs into the lungs every 6 hours as needed for shortness of breath, wheezing or cough 23  Yes Nicolas Harris MD   aspirin (ASA) 81 MG chewable tablet Take 1 tablet (81 mg) by mouth daily 23  Yes Yasmeen Singleton APRN CNP   chlorthalidone (HYGROTON) 25 MG tablet Take 1 tablet (25 mg) by mouth daily 24  Yes Devon Alvarado MD   clobetasol (TEMOVATE) 0.05 % external  "ointment Apply topically daily as needed For itching 12/6/19  Yes DenisaTim JUAN LUIS Barlow CNP   fluconazole (DIFLUCAN) 200 MG tablet Take 1 tablet (200 mg) by mouth once a week 7/26/23  Yes Chasity Zeina Ferguson JUAN LUIS Wood CNP   FLUoxetine (PROZAC) 20 MG capsule Take 1 capsule (20 mg) by mouth daily 4/8/24  Yes Devon Alvarado MD   gabapentin (NEURONTIN) 300 MG capsule TAKE 1 CAPSULE THREE TIMES A DAY 4/8/24  Yes Devon Alvarado MD   losartan (COZAAR) 25 MG tablet Take 1 tablet (25 mg) by mouth daily 4/8/24  Yes Devon Alvarado MD   omeprazole (PRILOSEC) 40 MG DR capsule TAKE 1 CAPSULE DAILY 30 TO 60 MINUTES BEFORE A MEAL 4/8/24  Yes Devon Alvarado MD   rosuvastatin (CRESTOR) 40 MG tablet Take 1 tablet (40 mg) by mouth daily 4/8/24  Yes Devon Alvarado MD   vitamin C (ASCORBIC ACID) 1000 MG TABS Take 1,000 mg by mouth daily   Yes Reported, Patient   Vitamin D3 (CHOLECALCIFEROL) 125 MCG (5000 UT) tablet Take 1 tablet by mouth daily   Yes Reported, Patient       Allergies   Allergen Reactions    Amlodipine Swelling    Darvocet [Propoxyphene N-Apap] Hives    Penicillins Hives        REVIEW OF SYSTEMS:   5 point ROS negative except as noted above in HPI, including Gen., Resp., CV, GI &  system review.    PHYSICAL EXAM:   /86 (BP Location: Left arm)   Pulse 51   Temp 98.6  F (37  C) (Oral)   Resp 16   Ht 1.803 m (5' 11\")   Wt 140.2 kg (309 lb)   BMI 43.10 kg/m   Estimated body mass index is 43.1 kg/m  as calculated from the following:    Height as of this encounter: 1.803 m (5' 11\").    Weight as of this encounter: 140.2 kg (309 lb).   Constitutional: Awake, alert, no acute distress.  Eyes: No scleral icterus.  Conjunctiva are without injection.  ENMT: Mucous membranes moist, dentition and gums are intact.   Neck: Soft, supple, trachea midline.    Endocrine: n/a   Lymphatic: There is no cervical, submandibularadenopathy.  Respiratory: normal effortgs   Cardiovascular: S1, S2  Abdomen: Non-distended, non-tender,  " No masses,  Musculoskeletal: No spinal or CVA tenderness. Full range of motion in the upper and lower extremities.    Skin: No skin rashes or lesions to inspection.  No petechia.    Neurologic: alerted and oriented 3x  Psychiatric: The patient's affect is not blunted and mood is appropriate.  DIAGNOSTICS:    Not indicated    IMPRESSION   ASA Class 2 - Mild systemic disease    PLAN:   Plan for Colonoscopy with possible biopsy, possible polypectomy. We discussed the risks, benefits and alternatives and the patient wished to proceed.  Patient is cleared for the above procedure.    The above has been forwarded to the consulting provider.    Rudy Tejeda DO  [facility]

## 2024-08-16 LAB
PATH REPORT.COMMENTS IMP SPEC: NORMAL
PATH REPORT.COMMENTS IMP SPEC: NORMAL
PATH REPORT.FINAL DX SPEC: NORMAL
PATH REPORT.GROSS SPEC: NORMAL
PATH REPORT.MICROSCOPIC SPEC OTHER STN: NORMAL
PATH REPORT.RELEVANT HX SPEC: NORMAL
PHOTO IMAGE: NORMAL

## 2024-08-16 PROCEDURE — 88305 TISSUE EXAM BY PATHOLOGIST: CPT | Mod: 26 | Performed by: PATHOLOGY

## 2024-08-16 PROCEDURE — 88341 IMHCHEM/IMCYTCHM EA ADD ANTB: CPT | Mod: 26 | Performed by: PATHOLOGY

## 2024-08-16 PROCEDURE — 88342 IMHCHEM/IMCYTCHM 1ST ANTB: CPT | Mod: 26 | Performed by: PATHOLOGY

## 2024-08-28 ENCOUNTER — PATIENT OUTREACH (OUTPATIENT)
Dept: GASTROENTEROLOGY | Facility: CLINIC | Age: 69
End: 2024-08-28
Payer: MEDICARE

## 2024-10-16 ENCOUNTER — HOSPITAL ENCOUNTER (OUTPATIENT)
Dept: NUCLEAR MEDICINE | Facility: CLINIC | Age: 69
Setting detail: NUCLEAR MEDICINE
Discharge: HOME | End: 2024-10-16
Attending: INTERNAL MEDICINE
Payer: MEDICARE

## 2024-10-16 ENCOUNTER — HOSPITAL ENCOUNTER (OUTPATIENT)
Dept: CARDIOLOGY | Facility: CLINIC | Age: 69
Discharge: HOME | End: 2024-10-16
Attending: INTERNAL MEDICINE
Payer: MEDICARE

## 2024-10-16 VITALS — HEIGHT: 71 IN | BODY MASS INDEX: 43.82 KG/M2 | WEIGHT: 313 LBS

## 2024-10-16 DIAGNOSIS — R06.02 SHORTNESS OF BREATH: ICD-10-CM

## 2024-10-16 PROCEDURE — A9500 TC99M SESTAMIBI: HCPCS | Performed by: INTERNAL MEDICINE

## 2024-10-16 PROCEDURE — 93016 CV STRESS TEST SUPVJ ONLY: CPT | Performed by: INTERNAL MEDICINE

## 2024-10-16 PROCEDURE — G1010 CDSM STANSON: HCPCS

## 2024-10-16 PROCEDURE — 343N000001 HC RX 343 MED OP 636: Performed by: INTERNAL MEDICINE

## 2024-10-16 PROCEDURE — G1010 CDSM STANSON: HCPCS | Performed by: INTERNAL MEDICINE

## 2024-10-16 PROCEDURE — 78452 HT MUSCLE IMAGE SPECT MULT: CPT | Mod: 26 | Performed by: INTERNAL MEDICINE

## 2024-10-16 PROCEDURE — 93017 CV STRESS TEST TRACING ONLY: CPT

## 2024-10-16 PROCEDURE — 93018 CV STRESS TEST I&R ONLY: CPT | Performed by: INTERNAL MEDICINE

## 2024-10-16 RX ADMIN — KIT FOR THE PREPARATION OF TECHNETIUM TC99M SESTAMIBI 32.1 MILLICURIE: 1 INJECTION, POWDER, LYOPHILIZED, FOR SOLUTION PARENTERAL at 13:24

## 2024-10-17 ENCOUNTER — HOSPITAL ENCOUNTER (OUTPATIENT)
Dept: NUCLEAR MEDICINE | Facility: CLINIC | Age: 69
Setting detail: NUCLEAR MEDICINE
Discharge: HOME | End: 2024-10-17
Attending: INTERNAL MEDICINE
Payer: MEDICARE

## 2024-10-17 ENCOUNTER — TELEPHONE (OUTPATIENT)
Dept: CARDIOLOGY | Facility: CLINIC | Age: 69
End: 2024-10-17
Payer: MEDICARE

## 2024-10-17 LAB
CV STRESS MAX HR HE: 130
NUC STRESS EJECTION FRACTION: 62 %
RATE PRESSURE PRODUCT: NORMAL
STRESS ANGINA INDEX: 0
STRESS DUKE TREADMILL SCORE: 7
STRESS ECHO BASELINE DIASTOLIC HE: 74
STRESS ECHO BASELINE HR: 50 BPM
STRESS ECHO BASELINE SYSTOLIC BP: 120
STRESS ECHO CALCULATED PERCENT HR: 86 %
STRESS ECHO LAST STRESS DIASTOLIC BP: 98
STRESS ECHO LAST STRESS SYSTOLIC BP: 210
STRESS ECHO POST ESTIMATED WORKLOAD: 7.7 METS
STRESS ECHO POST EXERCISE DUR MIN: 7 MIN
STRESS ECHO POST EXERCISE DUR SEC: 11 SEC
STRESS ECHO TARGET HR: 151
STRESS ST DEPRESSION: 0.5 MM

## 2024-10-17 PROCEDURE — A9500 TC99M SESTAMIBI: HCPCS | Performed by: INTERNAL MEDICINE

## 2024-10-17 PROCEDURE — 343N000001 HC RX 343 MED OP 636: Performed by: INTERNAL MEDICINE

## 2024-10-17 RX ADMIN — KIT FOR THE PREPARATION OF TECHNETIUM TC99M SESTAMIBI 29.6 MILLICURIE: 1 INJECTION, POWDER, LYOPHILIZED, FOR SOLUTION PARENTERAL at 10:10

## 2024-10-17 NOTE — TELEPHONE ENCOUNTER
"Routing stress test to Dr. Ortega-    Per last clinic note 4/9/24- \"Chest pressure: reviewed stress test and cor cta, moderate disease, LAD burden, will follow closely. Stress test in 6 months given ongoing CHUNG.\"    Stress echo done 2/14/23- showed no ischemia, no ST changes  CTAngio done 4/12/23-  Moderate coronary artery disease     Current Treadmill Nuc 10/17/24 showing - small area of mild ischemia in the apical segment(s) of the left ventricle. nontransmural infarction in the inferior segment(s) of the left ventricle vs diaphragm attenuation. EF 62%    Any concerns with stress test?    Casie Esparza RN   "

## 2024-10-17 NOTE — TELEPHONE ENCOUNTER
"Reviewed with Dr. Ortega, Per Dr. Ortega- \"New changes on stress test. New area of ischemia. Recommend first avail with [facility] to discuss cath.\"    Called and reviewed results and recommendations with pt. Pt agreeable. Denies any worsening CHUNG or CP.   Pt scheduled first avail [facility] virtual visit with Patrick Solis CNP on 11/5/24 per Dr. Ortega to discuss and schedule cath.    Pt verbalized an understanding.     Casie Esparza RN    "

## 2024-11-04 ENCOUNTER — TELEPHONE (OUTPATIENT)
Dept: CARDIOLOGY | Facility: CLINIC | Age: 69
End: 2024-11-04
Payer: MEDICARE

## 2024-11-04 NOTE — TELEPHONE ENCOUNTER
[facility] Call Center    Phone Message    May a detailed message be left on voicemail: yes    Reason for Call: Daughter called on behalf of the patient requesting to speak with a member of his care team in regards to his virtual video visit tomorrow. Please call back to further discuss.    Thank you!  [facility]

## 2024-11-04 NOTE — TELEPHONE ENCOUNTER
Routing call to Gordo Garcia scheduled for a virtual visit with Patrick tomorrow     Casie Esparza RN

## 2024-11-04 NOTE — TELEPHONE ENCOUNTER
[facility] routed call back to [place]. Pt having virtual visit at [facility]. Unsure how they room pts.  Called and spoke to daughter and pt. They would like daughter to be apart of the visit but she wont be with pt during visit.     Appt note made to [facility] team to please sent link to pt via [employer] and to daughter via cell phone at [phone]    Casie Esparza RN

## 2024-11-05 ENCOUNTER — VIRTUAL VISIT (OUTPATIENT)
Dept: CARDIOLOGY | Facility: CLINIC | Age: 69
End: 2024-11-05
Payer: MEDICARE

## 2024-11-05 DIAGNOSIS — R07.9 CHEST PAIN: ICD-10-CM

## 2024-11-05 DIAGNOSIS — R94.39 ABNORMAL STRESS TEST: Primary | ICD-10-CM

## 2024-11-05 DIAGNOSIS — I25.10 NONOBSTRUCTIVE ATHEROSCLEROSIS OF CORONARY ARTERY: ICD-10-CM

## 2024-11-05 DIAGNOSIS — R06.02 SHORTNESS OF BREATH: ICD-10-CM

## 2024-11-05 PROCEDURE — 99214 OFFICE O/P EST MOD 30 MIN: CPT | Mod: 95 | Performed by: PHYSICIAN ASSISTANT

## 2024-11-05 PROCEDURE — G2211 COMPLEX E/M VISIT ADD ON: HCPCS | Mod: 95 | Performed by: PHYSICIAN ASSISTANT

## 2024-11-05 RX ORDER — SODIUM CHLORIDE 9 MG/ML
INJECTION, SOLUTION INTRAVENOUS CONTINUOUS
OUTPATIENT
Start: 2024-11-05

## 2024-11-05 RX ORDER — LIDOCAINE 40 MG/G
CREAM TOPICAL
OUTPATIENT
Start: 2024-11-05

## 2024-11-05 NOTE — PROGRESS NOTES
Review Of Systems  Skin: NEGATIVE  Eyes:Ears/Nose/Throat: NEGATIVE  Respiratory: sob with exertion  Cardiovascular:NEGATIVE  Gastrointestinal: NEGATIVE  Genitourinary:NEGATIVE   Musculoskeletal: NEGATIVE  Neurologic: NEGATIVE  Psychiatric: NEGATIVE  Hematologic/Lymphatic/Immunologic: NEGATIVE  Endocrine:  NEGATIVE    Telephone number of patient: [phone]        .Tommy is a 69 year old who is being evaluated via a billable video visit.    What phone number would you like to be contacted at? [phone]  How would you like to obtain your AVS? [employer]    Video-Visit Details    Type of service:  Video Visit   Video End Time:8:50 AM  Originating Location (pt. Location): Home    Distant Location (provider location):  On-site  Platform used for Video Visit: [email or web address]      -----------------------------------------------------------------------------------------------------------------------------    Primary Cardiologist: Dr. Ortega    Reason for Video Visit: Discuss coronary angiogram in the setting of abnormal stress test    HPI:  Tommy is a very pleasant 69 year old male with past medical history notable for:     # CAD   -- moderate disease per CT cor angio in 4/2023; stress test at that time was negative for ischemia  -- has stress test recently due to CHUNG  -- on asa and statin; unable to add BB due to resting bradycadia    # HTN  -- controlled    # HLD  -- at goal on high intensity statin    # Strong Family hx of premature CAD    # SVT/PVC's    # Chronic vein insufficiency    # History of tobacco dependence    #MARTA with unknown compliance with CPAP    Tommy presents for video visit along with his daughter Coral.  He reports continued CHUNG with intermittent chest tightness.  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  He denies bleeding issues or recent complications.  No upcoming elective procedures.  No history of complications with anesthesia.  He has strong family history of premature CAD.  He has  been compliant with aspirin and statin therapy.    ROS:  12-pt ROS is negative except for as noted above.     Physical Exam:  A limited exam was conducted via video.  Constitutional:  Patient is pleasant, alert, cooperative, and in NAD.  HEENT:  NCAT. EOM's intact.   Neck:  CVP appears normal.   Pulmonary: Normal respiratory effort.   Extremities: No edema, erythema, cyanosis appreciated.  Skin:  No rashes or lesions appreciated.   Neurological:  No gross motor or sensory deficits.   Psych: Appropriate affect.       A/P:   Tommy is a very pleasant 69 year old male with past medical history notable for:     # CAD   -- Recent stress test with abnormal findings  - Results were reviewed by Dr. Ortega who recommended proceeding with coronary angiogram  - Risk and benefits discussed with patient and his daughter and he is in agreement with proceeding    # HTN  -- controlled    # HLD  -- at goal on high intensity statin    # Strong Family hx of premature CAD    # SVT/PVC's  - Will continue to monitor this following his coronary angiogram; may improve if he requires PCI; he also reports heavy caffeine intake which may be part of the cause    # Chronic vein insufficiency  -Stable    # History of tobacco dependence    #MARTA  -- Were not able to discuss if he was compliant with CPAP as we ran out of time  - If he is not compliant with this this can also affect his blood pressure as well as his arrhythmias  - This can be discussed at his upcoming follow-up visit    Risks and benefits of left heart catheterization and coronary angiogram were discussed with the patient in detail. 0.1-0.3% (for diagnostic angio) and 1-2% (for PCI)  risk of stroke, MI, death, emergent bypass for diagnostic angio, risk of contrast induced allergic reaction, renal dysfunction, vascular complications were discussed.     No history of bleeding problems or current bleeding, and no scheduled surgeries or procedures in the next year. Patient understands  and wishes to proceed with it.        This visit is being conducted as a virtual visit due to the emphasis on mitigation of the COVID-19 virus pandemic. The clinician has decided that the risk of an in-office visit outweighs the benefit for this patient. The rest of the comprehensive physical examination is deferred due to public health emergency video visit restrictions.         Patrick Banks PA-C   11/5/2024  Pager: [phone]

## 2024-11-05 NOTE — PATIENT INSTRUCTIONS
Today's Plan:   1) Schedule coronary angiogram to be done in  the next few weeks with follow up visit afterwards with blood work (no need to fast).       If you have questions or concerns please call my nurse team at [phone]    Scheduling phone number: [phone]  Reminder: Please bring in all current medications, over the counter supplements and vitamin bottles to your next appointment.    It was a pleasure seeing you today!

## 2024-11-05 NOTE — LETTER
11/5/2024    Devon Alvarado MD  [address] [place] [id number]    RE: Tommy De La O       Dear Colleague,     I had the pleasure of seeing Tommy De La O in the [facility].  Review Of Systems  Skin: NEGATIVE  Eyes:Ears/Nose/Throat: NEGATIVE  Respiratory: sob with exertion  Cardiovascular:NEGATIVE  Gastrointestinal: NEGATIVE  Genitourinary:NEGATIVE   Musculoskeletal: NEGATIVE  Neurologic: NEGATIVE  Psychiatric: NEGATIVE  Hematologic/Lymphatic/Immunologic: NEGATIVE  Endocrine:  NEGATIVE    Telephone number of patient: [phone]        .Tommy is a 69 year old who is being evaluated via a billable video visit.    What phone number would you like to be contacted at? [phone]  How would you like to obtain your AVS? [email or web address]    Video-Visit Details    Type of service:  Video Visit   Video End Time:8:50 AM  Originating Location (pt. Location): Home    Distant Location (provider location):  On-site  Platform used for Video Visit: Janneth      -----------------------------------------------------------------------------------------------------------------------------    Primary Cardiologist: Dr. Ortega    Reason for Video Visit: Discuss coronary angiogram in the setting of abnormal stress test    HPI:  Tommy is a very pleasant 69 year old male with past medical history notable for:     # CAD   -- moderate disease per CT cor angio in 4/2023; stress test at that time was negative for ischemia  -- has stress test recently due to CHUNG  -- on asa and statin; unable to add BB due to resting bradycadia    # HTN  -- controlled    # HLD  -- at goal on high intensity statin    # Strong Family hx of premature CAD    # SVT/PVC's    # Chronic vein insufficiency    # History of tobacco dependence    #MARTA with unknown compliance with CPAP    Tommy presents for video visit along with his daughter Coral.  He reports continued CHUNG with intermittent chest tightness.  He has a very active individual working around his  [employer].  He denies bleeding issues or recent complications.  No upcoming elective procedures.  No history of complications with anesthesia.  He has strong family history of premature CAD.  He has been compliant with aspirin and statin therapy.    ROS:  12-pt ROS is negative except for as noted above.     Physical Exam:  A limited exam was conducted via video.  Constitutional:  Patient is pleasant, alert, cooperative, and in NAD.  HEENT:  NCAT. EOM's intact.   Neck:  CVP appears normal.   Pulmonary: Normal respiratory effort.   Extremities: No edema, erythema, cyanosis appreciated.  Skin:  No rashes or lesions appreciated.   Neurological:  No gross motor or sensory deficits.   Psych: Appropriate affect.       A/P:   Tommy is a very pleasant 69 year old male with past medical history notable for:     # CAD   -- Recent stress test with abnormal findings  - Results were reviewed by Dr. Ortega who recommended proceeding with coronary angiogram  - Risk and benefits discussed with patient and his daughter and he is in agreement with proceeding    # HTN  -- controlled    # HLD  -- at goal on high intensity statin    # Strong Family hx of premature CAD    # SVT/PVC's  - Will continue to monitor this following his coronary angiogram; may improve if he requires PCI; he also reports heavy caffeine intake which may be part of the cause    # Chronic vein insufficiency  -Stable    # History of tobacco dependence    #MARTA  -- Were not able to discuss if he was compliant with CPAP as we ran out of time  - If he is not compliant with this this can also affect his blood pressure as well as his arrhythmias  - This can be discussed at his upcoming follow-up visit    Risks and benefits of left heart catheterization and coronary angiogram were discussed with the patient in detail. 0.1-0.3% (for diagnostic angio) and 1-2% (for PCI)  risk of stroke, MI, death, emergent bypass for diagnostic angio, risk of contrast induced allergic  reaction, renal dysfunction, vascular complications were discussed.     No history of bleeding problems or current bleeding, and no scheduled surgeries or procedures in the next year. Patient understands and wishes to proceed with it.        This visit is being conducted as a virtual visit due to the emphasis on mitigation of the COVID-19 virus pandemic. The clinician has decided that the risk of an in-office visit outweighs the benefit for this patient. The rest of the comprehensive physical examination is deferred due to public health emergency video visit restrictions.         Patrick Banks PA-C   11/5/2024  Pager: [phone]        Thank you for allowing me to participate in the care of your patient.      Sincerely,     Patrick Banks PA-C     [facility]  cc:   No referring provider defined for this encounter.

## 2024-11-05 NOTE — PROGRESS NOTES
Coronary angiogram/PCI/Right Heart Cath prep instructions.     Patient is scheduled for a Coronary Angio w/possible PCI at [facility] - [address], [place] [address] - Main Entrance of the Hospital on 11/19/24.  Check in time is at 8:30am and procedure to follow.    Patient instructed to remain NPO for solid foods 8 hours prior to arrival and may have clear liquids up to 2 hours prior to arrival.    Patient does not require extra fluids prior to procedure.    Patient is not diabetic.    Patient is not on anticoagulation.    Patient is taking ASA 81mg daily and will take 4 tabs (324mg) the morning of the procedure.    Pt is not on a SGLT2 inhibitor.    Pt is not on a GLP-1 Agonist    Patient advised to take their other daily medications the morning of the procedure with small sips of water.     Patient advised to shower the night before and morning of their procedure with regular soap.    Verified patient does not have a contrast allergy.    Verified patient has someone available to drive them home from the hospital and can stay with them for 24 hours after the procedure.     Patient advised they will have bedrest post procedure.  Length of time is 2-6 hours and dependent on access site used for procedure.  This bedrest is to allow proper clotting of the access site to prevent bleeding.    Patient advised to notify care team with any new COVID like symptoms prior to procedure. Day of procedure phone number: Camilla at [phone]    Patient is aware of visitor policy.    Patient expresses understanding of above instructions and denies further questions at this time.      Casie Esparza RN  [facility]

## 2024-11-18 NOTE — PROGRESS NOTES
Chart reviewed for scheduled heart cath tomorrow. CSE, no pertinent allergies, orders in epic, pre-call completed by heart clinic RN.

## 2024-11-19 ENCOUNTER — HOSPITAL ENCOUNTER (OUTPATIENT)
Facility: CLINIC | Age: 69
Discharge: HOME | End: 2024-11-19
Attending: INTERNAL MEDICINE | Admitting: INTERNAL MEDICINE
Payer: MEDICARE

## 2024-11-19 VITALS
OXYGEN SATURATION: 95 % | SYSTOLIC BLOOD PRESSURE: 112 MMHG | BODY MASS INDEX: 43.96 KG/M2 | DIASTOLIC BLOOD PRESSURE: 58 MMHG | HEIGHT: 71 IN | TEMPERATURE: 98 F | WEIGHT: 314 LBS | RESPIRATION RATE: 18 BRPM | HEART RATE: 60 BPM

## 2024-11-19 DIAGNOSIS — R06.02 SHORTNESS OF BREATH: ICD-10-CM

## 2024-11-19 DIAGNOSIS — R07.9 CHEST PAIN: ICD-10-CM

## 2024-11-19 DIAGNOSIS — R94.39 ABNORMAL STRESS TEST: ICD-10-CM

## 2024-11-19 DIAGNOSIS — I25.10 NONOBSTRUCTIVE ATHEROSCLEROSIS OF CORONARY ARTERY: ICD-10-CM

## 2024-11-19 PROBLEM — Z98.890 STATUS POST CORONARY ANGIOGRAM: Status: ACTIVE | Noted: 2024-11-19

## 2024-11-19 LAB
ANION GAP SERPL CALCULATED.3IONS-SCNC: 10 MMOL/L (ref 7–15)
APTT PPP: 29 SECONDS (ref 22–38)
ATRIAL RATE - MUSE: 52 BPM
BUN SERPL-MCNC: 22.6 MG/DL (ref 8–23)
CALCIUM SERPL-MCNC: 9.6 MG/DL (ref 8.8–10.4)
CHLORIDE SERPL-SCNC: 103 MMOL/L (ref 98–107)
CREAT SERPL-MCNC: 0.81 MG/DL (ref 0.67–1.17)
DIASTOLIC BLOOD PRESSURE - MUSE: NORMAL MMHG
EGFRCR SERPLBLD CKD-EPI 2021: >90 ML/MIN/1.73M2
ERYTHROCYTE [DISTWIDTH] IN BLOOD BY AUTOMATED COUNT: 12.9 % (ref 10–15)
GLUCOSE SERPL-MCNC: 101 MG/DL (ref 70–99)
HCO3 SERPL-SCNC: 25 MMOL/L (ref 22–29)
HCT VFR BLD AUTO: 41.5 % (ref 40–53)
HGB BLD-MCNC: 14.4 G/DL (ref 13.3–17.7)
INR PPP: 1.05 (ref 0.85–1.15)
INTERPRETATION ECG - MUSE: NORMAL
MCH RBC QN AUTO: 31 PG (ref 26.5–33)
MCHC RBC AUTO-ENTMCNC: 34.7 G/DL (ref 31.5–36.5)
MCV RBC AUTO: 89 FL (ref 78–100)
P AXIS - MUSE: 70 DEGREES
PLATELET # BLD AUTO: 171 10E3/UL (ref 150–450)
POTASSIUM SERPL-SCNC: 3.8 MMOL/L (ref 3.4–5.3)
PR INTERVAL - MUSE: 208 MS
QRS DURATION - MUSE: 106 MS
QT - MUSE: 470 MS
QTC - MUSE: 437 MS
R AXIS - MUSE: 1 DEGREES
RBC # BLD AUTO: 4.64 10E6/UL (ref 4.4–5.9)
SODIUM SERPL-SCNC: 138 MMOL/L (ref 135–145)
SYSTOLIC BLOOD PRESSURE - MUSE: NORMAL MMHG
T AXIS - MUSE: 35 DEGREES
VENTRICULAR RATE- MUSE: 52 BPM
WBC # BLD AUTO: 5.4 10E3/UL (ref 4–11)

## 2024-11-19 PROCEDURE — 999N000071 HC STATISTIC HEART CATH LAB OR EP LAB

## 2024-11-19 PROCEDURE — C1894 INTRO/SHEATH, NON-LASER: HCPCS | Performed by: INTERNAL MEDICINE

## 2024-11-19 PROCEDURE — 272N000001 HC OR GENERAL SUPPLY STERILE: Performed by: INTERNAL MEDICINE

## 2024-11-19 PROCEDURE — 85027 COMPLETE CBC AUTOMATED: CPT | Performed by: PHYSICIAN ASSISTANT

## 2024-11-19 PROCEDURE — 250N000011 HC RX IP 250 OP 636: Performed by: INTERNAL MEDICINE

## 2024-11-19 PROCEDURE — 93005 ELECTROCARDIOGRAM TRACING: CPT

## 2024-11-19 PROCEDURE — 80048 BASIC METABOLIC PNL TOTAL CA: CPT | Performed by: PHYSICIAN ASSISTANT

## 2024-11-19 PROCEDURE — 999N000054 HC STATISTIC EKG NON-CHARGEABLE

## 2024-11-19 PROCEDURE — 93458 L HRT ARTERY/VENTRICLE ANGIO: CPT | Mod: 26 | Performed by: INTERNAL MEDICINE

## 2024-11-19 PROCEDURE — 82565 ASSAY OF CREATININE: CPT | Performed by: PHYSICIAN ASSISTANT

## 2024-11-19 PROCEDURE — 99152 MOD SED SAME PHYS/QHP 5/>YRS: CPT | Performed by: INTERNAL MEDICINE

## 2024-11-19 PROCEDURE — 93458 L HRT ARTERY/VENTRICLE ANGIO: CPT | Performed by: INTERNAL MEDICINE

## 2024-11-19 PROCEDURE — 36415 COLL VENOUS BLD VENIPUNCTURE: CPT | Performed by: PHYSICIAN ASSISTANT

## 2024-11-19 PROCEDURE — 250N000009 HC RX 250: Performed by: INTERNAL MEDICINE

## 2024-11-19 PROCEDURE — 82435 ASSAY OF BLOOD CHLORIDE: CPT | Performed by: PHYSICIAN ASSISTANT

## 2024-11-19 PROCEDURE — 85610 PROTHROMBIN TIME: CPT | Performed by: PHYSICIAN ASSISTANT

## 2024-11-19 PROCEDURE — C1760 CLOSURE DEV, VASC: HCPCS | Performed by: INTERNAL MEDICINE

## 2024-11-19 PROCEDURE — 85730 THROMBOPLASTIN TIME PARTIAL: CPT | Performed by: PHYSICIAN ASSISTANT

## 2024-11-19 PROCEDURE — 999N000184 HC STATISTIC TELEMETRY

## 2024-11-19 DEVICE — CLOSURE ANGIOSEAL 6FR 610130: Type: IMPLANTABLE DEVICE | Status: FUNCTIONAL

## 2024-11-19 RX ORDER — IOPAMIDOL 755 MG/ML
INJECTION, SOLUTION INTRAVASCULAR
Status: DISCONTINUED | OUTPATIENT
Start: 2024-11-19 | End: 2024-11-19 | Stop reason: HOSPADM

## 2024-11-19 RX ORDER — ACETAMINOPHEN 325 MG/1
650 TABLET ORAL EVERY 4 HOURS PRN
Status: DISCONTINUED | OUTPATIENT
Start: 2024-11-19 | End: 2024-11-19 | Stop reason: HOSPADM

## 2024-11-19 RX ORDER — LIDOCAINE 40 MG/G
CREAM TOPICAL
Status: DISCONTINUED | OUTPATIENT
Start: 2024-11-19 | End: 2024-11-19 | Stop reason: HOSPADM

## 2024-11-19 RX ORDER — OXYCODONE HYDROCHLORIDE 5 MG/1
10 TABLET ORAL EVERY 4 HOURS PRN
Status: DISCONTINUED | OUTPATIENT
Start: 2024-11-19 | End: 2024-11-19 | Stop reason: HOSPADM

## 2024-11-19 RX ORDER — SODIUM CHLORIDE 9 MG/ML
INJECTION, SOLUTION INTRAVENOUS CONTINUOUS
Status: DISCONTINUED | OUTPATIENT
Start: 2024-11-19 | End: 2024-11-19 | Stop reason: HOSPADM

## 2024-11-19 RX ORDER — NALOXONE HYDROCHLORIDE 0.4 MG/ML
0.2 INJECTION, SOLUTION INTRAMUSCULAR; INTRAVENOUS; SUBCUTANEOUS
Status: DISCONTINUED | OUTPATIENT
Start: 2024-11-19 | End: 2024-11-19 | Stop reason: HOSPADM

## 2024-11-19 RX ORDER — HEPARIN SODIUM 10000 [USP'U]/100ML
100-1000 INJECTION, SOLUTION INTRAVENOUS CONTINUOUS PRN
Status: DISCONTINUED | OUTPATIENT
Start: 2024-11-19 | End: 2024-11-19 | Stop reason: HOSPADM

## 2024-11-19 RX ORDER — FENTANYL CITRATE 50 UG/ML
25 INJECTION, SOLUTION INTRAMUSCULAR; INTRAVENOUS
Status: DISCONTINUED | OUTPATIENT
Start: 2024-11-19 | End: 2024-11-19 | Stop reason: HOSPADM

## 2024-11-19 RX ORDER — NALOXONE HYDROCHLORIDE 0.4 MG/ML
0.4 INJECTION, SOLUTION INTRAMUSCULAR; INTRAVENOUS; SUBCUTANEOUS
Status: DISCONTINUED | OUTPATIENT
Start: 2024-11-19 | End: 2024-11-19 | Stop reason: HOSPADM

## 2024-11-19 RX ORDER — OXYCODONE HYDROCHLORIDE 5 MG/1
5 TABLET ORAL EVERY 4 HOURS PRN
Status: DISCONTINUED | OUTPATIENT
Start: 2024-11-19 | End: 2024-11-19 | Stop reason: HOSPADM

## 2024-11-19 RX ORDER — FENTANYL CITRATE 50 UG/ML
INJECTION, SOLUTION INTRAMUSCULAR; INTRAVENOUS
Status: DISCONTINUED | OUTPATIENT
Start: 2024-11-19 | End: 2024-11-19 | Stop reason: HOSPADM

## 2024-11-19 RX ORDER — ATROPINE SULFATE 0.1 MG/ML
0.5 INJECTION INTRAVENOUS
Status: DISCONTINUED | OUTPATIENT
Start: 2024-11-19 | End: 2024-11-19 | Stop reason: HOSPADM

## 2024-11-19 RX ORDER — FLUMAZENIL 0.1 MG/ML
0.2 INJECTION, SOLUTION INTRAVENOUS
Status: DISCONTINUED | OUTPATIENT
Start: 2024-11-19 | End: 2024-11-19 | Stop reason: HOSPADM

## 2024-11-19 ASSESSMENT — ACTIVITIES OF DAILY LIVING (ADL)
ADLS_ACUITY_SCORE: 0

## 2024-11-19 NOTE — Clinical Note
Potential access sites were evaluated for patency using ultrasound.   The right femoral artery was selected. Access was obtained under with Fluoroscopic guidance using a micropuncture 21 gauge needle with direct visualization of needle entry.

## 2024-11-19 NOTE — PROGRESS NOTES
Care Suites Discharge Nursing Note    Patient Information  Name: Tommy De La O  Age: 69 year old    Discharge Education:  Discharge instructions reviewed: Yes  Additional education/resources provided: yes  Patient/patient representative verbalizes understanding: Yes  Patient discharging on new medications: No  Medication education completed: N/A    Discharge Plans:   Discharge location: home  Discharge ride contacted: Yes  Approximate discharge time: 1330    Discharge Criteria:  Discharge criteria met and vital signs stable: Yes    Patient Belongs:  Patient belongings returned to patient: Yes    Jenae Alexandra RN

## 2024-11-19 NOTE — PROGRESS NOTES
Care Suites Admission Nursing Note    Patient Information  Name: Tommy De La O  Age: 69 year old  Reason for admission: heart cath  Care Suites arrival time: 0900    Visitor Information  Name: Coral     Patient Admission/Assessment   Pre-procedure assessment complete: Yes  If abnormal assessment/labs, provider notified: Yes  NPO: Yes  Medications held per instructions/orders: Yes  Consent: obtained  If applicable, pregnancy test status: deferred  Patient oriented to room: Yes  Education/questions answered: Yes  Plan/other: scheduled for 1030    Discharge Planning  Discharge name/phone number: Coral [phone]  Overnight post sedation caregiver: daughter Coral  Discharge location: [place]    Jenae Alexandra RN

## 2024-11-19 NOTE — PRE-PROCEDURE
GENERAL PRE-PROCEDURE:   Procedure:  Coronary angiogram  Date/Time:  11/19/2024 10:01 AM    Verbal consent obtained?: Yes    Written consent obtained?: Yes    Risks and benefits: Risks, benefits and alternatives were discussed    Consent given by:  Patient  Patient states understanding of procedure being performed: Yes    Patient's understanding of procedure matches consent: Yes    Procedure consent matches procedure scheduled: Yes    Expected level of sedation:  Moderate  Appropriately NPO:  Yes  ASA Class:  3  Mallampati  :  Grade 3- soft palate visible, posterior pharyngeal wall not visible  Lungs:  Lungs clear with good breath sounds bilaterally  Heart:  Normal heart sounds and rate  History & Physical reviewed:  History and physical reviewed and no updates needed  Statement of review:  I have reviewed the lab findings, diagnostic data, medications, and the plan for sedation

## 2024-11-19 NOTE — PROGRESS NOTES
Care Suites Post Procedure Note    Patient Information  Name: Tommy De La O  Age: 69 year old    Post Procedure  Time patient returned to Care Suites: 1030  Concerns/abnormal assessment: none  If abnormal assessment, provider notified: N/A  Plan/Other: off bedrest at 1230 then discharge    Jenae Alexandra RN

## 2024-11-19 NOTE — DISCHARGE INSTRUCTIONS
Cardiac Angiogram Discharge Instructions - Femoral    After you go home:    Have an adult stay with you until tomorrow.  Drink extra fluids for 2 days.  You may resume your normal diet.  No smoking       For 24 hours - due to the sedation you received:  Relax and take it easy.  Do NOT make any important or legal decisions.  Do NOT drive or operate machines at home or at work.  Do NOT drink alcohol.    Care of Groin Puncture Site:    For the first 24 hrs - check the puncture site every 1-2 hours while awake.  For 2 days, when you cough, sneeze, laugh or move your bowels, hold your hand over the puncture site and press firmly.  Remove the bandaid after 24 hours. If there is minor oozing, apply another bandaid and remove it after 12 hours.  It is normal to have a small bruise or pea size lump at the site.  You may shower tomorrow. Do NOT take a bath, or use a hot tub or pool for at least 3 days. Do NOT scrub the site. Do not use lotion or powder near the puncture site.    Activity:            For 2 days:  No stooping or squatting  Do NOT do any heavy activity such as exercise, lifting, or straining.   No housework, yard work or any activity that make you sweat  Do NOT lift more than 10 pounds    Bleeding:    If you start bleeding from the site in your groin, lie down flat and press firmly on/above the site for 10 minutes.   Once bleeding stops, lay flat for 2 hours.   Call [facility] as soon as you can.       Call 911 right away if you have heavy bleeding or bleeding that does not stop.      Medicines:    Take your medications, including blood thinners, unless your provider tells you not to.    If you have stopped any medicines, check with your provider about when to restart them.    Follow Up Appointments:    Follow up with [facility] Nurse Practitioner at [facility] of patient preference in 7-10 days.    Call the clinic if:    You have increased pain or a large or growing hard lump around the site.  The site is  red, swollen, hot or tender.  Blood or fluid is draining from the site.  You have chills or a fever greater than 101 F (38 C).  Your leg feels numb, cool or changes color.  You have hives, a rash or unusual itching.  New pain in the back or belly that you cannot control with Tylenol.  Any questions or concerns.          [facility] at [place]:    [phone] [facility] (7 days a week)    Or you may contact your provider via My Chart

## 2024-11-20 ENCOUNTER — TELEPHONE (OUTPATIENT)
Dept: CARDIOLOGY | Facility: CLINIC | Age: 69
End: 2024-11-20
Payer: MEDICARE

## 2024-11-20 NOTE — TELEPHONE ENCOUNTER
[facility] Call Center    Phone Message    May a detailed message be left on voicemail: yes     Reason for Call: Other: Pt would like a call back to discuss his stomach discomfort that started after his angiogram and he would like to discuss      Action Taken: Other: Cardio    Travel Screening: Not Applicable     Date of Service:

## 2024-11-20 NOTE — TELEPHONE ENCOUNTER
Called and spoke with pt. Pt had a Coronary angiogram yesterday showing mild nonocclusive coronary artery disease.  Pt stating today is feeling slightly nauseous and feeling urge to urinate.     Pt states he did not eat well after procedure. On his way home his daughter and him stopped at fast food restaurant and got a large burger and fries and today ate a salad with high sodium content with lots of cheese, ham and loaded with salad dressing.     Notified pt that with the anesthesia he received and the bad diet is most likely why he feels nauseas. Recommended pt take it easy with foods and start with BRAT diet (banana, rice, applesauce and toast) things that are easier on the stomach. Anesthetic should be almost out of system usually takes about 24 hours.     In regards to urination. Pt states he just went to the bathroom with good urine output, no difficulty or painful urination. Suggested pt push water intake to flush his system. If at anytime he has the urge to urinate and it is difficult or painful to urinate he should follow up with his PCP.     Pt verbalized an understanding and will call back with any further issues or concerns.     Casie Esparza RN

## 2024-11-23 ENCOUNTER — HOSPITAL ENCOUNTER (EMERGENCY)
Facility: CLINIC | Age: 69
Discharge: HOME | End: 2024-11-23
Attending: NURSE PRACTITIONER | Admitting: NURSE PRACTITIONER
Payer: MEDICARE

## 2024-11-23 ENCOUNTER — APPOINTMENT (OUTPATIENT)
Dept: CT IMAGING | Facility: CLINIC | Age: 69
End: 2024-11-23
Attending: NURSE PRACTITIONER
Payer: MEDICARE

## 2024-11-23 VITALS
OXYGEN SATURATION: 96 % | WEIGHT: 315 LBS | RESPIRATION RATE: 22 BRPM | HEART RATE: 44 BPM | TEMPERATURE: 97.6 F | HEIGHT: 71 IN | SYSTOLIC BLOOD PRESSURE: 118 MMHG | BODY MASS INDEX: 44.1 KG/M2 | DIASTOLIC BLOOD PRESSURE: 74 MMHG

## 2024-11-23 DIAGNOSIS — R11.0 NAUSEA: ICD-10-CM

## 2024-11-23 DIAGNOSIS — R10.819 SUPRAPUBIC TENDERNESS: ICD-10-CM

## 2024-11-23 DIAGNOSIS — S70.11XA TRAUMATIC ECCHYMOSIS OF RIGHT THIGH, INITIAL ENCOUNTER: ICD-10-CM

## 2024-11-23 LAB
ALBUMIN SERPL BCG-MCNC: 4.2 G/DL (ref 3.5–5.2)
ALBUMIN UR-MCNC: NEGATIVE MG/DL
ALP SERPL-CCNC: 80 U/L (ref 40–150)
ALT SERPL W P-5'-P-CCNC: 27 U/L (ref 0–70)
ANION GAP SERPL CALCULATED.3IONS-SCNC: 11 MMOL/L (ref 7–15)
APPEARANCE UR: CLEAR
AST SERPL W P-5'-P-CCNC: 24 U/L (ref 0–45)
BASOPHILS # BLD AUTO: 0.1 10E3/UL (ref 0–0.2)
BASOPHILS NFR BLD AUTO: 1 %
BILIRUB DIRECT SERPL-MCNC: <0.2 MG/DL (ref 0–0.3)
BILIRUB SERPL-MCNC: 0.5 MG/DL
BILIRUB UR QL STRIP: NEGATIVE
BUN SERPL-MCNC: 16.4 MG/DL (ref 8–23)
CALCIUM SERPL-MCNC: 9.4 MG/DL (ref 8.8–10.4)
CHLORIDE SERPL-SCNC: 98 MMOL/L (ref 98–107)
COLOR UR AUTO: NORMAL
CREAT SERPL-MCNC: 0.78 MG/DL (ref 0.67–1.17)
EGFRCR SERPLBLD CKD-EPI 2021: >90 ML/MIN/1.73M2
EOSINOPHIL # BLD AUTO: 0.4 10E3/UL (ref 0–0.7)
EOSINOPHIL NFR BLD AUTO: 7 %
ERYTHROCYTE [DISTWIDTH] IN BLOOD BY AUTOMATED COUNT: 12.5 % (ref 10–15)
GLUCOSE SERPL-MCNC: 102 MG/DL (ref 70–99)
GLUCOSE UR STRIP-MCNC: NEGATIVE MG/DL
HCO3 SERPL-SCNC: 28 MMOL/L (ref 22–29)
HCT VFR BLD AUTO: 38.8 % (ref 40–53)
HGB BLD-MCNC: 14.1 G/DL (ref 13.3–17.7)
HGB UR QL STRIP: NEGATIVE
IMM GRANULOCYTES # BLD: 0 10E3/UL
IMM GRANULOCYTES NFR BLD: 0 %
KETONES UR STRIP-MCNC: NEGATIVE MG/DL
LEUKOCYTE ESTERASE UR QL STRIP: NEGATIVE
LYMPHOCYTES # BLD AUTO: 1.8 10E3/UL (ref 0.8–5.3)
LYMPHOCYTES NFR BLD AUTO: 29 %
MCH RBC QN AUTO: 32 PG (ref 26.5–33)
MCHC RBC AUTO-ENTMCNC: 36.3 G/DL (ref 31.5–36.5)
MCV RBC AUTO: 88 FL (ref 78–100)
MONOCYTES # BLD AUTO: 0.4 10E3/UL (ref 0–1.3)
MONOCYTES NFR BLD AUTO: 6 %
NEUTROPHILS # BLD AUTO: 3.5 10E3/UL (ref 1.6–8.3)
NEUTROPHILS NFR BLD AUTO: 57 %
NITRATE UR QL: NEGATIVE
NRBC # BLD AUTO: 0 10E3/UL
NRBC BLD AUTO-RTO: 0 /100
PH UR STRIP: 7 [PH] (ref 5–7)
PLATELET # BLD AUTO: 160 10E3/UL (ref 150–450)
POTASSIUM SERPL-SCNC: 3.3 MMOL/L (ref 3.4–5.3)
PROT SERPL-MCNC: 7 G/DL (ref 6.4–8.3)
RBC # BLD AUTO: 4.4 10E6/UL (ref 4.4–5.9)
RBC URINE: 0 /HPF
SODIUM SERPL-SCNC: 137 MMOL/L (ref 135–145)
SP GR UR STRIP: 1.01 (ref 1–1.03)
UROBILINOGEN UR STRIP-MCNC: NORMAL MG/DL
WBC # BLD AUTO: 6.2 10E3/UL (ref 4–11)
WBC URINE: 1 /HPF

## 2024-11-23 PROCEDURE — 84460 ALANINE AMINO (ALT) (SGPT): CPT | Performed by: NURSE PRACTITIONER

## 2024-11-23 PROCEDURE — 81003 URINALYSIS AUTO W/O SCOPE: CPT | Performed by: NURSE PRACTITIONER

## 2024-11-23 PROCEDURE — 36415 COLL VENOUS BLD VENIPUNCTURE: CPT | Performed by: NURSE PRACTITIONER

## 2024-11-23 PROCEDURE — 87086 URINE CULTURE/COLONY COUNT: CPT | Performed by: NURSE PRACTITIONER

## 2024-11-23 PROCEDURE — 75635 CT ANGIO ABDOMINAL ARTERIES: CPT | Mod: MA

## 2024-11-23 PROCEDURE — 80053 COMPREHEN METABOLIC PANEL: CPT | Performed by: NURSE PRACTITIONER

## 2024-11-23 PROCEDURE — 82435 ASSAY OF BLOOD CHLORIDE: CPT | Performed by: NURSE PRACTITIONER

## 2024-11-23 PROCEDURE — 84155 ASSAY OF PROTEIN SERUM: CPT | Performed by: NURSE PRACTITIONER

## 2024-11-23 PROCEDURE — 99284 EMERGENCY DEPT VISIT MOD MDM: CPT | Performed by: NURSE PRACTITIONER

## 2024-11-23 PROCEDURE — 99285 EMERGENCY DEPT VISIT HI MDM: CPT | Mod: 25 | Performed by: NURSE PRACTITIONER

## 2024-11-23 PROCEDURE — 82565 ASSAY OF CREATININE: CPT | Performed by: NURSE PRACTITIONER

## 2024-11-23 PROCEDURE — 250N000009 HC RX 250: Performed by: NURSE PRACTITIONER

## 2024-11-23 PROCEDURE — 250N000011 HC RX IP 250 OP 636: Performed by: NURSE PRACTITIONER

## 2024-11-23 PROCEDURE — 85014 HEMATOCRIT: CPT | Performed by: NURSE PRACTITIONER

## 2024-11-23 PROCEDURE — 250N000013 HC RX MED GY IP 250 OP 250 PS 637: Performed by: NURSE PRACTITIONER

## 2024-11-23 PROCEDURE — 85041 AUTOMATED RBC COUNT: CPT | Performed by: NURSE PRACTITIONER

## 2024-11-23 PROCEDURE — 85004 AUTOMATED DIFF WBC COUNT: CPT | Performed by: NURSE PRACTITIONER

## 2024-11-23 RX ORDER — POTASSIUM CHLORIDE 1500 MG/1
20 TABLET, EXTENDED RELEASE ORAL ONCE
Status: COMPLETED | OUTPATIENT
Start: 2024-11-23 | End: 2024-11-23

## 2024-11-23 RX ORDER — IOPAMIDOL 755 MG/ML
100 INJECTION, SOLUTION INTRAVASCULAR ONCE
Status: COMPLETED | OUTPATIENT
Start: 2024-11-23 | End: 2024-11-23

## 2024-11-23 RX ADMIN — POTASSIUM CHLORIDE 20 MEQ: 1500 TABLET, EXTENDED RELEASE ORAL at 13:00

## 2024-11-23 RX ADMIN — SODIUM CHLORIDE 100 ML: 9 INJECTION, SOLUTION INTRAVENOUS at 12:32

## 2024-11-23 RX ADMIN — IOPAMIDOL 100 ML: 755 INJECTION, SOLUTION INTRAVENOUS at 12:32

## 2024-11-23 ASSESSMENT — COLUMBIA-SUICIDE SEVERITY RATING SCALE - C-SSRS
1. IN THE PAST MONTH, HAVE YOU WISHED YOU WERE DEAD OR WISHED YOU COULD GO TO SLEEP AND NOT WAKE UP?: NO
6. HAVE YOU EVER DONE ANYTHING, STARTED TO DO ANYTHING, OR PREPARED TO DO ANYTHING TO END YOUR LIFE?: NO
2. HAVE YOU ACTUALLY HAD ANY THOUGHTS OF KILLING YOURSELF IN THE PAST MONTH?: NO

## 2024-11-23 ASSESSMENT — ACTIVITIES OF DAILY LIVING (ADL)
ADLS_ACUITY_SCORE: 0

## 2024-11-23 NOTE — DISCHARGE INSTRUCTIONS
You are seen in the emergency room for some right groin bruising and a small knot and some suprapubic pain and some nausea.  Our workup finds a very small superficial clot in a superficial artery that is unlikely to be related to your recent procedure.  Consultation with the interventional cardiologist was completed and he recommends rest for 5 to 7 days postprocedure and this includes no skid steer work.  You can move around the house but that would be the maximum activity.  You may place pressure on the groin as needed for comfort.  Keep drinking and it is recommended to have at least 64 fluid ounces of water daily in addition to coffee and milk is fine.  Keep your medications as prescribed.  Keep your follow-up appointment with urology.  Keep your follow-up appointment with cardiology as well.  Return if you have severe acute worsening pain in your right groin or right leg or discoloration of your right leg or swelling behind the knee or calf area of your right leg with pain.  Thank you for coming to the emergency room today.

## 2024-11-23 NOTE — ED TRIAGE NOTES
Pt here with multiple concerns. States he thinks he has a UTI, reports nausea, also states he had an angiogram Tuesday and has had increased swelling and bruising in groin site.      Triage Assessment (Adult)       Row Name 11/23/24 110          Triage Assessment    Airway WDL WDL        Respiratory WDL    Respiratory WDL WDL        Skin Circulation/Temperature WDL    Skin Circulation/Temperature WDL X        Cardiac WDL    Cardiac WDL WDL        Peripheral/Neurovascular WDL    Peripheral Neurovascular WDL WDL        Cognitive/Neuro/Behavioral WDL    Cognitive/Neuro/Behavioral WDL WDL

## 2024-11-23 NOTE — ED PROVIDER NOTES
"  History     Chief Complaint   Patient presents with    Nausea     HPI  Tommy De La O is a 69 year old male with past medical history of GERD, hyperlipidemia, obstructive sleep apnea, osteoarthritis, GI bleed, hypertension, polyneuropathy, benign prostatic hypertrophy, morbid obesity, COPD, abnormal stress test with recent verbal report of normal angiogram-November 19, nonobstructive atherosclerosis of the coronary artery who presents emergency room with concerns of nausea, suprapubic lower abdominal/pelvic discomfort with loose stools with onset of symptoms 4 days ago and also concerns of bruising in his right upper inner thigh status post recent angiogram on November 19-5 days ago.  He is rating his nausea 2 out of 10 in his pelvic discomfort 2 out of 10.  Patient denies loss of sensation in his lower extremities denies discoloration other than the bruising.  He states he has been able to walk without difficulty.  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  Patient states concern for possible  Urinary obstruction and arterial clot after talking this morning to his daughter who called her friend a \"physician\".  He reports they were recommended to come in and obtain a CT scan to evaluate for a possible clot.  Patient denies any fever, chills, sweats, chest pain, cough, shortness of breath, difficulty breathing.  He denies any suicidal homicidal thoughts.  He states he has not tried any therapies or treatments for his current symptoms.    CHART REVIEW:  Patient was admitted to [facility] on 11/19/24 with chest pain and dyspnea on exertion. Recent stress test with apical ischemia. Sent for coronary angiography and PCI if appropriate.     11/19/24: Coronary angiogram via RFA showed:       Dist Cx lesion is 30% stenosed.    Mid LAD lesion is 20% stenosed.    1st Diag lesion is 20% stenosed.    Prox RCA lesion is 10% " stenosed.    Dist RCA lesion is 20% stenosed.     1.  Mild nonocclusive coronary artery disease  2.  Upper normal LVEDP at 13 mmHg.     No new medications.     Called patient to discuss any post hospital d/c questions he may have and confirm f/u appts.      Patient denied any SOB, chest pain or lightheadedness.      RFA cardiac cath site is without bleeding, swelling, redness or signs of infection. Bruising present to right inner thigh-resolving.     RN confirmed with patient that he is scheduled for an OV on 12/13/24 at 1450 with ARISTIDES Yasmeen Singleton at our [facility].            Allergies:  Allergies   Allergen Reactions    Amlodipine Swelling    Darvocet [Propoxyphene N-Apap] Hives    Penicillins Hives       Problem List:    Patient Active Problem List    Diagnosis Date Noted    Shortness of breath 11/19/2024     Priority: Medium    Chest pain 11/19/2024     Priority: Medium    Abnormal stress test 11/19/2024     Priority: Medium    Status post coronary angiogram 11/19/2024     Priority: Medium    Nonobstructive atherosclerosis of coronary artery 11/19/2024     Priority: Medium    Chronic obstructive pulmonary disease, unspecified COPD type (H) 01/11/2023     Priority: Medium    Hand dermatitis 12/30/2019     Priority: Medium     12/30/2019:Uses Clobetasol cream.  He uses on fingers for rash as needed.  Uses intermittently.  Aggravating factors: dry weather.       Peripheral polyneuropathy 12/30/2019     Priority: Medium     12/30/2019:Has pains in legs and feet.  TAking gabapentin chronically.       Benign prostatic hyperplasia with incomplete bladder emptying 11/28/2018     Priority: Medium    Essential hypertension with goal blood pressure less than 140/90 06/15/2018     Priority: Medium    Morbid obesity with BMI of 40.0-44.9, adult (H) 03/31/2016     Priority: Medium    Osteoarthritis of knee 11/03/2014     Priority: Medium     He has had bilateral knee replacements.       Gastrointestinal hemorrhage  11/03/2014     Priority: Medium     11/19/2018:He had colonoscopy.  Presumed from hemorrhoids.       Gastroesophageal reflux disease 11/03/2014     Priority: Medium    Hyperlipidemia 11/03/2014     Priority: Medium    Obstructive sleep apnea syndrome 11/03/2014     Priority: Medium     Diagnosed in 2014 with sleep study and sleep consultation.   3/18/2020:Uses CPAP nightly.  Sleeps much better with use.           Past Medical History:    Past Medical History:   Diagnosis Date    COPD (chronic obstructive pulmonary disease) (H)        Past Surgical History:    Past Surgical History:   Procedure Laterality Date    ARTHROSCOPY KNEE Bilateral     both knees with arthroscopy in the past.     ARTHROSCOPY KNEE      ARTHROSCOPY SHOULDER ROTATOR CUFF REPAIR      AS TOTAL KNEE ARTHROPLASTY Bilateral     Both total knees.     COLONOSCOPY N/A 8/14/2024    Procedure: COLONOSCOPY, WITH POLYPECTOMY AND BIOPSY;  Surgeon: Rudy Tejeda DO;  Location: [facility]    CV CORONARY ANGIOGRAM N/A 11/19/2024    Procedure: Coronary Angiogram;  Surgeon: Evens Patino MD;  Location:  HEART CARDIAC CATH LAB    CV LEFT HEART CATH N/A 11/19/2024    Procedure: Left Heart Catheterization;  Surgeon: Evens Patino MD;  Location:  HEART CARDIAC CATH LAB    LENGTHEN TENDON ACHILLES Left     REPAIR TENDON ACHILLES      1980s two separate surgeries.    ROTATOR CUFF REPAIR RT/LT Left     [facility] TOTAL KNEE ARTHROPLASTY Right 3/17/2015    Procedure: RIGHT KNEE TOTAL ARTHROPLASTY;  Surgeon: Jaiden Barnes MD;  Location: [facility];  Service: Orthopedics    [facility] TOTAL KNEE ARTHROPLASTY Left 6/30/2015    Procedure: LEFT KNEE TOTAL ARTHROPLASTY;  Surgeon: Jaiden Barnes MD;  Location: [facility];  Service: Orthopedics       Family History:    Family History   Problem Relation Age of Onset    Coronary Artery Disease Father     Hyperlipidemia Mother     Prostate Cancer No family hx of     Colon Cancer No family hx of   "   Hypertension Mother     Heart Disease Mother     Heart Disease Father     Hypertension Sister     Cancer Brother     No Known Problems Daughter     No Known Problems Son     Cancer Maternal Grandmother     Vision Loss Maternal Grandmother     No Known Problems Sister     Cancer Brother     Hypertension Brother     Hypertension Brother     Cancer Brother     Sleep Apnea Brother     No Known Problems Brother     No Known Problems Daughter        Social History:  Marital Status:  Single [1]  Social History     Tobacco Use    Smoking status: Former     Current packs/day: 0.00     Average packs/day: 1 pack/day for 30.0 years (30.0 ttl pk-yrs)     Types: Cigarettes     Start date: 1981     Quit date: 2011     Years since quittin.8    Smokeless tobacco: Never    Tobacco comments:     started at age 22   Vaping Use    Vaping status: Never Used   Substance Use Topics    Alcohol use: Yes    Drug use: No        Medications:    albuterol (PROAIR HFA/PROVENTIL HFA/VENTOLIN HFA) 108 (90 Base) MCG/ACT inhaler  aspirin (ASA) 81 MG chewable tablet  chlorthalidone (HYGROTON) 25 MG tablet  clobetasol (TEMOVATE) 0.05 % external ointment  FLUoxetine (PROZAC) 20 MG capsule  gabapentin (NEURONTIN) 300 MG capsule  losartan (COZAAR) 25 MG tablet  omeprazole (PRILOSEC) 40 MG DR capsule  rosuvastatin (CRESTOR) 40 MG tablet  vitamin C (ASCORBIC ACID) 1000 MG TABS  Vitamin D3 (CHOLECALCIFEROL) 125 MCG (5000 UT) tablet          Review of Systems  As mentioned above in the history present illness. All other systems were reviewed and are negative.    Physical Exam   BP: (!) 138/93  Pulse: (!) 46  Temp: 97.6  F (36.4  C)  Resp: 22  Height: 180.3 cm (5' 11\")  Weight: 142.9 kg (315 lb)  SpO2: 96 %      Physical Exam  Vitals and nursing note reviewed.   Constitutional:       General: He is not in acute distress.     Appearance: Normal appearance. He is well-developed. He is not ill-appearing, toxic-appearing or diaphoretic.   HENT:     "  Head: Normocephalic and atraumatic.      Right Ear: Hearing and external ear normal.      Left Ear: Hearing and external ear normal.      Nose: Nose normal.   Eyes:      General: No scleral icterus.        Right eye: No discharge.         Left eye: No discharge.      Conjunctiva/sclera: Conjunctivae normal.   Cardiovascular:      Rate and Rhythm: Normal rate and regular rhythm.      Heart sounds: Normal heart sounds. No murmur heard.     No friction rub. No gallop.   Pulmonary:      Effort: Pulmonary effort is normal. No respiratory distress.      Breath sounds: Normal breath sounds. No stridor. No wheezing or rales.   Abdominal:      General: Bowel sounds are normal. There is no distension.      Palpations: Abdomen is soft. There is no mass.      Tenderness: There is abdominal tenderness. There is no guarding or rebound.   Musculoskeletal:      Right upper leg: Tenderness (faint tenderness at the catheter insertion site with surrounding 5 cm ecchymosis without fluctuant mass) present. No swelling, edema, deformity, lacerations or bony tenderness.      Right lower leg: No edema.      Left lower leg: No edema.      Comments: Equal bilateral pedal pulses- normal straight leg raises   Skin:     General: Skin is warm.      Capillary Refill: Capillary refill takes less than 2 seconds.      Findings: No rash.   Neurological:      Mental Status: He is alert and oriented to person, place, and time.   Psychiatric:         Mood and Affect: Mood normal.         Behavior: Behavior normal. Behavior is cooperative.         ED Course     ED Course as of 11/23/24 1525   Sat Nov 23, 2024   1135 UA with Microscopic reflex to Culture  No signs of infection nor signs of ureteral stone process   1244 Basic metabolic panel(!)  Basic metabolic panel reveals mild hypokalemia at 3.3 will replace orally.   1244 Hepatic function panel  Hepatic function panel unremarkable   1245 CBC with platelets differential(!)  CBC unremarkable   1457 CTA  Abdomen Pelvis Runoff w Contrast  IMPRESSION:  1.  Status post right inguinal angiography. Small nonocclusive thrombus is seen within the right superficial femoral artery at the level of the puncture site. Normal three-vessel runoff. Small subcutaneous hematoma. No evidence of pseudoaneurysm.  2.  Moderate atherosclerotic vascular disease.  3.  Normal appendix. Normal bowel.        1506 Discussed all the findings with patient.  Discussed cold packs and pressure to the groin.  Discussed that I will consult with interventional cardiology and advised them of his being here and our workup thus far.  He denies any further questions or concerns at this time.  Advised that I am thinking there will be no change to plan of care.   1521 Phone call went with interventional radiologist-Dr. Patino-discussed case, workup andhe recommends 5 to 7 days no riding in the skid steer or activity such as exercise.  Recommend continuing medication and pressure on the site as needed for comfort.  Keep follow-up visit as currently scheduled.   1522   Will review with patient.     Procedures      Results for orders placed or performed during the hospital encounter of 11/23/24 (from the past 24 hours)   UA with Microscopic reflex to Culture    Specimen: Urine, Clean Catch   Result Value Ref Range    Color Urine Straw Colorless, Straw, Light Yellow, Yellow    Appearance Urine Clear Clear    Glucose Urine Negative Negative mg/dL    Bilirubin Urine Negative Negative    Ketones Urine Negative Negative mg/dL    Specific Gravity Urine 1.006 1.003 - 1.035    Blood Urine Negative Negative    pH Urine 7.0 5.0 - 7.0    Protein Albumin Urine Negative Negative mg/dL    Urobilinogen Urine Normal Normal, 2.0 mg/dL    Nitrite Urine Negative Negative    Leukocyte Esterase Urine Negative Negative    RBC Urine 0 <=2 /HPF    WBC Urine 1 <=5 /HPF    Narrative    Urine Culture not indicated   CBC with platelets differential    Narrative    The following orders  were created for panel order CBC with platelets differential.  Procedure                               Abnormality         Status                     ---------                               -----------         ------                     CBC with platelets and d...[[id number]]  Abnormal            Final result                 Please view results for these tests on the individual orders.   Basic metabolic panel   Result Value Ref Range    Sodium 137 135 - 145 mmol/L    Potassium 3.3 (L) 3.4 - 5.3 mmol/L    Chloride 98 98 - 107 mmol/L    Carbon Dioxide (CO2) 28 22 - 29 mmol/L    Anion Gap 11 7 - 15 mmol/L    Urea Nitrogen 16.4 8.0 - 23.0 mg/dL    Creatinine 0.78 0.67 - 1.17 mg/dL    GFR Estimate >90 >60 mL/min/1.73m2    Calcium 9.4 8.8 - 10.4 mg/dL    Glucose 102 (H) 70 - 99 mg/dL   Hepatic function panel   Result Value Ref Range    Protein Total 7.0 6.4 - 8.3 g/dL    Albumin 4.2 3.5 - 5.2 g/dL    Bilirubin Total 0.5 <=1.2 mg/dL    Alkaline Phosphatase 80 40 - 150 U/L    AST 24 0 - 45 U/L    ALT 27 0 - 70 U/L    Bilirubin Direct <0.20 0.00 - 0.30 mg/dL   CBC with platelets and differential   Result Value Ref Range    WBC Count 6.2 4.0 - 11.0 10e3/uL    RBC Count 4.40 4.40 - 5.90 10e6/uL    Hemoglobin 14.1 13.3 - 17.7 g/dL    Hematocrit 38.8 (L) 40.0 - 53.0 %    MCV 88 78 - 100 fL    MCH 32.0 26.5 - 33.0 pg    MCHC 36.3 31.5 - 36.5 g/dL    RDW 12.5 10.0 - 15.0 %    Platelet Count 160 150 - 450 10e3/uL    % Neutrophils 57 %    % Lymphocytes 29 %    % Monocytes 6 %    % Eosinophils 7 %    % Basophils 1 %    % Immature Granulocytes 0 %    NRBCs per 100 WBC 0 <1 /100    Absolute Neutrophils 3.5 1.6 - 8.3 10e3/uL    Absolute Lymphocytes 1.8 0.8 - 5.3 10e3/uL    Absolute Monocytes 0.4 0.0 - 1.3 10e3/uL    Absolute Eosinophils 0.4 0.0 - 0.7 10e3/uL    Absolute Basophils 0.1 0.0 - 0.2 10e3/uL    Absolute Immature Granulocytes 0.0 <=0.4 10e3/uL    Absolute NRBCs 0.0 10e3/uL   CTA Abdomen Pelvis Runoff w Contrast    Narrative     EXAM: CTA ABDOMEN PELVIS RUNOFF W CONTRAST  LOCATION: [facility]  DATE: 11/23/2024    INDICATION: Suprapubic pressure, loose stools, nausea for pst 4 days, bruising to right groin, rule out arterial clot at or around site of angiogram insertion  COMPARISON: 10/24/2023  TECHNIQUE: Helical acquisition through the abdomen, pelvis, and bilateral lower extremities was performed during the arterial phase of contrast enhancement using IV Contrast. 2D and 3D reconstructions were performed by the CT technologist. Dose reduction   techniques were used.   CONTRAST: 100 mL of Isovue-370    FINDINGS:  AORTA: Mildly tortuous atherosclerotic aorta. Widely patent with patent branch vessels. Patent common, internal, and external iliac arteries.    RIGHT LEG: Status post right inguinal angiography. A small filling defect is seen within the right superficial femoral artery. The common femoral, superficial femoral, and deep femoral arteries are widely patent. The popliteal artery is widely patent   with normal three-vessel runoff.    LEFT LEG: The common femoral, superficial femoral, and deep femoral arteries are widely patent. The popliteal artery is widely patent with normal three-vessel runoff.    LUNG BASES: Unremarkable.    ABDOMEN: Normal liver, gallbladder, spleen, adrenals, and pancreas. Normal kidneys. No abdominal or retroperitoneal lymphadenopathy.    PELVIS: Normal appendix. Normal bowel. Normal prostate, and bladder. No free air or fluid. No lymphadenopathy.    MUSCULOSKELETAL: Moderate multilevel discogenic degenerative change. Bilateral total hip arthroplasties.      Impression    IMPRESSION:  1.  Status post right inguinal angiography. Small nonocclusive thrombus is seen within the right superficial femoral artery at the level of the puncture site. Normal three-vessel runoff. Small subcutaneous hematoma. No evidence of pseudoaneurysm.  2.  Moderate atherosclerotic vascular disease.  3.   Normal appendix. Normal bowel.         Medications   iopamidol (ISOVUE-370) solution 100 mL (100 mLs Intravenous $Given 11/23/24 1232)   sodium chloride 0.9 % bag 500mL for CT scan flush use (100 mLs Intravenous $Given 11/23/24 1232)   potassium chloride zee ER (KLOR-CON M20) CR tablet 20 mEq (20 mEq Oral $Given 11/23/24 1300)       Assessments & Plan (with Medical Decision Making)     I have reviewed the nursing notes.    I have reviewed the findings, diagnosis, plan and need for follow up with the patient.  Tommy De La O is a 69 year old male with past medical history of GERD, hyperlipidemia, obstructive sleep apnea, osteoarthritis, GI bleed, hypertension, polyneuropathy, benign prostatic hypertrophy, morbid obesity, COPD, abnormal stress test with recent verbal report of normal angiogram-November 19, nonobstructive atherosclerosis of the coronary artery who presents emergency room with concerns of nausea, suprapubic lower abdominal/pelvic discomfort with loose stools with onset of symptoms 4 days ago and also concerns of bruising in his right upper inner thigh status post recent angiogram on November 19-5 days ago.  He is rating his nausea 2 out of 10 in his pelvic discomfort 2 out of 10.    On exam blood pressure 138/93, temp 97.6, pulse 46, respiratory rate is 22, O2 saturation 96%.  Patient is alert and orientated and normal bowel sounds noted no abdominal distention appreciated no rebound no guarding.  Patient has slight tenderness in the right lower quadrant suprapubic area and left lower quadrant.  No abdominal masses appreciated.  Patient's right groin you can palpate where the catheter insertion site was and there is a less than 1 cm nodule present and surrounding approximately 5 cm of some mild ecchymosis.  There is no large fluctuant masses or tenderness that is remarkable at this site.  Patient states no pain and perhaps mild tenderness here.  Differential diagnosis arterial clot low suspicion, DVT  low suspicion, bladder outlet obstruction, UTI, cystitis, diverticulitis, gastroenteritis, colitis, less suspicion for acute abdomen.  Discussed all these findings with patient.  Workup initiated.  Postvoid bladder scan reveals 150 and not clinically recognizable as outlet obstruction and no indication to place catheter.  Urinalysis obtained and no signs of infectious etiology and low suspicion for ureteral stone process.  CBC is unremarkable basic metabolic panel and hepatic panel are unremarkable.  CT reveals a small nonocclusive thrombus in the right superficial artery but other arteries are widely patent in the groin.  Reviewed this with interventional cardiology.  They recommend 5 to 7 days of rest and routine follow-up.  Reviewed this with patient.  Discussed worrisome reasons to return given this and written format.  Patient agreeable to all of the above and is discharged in stable condition.      New Prescriptions    No medications on file       Final diagnoses:   Traumatic ecchymosis of right thigh, initial encounter   Suprapubic tenderness   Nausea       11/23/2024   [facility] EMERGENCY DEPT       Catherine Wheatley, JUAN LUIS CNP  11/23/24 [id number]

## 2024-11-24 LAB — BACTERIA UR CULT: NO GROWTH

## 2024-12-13 PROBLEM — I87.2 VENOUS (PERIPHERAL) INSUFFICIENCY: Status: ACTIVE | Noted: 2024-12-13

## 2024-12-13 PROBLEM — Z82.49 FAMILY HISTORY OF PREMATURE CORONARY ARTERY DISEASE: Status: ACTIVE | Noted: 2024-12-13

## 2024-12-13 PROBLEM — R00.2 PALPITATIONS: Status: ACTIVE | Noted: 2024-12-13

## 2024-12-13 PROBLEM — I47.10 PSVT (PAROXYSMAL SUPRAVENTRICULAR TACHYCARDIA) (H): Status: ACTIVE | Noted: 2024-12-13

## 2024-12-13 PROBLEM — I49.3 PVC'S (PREMATURE VENTRICULAR CONTRACTIONS): Status: ACTIVE | Noted: 2024-12-13

## 2024-12-23 ENCOUNTER — OFFICE VISIT (OUTPATIENT)
Dept: UROLOGY | Facility: CLINIC | Age: 69
End: 2024-12-23
Payer: MEDICARE

## 2024-12-23 VITALS
TEMPERATURE: 96.6 F | DIASTOLIC BLOOD PRESSURE: 68 MMHG | BODY MASS INDEX: 44.1 KG/M2 | WEIGHT: 315 LBS | SYSTOLIC BLOOD PRESSURE: 128 MMHG | HEIGHT: 71 IN

## 2024-12-23 DIAGNOSIS — R39.198 SLOWING OF URINARY STREAM: Primary | ICD-10-CM

## 2024-12-23 PROCEDURE — 51798 US URINE CAPACITY MEASURE: CPT | Performed by: UROLOGY

## 2024-12-23 PROCEDURE — 99212 OFFICE O/P EST SF 10 MIN: CPT | Mod: 25 | Performed by: UROLOGY

## 2024-12-23 ASSESSMENT — PAIN SCALES - GENERAL: PAINLEVEL_OUTOF10: NO PAIN (0)

## 2024-12-23 NOTE — PROGRESS NOTES
Chief Complaint   Patient presents with    Follow Up     Hesitant stream       Tommy De La O is a 69 year old male who presents today for follow up of   Chief Complaint   Patient presents with    Follow Up     Hesitant stream   Patient is a pleasant 69-year-old male with history of BPH status post TURP in 2018.  He complains of slowing of urinary stream and hesitancy lately.  He has no dysuria or hematuria.  Patient had a cystoscopy done in 2022 that showed post TURP changes without any evidence of obstructions.    Current Outpatient Medications   Medication Sig Dispense Refill    albuterol (PROAIR HFA/PROVENTIL HFA/VENTOLIN HFA) 108 (90 Base) MCG/ACT inhaler Inhale 2 puffs into the lungs every 6 hours as needed for shortness of breath, wheezing or cough 18 g 3    aspirin (ASA) 81 MG chewable tablet Take 1 tablet (81 mg) by mouth daily      clobetasol (TEMOVATE) 0.05 % external ointment Apply topically daily as needed For itching 60 g 0    FLUoxetine (PROZAC) 20 MG capsule Take 1 capsule (20 mg) by mouth daily 90 capsule 3    gabapentin (NEURONTIN) 300 MG capsule TAKE 1 CAPSULE THREE TIMES A  capsule 3    losartan (COZAAR) 50 MG tablet Take 1 tablet (50 mg) by mouth daily. 90 tablet 3    omeprazole (PRILOSEC) 40 MG DR capsule TAKE 1 CAPSULE DAILY 30 TO 60 MINUTES BEFORE A MEAL 90 capsule 3    rosuvastatin (CRESTOR) 40 MG tablet Take 1 tablet (40 mg) by mouth daily 90 tablet 3    vitamin C (ASCORBIC ACID) 1000 MG TABS Take 1,000 mg by mouth daily      Vitamin D3 (CHOLECALCIFEROL) 125 MCG (5000 UT) tablet Take 1 tablet by mouth daily       Allergies   Allergen Reactions    Amlodipine Swelling    Darvocet [Propoxyphene N-Apap] Hives    Penicillins Hives      Past Medical History:   Diagnosis Date    COPD (chronic obstructive pulmonary disease) (H)      Past Surgical History:   Procedure Laterality Date    ARTHROSCOPY KNEE Bilateral     both knees with arthroscopy in the past.     ARTHROSCOPY KNEE       ARTHROSCOPY SHOULDER ROTATOR CUFF REPAIR      AS TOTAL KNEE ARTHROPLASTY Bilateral     Both total knees.     COLONOSCOPY N/A 8/14/2024    Procedure: COLONOSCOPY, WITH POLYPECTOMY AND BIOPSY;  Surgeon: Rudy Tejeda DO;  Location: [facility]    CV CORONARY ANGIOGRAM N/A 11/19/2024    Procedure: Coronary Angiogram;  Surgeon: Evens Patino MD;  Location:  HEART CARDIAC CATH LAB    CV LEFT HEART CATH N/A 11/19/2024    Procedure: Left Heart Catheterization;  Surgeon: Evens Patino MD;  Location:  HEART CARDIAC CATH LAB    LENGTHEN TENDON ACHILLES Left     REPAIR TENDON ACHILLES      1980s two separate surgeries.    ROTATOR CUFF REPAIR RT/LT Left     [facility] TOTAL KNEE ARTHROPLASTY Right 3/17/2015    Procedure: RIGHT KNEE TOTAL ARTHROPLASTY;  Surgeon: Jaiden Barnes MD;  Location: [facility];  Service: Orthopedics    [facility] TOTAL KNEE ARTHROPLASTY Left 6/30/2015    Procedure: LEFT KNEE TOTAL ARTHROPLASTY;  Surgeon: Jaiden Barnes MD;  Location: [facility];  Service: Orthopedics     Family History   Problem Relation Age of Onset    Coronary Artery Disease Father     Hyperlipidemia Mother     Prostate Cancer No family hx of     Colon Cancer No family hx of     Hypertension Mother     Heart Disease Mother     Heart Disease Father     Hypertension Sister     Cancer Brother     No Known Problems Daughter     No Known Problems Son     Cancer Maternal Grandmother     Vision Loss Maternal Grandmother     No Known Problems Sister     Cancer Brother     Hypertension Brother     Hypertension Brother     Cancer Brother     Sleep Apnea Brother     No Known Problems Brother     No Known Problems Daughter      Social History     Socioeconomic History    Marital status: Single     Spouse name: None    Number of children: None    Years of education: None    Highest education level: None   Tobacco Use    Smoking status: Former     Current packs/day: 0.00     Average packs/day: 1 pack/day for 30.0  years (30.0 ttl pk-yrs)     Types: Cigarettes     Start date: 1981     Quit date: 2011     Years since quittin.9    Smokeless tobacco: Never    Tobacco comments:     started at age 22   Vaping Use    Vaping status: Never Used   Substance and Sexual Activity    Alcohol use: Yes     Comment: occasional    Drug use: No    Sexual activity: Yes     Partners: Female     Social Drivers of Health     Financial Resource Strain: Low Risk  (4/3/2024)    Financial Resource Strain     Within the past 12 months, have you or your family members you live with been unable to get utilities (heat, electricity) when it was really needed?: No   Food Insecurity: Low Risk  (4/3/2024)    Food Insecurity     Within the past 12 months, did you worry that your food would run out before you got money to buy more?: No     Within the past 12 months, did the food you bought just not last and you didn t have money to get more?: No   Transportation Needs: Low Risk  (4/3/2024)    Transportation Needs     Within the past 12 months, has lack of transportation kept you from medical appointments, getting your medicines, non-medical meetings or appointments, work, or from getting things that you need?: No   Physical Activity: Inactive (4/3/2024)    Exercise Vital Sign     Days of Exercise per Week: 0 days     Minutes of Exercise per Session: 0 min   Stress: Stress Concern Present (4/3/2024)    [employer] - Occupational Stress Questionnaire     Feeling of Stress : To some extent   Social Connections: Unknown (4/3/2024)    Social Connection and Isolation Panel [NHANES]     Frequency of Social Gatherings with Friends and Family: Once a week   Interpersonal Safety: Low Risk  (2024)    Interpersonal Safety     Do you feel physically and emotionally safe where you currently live?: Yes     Within the past 12 months, have you been hit, slapped, kicked or otherwise physically hurt by someone?: No     Within the  "past 12 months, have you been humiliated or emotionally abused in other ways by your partner or ex-partner?: No   Housing Stability: Low Risk  (4/3/2024)    Housing Stability     Do you have housing? : Yes     Are you worried about losing your housing?: No       REVIEW OF SYSTEMS  =================  C: NEGATIVE for fever, chills, change in weight  I: NEGATIVE for worrisome rashes, moles or lesions  E/M: NEGATIVE for ear, mouth and throat problems  R: NEGATIVE for significant cough or SHORTNESS OF BREATH  CV:  NEGATIVE for chest pain, palpitations or peripheral edema  GI: NEGATIVE for nausea, abdominal pain, heartburn, or change in bowel habits  NEURO: NEGATIVE numbness/weakness  : see HPI  PSYCH: NEGATIVE depression/anxiety  LYmph: no new enlarged lymph nodes  Ortho: no new trauma/movements    Physical Exam:  Blood pressure 128/68, temperature (!) 96.6  F (35.9  C), temperature source Temporal, height 1.803 m (5' 10.98\"), weight 143.8 kg (317 lb).    GENERAL: healthy, alert and no distress  EYES: Eyes grossly normal to inspection, conjunctivae and sclerae normal  RESP: no audible wheeze, cough, or visible cyanosis.  No visible retractions or increased work of breathing.  Able to speak fully in complete sentences.  NEURO: Cranial nerves grossly intact, mentation intact and speech normal  PSYCH: mentation appears normal, affect normal/bright, judgement and insight intact, normal speech and appearance well-groomed    Assessment/Plan:   (R39.257) Slowing of urinary stream  (primary encounter diagnosis)  Comment: Bladder scan with 0 ml.    Plan: Reassured.  Return to clinic for cystoscopy if symptoms worsen.            Please note: Voice recognition software was used in this dictation.  It may therefore contain typographical errors.           "

## 2024-12-25 ENCOUNTER — APPOINTMENT (OUTPATIENT)
Dept: GENERAL RADIOLOGY | Facility: CLINIC | Age: 69
End: 2024-12-25
Attending: STUDENT IN AN ORGANIZED HEALTH CARE EDUCATION/TRAINING PROGRAM
Payer: MEDICARE

## 2024-12-25 ENCOUNTER — HOSPITAL ENCOUNTER (EMERGENCY)
Facility: CLINIC | Age: 69
Discharge: HOME | End: 2024-12-25
Attending: STUDENT IN AN ORGANIZED HEALTH CARE EDUCATION/TRAINING PROGRAM
Payer: MEDICARE

## 2024-12-25 VITALS
DIASTOLIC BLOOD PRESSURE: 83 MMHG | SYSTOLIC BLOOD PRESSURE: 141 MMHG | OXYGEN SATURATION: 90 % | HEART RATE: 46 BPM | RESPIRATION RATE: 14 BRPM | WEIGHT: 315 LBS | HEIGHT: 71 IN | TEMPERATURE: 98.8 F | BODY MASS INDEX: 44.1 KG/M2

## 2024-12-25 DIAGNOSIS — R06.09 DOE (DYSPNEA ON EXERTION): ICD-10-CM

## 2024-12-25 DIAGNOSIS — Z48.02 ENCOUNTER FOR REMOVAL OF SUTURES: ICD-10-CM

## 2024-12-25 LAB
ALBUMIN SERPL BCG-MCNC: 4 G/DL (ref 3.5–5.2)
ALP SERPL-CCNC: 93 U/L (ref 40–150)
ALT SERPL W P-5'-P-CCNC: 25 U/L (ref 0–70)
ANION GAP SERPL CALCULATED.3IONS-SCNC: 9 MMOL/L (ref 7–15)
AST SERPL W P-5'-P-CCNC: 25 U/L (ref 0–45)
ATRIAL RATE - MUSE: 50 BPM
BASOPHILS # BLD AUTO: 0 10E3/UL (ref 0–0.2)
BASOPHILS NFR BLD AUTO: 1 %
BILIRUB SERPL-MCNC: 0.3 MG/DL
BUN SERPL-MCNC: 22 MG/DL (ref 8–23)
CALCIUM SERPL-MCNC: 9.2 MG/DL (ref 8.8–10.4)
CHLORIDE SERPL-SCNC: 107 MMOL/L (ref 98–107)
CREAT SERPL-MCNC: 0.96 MG/DL (ref 0.67–1.17)
DIASTOLIC BLOOD PRESSURE - MUSE: NORMAL MMHG
EGFRCR SERPLBLD CKD-EPI 2021: 86 ML/MIN/1.73M2
EOSINOPHIL # BLD AUTO: 0.4 10E3/UL (ref 0–0.7)
EOSINOPHIL NFR BLD AUTO: 6 %
ERYTHROCYTE [DISTWIDTH] IN BLOOD BY AUTOMATED COUNT: 13 % (ref 10–15)
GLUCOSE SERPL-MCNC: 107 MG/DL (ref 70–99)
HCO3 SERPL-SCNC: 24 MMOL/L (ref 22–29)
HCT VFR BLD AUTO: 37.8 % (ref 40–53)
HGB BLD-MCNC: 12.9 G/DL (ref 13.3–17.7)
HOLD SPECIMEN: NORMAL
IMM GRANULOCYTES # BLD: 0 10E3/UL
IMM GRANULOCYTES NFR BLD: 0 %
INTERPRETATION ECG - MUSE: NORMAL
LYMPHOCYTES # BLD AUTO: 1.3 10E3/UL (ref 0.8–5.3)
LYMPHOCYTES NFR BLD AUTO: 19 %
MCH RBC QN AUTO: 31.3 PG (ref 26.5–33)
MCHC RBC AUTO-ENTMCNC: 34.1 G/DL (ref 31.5–36.5)
MCV RBC AUTO: 92 FL (ref 78–100)
MONOCYTES # BLD AUTO: 0.4 10E3/UL (ref 0–1.3)
MONOCYTES NFR BLD AUTO: 6 %
NEUTROPHILS # BLD AUTO: 4.9 10E3/UL (ref 1.6–8.3)
NEUTROPHILS NFR BLD AUTO: 69 %
NRBC # BLD AUTO: 0 10E3/UL
NRBC BLD AUTO-RTO: 0 /100
NT-PROBNP SERPL-MCNC: 361 PG/ML (ref 0–900)
P AXIS - MUSE: 61 DEGREES
PLATELET # BLD AUTO: 155 10E3/UL (ref 150–450)
POTASSIUM SERPL-SCNC: 4.4 MMOL/L (ref 3.4–5.3)
PR INTERVAL - MUSE: 204 MS
PROT SERPL-MCNC: 6.6 G/DL (ref 6.4–8.3)
QRS DURATION - MUSE: 94 MS
QT - MUSE: 460 MS
QTC - MUSE: 419 MS
R AXIS - MUSE: -5 DEGREES
RBC # BLD AUTO: 4.12 10E6/UL (ref 4.4–5.9)
SODIUM SERPL-SCNC: 140 MMOL/L (ref 135–145)
SYSTOLIC BLOOD PRESSURE - MUSE: NORMAL MMHG
T AXIS - MUSE: 1 DEGREES
TROPONIN T SERPL HS-MCNC: 9 NG/L
TROPONIN T SERPL HS-MCNC: 9 NG/L
VENTRICULAR RATE- MUSE: 50 BPM
WBC # BLD AUTO: 7.1 10E3/UL (ref 4–11)

## 2024-12-25 PROCEDURE — 99285 EMERGENCY DEPT VISIT HI MDM: CPT | Mod: 25

## 2024-12-25 PROCEDURE — 82374 ASSAY BLOOD CARBON DIOXIDE: CPT | Performed by: STUDENT IN AN ORGANIZED HEALTH CARE EDUCATION/TRAINING PROGRAM

## 2024-12-25 PROCEDURE — 83880 ASSAY OF NATRIURETIC PEPTIDE: CPT | Performed by: STUDENT IN AN ORGANIZED HEALTH CARE EDUCATION/TRAINING PROGRAM

## 2024-12-25 PROCEDURE — 71046 X-RAY EXAM CHEST 2 VIEWS: CPT

## 2024-12-25 PROCEDURE — 93005 ELECTROCARDIOGRAM TRACING: CPT

## 2024-12-25 PROCEDURE — 85004 AUTOMATED DIFF WBC COUNT: CPT | Performed by: STUDENT IN AN ORGANIZED HEALTH CARE EDUCATION/TRAINING PROGRAM

## 2024-12-25 PROCEDURE — 82310 ASSAY OF CALCIUM: CPT | Performed by: STUDENT IN AN ORGANIZED HEALTH CARE EDUCATION/TRAINING PROGRAM

## 2024-12-25 PROCEDURE — 36415 COLL VENOUS BLD VENIPUNCTURE: CPT | Performed by: STUDENT IN AN ORGANIZED HEALTH CARE EDUCATION/TRAINING PROGRAM

## 2024-12-25 PROCEDURE — 84484 ASSAY OF TROPONIN QUANT: CPT | Performed by: STUDENT IN AN ORGANIZED HEALTH CARE EDUCATION/TRAINING PROGRAM

## 2024-12-25 PROCEDURE — 250N000012 HC RX MED GY IP 250 OP 636 PS 637: Performed by: STUDENT IN AN ORGANIZED HEALTH CARE EDUCATION/TRAINING PROGRAM

## 2024-12-25 PROCEDURE — 99284 EMERGENCY DEPT VISIT MOD MDM: CPT | Performed by: STUDENT IN AN ORGANIZED HEALTH CARE EDUCATION/TRAINING PROGRAM

## 2024-12-25 PROCEDURE — 93010 ELECTROCARDIOGRAM REPORT: CPT | Performed by: STUDENT IN AN ORGANIZED HEALTH CARE EDUCATION/TRAINING PROGRAM

## 2024-12-25 RX ORDER — PREDNISONE 20 MG/1
40 TABLET ORAL DAILY
Qty: 10 TABLET | Refills: 0 | Status: SHIPPED | OUTPATIENT
Start: 2024-12-25

## 2024-12-25 RX ORDER — PREDNISONE 20 MG/1
60 TABLET ORAL ONCE
Status: COMPLETED | OUTPATIENT
Start: 2024-12-25 | End: 2024-12-25

## 2024-12-25 RX ADMIN — PREDNISONE 60 MG: 20 TABLET ORAL at 02:19

## 2024-12-25 ASSESSMENT — COLUMBIA-SUICIDE SEVERITY RATING SCALE - C-SSRS
6. HAVE YOU EVER DONE ANYTHING, STARTED TO DO ANYTHING, OR PREPARED TO DO ANYTHING TO END YOUR LIFE?: NO
2. HAVE YOU ACTUALLY HAD ANY THOUGHTS OF KILLING YOURSELF IN THE PAST MONTH?: NO
1. IN THE PAST MONTH, HAVE YOU WISHED YOU WERE DEAD OR WISHED YOU COULD GO TO SLEEP AND NOT WAKE UP?: NO

## 2024-12-25 ASSESSMENT — ACTIVITIES OF DAILY LIVING (ADL)
ADLS_ACUITY_SCORE: 41

## 2024-12-25 NOTE — DISCHARGE INSTRUCTIONS
I am not exactly sure what is causing your symptoms.  Your workup here was all reassuring.  It could be multifactorial.  You may be developing a cold and this is exacerbating your COPD.  Will trial a course of steroids to see if that is helpful.  Do not take your next dose until tomorrow morning.  It does not appear to be due to the medications, but it is possible, so as we discussed you can decrease your losartan to 25 mg and restart your chlorthalidone.  Follow-up with your regular doctor when able.  Return to the ER with new or concerning symptoms

## 2024-12-25 NOTE — ED PROVIDER NOTES
"  History     Chief Complaint   Patient presents with    Shortness of Breath     HPI  Tomym De La O is a 69 year old male who has hypertension, COPD, coronary artery disease, hyperlipidemia, MARTA who presents to the ER for evaluation of shortness of breath.   Patient had angiogram on 11/19 and that visit was reviewed.  His angiogram was relatively reassuring and no interventions were performed, he had mild nonocclusive coronary artery disease and secondary prevention and risk factor modification was recommended.  Patient states that is not unusual for him to get short of breath and he typically attributes it to his COPD and weight.  He states that he is currently trying to get on weight loss medication and recently had his medications changed.  He states that he has a family member who is on Zepbound and it caused low potassium so he is trying to optimize his medications that may worsen low potassium prior to starting any weight loss medications.  He saw his cardiologist on 12/13 and had his losartan increased from 25 to 50 mg and he was instructed to stop his chlorthalidone.  He notes that since that time he has gained about 12 pounds.  He is concerned that he could be retaining water due to stopping the chlorthalidone.  He states that today he was doing some chores and hauling a large bucket of maneuver when he became very short of breath.  He states that he was breathing so rapidly he thought he was going to pass out.  It took him about 2 minutes to catch his breath.  He states that it is not unusual for him to get short of breath doing chores but it is not usually this bad.  He states that he noticed he got more short of breath with going up and down the stairs in his home as well.  He never had any chest pain.  He had no syncope or presyncope.  No back pain.  No abdominal pain.  No weakness in the arms or legs.  No numbness or tingling.  He states that he called medics to \"get checked out\" and he felt reassured " when he had a normal EKG and normal vitals.  However, he was still worried that something could be going on so he decided to come in for evaluation.  He currently is feeling at his baseline.  No shortness of breath.  No chest pain.  No orthopnea.  No pain or swelling in his legs.  No abdominal distention.  No nausea or vomiting.  No diaphoresis.  He denies fevers or worsening cough.  He is wondering if it could be a COPD as he feels a cold coming on and this sometimes triggers his COPD.    Patient also notes that he has stitches on his back that were supposed to be removed last week and he forgot about them and is wondering if I could take them out.    Allergies:  Allergies   Allergen Reactions    Amlodipine Swelling    Darvocet [Propoxyphene N-Apap] Hives    Penicillins Hives       Problem List:    Patient Active Problem List    Diagnosis Date Noted    Family history of premature coronary artery disease 2024     Priority: Medium     Father - had bypass and then  57 yrs old of MI  Brother - had bypass   Mother - lived to 93   P. Uncle  60s of MI  P. Grandmother  of MI  Multiple cousins with heart attacks      Venous (peripheral) insufficiency 2024     Priority: Medium     S/p vein ablation  Abnormal venous competency ultrasound 2023 severe reflux      Palpitations 2024     Priority: Medium    PVC's (premature ventricular contractions) 2024     Priority: Medium     Occasional PVCs 1.6% on Zio patch       PSVT (paroxysmal supraventricular tachycardia) (H) 2024     Priority: Medium     Zio patch with 42 runs up to 14 minutes      Shortness of breath 2024     Priority: Medium    Chest pain 2024     Priority: Medium    Abnormal stress test 2024     Priority: Medium    Status post coronary angiogram 2024     Priority: Medium    Coronary artery disease involving native coronary artery of native heart, unspecified whether angina present 2024      Priority: Medium     CT coronary angiogram 4/2023 with calcium score 241 in the LAD, LCx and RCA, 63rd percentile, mild to moderate nonobstructive disease  Abnormal stress test-> angio 11/2024 nonobstructive CAD 30% distal LCx, 20% mid LAD and D1, 10% proximal RCA, and 20% distal RCA.      Chronic obstructive pulmonary disease, unspecified COPD type (H) 01/11/2023     Priority: Medium    Hand dermatitis 12/30/2019     Priority: Medium     12/30/2019:Uses Clobetasol cream.  He uses on fingers for rash as needed.  Uses intermittently.  Aggravating factors: dry weather.       Peripheral polyneuropathy 12/30/2019     Priority: Medium     12/30/2019:Has pains in legs and feet.  TAking gabapentin chronically.       Benign prostatic hyperplasia with incomplete bladder emptying 11/28/2018     Priority: Medium    Benign essential hypertension 06/15/2018     Priority: Medium    Morbid obesity with BMI of 40.0-44.9, adult (H) 03/31/2016     Priority: Medium    Osteoarthritis of knee 11/03/2014     Priority: Medium     He has had bilateral knee replacements.       Gastrointestinal hemorrhage 11/03/2014     Priority: Medium     11/19/2018:He had colonoscopy.  Presumed from hemorrhoids.       Gastroesophageal reflux disease 11/03/2014     Priority: Medium    Hyperlipidemia LDL goal <70 11/03/2014     Priority: Medium    Obstructive sleep apnea syndrome 11/03/2014     Priority: Medium     Diagnosed in 2014 with sleep study and sleep consultation.   3/18/2020:Uses CPAP nightly.  Sleeps much better with use.           Past Medical History:    Past Medical History:   Diagnosis Date    COPD (chronic obstructive pulmonary disease) (H)        Past Surgical History:    Past Surgical History:   Procedure Laterality Date    ARTHROSCOPY KNEE Bilateral     both knees with arthroscopy in the past.     ARTHROSCOPY KNEE      ARTHROSCOPY SHOULDER ROTATOR CUFF REPAIR      AS TOTAL KNEE ARTHROPLASTY Bilateral     Both total knees.     COLONOSCOPY  N/A 2024    Procedure: COLONOSCOPY, WITH POLYPECTOMY AND BIOPSY;  Surgeon: Rudy Tejeda DO;  Location: [facility]    CV CORONARY ANGIOGRAM N/A 2024    Procedure: Coronary Angiogram;  Surgeon: Evens Patino MD;  Location:  HEART CARDIAC CATH LAB    CV LEFT HEART CATH N/A 2024    Procedure: Left Heart Catheterization;  Surgeon: Evens Patino MD;  Location:  HEART CARDIAC CATH LAB    LENGTHEN TENDON ACHILLES Left     REPAIR TENDON ACHILLES      1980s two separate surgeries.    ROTATOR CUFF REPAIR RT/LT Left     [facility] TOTAL KNEE ARTHROPLASTY Right 3/17/2015    Procedure: RIGHT KNEE TOTAL ARTHROPLASTY;  Surgeon: Jaiden Barnes MD;  Location: [facility];  Service: Orthopedics    [facility] TOTAL KNEE ARTHROPLASTY Left 2015    Procedure: LEFT KNEE TOTAL ARTHROPLASTY;  Surgeon: Jaiden Barnes MD;  Location: [facility];  Service: Orthopedics       Family History:    Family History   Problem Relation Age of Onset    Coronary Artery Disease Father     Hyperlipidemia Mother     Prostate Cancer No family hx of     Colon Cancer No family hx of     Hypertension Mother     Heart Disease Mother     Heart Disease Father     Hypertension Sister     Cancer Brother     No Known Problems Daughter     No Known Problems Son     Cancer Maternal Grandmother     Vision Loss Maternal Grandmother     No Known Problems Sister     Cancer Brother     Hypertension Brother     Hypertension Brother     Cancer Brother     Sleep Apnea Brother     No Known Problems Brother     No Known Problems Daughter        Social History:  Marital Status:  Single [1]  Social History     Tobacco Use    Smoking status: Former     Current packs/day: 0.00     Average packs/day: 1 pack/day for 30.0 years (30.0 ttl pk-yrs)     Types: Cigarettes     Start date: 1981     Quit date: 2011     Years since quittin.9    Smokeless tobacco: Never    Tobacco comments:     started at age 22   Vaping Use     "Vaping status: Never Used   Substance Use Topics    Alcohol use: Yes     Comment: occasional    Drug use: No        Medications:    predniSONE (DELTASONE) 20 MG tablet  albuterol (PROAIR HFA/PROVENTIL HFA/VENTOLIN HFA) 108 (90 Base) MCG/ACT inhaler  aspirin (ASA) 81 MG chewable tablet  clobetasol (TEMOVATE) 0.05 % external ointment  FLUoxetine (PROZAC) 20 MG capsule  gabapentin (NEURONTIN) 300 MG capsule  losartan (COZAAR) 50 MG tablet  omeprazole (PRILOSEC) 40 MG DR capsule  rosuvastatin (CRESTOR) 40 MG tablet  vitamin C (ASCORBIC ACID) 1000 MG TABS  Vitamin D3 (CHOLECALCIFEROL) 125 MCG (5000 UT) tablet          Review of Systems  See HPI  Physical Exam   BP: (!) 173/71  Pulse: 51  Temp: 98.8  F (37.1  C)  Resp: 20  Height: 180.3 cm (5' 11\")  Weight: 149.7 kg (330 lb)  SpO2: 97 %      Physical Exam  BP (!) 141/83   Pulse (!) 46   Temp 98.8  F (37.1  C) (Oral)   Resp 14   Ht 1.803 m (5' 11\")   Wt 149.7 kg (330 lb)   SpO2 90%   BMI 46.03 kg/m    General: alert, interactive, in no apparent distress  Head: atraumatic  Nose: no rhinorrhea or epistaxis  Ears: no external auditory canal discharge or bleeding.    Eyes: Sclera nonicteric. Conjunctiva noninjected. PERRL, EOMI  Mouth: no tonsillar erythema, edema, or exudate.  Moist mucous membranes  Neck: supple, moving spontaneously no midline cervical tenderness  Lungs: No increased work of breathing.  Clear to auscultation bilaterally.  No wheezing  CV: RRR, peripheral pulses palpable and symmetric  Abdomen: soft, nt, nd, no guarding or rebound.   Extremities: Warm and well-perfused.  No edema or calf tenderness bilaterally.  Skin: no rash or diaphoresis.  Well-healed incision on the low back with 4 stitches in place.  No drainage or erythema  Neuro: CN II-XII grossly intact, strength 5/5 in UE and LEs bilaterally, sensation intact to light touch in UE and LEs bilaterally;     [facility]    Suture Removal    Date/Time: " 12/26/2024 10:47 AM    Performed by: Jone Courtney MD  Authorized by: Jone Courtney MD    Risks, benefits and alternatives discussed.      LOCATION     Location:  Trunk    Trunk location:  Back    PROCEDURE DETAILS     Wound appearance:  No signs of infection, good wound healing and clean    Number of sutures removed:  4    POST-PROCEDURE DETAILS     Post-removal:  No dressing applied      PROCEDURE    Patient Tolerance:  Patient tolerated the procedure well with no immediate complications                EKG Interpretation:      Interpreted by Jone Courtney MD  Time reviewed: 7:16 AM    Symptoms at time of EKG: None   Rhythm: sinus bradycardia  Rate: 50-60  Axis: Normal  Ectopy: none  Conduction: normal  ST Segments/ T Waves: No ST-T wave changes  Q Waves: none  Comparison to prior: Unchanged    Clinical Impression: no acute changes            Critical Care time:  none              No results found for this or any previous visit (from the past 24 hours).      Medications   predniSONE (DELTASONE) tablet 60 mg (60 mg Oral $Given 12/25/24 0219)       Assessments & Plan (with Medical Decision Making)     I have reviewed the nursing notes.    I have reviewed the findings, diagnosis, plan and need for follow up with the patient.          Medical Decision Making  Tommy De La O is a 69 year old male who has hypertension, COPD, coronary artery disease, hyperlipidemia, MARTA who presents to the ER for evaluation of shortness of breath.  Vital signs reviewed and notable for mild hypertension otherwise reassuring.  He is slightly bradycardic, but this is normal for the patient.  She had a reassuring EKG without any acute ischemic changes or dysrhythmia or signs of heart block.  He is not short of breath here.  Patient has myriad of reasons that he could be short of breath.  Seems to be mostly with exertion.  He is not having any orthopnea or obvious volume overload but he does note having an increased 12 pound weight gain since  stopping his chlorthalidone so fluid overload is considered.  No wheezing to suggest acute COPD exacerbation.  However, patient does think that this could be his COPD.  Will start with labs and an x-ray.  Labs are all reassuring.  BNP is normal which is reassuring against acute CHF.  Troponin is negative.  CBC without leukocytosis.  Hemoglobin slightly low, but no active bleeding and relatively similar to previous.  CMP is normal.  I reassessed the patient and he continues to feel well.  He is wondering what could have caused his symptoms.  I do not think it is ACS or CHF.  Second troponin was negative, had a reassuring angiogram a month ago.  Also had a CTA that was relatively reassuring.  Presentation is not consistent with PE or dissection.  He is convinced it could be from the medication change which certainly could be contributing given the 12 pound weight gain, but I do not see any evidence of pulmonary edema or volume overload.  He is interested in switching the medication back to see if that is helpful I think it is reasonable as he was tolerating it fine without difficulty before so I will have him go back to his 25 mg of losartan and restart his 25 mg of chlorthalidone.  Recommended that he follow-up with his cardiologist regular doctor about this as well.  He is also wondering about any additional treatments particularly wondering if he should be treated for COPD exacerbation.  I am not completely convinced this is due to COPD, but he feels a cold coming on and is wondering if steroids would be helpful and I think it is reasonable to do a short course of prednisone.  He was given the first dose here and additional 5 days sent to the pharmacy.  Will hold off on antibiotics for now.  Patient also wondering if it could be due to his weight and I explained to him that this could be contributing but cannot say for certain.  Encouraged him to continue to pursue weight loss programs.  At this time, given his  well appearance and relatively reassuring workup, I think outpatient management is appropriate.  Patient's sutures were removed at his request.  See procedure note for details.  Recommended he follow-up closely with his regular doctor.  Return precautions discussed as well.        Discharge Medication List as of 12/25/2024  4:22 AM        START taking these medications    Details   predniSONE (DELTASONE) 20 MG tablet Take 2 tablets (40 mg) by mouth daily., Disp-10 tablet, R-0, [facility]             Final diagnoses:   CHUNG (dyspnea on exertion)       12/25/2024   [facility] EMERGENCY DEPT       Jone Courtney MD  12/26/24 [id number]       Jone Courtney MD  12/26/24 [id number]

## 2024-12-26 ENCOUNTER — PATIENT OUTREACH (OUTPATIENT)
Dept: CARE COORDINATION | Facility: CLINIC | Age: 69
End: 2024-12-26
Payer: MEDICARE

## 2024-12-26 NOTE — LETTER
Tommy De La O  1964 [address] [place] [id number]    Dear Tommy De La O,      I am a team member within the [facility] with [facility]. I recently contacted you to ensure you are doing well following a visit within our health system. I also wanted to take this chance to introduce Clinic Care Coordination should you have any interest in this program in the future.    Below is a description of Clinic Care Coordination and how this team can further assist you:       The Clinic Care Coordination team is made up of a Registered Nurse, , Financial Resource Worker, and a Community Health Worker who understand and can help navigate the health care system. The goal of clinic care coordination is to help you manage your health, improve access to care, and achieve optimal health outcomes. They work alongside your provider to assist you in determining your health and social needs, obtain health care and community resources, and provide you with necessary information and education. Clinic Care Coordination can work with you through any barriers and develop a care plan that helps coordinate and strengthen the relationship between you and your care team.    If you wish to connect with the Clinic Care Coordination Team, please let your [facility] Primary Care Provider or Clinic Care Team know and they can place a referral. The Clinic Care Coordination team will then reach out by phone to further support you.    We are focused on providing you with the highest-quality healthcare experience possible.    Sincerely,   Your care team with [facility]'s [address] [place] [address] ([phone]).

## 2024-12-26 NOTE — PROGRESS NOTES
[facility]  Community Health Worker Initial Outreach    CHW Initial Information Gathering:  Referral Source: ED Follow-Up  CHW Additional Questions  If ED/Hospital discharge, follow-up appointment scheduled as recommended?: Other (Per Pt request, CHW called to [facility] ED follow-up with PCP and main scheduling number to schedule appt with pulmonologist)  MyChart active?: Yes    Patient accepts CC: No, patient declined at this time. Patient will be sent Care Coordination introduction letter for future reference.       Jaye De Jesus  Community Health Worker  [facility] [place], [facility]    *Connected Care Resource Team does NOT follow patient ongoing. Referrals are identified based on internal discharge reports and the outreach is to ensure patient has an understanding of their discharge instructions.

## 2024-12-27 ENCOUNTER — TELEPHONE (OUTPATIENT)
Dept: CARDIOLOGY | Facility: CLINIC | Age: 69
End: 2024-12-27
Payer: MEDICARE

## 2024-12-27 DIAGNOSIS — R06.02 SHORTNESS OF BREATH: ICD-10-CM

## 2024-12-27 DIAGNOSIS — J44.9 COPD (CHRONIC OBSTRUCTIVE PULMONARY DISEASE) (H): Primary | ICD-10-CM

## 2024-12-27 DIAGNOSIS — I10 BENIGN ESSENTIAL HYPERTENSION: Primary | ICD-10-CM

## 2024-12-27 LAB
ATRIAL RATE - MUSE: 50 BPM
DIASTOLIC BLOOD PRESSURE - MUSE: NORMAL MMHG
INTERPRETATION ECG - MUSE: NORMAL
P AXIS - MUSE: 61 DEGREES
PR INTERVAL - MUSE: 204 MS
QRS DURATION - MUSE: 94 MS
QT - MUSE: 460 MS
QTC - MUSE: 419 MS
R AXIS - MUSE: -5 DEGREES
SYSTOLIC BLOOD PRESSURE - MUSE: NORMAL MMHG
T AXIS - MUSE: 1 DEGREES
VENTRICULAR RATE- MUSE: 50 BPM

## 2024-12-27 NOTE — TELEPHONE ENCOUNTER
"Called pt.  Pt reports recent medication changes at clinic visit 12/13: increase losartan to 50mg and stopped the chlorthalidone.     12/24: pt was doing chores and had a really hard time catching his breath. BP was 188/?? When he went inside to check it later.   Pt eventually called EMS during the night \"they checked me and said it all looked great, but I went to ER to get checked anyway\".     Pt reports weight gain since stopping chlorthalidone. Gained 5 lbs on 12/24/2024 from the day before.     ER visit \"didn't amount to much\" but gave pt prednisone.     Pt then decided to go back to 25mg losartan and restarted the chlorthalidone \"and I feel great and my blood pressure is back down\". Pt reports \"I had to do something to get rid of the shortness of breath, and since the old dose of the medications didn't do that to me, that's what I did\".     Pt encouraged to keep 12/31/2024 lab appointment since he went back to the previous medication regimen.      Pt wanting to know if adding chlorthalidone back and taking the 50mg losartan if that would be OK?    Pt is trying to find the best possible options to lose the the 12 pounds since 12/13/2024.   Pt reports being 318lbs, then made the medication changes and was 330lbs when we got to the ER on 12/24/2024.  Today's weight is 324lbs. Has been back on chlorthalidone for 2 days.  Pt reports improved shortness of breath the last couple of days since going back to previous medications.     Pt will log daily weights and blood pressures.     RN talked to pt at length about better dietary choices since pt reports eating \"a lot of white bread and bags of candy a day\". RN educated pt in the effects those foods can have on overall health, not just weight gain. Pt stated his understanding and stated \"I really just want the medication to help me lose the weight and not eat those things so I can make those changes\". Pt encouraged to make those changes now while meal planning and " "grocery shopping. Pt stated his understanding and is agreeable to trying that. RN encouraged pt to have his daughter help him with some of that and help hold him accountable. Pt stated his agreement \"that's a good idea\".     Please review and advise. Pt aware that it will be a few days before he hears back from us with recommendations due to the weekend and upcoming holiday.    Martha Suarez RN        "

## 2024-12-31 ENCOUNTER — HOSPITAL ENCOUNTER (OUTPATIENT)
Dept: RESPIRATORY THERAPY | Facility: CLINIC | Age: 69
Discharge: HOME | End: 2024-12-31
Attending: NURSE PRACTITIONER
Payer: MEDICARE

## 2024-12-31 ENCOUNTER — LAB (OUTPATIENT)
Dept: LAB | Facility: CLINIC | Age: 69
End: 2024-12-31
Payer: MEDICARE

## 2024-12-31 DIAGNOSIS — I10 BENIGN ESSENTIAL HYPERTENSION: ICD-10-CM

## 2024-12-31 DIAGNOSIS — J44.9 COPD (CHRONIC OBSTRUCTIVE PULMONARY DISEASE) (H): ICD-10-CM

## 2024-12-31 DIAGNOSIS — E78.5 HYPERLIPIDEMIA LDL GOAL <70: ICD-10-CM

## 2024-12-31 DIAGNOSIS — R94.39 ABNORMAL STRESS TEST: ICD-10-CM

## 2024-12-31 DIAGNOSIS — E87.6 HYPOKALEMIA: ICD-10-CM

## 2024-12-31 LAB
ALT SERPL W P-5'-P-CCNC: 28 U/L (ref 0–70)
ANION GAP SERPL CALCULATED.3IONS-SCNC: 10 MMOL/L (ref 7–15)
BUN SERPL-MCNC: 25.3 MG/DL (ref 8–23)
CALCIUM SERPL-MCNC: 9.5 MG/DL (ref 8.8–10.4)
CHLORIDE SERPL-SCNC: 100 MMOL/L (ref 98–107)
CHOLEST SERPL-MCNC: 137 MG/DL
CREAT SERPL-MCNC: 0.99 MG/DL (ref 0.67–1.17)
EGFRCR SERPLBLD CKD-EPI 2021: 82 ML/MIN/1.73M2
FASTING STATUS PATIENT QL REPORTED: YES
FASTING STATUS PATIENT QL REPORTED: YES
GLUCOSE SERPL-MCNC: 100 MG/DL (ref 70–99)
HCO3 SERPL-SCNC: 28 MMOL/L (ref 22–29)
HDLC SERPL-MCNC: 47 MG/DL
LDLC SERPL CALC-MCNC: 68 MG/DL
NONHDLC SERPL-MCNC: 90 MG/DL
POTASSIUM SERPL-SCNC: 3.5 MMOL/L (ref 3.4–5.3)
SODIUM SERPL-SCNC: 138 MMOL/L (ref 135–145)
TRIGL SERPL-MCNC: 108 MG/DL

## 2024-12-31 PROCEDURE — 80048 BASIC METABOLIC PNL TOTAL CA: CPT

## 2024-12-31 PROCEDURE — 94726 PLETHYSMOGRAPHY LUNG VOLUMES: CPT

## 2024-12-31 PROCEDURE — 80061 LIPID PANEL: CPT

## 2024-12-31 PROCEDURE — 94060 EVALUATION OF WHEEZING: CPT

## 2024-12-31 PROCEDURE — 84460 ALANINE AMINO (ALT) (SGPT): CPT

## 2024-12-31 PROCEDURE — 36415 COLL VENOUS BLD VENIPUNCTURE: CPT

## 2024-12-31 PROCEDURE — 94729 DIFFUSING CAPACITY: CPT

## 2024-12-31 NOTE — DISCHARGE INSTRUCTIONS
Thank you for completing pulmonary function testing today.  All results will be scanned into your epic results for your doctor to review.  Please resume taking all your current prescribed medications and diet as directed by your provider.   If you have not heard from your provider about your testing within two weeks and do not have a follow-up appointment scheduled with them please contact your provider about any questions you have concerning your testing.   Thank you  [facility]

## 2025-01-02 LAB
DLCOCOR-%PRED-PRE: 107 %
DLCOCOR-PRE: 28.97 ML/MIN/MMHG
DLCOUNC-%PRED-PRE: 101 %
DLCOUNC-PRE: 27.48 ML/MIN/MMHG
DLCOUNC-PRED: 27.07 ML/MIN/MMHG
ERV-%PRED-PRE: 39 %
ERV-PRE: 0.53 L
ERV-PRED: 1.35 L
EXPTIME-PRE: 12.16 SEC
FEF2575-%PRED-POST: 30 %
FEF2575-%PRED-PRE: 35 %
FEF2575-POST: 0.74 L/SEC
FEF2575-PRE: 0.86 L/SEC
FEF2575-PRED: 2.42 L/SEC
FEFMAX-%PRED-PRE: 81 %
FEFMAX-PRE: 7.1 L/SEC
FEFMAX-PRED: 8.72 L/SEC
FEV1-%PRED-PRE: 88 %
FEV1-PRE: 2.79 L
FEV1FEV6-PRE: 66 %
FEV1FEV6-PRED: 78 %
FEV1FVC-PRE: 60 %
FEV1FVC-PRED: 77 %
FEV1SVC-PRE: 58 %
FEV1SVC-PRED: 65 %
FIFMAX-PRE: 4.26 L/SEC
FRCPLETH-%PRED-PRE: 110 %
FRCPLETH-PRE: 4.31 L
FRCPLETH-PRED: 3.91 L
FVC-%PRED-PRE: 111 %
FVC-PRE: 4.62 L
FVC-PRED: 4.13 L
IC-%PRED-PRE: 124 %
IC-PRE: 4.31 L
IC-PRED: 3.48 L
RVPLETH-%PRED-PRE: 140 %
RVPLETH-PRE: 3.78 L
RVPLETH-PRED: 2.68 L
TLCPLETH-%PRED-PRE: 116 %
TLCPLETH-PRE: 8.62 L
TLCPLETH-PRED: 7.43 L
VA-%PRED-PRE: 110 %
VA-PRE: 7.5 L
VC-%PRED-PRE: 100 %
VC-PRE: 4.84 L
VC-PRED: 4.84 L

## 2025-01-02 RX ORDER — LOSARTAN POTASSIUM 25 MG/1
25 TABLET ORAL DAILY
Qty: 90 TABLET | Refills: 1 | Status: SHIPPED | OUTPATIENT
Start: 2025-01-02

## 2025-01-02 RX ORDER — CHLORTHALIDONE 25 MG/1
25 TABLET ORAL DAILY
Qty: 90 TABLET | Refills: 1 | Status: SHIPPED | OUTPATIENT
Start: 2025-01-02

## 2025-01-02 NOTE — TELEPHONE ENCOUNTER
"Called and spoke with pt.     Pt in the middle of morning chores and would like RN to call back around 10am.   Will call pt back to discuss providers recommendations below.     Martha Suarez, RN      \"Please check with Pt to see how his Wts are doing and SOB.   If Wt is still up, have him start lasix 20 mg daily x 3 days and then discontinue.   Take potassium 20 mEq daily x 3 days, while on lasix.   If BP is not controlled, <130/80, then he can increase losartan to 50 mg daily, in addition to chlorthalidone.   If BP controlled, then keep current medications losartan 25 mg daily and chlorthalidone.   Please update his med list.     Check blood pressure at least 1 hour after medications. Call the clinic if your blood pressure is consistently greater than 130/80.   Check daily weights and call the clinic if your weight has increased more than 2 lbs in one day or 5 lbs in one week; if you feel more short of breath or have worsening swelling in your legs or abdomen.      Yasmeen Arzola, JUAN LUIS CNP\"     "

## 2025-01-02 NOTE — CONFIDENTIAL NOTE
Please check with Pt to see how his Wts are doing and SOB.   If Wt is still up, have him start lasix 20 mg daily x 3 days and then discontinue.   Take potassium 20 mEq daily x 3 days, while on lasix.   If BP is not controlled, <130/80, then he can increase losartan to 50 mg daily, in addition to chlorthalidone.   If BP controlled, then keep current medications losartan 25 mg daily and chlorthalidone.   Please update his med list.    Check blood pressure at least 1 hour after medications. Call the clinic if your blood pressure is consistently greater than 130/80.   Check daily weights and call the clinic if your weight has increased more than 2 lbs in one day or 5 lbs in one week; if you feel more short of breath or have worsening swelling in your legs or abdomen.     Yasmeen Arzola, JUAN LUIS CNP

## 2025-01-02 NOTE — TELEPHONE ENCOUNTER
"Called pt back.    Weights and BP as follows:    12/24 wt 330    12/25 wt 328  12/26 wt 324 /70   12/27 wt 325 BP no reading  12/28 wt 318 /83  12/29 wt 316 /77  12/30 wt 315 BP no reading  12/31 wt 313 /79  1/1 wt 314 /85  1/2 wt 313 /77    Pt reports shortness of breath \"is still there a little bit\". Had labs and pulmonary testing done on Monday 12/30.    Medication list updated for the chlorthalidone and losartan.   Pt reports he is going to talk to his PCP about his anxiety medications and weight loss medication at his visit on 1/13/2025.     Reviewed lab results from 12/31 with pt. Pt encouraged to work on hydration daily.   Reviewed diet and exercise as well. Pt reports he has been working on things since we spoke last week.     Pt will continue to log daily weights and BP and bring those with to his clinic visits. Pt will call with questions or concerns.     Martha Suarez RN                "

## 2025-01-09 ENCOUNTER — OFFICE VISIT (OUTPATIENT)
Dept: PULMONOLOGY | Facility: CLINIC | Age: 70
End: 2025-01-09
Payer: MEDICARE

## 2025-01-09 VITALS
HEART RATE: 50 BPM | HEIGHT: 72 IN | SYSTOLIC BLOOD PRESSURE: 108 MMHG | OXYGEN SATURATION: 96 % | WEIGHT: 315 LBS | BODY MASS INDEX: 42.66 KG/M2 | DIASTOLIC BLOOD PRESSURE: 78 MMHG

## 2025-01-09 DIAGNOSIS — R06.09 DYSPNEA ON EXERTION: ICD-10-CM

## 2025-01-09 DIAGNOSIS — J44.9 CHRONIC OBSTRUCTIVE PULMONARY DISEASE, UNSPECIFIED COPD TYPE (H): Primary | ICD-10-CM

## 2025-01-09 RX ORDER — FLUTICASONE PROPIONATE AND SALMETEROL XINAFOATE 115; 21 UG/1; UG/1
2 AEROSOL, METERED RESPIRATORY (INHALATION) 2 TIMES DAILY
Qty: 12 G | Refills: 11 | Status: SHIPPED | OUTPATIENT
Start: 2025-01-09

## 2025-01-09 RX ORDER — BUDESONIDE AND FORMOTEROL FUMARATE DIHYDRATE 80; 4.5 UG/1; UG/1
2 AEROSOL RESPIRATORY (INHALATION) 2 TIMES DAILY
Qty: 10.2 G | Refills: 11 | Status: SHIPPED | OUTPATIENT
Start: 2025-01-09 | End: 2025-01-09

## 2025-01-09 NOTE — PATIENT INSTRUCTIONS
It was a pleasure seeing you in clinic today. This is what we discussed:    START the inhaler as prescribed. Use this every day no matter what. Rinse and gargle after use.   Use your albuterol every 4 hours as needed for cough, shortness of breath, wheeze, or chest tightness.   Make follow up appointment with sleep medicine.   Follow up 6 weeks for re-check   If you have worsening symptoms, questions, or need to speak with the nurse please call [phone].

## 2025-01-09 NOTE — PROGRESS NOTES
Pulmonary Outpatient Consult Note  January 9, 2025      Assessment and Plan:   Tommy De La O is a 69 year old male, former smoker, with history of COPD, HTN, CAD, HLD, and MARTA presenting today for evaluation of shortness of breath.  Previous COPD diagnosis, upon review given reversibility this could be more of an asthma presentation.  He did not start any long-acting inhalers and has been managing on albuterol alone with very minimal use for the last few years.  Noticed some increased shortness of breath with activity and has become winded with some ADLs like stairs and extensive exertion.  Presented to the ED and workup was unremarkable, no concern for heart failure or COPD exacerbation.  He presents today for further workup of dyspnea as symptoms have persisted.    Previous PFTs in 2021 showed mild obstruction with reversibility. Most recent PFTs in 12/2024 showed slightly worsening obstruction (FEV1 88%), there is no longer reversibility.  There is also new air trapping and hyperinflation.  Diffusing capacity when corrected for hemoglobin is normal.    #.  COPD, ?  Asthma overlap: Consistent obstruction and smoking history supportive of COPD diagnosis, previous reversibility could suggest asthma overlap. Lung function testing shows continued obstruction with slight worsening in ratio. I highly recommended he start a daily long-acting inhaler and he is agreeable. Given his previous reversibility and eosinophils of 0.4 we will start with ICS/LABA.  Symbicort (80-4.5), 2 puffs twice daily.  Rinse and gargle after use.  Can be substituted for any medium dose ICS/LABA on formulary.  Continue albuterol as needed  If symptoms persist will obtain CT chest  #.  CAD, HTN: Followed by cardiology.  Recent medication adjustment with rapid weight gain after discontinuing chlorthalidone.  Continue follow-up and management with cardiology.  #.  HCM: UTD with COVID vaccine (09/2024), PCV 20, seasonal flu, TDAP   Qualifies for  LDCT for lung cancer screening, 13 years out from quit date. Will address at follow up.     If his symptoms exacerbate: prednisone 40mg daily x 5 days. If sputum amount or thickness increased add azithromycin Z-Moo x 5 days.    RTC 6 weeks for recheck     Linda Orozco, CNP  Pulmonary Medicine  ______________________________________________________________________________    CC:   Chief Complaint   Patient presents with    Consult     COPD (chronic obstructive pulmonary disease) (H); Self-referred  Seen in ED 24:  Dyspnea on exertion  PFT 24  CXR 24        HPI:   Tommy presents today for evaluation of COPD.   Diagnosed in  with PFTs but never started a long-acting inhaler. He has used albuterol occasionally with good relief. Is concerned about possible out of pocket costs of inhalers.   Denies much cough. No sputum. Denies wheeze or chest tightness.   SOB is most noticed with stairs or extensive exertion. 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   He is hoping to lose weight.   Recent ED visit on 2024 for shortness of breath.  EKG and .  Had 12 pound weight gain since stopping chlorthalidone so fluid overload was considered.  BNP was normal.  Troponin normal.     Followed by cardiology. Angiogram was relatively reassuring and no interventions were performed, he had mild nonocclusive coronary artery disease and secondary prevention and risk factor modification was recommended. At last visit losartan increased from 25 to 50 mg and he was instructed to stop his chlorthalidone. He is concerned that he could be retaining water due to stopping the chlorthalidone.     PMH:  Patient Active Problem List    Diagnosis Date Noted    Family history of premature coronary artery disease 2024     Priority: Medium     Father - had bypass and then  57 yrs old of MI  Brother - had bypass   Mother - lived to 93   P. Uncle  60s of MI  P. Grandmother  of MI  Multiple cousins with  heart attacks      Venous (peripheral) insufficiency 12/13/2024     Priority: Medium     S/p vein ablation  Abnormal venous competency ultrasound 5/2023 severe reflux      Palpitations 12/13/2024     Priority: Medium    PVC's (premature ventricular contractions) 12/13/2024     Priority: Medium     Occasional PVCs 1.6% on Zio patch 2023      PSVT (paroxysmal supraventricular tachycardia) 12/13/2024     Priority: Medium     Zio patch with 42 runs up to 14 minutes      Shortness of breath 11/19/2024     Priority: Medium    Chest pain 11/19/2024     Priority: Medium    Abnormal stress test 11/19/2024     Priority: Medium    Status post coronary angiogram 11/19/2024     Priority: Medium    Coronary artery disease involving native coronary artery of native heart, unspecified whether angina present 11/19/2024     Priority: Medium     CT coronary angiogram 4/2023 with calcium score 241 in the LAD, LCx and RCA, 63rd percentile, mild to moderate nonobstructive disease  Abnormal stress test-> angio 11/2024 nonobstructive CAD 30% distal LCx, 20% mid LAD and D1, 10% proximal RCA, and 20% distal RCA.      Chronic obstructive pulmonary disease, unspecified COPD type (H) 01/11/2023     Priority: Medium    Hand dermatitis 12/30/2019     Priority: Medium     12/30/2019:Uses Clobetasol cream.  He uses on fingers for rash as needed.  Uses intermittently.  Aggravating factors: dry weather.       Peripheral polyneuropathy 12/30/2019     Priority: Medium     12/30/2019:Has pains in legs and feet.  TAking gabapentin chronically.       Benign prostatic hyperplasia with incomplete bladder emptying 11/28/2018     Priority: Medium    Benign essential hypertension 06/15/2018     Priority: Medium    Morbid obesity with BMI of 40.0-44.9, adult (H) 03/31/2016     Priority: Medium    Osteoarthritis of knee 11/03/2014     Priority: Medium     He has had bilateral knee replacements.       Gastrointestinal hemorrhage 11/03/2014     Priority: Medium      11/19/2018:He had colonoscopy.  Presumed from hemorrhoids.       Gastroesophageal reflux disease 11/03/2014     Priority: Medium    Hyperlipidemia LDL goal <70 11/03/2014     Priority: Medium    Obstructive sleep apnea syndrome 11/03/2014     Priority: Medium     Diagnosed in 2014 with sleep study and sleep consultation.   3/18/2020:Uses CPAP nightly.  Sleeps much better with use.        PSH:  Past Surgical History:   Procedure Laterality Date    ARTHROSCOPY KNEE Bilateral     both knees with arthroscopy in the past.     ARTHROSCOPY KNEE      ARTHROSCOPY SHOULDER ROTATOR CUFF REPAIR      AS TOTAL KNEE ARTHROPLASTY Bilateral     Both total knees.     COLONOSCOPY N/A 8/14/2024    Procedure: COLONOSCOPY, WITH POLYPECTOMY AND BIOPSY;  Surgeon: Rudy Tejeda DO;  Location: [facility]    CV CORONARY ANGIOGRAM N/A 11/19/2024    Procedure: Coronary Angiogram;  Surgeon: Evens Patino MD;  Location:  HEART CARDIAC CATH LAB    CV LEFT HEART CATH N/A 11/19/2024    Procedure: Left Heart Catheterization;  Surgeon: Evens Patino MD;  Location:  HEART CARDIAC CATH LAB    LENGTHEN TENDON ACHILLES Left     REPAIR TENDON ACHILLES      1980s two separate surgeries.    ROTATOR CUFF REPAIR RT/LT Left     [facility] TOTAL KNEE ARTHROPLASTY Right 3/17/2015    Procedure: RIGHT KNEE TOTAL ARTHROPLASTY;  Surgeon: Jaiden Barnes MD;  Location: [facility];  Service: Orthopedics    [facility] TOTAL KNEE ARTHROPLASTY Left 6/30/2015    Procedure: LEFT KNEE TOTAL ARTHROPLASTY;  Surgeon: Jaiden Barnes MD;  Location: [facility];  Service: Orthopedics     Current Meds:  Current Outpatient Medications   Medication Sig Dispense Refill    albuterol (PROAIR HFA/PROVENTIL HFA/VENTOLIN HFA) 108 (90 Base) MCG/ACT inhaler Inhale 2 puffs into the lungs every 6 hours as needed for shortness of breath, wheezing or cough 18 g 3    aspirin (ASA) 81 MG chewable tablet Take 1 tablet (81 mg) by mouth daily       budesonide-formoterol (SYMBICORT) 80-4.5 MCG/ACT Inhaler Inhale 2 puffs into the lungs 2 times daily. 10.2 g 11    chlorthalidone (HYGROTON) 25 MG tablet Take 1 tablet (25 mg) by mouth daily. 90 tablet 1    clobetasol (TEMOVATE) 0.05 % external ointment Apply topically daily as needed For itching 60 g 0    FLUoxetine (PROZAC) 20 MG capsule Take 1 capsule (20 mg) by mouth daily 90 capsule 3    gabapentin (NEURONTIN) 300 MG capsule TAKE 1 CAPSULE THREE TIMES A  capsule 3    losartan (COZAAR) 25 MG tablet Take 1 tablet (25 mg) by mouth daily. 90 tablet 1    omeprazole (PRILOSEC) 40 MG DR capsule TAKE 1 CAPSULE DAILY 30 TO 60 MINUTES BEFORE A MEAL 90 capsule 3    rosuvastatin (CRESTOR) 40 MG tablet Take 1 tablet (40 mg) by mouth daily 90 tablet 3    vitamin C (ASCORBIC ACID) 1000 MG TABS Take 1,000 mg by mouth daily      Vitamin D3 (CHOLECALCIFEROL) 125 MCG (5000 UT) tablet Take 1 tablet by mouth daily       Current Facility-Administered Medications   Medication Dose Route Frequency Provider Last Rate Last Admin    1 mL ropivacaine (NAROPIN) injection 5 mg/mL  1 mL   Noah Heller MD   1 mL at 01/22/24 1107    4 mL ropivacaine (NAROPIN) injection 5 mg/mL  4 mL   Noah Hellre MD   4 mL at 01/05/24 1124    4 mL ropivacaine (NAROPIN) injection 5 mg/mL  4 mL   Noah Heller MD   4 mL at 09/22/23 1610    betamethasone acet & sod phos (CELESTONE) injection 6 mg  6 mg   Noah Heller MD   6 mg at 01/22/24 1107    betamethasone acet & sod phos (CELESTONE) injection 6 mg  6 mg   Noah Heller MD   6 mg at 01/05/24 1124    betamethasone acet & sod phos (CELESTONE) injection 6 mg  6 mg   Noah Heller MD   6 mg at 09/22/23 1610     Allergies:  Allergies   Allergen Reactions    Amlodipine Swelling    Darvocet [Propoxyphene N-Apap] Hives    Penicillins Hives     Social Hx:  Social History     Tobacco Use    Smoking status: Former     Current packs/day: 0.00     Average packs/day: 1  "pack/day for 30.0 years (30.0 ttl pk-yrs)     Types: Cigarettes     Start date: 1981     Quit date: 2011     Years since quittin.9     Passive exposure: Past (Dad was a smoker)    Smokeless tobacco: Never    Tobacco comments:     started at age 22   Vaping Use    Vaping status: Never Used   Substance Use Topics    Alcohol use: Yes     Comment: occasional    Drug use: No   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  Smoking history: 30 pack year history    Family HX: family history includes Cancer in his brother, brother, brother, and maternal grandmother; Coronary Artery Disease in his father; Heart Disease in his father and mother; Hyperlipidemia in his mother; Hypertension in his brother, brother, mother, and sister; No Known Problems in his brother, daughter, daughter, sister, and son; Sleep Apnea in his brother; Vision Loss in his maternal grandmother.    ROS:   10-point review performed and notable for the above-mentioned symptoms. The remainder reviewed and negative.     Physical Exam:  /78 (BP Location: Right arm, Patient Position: Sitting, Cuff Size: Adult Large)   Pulse 50   Ht 1.816 m (5' 11.5\")   Wt (!) 149.7 kg (330 lb)   SpO2 96%   BMI 45.38 kg/m      Physical Exam  Constitutional:       General: He is not in acute distress.     Appearance: He is obese. He is not ill-appearing.   Cardiovascular:      Rate and Rhythm: Normal rate and regular rhythm.      Heart sounds: Normal heart sounds.   Pulmonary:      Effort: Pulmonary effort is normal. No respiratory distress.      Breath sounds: No wheezing or rhonchi.   Musculoskeletal:      Right lower leg: No edema.      Left lower leg: No edema.   Neurological:      Mental Status: He is alert.   Psychiatric:         Behavior: Behavior normal.         PFT's     2024:      Impression:  Full Pulmonary Function Test is abnormal.  PFTs are consistent with mild obstructive disease.  Spirometry is not " consistent with reversibility.  There is hyperinflation.  There is air-trapping.  Diffusion capacity when corrected for hemoglobin is not reduced.    2021:  FVC 4.76L, 101% predicted  FEV1 3.00L, 84% predicted  FEV1/FVC 63% predicted and is reduced  There is significant bronchodilator response  RV 3.35L, 129% predicted  TLC 8.40L, 112% predicted  DLCO when uncorrected for hemoglobin is 106% predicted and is normal  Impression:  Mild obstruction with reversibility, normal lung volumes, normal diffusing capacity    Labs:   personally reviewed in the EMR.   Latest Reference Range & Units 12/25/24 01:30   Absolute Eosinophils 0.0 - 0.7 10e3/uL 0.4     Imaging studies: personally reviewed and interpreted. Below are the Radiology interpretations.    CHEST 2 VIEWS, DATE: 12/25/2024  INDICATION: Shortness of breath.  COMPARISON: None.  FINDINGS: No convincing pulmonary opacities. Normal size cardiac silhouette. Atherosclerotic calcification in the thoracic aorta.                                                                IMPRESSION: No convincing evidence of active cardiopulmonary disease.

## 2025-01-13 ENCOUNTER — OFFICE VISIT (OUTPATIENT)
Dept: FAMILY MEDICINE | Facility: CLINIC | Age: 70
End: 2025-01-13
Payer: MEDICARE

## 2025-01-13 ENCOUNTER — TELEPHONE (OUTPATIENT)
Dept: PULMONOLOGY | Facility: CLINIC | Age: 70
End: 2025-01-13

## 2025-01-13 VITALS
OXYGEN SATURATION: 98 % | HEIGHT: 72 IN | WEIGHT: 315 LBS | TEMPERATURE: 97.6 F | RESPIRATION RATE: 18 BRPM | BODY MASS INDEX: 42.66 KG/M2 | HEART RATE: 54 BPM | SYSTOLIC BLOOD PRESSURE: 136 MMHG | DIASTOLIC BLOOD PRESSURE: 80 MMHG

## 2025-01-13 DIAGNOSIS — F41.9 ANXIETY AND DEPRESSION: Primary | ICD-10-CM

## 2025-01-13 DIAGNOSIS — J44.9 CHRONIC OBSTRUCTIVE PULMONARY DISEASE, UNSPECIFIED COPD TYPE (H): Primary | ICD-10-CM

## 2025-01-13 DIAGNOSIS — E66.01 MORBID OBESITY WITH BMI OF 40.0-44.9, ADULT (H): ICD-10-CM

## 2025-01-13 DIAGNOSIS — F32.A ANXIETY AND DEPRESSION: Primary | ICD-10-CM

## 2025-01-13 PROBLEM — Z82.49 FAMILY HISTORY OF PREMATURE CORONARY ARTERY DISEASE: Status: RESOLVED | Noted: 2024-12-13 | Resolved: 2025-01-13

## 2025-01-13 PROBLEM — Z98.890 STATUS POST CORONARY ANGIOGRAM: Status: RESOLVED | Noted: 2024-11-19 | Resolved: 2025-01-13

## 2025-01-13 PROBLEM — R06.02 SHORTNESS OF BREATH: Status: RESOLVED | Noted: 2024-11-19 | Resolved: 2025-01-13

## 2025-01-13 PROBLEM — R00.2 PALPITATIONS: Status: RESOLVED | Noted: 2024-12-13 | Resolved: 2025-01-13

## 2025-01-13 PROBLEM — R94.39 ABNORMAL STRESS TEST: Status: RESOLVED | Noted: 2024-11-19 | Resolved: 2025-01-13

## 2025-01-13 PROBLEM — R07.9 CHEST PAIN: Status: RESOLVED | Noted: 2024-11-19 | Resolved: 2025-01-13

## 2025-01-13 PROCEDURE — G2211 COMPLEX E/M VISIT ADD ON: HCPCS | Performed by: FAMILY MEDICINE

## 2025-01-13 PROCEDURE — 99214 OFFICE O/P EST MOD 30 MIN: CPT | Performed by: FAMILY MEDICINE

## 2025-01-13 RX ORDER — BUDESONIDE AND FORMOTEROL FUMARATE DIHYDRATE 80; 4.5 UG/1; UG/1
2 AEROSOL RESPIRATORY (INHALATION) 2 TIMES DAILY
Qty: 30.6 G | Refills: 3 | Status: SHIPPED | OUTPATIENT
Start: 2025-01-13

## 2025-01-13 RX ORDER — FLUOXETINE 40 MG/1
40 CAPSULE ORAL DAILY
Qty: 90 CAPSULE | Refills: 3 | Status: SHIPPED | OUTPATIENT
Start: 2025-01-13

## 2025-01-13 RX ORDER — FLUTICASONE FUROATE AND VILANTEROL 100; 25 UG/1; UG/1
1 POWDER RESPIRATORY (INHALATION) DAILY
Qty: 180 EACH | Refills: 3 | Status: SHIPPED | OUTPATIENT
Start: 2025-01-13

## 2025-01-13 ASSESSMENT — PAIN SCALES - GENERAL: PAINLEVEL_OUTOF10: NO PAIN (0)

## 2025-01-13 NOTE — PROGRESS NOTES
"S :Tommy De La O is a 69 year old male with anxiety.  Prozac 20mg not enough.  Irritable, family says he's hard to  get along with, he wants to increase dose    Morbid obesity: motivated to lose some weight, wants to talk about options      O:/80   Pulse 54   Temp 97.6  F (36.4  C) (Tympanic)   Resp 18   Ht 1.816 m (5' 11.5\")   Wt 143.3 kg (316 lb)   SpO2 98%   BMI 43.46 kg/m    GEN: Alert and oriented, in no acute distress  Well groomed, dressed appropriately. Good eye contact.  No abnormal motor movements, oriented x3, speaks clearly with a normal rate and volume.  Thoughts are coherent and linear.  No tangential responses or loose associatons.  No obsessive behaviors discussed or observed, no suicidal thoughts.   Responds to questions appropriately, gives detailed answers.   Attention and concentration normal.  Affect WNL.  Insight and judgment appropriate.       A: anxiety, not controlled       Morbid obesity    P: up to 40mg on prozac.  Start working more on diet changes for weight, went over some strategies.     The longitudinal plan of care for the diagnosis(es)/condition(s) as documented were addressed during this visit. Due to the added complexity in care, I will continue to support Tommy in the subsequent management and with ongoing continuity of care.      "

## 2025-01-13 NOTE — TELEPHONE ENCOUNTER
[facility]    Phone Message    May a detailed message be left on voicemail: yes     Reason for Call: Medication Question or concern regarding medication   Prescription Clarification  Name of Medication: fluticasone-salmeterol (ADVAIR HFA) 115-21 MCG/ACT inhaler   Prescribing Provider: Linda Orozco     Pharmacy:   [id number] [place], [place] - [address] [place] [address] N AT [facility] & [address]      What on the order needs clarification?     Per pt, medicare only accepts advair, breo ellipta, and breyna. Pt would like a script for breo ellipta and breyna sent to his pharmacy in order compare cost. Pt is requesting script to be for 90 days.      Action Taken: Other: Pulm      Travel Screening: Not Applicable     Date of Service:

## 2025-02-14 PROBLEM — I25.10 CORONARY ARTERY DISEASE INVOLVING NATIVE CORONARY ARTERY OF NATIVE HEART, UNSPECIFIED WHETHER ANGINA PRESENT: Status: ACTIVE | Noted: 2024-11-19

## 2025-03-10 ENCOUNTER — PATIENT OUTREACH (OUTPATIENT)
Dept: CARE COORDINATION | Facility: CLINIC | Age: 70
End: 2025-03-10
Payer: MEDICARE

## 2025-03-17 ENCOUNTER — OFFICE VISIT (OUTPATIENT)
Dept: FAMILY MEDICINE | Facility: CLINIC | Age: 70
End: 2025-03-17
Attending: NURSE PRACTITIONER
Payer: MEDICARE

## 2025-03-17 ENCOUNTER — PATIENT OUTREACH (OUTPATIENT)
Dept: CARE COORDINATION | Facility: CLINIC | Age: 70
End: 2025-03-17

## 2025-03-17 VITALS
SYSTOLIC BLOOD PRESSURE: 135 MMHG | RESPIRATION RATE: 20 BRPM | HEART RATE: 63 BPM | OXYGEN SATURATION: 97 % | TEMPERATURE: 98.2 F | BODY MASS INDEX: 44.67 KG/M2 | HEIGHT: 70 IN | DIASTOLIC BLOOD PRESSURE: 77 MMHG | WEIGHT: 312 LBS

## 2025-03-17 DIAGNOSIS — E66.813 CLASS 3 SEVERE OBESITY DUE TO EXCESS CALORIES WITH SERIOUS COMORBIDITY AND BODY MASS INDEX (BMI) OF 45.0 TO 49.9 IN ADULT (H): Primary | ICD-10-CM

## 2025-03-17 DIAGNOSIS — Z87.891 HISTORY OF TOBACCO USE, PRESENTING HAZARDS TO HEALTH: ICD-10-CM

## 2025-03-17 DIAGNOSIS — I10 BENIGN ESSENTIAL HYPERTENSION: ICD-10-CM

## 2025-03-17 DIAGNOSIS — Z29.11 NEED FOR VACCINATION AGAINST RESPIRATORY SYNCYTIAL VIRUS: ICD-10-CM

## 2025-03-17 DIAGNOSIS — I25.10 CORONARY ARTERY DISEASE INVOLVING NATIVE CORONARY ARTERY OF NATIVE HEART, UNSPECIFIED WHETHER ANGINA PRESENT: ICD-10-CM

## 2025-03-17 DIAGNOSIS — G47.33 OBSTRUCTIVE SLEEP APNEA SYNDROME: Chronic | ICD-10-CM

## 2025-03-17 DIAGNOSIS — E66.01 CLASS 3 SEVERE OBESITY DUE TO EXCESS CALORIES WITH SERIOUS COMORBIDITY AND BODY MASS INDEX (BMI) OF 45.0 TO 49.9 IN ADULT (H): Primary | ICD-10-CM

## 2025-03-17 PROCEDURE — 99215 OFFICE O/P EST HI 40 MIN: CPT | Performed by: FAMILY MEDICINE

## 2025-03-17 PROCEDURE — 3078F DIAST BP <80 MM HG: CPT | Performed by: FAMILY MEDICINE

## 2025-03-17 PROCEDURE — G2211 COMPLEX E/M VISIT ADD ON: HCPCS | Performed by: FAMILY MEDICINE

## 2025-03-17 PROCEDURE — 3075F SYST BP GE 130 - 139MM HG: CPT | Performed by: FAMILY MEDICINE

## 2025-03-17 PROCEDURE — 99417 PROLNG OP E/M EACH 15 MIN: CPT | Performed by: FAMILY MEDICINE

## 2025-03-17 NOTE — ASSESSMENT & PLAN NOTE
69 year old year old male in clinic today to discuss treatment of the following conditions through diet and lifestyle modification and weight loss:  1. Class 3 severe obesity due to excess calories with serious comorbidity and body mass index (BMI) of 45.0 to 49.9 in adult (H)    2. Coronary artery disease involving native coronary artery of native heart, unspecified whether angina present    3. Need for vaccination against respiratory syncytial virus    4. History of tobacco use, presenting hazards to health    5. Obstructive sleep apnea syndrome    6. Benign essential hypertension      Intake: 69-year-old man presenting for discussion of metabolic health and weight.  Long history of obesity.  Known coronary artery disease with lesions on his recent angiogram.  He meets criteria for metabolic syndrome.  We do lengthy discussion regarding nutrition.  He has a powerful drive to consume pastries, desserts and sugar.  The patient has central adiposity.  I suggested he decrease focus on calories and focus on protein consumption.  He lives with orthopedic pain.  Based on his history of coronary artery disease and based on metabolic syndrome he should be on a GLP-1 receptor agonist.  There are no contraindications to medicine such as Wegovy or Zepbound.  We had a lengthy discussion regarding the cost of these medicines and whether or not it would be justified for him to spend his own money to cover these medicines.  Using the manufacturer controlled cash pay pharmacies, these medicines will be approximately $500 per month.  He decided to proceed with Zepbound vials through the [facility].  We discussed potential side effects.  We will adjust the dose to the maximum therapy which is 10 mg/week unless he has intolerable side effects.  I would like to see him back in clinic (could be video) in 2 to 3 months to review progress.  We will focus on whether or not he is maintaining adequate protein consumption and  whether or not he is maintaining his strength.  We discussed that he common side effect of weight loss efforts at his age is loss of muscle.  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  I believe that this will decrease his likelihood of future heart disease.  If he does dramatically increase his protein consumption, we will need to check his cholesterol to make sure he still adequately controlled.  He is currently on maximum dose rosuvastatin.

## 2025-03-17 NOTE — PATIENT INSTRUCTIONS
"Let me know how you are doing after the first 2 weeks of the medication so I can send in the next step.  WE will do the following:   - 4 weeks of at 2.5 mg/week   - 4 weeks at 5 mg/week   - 4 weeks at 7.5 mg/week   - 4 weeks at 10 mg/week.  This is the ongoing dose (assuming no side effects)    Concepts/considerations that matter in discussing metabolic health: These considerations are tough to disentangle from ONE another.    Nutrition:   - Caloric restriction  - Time restricted eating  - Nutritional restriction    Movement:  - Important for weight loss maintenance.  Rarely the main  of initial weight loss.    Sleep:  - Snoring behaviors?  - Inadequate sleep?    Distress (versus stress):  - How good is an individual at relaxing?  What is the total work load?  -A person's cortisol levels (stress hormones) can cause weight gain.    Other:  - Iatrogenic?  Medication side effects  - Genetic?  -Other?       Categories of considerations for weight gain:    Metabolic Syndrome:    Overeating versus under eating   -Binging behaviors?  - Other eating disorder behaviors.  Restrictive behaviors or purging behaviors?    100+ grams of protein per day  800 grams of veggies/fruit day   Do not drink your calories    GLP-1 receptor agonist drug class side effects:    Bureaucracy: Cost, possibility of weight regain?    Not generally covered by Medicare (exception for some plans are for individuals with histories of heart disease).  The typical patient will likely regain weight after going off the medicine.  (Most patients who have gone off of the medicine have not gone off the medicine on his/her \"own terms\").  Think of these medicines in the 5 or 10-year time increments.    Delayed gastric emptying:  These are the side effects that most commonly result in individuals going off of these therapies.  Slowing of the digestive tract.  Food will stay in the stomach longer.  Most common side effect is nausea and queasiness which " tends to improve.    Some individuals experience random throwing up.  In my opinion, the medications not worth it if this is occurring.  Constipation.      Liquid calories bypass the benefit of the medication.     Effect on mood:  I have had a couple of patients experience dramatic changes in level of anxiety and depression.  There are case reports in the literature.  Some individuals use food as a coping mechanism.  If this coping mechanism is no longer possible given the effect of the medicine, some individuals struggle.    Physical Fragility:  Weight loss due to loss of muscle vs burning of fat.  I am concerned that individuals who take these medications long-term are at risk for excessive loss of muscle (medical term: sarcopenia).  Low muscle mass in older individuals leads to injury and decreased overall function.   Strategies to mitigate loss of muscle include dietary practices focused on protein and focusing exercise activities on strength training with functional fitness goals.     Contraindications:  Personal or family history of medullary thyroid cancer, multiple endocrine neoplasm syndrome.  There are some reports that lifetime risk of thyroid cancer increases 2-3x.  This is a rare cancer to begin with.  Personal history of pancreatitis.    GLP1-ra options:    Wegovy (or Ozempic) aka semaglutide: cost per month retail ~$1400+      Zepbound (or Mounjaro) aka tirzepatide: cost per month retail ~$1100+        Saxenda (or Victoza) aka liraglutide: cost per month retail: ~$1400    Cash Pay Options for continuing GLP1/GIP agonists in 2025 (this is continuously evolving.  Check the relevant website):  Zepbound Vials through [facility] CASH [facility] pharmacy -multiple changes in response to the end of availability of compounded semaglutide  $349/month for 2.5mg vials  $499/month for 5mg, 7.5mg and 10mg vials if enrolled in their program which requires the medication to be filled every 45 days  $5 per month for  administrations supplies (syringe/needles, etc)   Wegovy Pens through [facility] cash pay mail order pharmacy  $499/month

## 2025-03-17 NOTE — PROGRESS NOTES
"    New Medical Weight Management Consult    PATIENT:  Tommy De La O  MRN:         [id number]  :         1955  DEBBIE:         3/17/2025    Dear Yasmeen Singleton, JUAN LUIS, CNP,    I had the pleasure of seeing your patient, Tommy De La O. Full intake/assessment was done to determine barriers to weight loss success and develop a treatment plan. Tommy De La O is a 69 year old male interested in treatment of medical problems associated with excess weight. He has a height of 5' 9.5\", a weight of 312 lbs 0 oz, and the calculated Body mass index is 45.41 kg/m .    ASSESSMENT/PLAN:  Class 3 severe obesity due to excess calories with serious comorbidity and body mass index (BMI) of 45.0 to 49.9 in adult (H)  69 year old year old male in clinic today to discuss treatment of the following conditions through diet and lifestyle modification and weight loss:  1. Class 3 severe obesity due to excess calories with serious comorbidity and body mass index (BMI) of 45.0 to 49.9 in adult (H)    2. Coronary artery disease involving native coronary artery of native heart, unspecified whether angina present    3. Need for vaccination against respiratory syncytial virus    4. History of tobacco use, presenting hazards to health    5. Obstructive sleep apnea syndrome    6. Benign essential hypertension      Intake: 69-year-old man presenting for discussion of metabolic health and weight.  Long history of obesity.  Known coronary artery disease with lesions on his recent angiogram.  He meets criteria for metabolic syndrome.  We do lengthy discussion regarding nutrition.  He has a powerful drive to consume pastries, desserts and sugar.  The patient has central adiposity.  I suggested he decrease focus on calories and focus on protein consumption.  He lives with orthopedic pain.  Based on his history of coronary artery disease and based on metabolic syndrome he should be on a GLP-1 receptor agonist.  There are no contraindications to " "medicine such as Wegovy or Zepbound.  We had a lengthy discussion regarding the cost of these medicines and whether or not it would be justified for him to spend his own money to cover these medicines.  Using the manufacturer controlled cash pay pharmacies, these medicines will be approximately $500 per month.  He decided to proceed with Zepbound vials through the [facility].  We discussed potential side effects.  We will adjust the dose to the maximum therapy which is 10 mg/week unless he has intolerable side effects.  I would like to see him back in clinic (could be video) in 2 to 3 months to review progress.  We will focus on whether or not he is maintaining adequate protein consumption and whether or not he is maintaining his strength.  We discussed that he common side effect of weight loss efforts at his age is loss of muscle.  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  I believe that this will decrease his likelihood of future heart disease.  If he does dramatically increase his protein consumption, we will need to check his cholesterol to make sure he still adequately controlled.  He is currently on maximum dose rosuvastatin.    FOLLOW-UP:   12 weeks.    SUBJECTIVE:     He has the following co-morbidities:        3/12/2025    11:24 AM   --   I have the following health issues associated with obesity High Blood Pressure    Sleep Apnea   I have the following symptoms associated with obesity Knee Pain    Depression     Narrative History:    - gains weight in the winter.   - referred by cardiology.   - angiogram showing CAD.           3/12/2025    11:24 AM   Referring Provider   Please name the provider who referred you to Medical Weight Management  If you do not know, please answer \"I Don't Know\" Can t remember         3/12/2025    11:24 AM   Weight History   How concerned are you about your weight? Very " Concerned   I became overweight As an Adult   The following factors have contributed to my weight gain Mental Health Issues    Change in Schedule    After Quitting Smoking    Eating Wrong Types of Food    Eating Too Much    Lack of Exercise   I have tried the following methods to lose weight Watching Portions or Calories    Meal Replacements   My lowest weight since age 18 was 220   My highest weight since age 18 was 330   The most weight I have ever lost was (lbs) 30   I have the following family history of obesity/being overweight My mother is overweight    My father is overweight    One or more of my siblings are overweight    Many of my relatives are overweight   How has your weight changed over the last year? Gained   How many pounds? 10         3/12/2025    11:24 AM   Diet Recall Review with Patient   If you do eat lunch, what types of food do you typically eat? Eggs,ham,veggies,salsa s   If you do eat supper, what types of food do you typically eat? Pizza,chicken,brats, pork, steak,shrimp,fish lettuce ,dressing,bread,taco s   If you do snack, what types of food do you typically eat? Doughnuts,almonds,cashews,peanut butter,apples ,yogurt,blueberries,strawberries,lots of 1%milk   How many glasses of juice do you drink in a typical day? 3   How many of glasses of milk do you drink in a typical day? 4   If you do drink milk, what type? 1%   How many 8oz glasses of sugar containing drinks such as Ranjan-Aid/sweet tea do you drink in a day? 0   How many cans/bottles of sugar pop/soda/tea/sports drinks do you drink in a day? 0   How many cans/bottles of diet pop/soda/tea or sports drink do you drink in a day? 0   How often do you have a drink of alcohol? Monthly or Less         3/12/2025    11:24 AM   Eating Habits   Generally, my meals include foods like these bread, pasta, rice, potatoes, corn, crackers, sweet dessert, pop, or juice Once a Week   Generally, my meals include foods like these fried meats, brats,  burgers, french fries, pizza, cheese, chips, or ice cream A Few Times a Week   Eat fast food (like McDonalds, Burger [employer]a, Taco Bell) Less Than Weekly   Eat at a buffet or sit-down restaurant Once a Week   Eat most of my meals in front of the TV or computer Almost Everyday   Often skip meals, eat at random times, have no regular eating times Once a Week   Rarely sit down for a meal but snack or graze throughout Never   Eat extra snacks between meals Almost Everyday   Eat most of my food at the end of the day Less Than Weekly   Eat in the middle of the night or wake up at night to eat Never   Eat extra snacks to prevent or correct low blood sugar Never   Eat to prevent acid reflux or stomach pain Never   Worry about not having enough food to eat Never   I eat when I am depressed A Few Times a Week   I eat when I am stressed Once a Week   I eat when I am bored A Few Times a Week   I eat when I am anxious Never   I eat when I am happy or as a reward A Few Times a Week   I feel hungry all the time even if I just have eaten A Few Times a Week   Feeling full is important to me A Few Times a Week   I finish all the food on my plate even if I am already full Everyday   I can't resist eating delicious food or walk past the good food/smell Never   I eat/snack without noticing that I am eating Never   I eat when I am preparing the meal Never   I eat more than usual when I see others eating Never   I have trouble not eating sweets, ice cream, cookies, or chips if they are around the house Everyday   I think about food all day Never   What foods, if any, do you crave? Sweets/Candy/Chocolate         3/12/2025    11:24 AM   Amount of Food   I feel out of control when eating Weekly   I eat a large amount of food, like a loaf of bread, a box of cookies, a pint/quart of ice cream, all at once Never   I eat a large amount of food even when I am not hungry Never   I eat rapidly Almost Everyday   I eat alone because I feel embarrassed  and do not want others to see how much I have eaten Never   I eat until I am uncomfortably full Never   I feel bad, disgusted, or guilty after I overeat Never         3/12/2025    11:24 AM   Activity/Exercise History   How much of a typical 12 hour day do you spend sitting? Less Than Half the Day   How much of a typical 12 hour day do you spend lying down? Less Than Half the Day   How much of a typical day do you spend walking/standing? Half the Day   How many hours (not including work) do you spend on the TV/Video Games/Computer/Tablet/Phone? 4-5 Hours   How many times a week are you active for the purpose of exercise? Never   What keeps you from being more active? Pain   How many total minutes do you spend doing some activity for the purpose of exercising when you exercise? More Than 30 Minutes     PAST MEDICAL HISTORY:  Past Medical History:   Diagnosis Date    Arthritis 2012    COPD (chronic obstructive pulmonary disease) (H)     Depressive disorder 2016    Hypertension 2014         3/12/2025    11:24 AM   Work/Social History Reviewed With Patient   My employment status is Retired   My job is [employer]   How much of your job is spent on the computer or phone? Less Than 50%   How many hours do you spend commuting to work daily? 0   What is your marital status? Single   If you have children, are they overweight? Yes   Who do you live with? Dog   Who does the food shopping? Me         3/12/2025    11:24 AM   Mental Health History Reviewed With Patient   Have you ever been physically or sexually abused? No   How often in the past 2 weeks have you felt little interest or pleasure in doing things? Nearly Everyday   Over the past 2 weeks how often have you felt down, depressed, or hopeless? Not at all         3/12/2025    11:24 AM   Sleep History Reviewed With Patient   How many hours do you sleep at night? 6     MEDICATIONS:   Current Outpatient Medications   Medication Sig Dispense Refill    albuterol  (PROAIR HFA/PROVENTIL HFA/VENTOLIN HFA) 108 (90 Base) MCG/ACT inhaler Inhale 2 puffs into the lungs every 6 hours as needed for shortness of breath, wheezing or cough 18 g 3    aspirin (ASA) 81 MG chewable tablet Take 1 tablet (81 mg) by mouth daily      chlorthalidone (HYGROTON) 25 MG tablet Take 1 tablet (25 mg) by mouth daily. 90 tablet 1    clobetasol (TEMOVATE) 0.05 % external ointment Apply topically daily as needed For itching 60 g 0    FLUoxetine (PROZAC) 40 MG capsule Take 1 capsule (40 mg) by mouth daily. (Patient taking differently: Take 20 mg by mouth daily.) 90 capsule 3    gabapentin (NEURONTIN) 300 MG capsule TAKE 1 CAPSULE THREE TIMES A  capsule 3    losartan (COZAAR) 25 MG tablet Take 1 tablet (25 mg) by mouth daily. 90 tablet 1    omeprazole (PRILOSEC) 40 MG DR capsule TAKE 1 CAPSULE DAILY 30 TO 60 MINUTES BEFORE A MEAL 90 capsule 3    rosuvastatin (CRESTOR) 40 MG tablet Take 1 tablet (40 mg) by mouth daily 90 tablet 3    tirzepatide-weight management (ZEPBOUND) 2.5 MG/0.5ML vial Inject 0.5 mLs (2.5 mg) subcutaneously once a week. 2 mL 0    vitamin C (ASCORBIC ACID) 1000 MG TABS Take 2,000 mg by mouth daily.      Vitamin D3 (CHOLECALCIFEROL) 125 MCG (5000 UT) tablet Take 1 tablet by mouth daily       ALLERGIES:   Allergies   Allergen Reactions    Amlodipine Swelling    Darvocet [Propoxyphene N-Apap] Hives    Penicillins Hives     PHYSICAL EXAM:  Physical Exam  Nursing note reviewed.   Constitutional:       General: He is not in acute distress.     Appearance: Normal appearance. He is not ill-appearing.   HENT:      Head: Normocephalic and atraumatic.   Eyes:      Extraocular Movements: Extraocular movements intact.      Conjunctiva/sclera: Conjunctivae normal.   Pulmonary:      Effort: Pulmonary effort is normal.   Neurological:      Mental Status: He is alert and oriented to person, place, and time.   Psychiatric:         Attention and Perception: Attention normal.         Mood and Affect:  Mood normal.         Speech: Speech normal.         Thought Content: Thought content normal.       65 minutes spent on the date of the encounter doing chart review, history and exam, documentation and further activities per the note    This note has been dictated using voice recognition software. Any grammatical or context distortions are unintentional and inherent to the software    Sincerely,    Julio Sarmiento MD  Diplomate - American Board of Obesity Medicine  Diplomate - American Board of Family Medicine  [facility] - Comprehensive Weight Management

## 2025-03-19 ENCOUNTER — TELEPHONE (OUTPATIENT)
Dept: FAMILY MEDICINE | Facility: CLINIC | Age: 70
End: 2025-03-19

## 2025-03-19 NOTE — TELEPHONE ENCOUNTER
General Call    Contacts       Contact Date/Time Type Contact Phone/Fax    03/19/2025 10:46 AM CDT Phone (Incoming) Tommy De La O (Self) [phone] (H)          Reason for Call:  Medication Update    What are your questions or concerns:  Patient calling to update fluoxetine dose. He reported at visit that he was taking 20mg, but confirmed that he is on 40mg as listed in chart.    Date of last appointment with provider: 3/18/25    Could we send this information to you in [employer] or would you prefer to receive a phone call?:   Patient would prefer a phone call   Okay to leave a detailed message?: Yes at Cell number on file:    Telephone Information:   Mobile [phone]

## 2025-04-02 DIAGNOSIS — E66.01 CLASS 3 SEVERE OBESITY DUE TO EXCESS CALORIES WITH SERIOUS COMORBIDITY AND BODY MASS INDEX (BMI) OF 45.0 TO 49.9 IN ADULT (H): Primary | ICD-10-CM

## 2025-04-02 DIAGNOSIS — E66.813 CLASS 3 SEVERE OBESITY DUE TO EXCESS CALORIES WITH SERIOUS COMORBIDITY AND BODY MASS INDEX (BMI) OF 45.0 TO 49.9 IN ADULT (H): Primary | ICD-10-CM

## 2025-04-02 NOTE — TELEPHONE ENCOUNTER
Pended next dose of Zepbound    Tommy calling in to update on zepbound    Weight from 312 to 301 lbs, since starting.  No side effects from Zepbound.  Discussed increase of protein and water. Requesting next dose of Zepbound.

## 2025-04-09 NOTE — PATIENT INSTRUCTIONS
Please follow up with Dr. Domingo in one month. Please arrange a lab appointment for a PSA test approx. 3-5 days prior to your return appointment. You may stop at the  to schedule the appointment.  Please call our office if you have questions, [phone].    
English

## 2025-04-12 DIAGNOSIS — K21.9 GASTROESOPHAGEAL REFLUX DISEASE WITHOUT ESOPHAGITIS: ICD-10-CM

## 2025-04-13 DIAGNOSIS — G62.9 PERIPHERAL POLYNEUROPATHY: ICD-10-CM

## 2025-04-13 DIAGNOSIS — E78.5 HYPERLIPIDEMIA LDL GOAL <70: Primary | ICD-10-CM

## 2025-04-14 RX ORDER — GABAPENTIN 300 MG/1
CAPSULE ORAL
Qty: 270 CAPSULE | Refills: 0 | Status: SHIPPED | OUTPATIENT
Start: 2025-04-14

## 2025-04-14 RX ORDER — ROSUVASTATIN CALCIUM 40 MG/1
40 TABLET, COATED ORAL DAILY
Qty: 90 TABLET | Refills: 1 | Status: SHIPPED | OUTPATIENT
Start: 2025-04-14

## 2025-04-14 RX ORDER — OMEPRAZOLE 40 MG/1
CAPSULE, DELAYED RELEASE ORAL
Qty: 90 CAPSULE | Refills: 2 | Status: SHIPPED | OUTPATIENT
Start: 2025-04-14

## 2025-04-30 ENCOUNTER — TELEPHONE (OUTPATIENT)
Dept: FAMILY MEDICINE | Facility: CLINIC | Age: 70
End: 2025-04-30
Payer: MEDICARE

## 2025-04-30 DIAGNOSIS — E66.813 CLASS 3 SEVERE OBESITY DUE TO EXCESS CALORIES WITH SERIOUS COMORBIDITY AND BODY MASS INDEX (BMI) OF 45.0 TO 49.9 IN ADULT (H): Primary | ICD-10-CM

## 2025-04-30 NOTE — CONFIDENTIAL NOTE
I am concerned the patient is losing weight too quickly (and therefore losing muscle as well as fat).  Given that he feels as though he is making progress, I do not think he should go up to the 7.5 mg/week dose, at least not yet.  I sent a refill of the 5 mg/week dosing to the pharmacy.  He and I should talk about dosing and goals during his visit in May.    Please let the patient know this recommendation and that I sent in the refill to the [facility] pharmacy.

## 2025-04-30 NOTE — TELEPHONE ENCOUNTER
General Call      Reason for Call:  medication update    What are your questions or concerns:  Pt has taken 3 doses of Zepbound 5mg. Pt tolerating medication well. Has constipation 1 or 2 times a week. Current weight is 285lbs; starting weight 312lbs. Pt is very satisfied with his weight loss.     Please advise on Zepbound dosing; pended Zepbound 7.5mg for review and approval if appropriate.     Date of last appointment with provider: 3/27/25  Pt has visit scheduled 5/30/25    Could we send this information to you in [employer] or would you prefer to receive a phone call?:   Patient would prefer a phone call   Okay to leave a detailed message?: Yes at Cell number on file:    Telephone Information:   Mobile [phone]

## 2025-05-25 SDOH — HEALTH STABILITY: PHYSICAL HEALTH: ON AVERAGE, HOW MANY DAYS PER WEEK DO YOU ENGAGE IN MODERATE TO STRENUOUS EXERCISE (LIKE A BRISK WALK)?: 0 DAYS

## 2025-05-25 SDOH — HEALTH STABILITY: PHYSICAL HEALTH: ON AVERAGE, HOW MANY MINUTES DO YOU ENGAGE IN EXERCISE AT THIS LEVEL?: 0 MIN

## 2025-05-25 ASSESSMENT — SOCIAL DETERMINANTS OF HEALTH (SDOH): HOW OFTEN DO YOU GET TOGETHER WITH FRIENDS OR RELATIVES?: ONCE A WEEK

## 2025-05-28 ENCOUNTER — OFFICE VISIT (OUTPATIENT)
Dept: FAMILY MEDICINE | Facility: CLINIC | Age: 70
End: 2025-05-28
Attending: FAMILY MEDICINE
Payer: MEDICARE

## 2025-05-28 VITALS
DIASTOLIC BLOOD PRESSURE: 64 MMHG | WEIGHT: 276.4 LBS | OXYGEN SATURATION: 98 % | SYSTOLIC BLOOD PRESSURE: 106 MMHG | BODY MASS INDEX: 39.57 KG/M2 | RESPIRATION RATE: 20 BRPM | TEMPERATURE: 97.9 F | HEART RATE: 58 BPM | HEIGHT: 70 IN

## 2025-05-28 DIAGNOSIS — G62.9 PERIPHERAL POLYNEUROPATHY: ICD-10-CM

## 2025-05-28 DIAGNOSIS — F32.A ANXIETY AND DEPRESSION: ICD-10-CM

## 2025-05-28 DIAGNOSIS — Z12.5 SCREENING FOR PROSTATE CANCER: ICD-10-CM

## 2025-05-28 DIAGNOSIS — J44.9 CHRONIC OBSTRUCTIVE PULMONARY DISEASE, UNSPECIFIED COPD TYPE (H): ICD-10-CM

## 2025-05-28 DIAGNOSIS — Z00.00 ENCOUNTER FOR MEDICARE ANNUAL WELLNESS EXAM: Primary | ICD-10-CM

## 2025-05-28 DIAGNOSIS — E66.812 CLASS 2 SEVERE OBESITY DUE TO EXCESS CALORIES WITH SERIOUS COMORBIDITY AND BODY MASS INDEX (BMI) OF 39.0 TO 39.9 IN ADULT (H): ICD-10-CM

## 2025-05-28 DIAGNOSIS — I10 BENIGN ESSENTIAL HYPERTENSION: ICD-10-CM

## 2025-05-28 DIAGNOSIS — E78.5 HYPERLIPIDEMIA LDL GOAL <70: ICD-10-CM

## 2025-05-28 DIAGNOSIS — I25.10 CORONARY ARTERY DISEASE INVOLVING NATIVE CORONARY ARTERY OF NATIVE HEART, UNSPECIFIED WHETHER ANGINA PRESENT: ICD-10-CM

## 2025-05-28 DIAGNOSIS — E66.01 CLASS 2 SEVERE OBESITY DUE TO EXCESS CALORIES WITH SERIOUS COMORBIDITY AND BODY MASS INDEX (BMI) OF 39.0 TO 39.9 IN ADULT (H): ICD-10-CM

## 2025-05-28 DIAGNOSIS — K21.9 GASTROESOPHAGEAL REFLUX DISEASE WITHOUT ESOPHAGITIS: ICD-10-CM

## 2025-05-28 DIAGNOSIS — F41.9 ANXIETY AND DEPRESSION: ICD-10-CM

## 2025-05-28 DIAGNOSIS — R73.03 PREDIABETES: ICD-10-CM

## 2025-05-28 DIAGNOSIS — G47.33 OBSTRUCTIVE SLEEP APNEA SYNDROME: Chronic | ICD-10-CM

## 2025-05-28 PROBLEM — Z23 NEED FOR VACCINATION: Status: ACTIVE | Noted: 2025-05-28

## 2025-05-28 LAB
ANION GAP SERPL CALCULATED.3IONS-SCNC: 13 MMOL/L (ref 7–15)
BUN SERPL-MCNC: 17.1 MG/DL (ref 8–23)
CALCIUM SERPL-MCNC: 9.6 MG/DL (ref 8.8–10.4)
CHLORIDE SERPL-SCNC: 99 MMOL/L (ref 98–107)
CREAT SERPL-MCNC: 0.8 MG/DL (ref 0.67–1.17)
EGFRCR SERPLBLD CKD-EPI 2021: >90 ML/MIN/1.73M2
EST. AVERAGE GLUCOSE BLD GHB EST-MCNC: 105 MG/DL
GLUCOSE SERPL-MCNC: 88 MG/DL (ref 70–99)
HBA1C MFR BLD: 5.3 % (ref 0–5.6)
HCO3 SERPL-SCNC: 25 MMOL/L (ref 22–29)
POTASSIUM SERPL-SCNC: 3 MMOL/L (ref 3.4–5.3)
PSA SERPL DL<=0.01 NG/ML-MCNC: 0.74 NG/ML (ref 0–6.5)
SODIUM SERPL-SCNC: 137 MMOL/L (ref 135–145)

## 2025-05-28 PROCEDURE — G0103 PSA SCREENING: HCPCS | Performed by: FAMILY MEDICINE

## 2025-05-28 PROCEDURE — 83036 HEMOGLOBIN GLYCOSYLATED A1C: CPT | Mod: GZ | Performed by: FAMILY MEDICINE

## 2025-05-28 PROCEDURE — 99214 OFFICE O/P EST MOD 30 MIN: CPT | Mod: 25 | Performed by: FAMILY MEDICINE

## 2025-05-28 PROCEDURE — 3078F DIAST BP <80 MM HG: CPT | Performed by: FAMILY MEDICINE

## 2025-05-28 PROCEDURE — G0439 PPPS, SUBSEQ VISIT: HCPCS | Performed by: FAMILY MEDICINE

## 2025-05-28 PROCEDURE — 3044F HG A1C LEVEL LT 7.0%: CPT | Performed by: FAMILY MEDICINE

## 2025-05-28 PROCEDURE — 3074F SYST BP LT 130 MM HG: CPT | Performed by: FAMILY MEDICINE

## 2025-05-28 PROCEDURE — G2211 COMPLEX E/M VISIT ADD ON: HCPCS | Performed by: FAMILY MEDICINE

## 2025-05-28 PROCEDURE — 36415 COLL VENOUS BLD VENIPUNCTURE: CPT | Performed by: FAMILY MEDICINE

## 2025-05-28 PROCEDURE — 80048 BASIC METABOLIC PNL TOTAL CA: CPT | Performed by: FAMILY MEDICINE

## 2025-05-28 RX ORDER — OMEPRAZOLE 40 MG/1
CAPSULE, DELAYED RELEASE ORAL
Qty: 90 CAPSULE | Refills: 3 | Status: SHIPPED | OUTPATIENT
Start: 2025-05-28

## 2025-05-28 RX ORDER — GABAPENTIN 100 MG/1
100-300 CAPSULE ORAL 3 TIMES DAILY PRN
Qty: 270 CAPSULE | Refills: 3 | Status: SHIPPED | OUTPATIENT
Start: 2025-05-28

## 2025-05-28 RX ORDER — FLUOXETINE HYDROCHLORIDE 40 MG/1
40 CAPSULE ORAL DAILY
Qty: 90 CAPSULE | Refills: 3 | Status: SHIPPED | OUTPATIENT
Start: 2025-05-28

## 2025-05-28 RX ORDER — ROSUVASTATIN CALCIUM 40 MG/1
40 TABLET, COATED ORAL DAILY
Qty: 90 TABLET | Refills: 3 | Status: SHIPPED | OUTPATIENT
Start: 2025-05-28

## 2025-05-28 ASSESSMENT — ENCOUNTER SYMPTOMS
SORE THROAT: 0
DYSURIA: 0
BACK PAIN: 0
CHILLS: 0
VOMITING: 0
COUGH: 0
EYE PAIN: 0
SEIZURES: 0
PALPITATIONS: 0
HEMATURIA: 0
ABDOMINAL PAIN: 0
ARTHRALGIAS: 0
COLOR CHANGE: 0
FEVER: 0
SHORTNESS OF BREATH: 0

## 2025-05-28 NOTE — ASSESSMENT & PLAN NOTE
Fluoxetine was started when family members pointed out that he is quite irritable with his dog.  The patient himself is not sure if the medications helping.  PHQ-9 and FRANCI-7 reviewed.  For now, no changes given all the other medication changes and overall improvement.  At some point, consider taper.  Orders:    FLUoxetine (PROZAC) 40 MG capsule; Take 1 capsule (40 mg) by mouth daily.

## 2025-05-28 NOTE — ASSESSMENT & PLAN NOTE
He is generally feeling better.  We discussed what a taper of gabapentin might look like.  Given that he is doing well, right now might be an opportunity.  100 mg capsules of gabapentin sent to pharmacy with instructions to decrease the dose slowly.  We did discuss avoiding abrupt discontinuance of medication.    Orders:    gabapentin (NEURONTIN) 100 MG capsule; Take 1-3 capsules (100-300 mg) by mouth 3 times daily as needed for neuropathic pain.

## 2025-05-28 NOTE — PROGRESS NOTES
Preventive Care Visit  [facility] OMAYRA Sarmiento MD, Family Medicine  May 28, 2025      Assessment & Plan   Assessment & Plan  Encounter for Medicare annual wellness exam  Annual exam.  Currently feeling better than he has in quite some time.  Associated weight loss.  Lower extremity swelling proved.  Reading stable.  Discussed vaccines.  Labs as ordered today.       Class 2 severe obesity due to excess calories with serious comorbidity and body mass index (BMI) of 39.0 to 39.9 in adult (H)  He is funding zepbound.  More hunger recently.  Focused on protein. Working hard.  Mininal side effects.  He is down 36 lbs (11%).  Will adjust dose to 7.5mg/week.    Recheck in 3 months.     Orders:    tirzepatide-weight management (ZEPBOUND) 7.5 MG/0.5ML vial; Inject 0.5 mLs (7.5 mg) subcutaneously once a week.    Hyperlipidemia LDL goal <70  Labs reviewed. LDL cholesterol less than 70.  No changes/     Orders:    rosuvastatin (CRESTOR) 40 MG tablet; Take 1 tablet (40 mg) by mouth daily.    Gastroesophageal reflux disease without esophagitis  Symptoms stable. Nutrition improved.  Consider taper of PPI in the future.      Orders:    omeprazole (PRILOSEC) 40 MG DR capsule; TAKE 1 CAPSULE BY MOUTH DAILY 30 TO 60 MINUTES BEFORE A MEAL    Peripheral polyneuropathy  He is generally feeling better.  We discussed what a taper of gabapentin might look like.  Given that he is doing well, right now might be an opportunity.  100 mg capsules of gabapentin sent to pharmacy with instructions to decrease the dose slowly.  We did discuss avoiding abrupt discontinuance of medication.    Orders:    gabapentin (NEURONTIN) 100 MG capsule; Take 1-3 capsules (100-300 mg) by mouth 3 times daily as needed for neuropathic pain.    Coronary artery disease involving native coronary artery of native heart, unspecified whether angina present  BP improved.  No swelling. No hypotensive symptoms.  Tolerant of rosuvastatin.  BP  meds managed by primary care and cardiology.           Benign essential hypertension  He is having mild hypotensive symptoms.  This is likely result of nutritional change of weight loss.  In the past he has had lower extremity swelling.  - For now, continue current therapy but monitor blood pressure.  If his symptoms worsen he should reach out to cardiology as they are managing his blood pressure medicines.  - Consider dropping losartan altogether (this may be part of his treatment plan because of history of hypokalemia).  - From there, started to work against chlorthalidone.  - Check basic metabolic today based on history of hypokalemia    Orders:    Hemoglobin A1c; Future    Basic metabolic panel  (Ca, Cl, CO2, Creat, Gluc, K, Na, BUN); Future    Chronic obstructive pulmonary disease, unspecified COPD type (H)  No symptoms today.          Obstructive sleep apnea syndrome  Weight improved.  On (self-funded) Zepbound.     Orders:    tirzepatide-weight management (ZEPBOUND) 7.5 MG/0.5ML vial; Inject 0.5 mLs (7.5 mg) subcutaneously once a week.    Anxiety and depression  Fluoxetine was started when family members pointed out that he is quite irritable with his dog.  The patient himself is not sure if the medications helping.  PHQ-9 and FRANCI-7 reviewed.  For now, no changes given all the other medication changes and overall improvement.  At some point, consider taper.  Orders:    FLUoxetine (PROZAC) 40 MG capsule; Take 1 capsule (40 mg) by mouth daily.    Prediabetes  Hemoglobin A1c drawn today.  Now normal with weight loss and nutritional improvement.  Orders:    Hemoglobin A1c; Future    Basic metabolic panel  (Ca, Cl, CO2, Creat, Gluc, K, Na, BUN); Future    Screening for prostate cancer  The patient is status post TURP (?).  Most recent PSA testing was 3 years ago.  He specifically requests a PSA test.  He continues to struggle with mild urinary incontinence and decreased urinary flow which has been stable for quite  "some time and reportedly associated with flaccidity of the bladder.  He works with urology.  Orders:    PSA, screen; Future      Patient has been advised of split billing requirements and indicates understanding: Yes       BMI  Estimated body mass index is 39.94 kg/m  as calculated from the following:    Height as of this encounter: 1.772 m (5' 9.75\").    Weight as of this encounter: 125.4 kg (276 lb 6.4 oz).   Weight management plan: Specific weight management program called comprehensive weight management discussed    Counseling  Appropriate preventive services were addressed with this patient via screening, questionnaire, or discussion as appropriate for fall prevention, nutrition, physical activity, Tobacco-use cessation, social engagement, weight loss and cognition.  Checklist reviewing preventive services available has been given to the patient.  Reviewed patient's diet, addressing concerns and/or questions.   The patient was instructed to see the dentist every 6 months.   He is at risk for psychosocial distress and has been provided with information to reduce risk.   Discussed possible causes of fatigue.     The longitudinal plan of care for the diagnosis(es)/condition(s) as documented were addressed during this visit. Due to the added complexity in care, I will continue to support Tommy in the subsequent management and with ongoing continuity of care.    Subjective   Tommy is a 70 year old, presenting for the following:  Wellness Visit (Non-fasting ) and Weight Loss (Follow-up )        5/28/2025     9:48 AM   Additional Questions   Roomed by xl          Patient presents for treatment of chronic, comorbid conditions using intensive therapeutic lifestyle interventions including nutrition, physical activity, and behavior management.   - successes: feels good.  Making changes.    - struggles: hunger improved but he is hungry times.   - movement: farm work   - tracking/journaling: ad bill. Protein and vegies   - " following nutritional plan: yes.  Deviations from plan: infrequent   - hunger: Needs improvement.    - medication benefits: helpful.     Gabapentin:   - now losing weight   - wonders if he needs gabapentin    BP:    - no lightheadedness, no dizziness.  Taking medications.     -swelling: not an issue.           1/26/2023     9:49 AM 3/21/2023    12:50 PM 6/27/2023     6:36 AM   PHQ   PHQ-9 Total Score 3 6 4   Q9: Thoughts of better off dead/self-harm past 2 weeks Not at all  Not at all Not at all       Proxy-reported         1/26/2023     9:51 AM 3/21/2023    12:50 PM 6/27/2023     6:37 AM   FRANCI-7 SCORE   Total Score 3 (minimal anxiety)  3 (minimal anxiety)   Total Score 3 6 3                Advance Care Planning    Document on file is a Health Care Directive or POLST.        5/25/2025   General Health   How would you rate your overall physical health? Good   Feel stress (tense, anxious, or unable to sleep) To some extent   (!) STRESS CONCERN      5/25/2025   Nutrition   Diet: Regular (no restrictions)         5/25/2025   Exercise   Days per week of moderate/strenous exercise 0 days   Average minutes spent exercising at this level 0 min   (!) EXERCISE CONCERN      5/25/2025   Social Factors   Frequency of gathering with friends or relatives Once a week   Worry food won't last until get money to buy more No   Food not last or not have enough money for food? No   Do you have housing? (Housing is defined as stable permanent housing and does not include staying outside in a car, in a tent, in an abandoned building, in an overnight shelter, or couch-surfing.) Yes   Are you worried about losing your housing? No   Lack of transportation? No   Unable to get utilities (heat,electricity)? No         5/28/2025   Fall Risk   Gait Speed Test (Document in seconds) 4.05   Gait Speed Test Interpretation Less than or equal to 5.00 seconds - PASS          5/25/2025   Activities of Daily Living- Home Safety   Needs help with the  following daily activites None of the above   Safety concerns in the home None of the above         2025   Dental   Dentist two times every year? (!) NO         2025   Hearing Screening   Hearing concerns? None of the above         2025   Driving Risk Screening   Patient/family members have concerns about driving No         2025   General Alertness/Fatigue Screening   Have you been more tired than usual lately? (!) YES         2025   Urinary Incontinence Screening   Bothered by leaking urine in past 6 months No         Today's PHQ-2 Score:       2025     6:03 AM   PHQ-2 (  [employer])   Q1: Little interest or pleasure in doing things 0   Q2: Feeling down, depressed or hopeless 0   PHQ-2 Score 0    Q1: Little interest or pleasure in doing things Not at all   Q2: Feeling down, depressed or hopeless Not at all   PHQ-2 Score 0       Patient-reported           2025   Substance Use   Alcohol more than 3/day or more than 7/wk No   Do you have a current opioid prescription? No   How severe/bad is pain from 1 to 10? 2/10   Do you use any other substances recreationally? No     Social History     Tobacco Use    Smoking status: Former     Current packs/day: 0.00     Average packs/day: 1 pack/day for 30.1 years (30.1 ttl pk-yrs)     Types: Cigarettes     Start date: 1981     Quit date: 2011     Years since quittin.3     Passive exposure: Past (Dad was a smoker)    Smokeless tobacco: Never    Tobacco comments:     started at age 22   Vaping Use    Vaping status: Never Used   Substance Use Topics    Alcohol use: Not Currently     Comment: occasional    Drug use: No       ASCVD Risk   The 10-year ASCVD risk score (Bekah DELONG, et al., 2019) is: 13.1%    Values used to calculate the score:      Age: 70 years      Sex: Male      Is Non- : No      Diabetic: No      Tobacco smoker: No      Systolic Blood Pressure: 106 mmHg      Is BP treated: Yes      HDL  Cholesterol: 47 mg/dL      Total Cholesterol: 137 mg/dL            Reviewed and updated as needed this visit by Provider                    Patient Active Problem List   Diagnosis    Osteoarthritis of knee    Gastrointestinal hemorrhage    Gastroesophageal reflux disease    Hyperlipidemia LDL goal <70    Obstructive sleep apnea syndrome    Benign essential hypertension    Hand dermatitis    Peripheral polyneuropathy    Benign prostatic hyperplasia with incomplete bladder emptying    Class 2 severe obesity due to excess calories with serious comorbidity and body mass index (BMI) of 39.0 to 39.9 in adult (H)    Chronic obstructive pulmonary disease, unspecified COPD type (H)    Coronary artery disease involving native coronary artery of native heart, unspecified whether angina present    Venous (peripheral) insufficiency    PVC's (premature ventricular contractions)    PSVT (paroxysmal supraventricular tachycardia)    Anxiety and depression     Past Surgical History:   Procedure Laterality Date    ARTHROSCOPY KNEE Bilateral     both knees with arthroscopy in the past.     ARTHROSCOPY KNEE      ARTHROSCOPY SHOULDER ROTATOR CUFF REPAIR      AS TOTAL KNEE ARTHROPLASTY Bilateral     Both total knees.     COLONOSCOPY N/A 08/14/2024    Procedure: COLONOSCOPY, WITH POLYPECTOMY AND BIOPSY;  Surgeon: Rudy Tejeda DO;  Location: [facility]    CV CORONARY ANGIOGRAM N/A 11/19/2024    Procedure: Coronary Angiogram;  Surgeon: Evens Patino MD;  Location:  [facility]ARD[facility] CATH LAB    CV LEFT HEART CATH N/A 11/19/2024    Procedure: Left Heart Catheterization;  Surgeon: Evens Patino MD;  Location:  HE[facility]ARD[facility] CATH LAB    LENGTHEN TENDON ACHILLES Left     MO SHOULDER SURG PROC UNLISTED  2003    Right rotator cuff repair    REPAIR TENDON ACHILLES      1980s two separate surgeries.    ROTATOR CUFF REPAIR RT/LT Left     VASCULAR SURGERY  2016    Z TOTAL KNEE ARTHROPLASTY Right 03/17/2015    Procedure:  RIGHT KNEE TOTAL ARTHROPLASTY;  Surgeon: Jaiden Barnes MD;  Location: [facility] OR;  Service: Orthopedics    Z TOTAL KNEE ARTHROPLASTY Left 2015    Procedure: LEFT KNEE TOTAL ARTHROPLASTY;  Surgeon: Jaiden Barnes MD;  Location: [facility];  Service: Orthopedics       Social History     Tobacco Use    Smoking status: Former     Current packs/day: 0.00     Average packs/day: 1 pack/day for 30.1 years (30.1 ttl pk-yrs)     Types: Cigarettes     Start date: 1981     Quit date: 2011     Years since quittin.3     Passive exposure: Past (Dad was a smoker)    Smokeless tobacco: Never    Tobacco comments:     started at age 22   Substance Use Topics    Alcohol use: Not Currently     Comment: occasional     Family History   Problem Relation Age of Onset    Hyperlipidemia Mother     Hypertension Mother     Heart Disease Mother     Depression Mother     Anxiety Disorder Mother     Obesity Mother     Coronary Artery Disease Father     Heart Disease Father     Hypertension Father     Obesity Father     Hypertension Sister     No Known Problems Sister     Hypertension Sister     Hyperlipidemia Sister     Cancer Brother     Cancer Brother     Hypertension Brother     Hypertension Brother     Cancer Brother     Sleep Apnea Brother     No Known Problems Brother     Coronary Artery Disease Brother     Hypertension Brother     Hyperlipidemia Brother     Obesity Brother     Hypertension Brother     Hyperlipidemia Brother     Obesity Brother     Hypertension Brother     Hyperlipidemia Brother     Cancer Maternal Grandmother     Vision Loss Maternal Grandmother     Breast Cancer Maternal Grandmother     No Known Problems Daughter     No Known Problems Daughter     Obesity Daughter     No Known Problems Son     Hypertension Nephew     Hyperlipidemia Nephew     Coronary Artery Disease Brother     Hypertension Brother     Hyperlipidemia Brother     Obesity Brother     Prostate Cancer No family hx of      Colon Cancer No family hx of     Thyroid Cancer No family hx of          Current providers sharing in care for this patient include:  Patient Care Team:  Julio Sarmiento MD as PCP - General (Family Medicine)  Noah Heller MD as Assigned Musculoskeletal Provider  Devon Alvarado MD as Assigned PCP  Patricio Domingo MD as MD (Urology)  Linda Orozco APRN CNP as Pulmonologist (Pulmonary Disease)  Patricio Domingo MD as Assigned Surgical Provider  Linda Orozco APRN CNP as Assigned Pulmonology Provider  Yasmeen Singleton APRN CNP as Assigned Heart and Vascular Provider  Abbey Ortega MD as MD (Cardiovascular Disease)    The following health maintenance items are reviewed in Epic and correct as of today:  Health Maintenance   Topic Date Due    COPD ACTION PLAN  Never done    RSV VACCINE (1 - Risk 60-74 years 1-dose series) Never done    COVID-19 VACCINE (7 - 2024-25 season) 03/12/2025    BMP  12/31/2025    LIPID  12/31/2025    MEDICARE ANNUAL WELLNESS VISIT  05/28/2026    ANNUAL REVIEW OF HM ORDERS  05/28/2026    FALL RISK ASSESSMENT  05/28/2026    DTAP/TDAP/TD VACCINE (4 - Td or Tdap) 03/07/2027    DIABETES SCREENING  05/28/2028    ADVANCE CARE PLANNING  05/28/2030    COLORECTAL CANCER SCREENING  08/26/2030    SPIROMETRY  Completed    HEPATITIS C SCREENING  Completed    PHQ-2 (once per calendar year)  Completed    INFLUENZA VACCINE  Completed    PNEUMOCOCCAL VACCINE 50+ YEARS  Completed    ZOSTER VACCINE  Completed    AORTIC ANEURYSM SCREENING (SYSTEM ASSIGNED)  Completed    HPV VACCINE  Aged Out    MENINGITIS VACCINE  Aged Out    URINE DRUG SCREEN  Discontinued    LUNG CANCER SCREENING  Discontinued     Review of Systems   Constitutional:  Negative for chills and fever.   HENT:  Negative for ear pain and sore throat.    Eyes:  Negative for pain and visual disturbance.   Respiratory:  Negative for cough and shortness of breath.    Cardiovascular:  Negative for chest pain and  "palpitations.   Gastrointestinal:  Negative for abdominal pain and vomiting.   Genitourinary:  Negative for dysuria and hematuria.   Musculoskeletal:  Negative for arthralgias and back pain.   Skin:  Negative for color change and rash.   Neurological:  Negative for seizures and syncope.   All other systems reviewed and are negative.         Objective    Exam  /64 (BP Location: Left arm, Patient Position: Sitting, Cuff Size: Adult Large)   Pulse 58   Temp 97.9  F (36.6  C) (Oral)   Resp 20   Ht 1.772 m (5' 9.75\")   Wt 125.4 kg (276 lb 6.4 oz)   SpO2 98%   BMI 39.94 kg/m     Estimated body mass index is 39.94 kg/m  as calculated from the following:    Height as of this encounter: 1.772 m (5' 9.75\").    Weight as of this encounter: 125.4 kg (276 lb 6.4 oz).    Physical Exam  Vitals reviewed.   Constitutional:       General: He is not in acute distress.     Appearance: Normal appearance. He is not ill-appearing.   HENT:      Head: Normocephalic and atraumatic.      Right Ear: External ear normal.      Left Ear: External ear normal.      Nose: Nose normal.      Mouth/Throat:      Pharynx: Oropharynx is clear. No oropharyngeal exudate or posterior oropharyngeal erythema.   Eyes:      General: No scleral icterus.        Right eye: No discharge.         Left eye: No discharge.      Extraocular Movements: Extraocular movements intact.      Conjunctiva/sclera: Conjunctivae normal.      Pupils: Pupils are equal, round, and reactive to light.   Neck:      Comments: No thyromegaly.  Cardiovascular:      Rate and Rhythm: Normal rate and regular rhythm.      Heart sounds: Normal heart sounds. No murmur heard.     No friction rub. No gallop.   Pulmonary:      Effort: Pulmonary effort is normal. No respiratory distress.      Breath sounds: Normal breath sounds. No wheezing or rales.   Abdominal:      General: There is no distension.      Palpations: Abdomen is soft. There is no mass.      Tenderness: There is no " abdominal tenderness.   Musculoskeletal:         General: No signs of injury. Normal range of motion.      Cervical back: Normal range of motion.      Right lower leg: No edema.      Left lower leg: No edema.   Lymphadenopathy:      Cervical: No cervical adenopathy.   Skin:     General: Skin is warm.      Coloration: Skin is not jaundiced.      Findings: No rash.   Neurological:      General: No focal deficit present.      Mental Status: He is alert and oriented to person, place, and time.      Cranial Nerves: No cranial nerve deficit.      Deep Tendon Reflexes: Reflexes normal.   Psychiatric:         Mood and Affect: Mood normal.       Gait and balance assessed per Gait Speed Test.  Result as above.       Signed Electronically by: Julio Sarmiento MD

## 2025-05-28 NOTE — ASSESSMENT & PLAN NOTE
BP improved.  No swelling. No hypotensive symptoms.  Tolerant of rosuvastatin.  BP meds managed by primary care and cardiology.

## 2025-05-28 NOTE — ASSESSMENT & PLAN NOTE
Labs reviewed. LDL cholesterol less than 70.  No changes/     Orders:    rosuvastatin (CRESTOR) 40 MG tablet; Take 1 tablet (40 mg) by mouth daily.

## 2025-05-28 NOTE — ASSESSMENT & PLAN NOTE
He is funding zepbound.  More hunger recently.  Focused on protein. Working hard.  Mininal side effects.  He is down 36 lbs (11%).  Will adjust dose to 7.5mg/week.    Recheck in 3 months.     Orders:    tirzepatide-weight management (ZEPBOUND) 7.5 MG/0.5ML vial; Inject 0.5 mLs (7.5 mg) subcutaneously once a week.

## 2025-05-28 NOTE — ASSESSMENT & PLAN NOTE
Symptoms stable. Nutrition improved.  Consider taper of PPI in the future.      Orders:    omeprazole (PRILOSEC) 40 MG DR capsule; TAKE 1 CAPSULE BY MOUTH DAILY 30 TO 60 MINUTES BEFORE A MEAL

## 2025-05-28 NOTE — PATIENT INSTRUCTIONS
Please consider getting RSV vaccine.     Patient Education   Preventive Care Advice   This is general advice given by our system to help you stay healthy. However, your care team may have specific advice just for you. Please talk to your care team about your preventive care needs.  Nutrition  Eat 5 or more servings of fruits and vegetables each day.  Try wheat bread, brown rice and whole grain pasta (instead of white bread, rice, and pasta).  Get enough calcium and vitamin D. Check the label on foods and aim for 100% of the RDA (recommended daily allowance).  Lifestyle  Exercise at least 150 minutes each week  (30 minutes a day, 5 days a week).  Do muscle strengthening activities 2 days a week. These help control your weight and prevent disease.  No smoking.  Wear sunscreen to prevent skin cancer.  Have a dental exam and cleaning every 6 months.  Yearly exams  See your health care team every year to talk about:  Any changes in your health.  Any medicines your care team has prescribed.  Preventive care, family planning, and ways to prevent chronic diseases.  Shots (vaccines)   HPV shots (up to age 26), if you've never had them before.  Hepatitis B shots (up to age 59), if you've never had them before.  COVID-19 shot: Get this shot when it's due.  Flu shot: Get a flu shot every year.  Tetanus shot: Get a tetanus shot every 10 years.  Pneumococcal, hepatitis A, and RSV shots: Ask your care team if you need these based on your risk.  Shingles shot (for age 50 and up)  General health tests  Diabetes screening:  Starting at age 35, Get screened for diabetes at least every 3 years.  If you are younger than age 35, ask your care team if you should be screened for diabetes.  Cholesterol test: At age 39, start having a cholesterol test every 5 years, or more often if advised.  Bone density scan (DEXA): At age 50, ask your care team if you should have this scan for osteoporosis (brittle bones).  Hepatitis C: Get tested at  least once in your life.  STIs (sexually transmitted infections)  Before age 24: Ask your care team if you should be screened for STIs.  After age 24: Get screened for STIs if you're at risk. You are at risk for STIs (including HIV) if:  You are sexually active with more than one person.  You don't use condoms every time.  You or a partner was diagnosed with a sexually transmitted infection.  If you are at risk for HIV, ask about PrEP medicine to prevent HIV.  Get tested for HIV at least once in your life, whether you are at risk for HIV or not.  Cancer screening tests  Cervical cancer screening: If you have a cervix, begin getting regular cervical cancer screening tests starting at age 21.  Breast cancer scan (mammogram): If you've ever had breasts, begin having regular mammograms starting at age 40. This is a scan to check for breast cancer.  Colon cancer screening: It is important to start screening for colon cancer at age 45.  Have a colonoscopy test every 10 years (or more often if you're at risk) Or, ask your provider about stool tests like a FIT test every year or Cologuard test every 3 years.  To learn more about your testing options, visit:   .  For help making a decision, visit:   https://bit.ly/uh16470.  Prostate cancer screening test: If you have a prostate, ask your care team if a prostate cancer screening test (PSA) at age 55 is right for you.  Lung cancer screening: If you are a current or former smoker ages 50 to 80, ask your care team if ongoing lung cancer screenings are right for you.  For informational purposes only. Not to replace the advice of your health care provider. Copyright   2023 [facility]. All rights reserved. Clinically reviewed by the [facility]. Given.to [id number] - REV 01/24.  Preventing Falls: Care Instructions  Injuries and health problems such as trouble walking or poor eyesight can increase your risk of falling. So can some medicines. But  "there are things you can do to help prevent falls. You can exercise to get stronger. You can also arrange your home to make it safer.    Talk to your doctor about the medicines you take. Ask if any of them increase the risk of falls and whether they can be changed or stopped.   Try to exercise regularly. It can help improve your strength and balance. This can help lower your risk of falling.         Practice fall safety and prevention.   Wear low-heeled shoes that fit well and give your feet good support. Talk to your doctor if you have foot problems that make this hard.  Carry a cellphone or wear a medical alert device that you can use to call for help.  Use stepladders instead of chairs to reach high objects. Don't climb if you're at risk for falls. Ask for help, if needed.  Wear the correct eyeglasses, if you need them.        Make your home safer.   Remove rugs, cords, clutter, and furniture from walkways.  Keep your house well lit. Use night-lights in hallways and bathrooms.  Install and use sturdy handrails on stairways.  Wear nonskid footwear, even inside. Don't walk barefoot or in socks without shoes.        Be safe outside.   Use handrails, curb cuts, and ramps whenever possible.  Keep your hands free by using a shoulder bag or backpack.  Try to walk in well-lit areas. Watch out for uneven ground, changes in pavement, and debris.  Be careful in the winter. Walk on the grass or gravel when sidewalks are slippery. Use de-icer on steps and walkways. Add non-slip devices to shoes.    Put grab bars and nonskid mats in your shower or tub and near the toilet. Try to use a shower chair or bath bench when bathing.   Get into a tub or shower by putting in your weaker leg first. Get out with your strong side first. Have a phone or medical alert device in the bathroom with you.   Where can you learn more?  Go to https://www.GapJumpers.net/patiented  Enter G117 in the search box to learn more about \"Preventing Falls: " "Care Instructions.\"  Current as of: July 31, 2024  Content Version: 14.4    8568-5945 [employer].   Care instructions adapted under license by your healthcare professional. If you have questions about a medical condition or this instruction, always ask your healthcare professional. [employer] disclaims any warranty or liability for your use of this information.    Learning About Stress  What is stress?     Stress is your body's response to a hard situation. Your body can have a physical, emotional, or mental response. Stress is a fact of life for most people, and it affects everyone differently. What causes stress for you may not be stressful for someone else.  A lot of things can cause stress. You may feel stress when you go on a job interview, take a test, or run a race. This kind of short-term stress is normal and even useful. It can help you if you need to work hard or react quickly. For example, stress can help you finish an important job on time.  Long-term stress is caused by ongoing stressful situations or events. Examples of long-term stress include long-term health problems, ongoing problems at work, or conflicts in your family. Long-term stress can harm your health.  How does stress affect your health?  When you are stressed, your body responds as though you are in danger. It makes hormones that speed up your heart, make you breathe faster, and give you a burst of energy. This is called the fight-or-flight stress response. If the stress is over quickly, your body goes back to normal and no harm is done.  But if stress happens too often or lasts too long, it can have bad effects. Long-term stress can make you more likely to get sick, and it can make symptoms of some diseases worse. If you tense up when you are stressed, you may develop neck, shoulder, or low back pain. Stress is linked to high blood pressure and heart disease.  Stress also harms your emotional health. It can make " you varela, tense, or depressed. Your relationships may suffer, and you may not do well at work or school.  What can you do to manage stress?  You can try these things to help manage stress:   Do something active. Exercise or activity can help reduce stress. Walking is a great way to get started. Even everyday activities such as housecleaning or yard work can help.  Try yoga or ayush chi. These techniques combine exercise and meditation. You may need some training at first to learn them.  Do something you enjoy. For example, listen to music or go to a movie. Practice your hobby or do volunteer work.  Meditate. This can help you relax, because you are not worrying about what happened before or what may happen in the future.  Do guided imagery. Imagine yourself in any setting that helps you feel calm. You can use online videos, books, or a teacher to guide you.  Do breathing exercises. For example:  From a standing position, bend forward from the waist with your knees slightly bent. Let your arms dangle close to the floor.  Breathe in slowly and deeply as you return to a standing position. Roll up slowly and lift your head last.  Hold your breath for just a few seconds in the standing position.  Breathe out slowly and bend forward from the waist.  Let your feelings out. Talk, laugh, cry, and express anger when you need to. Talking with supportive friends or family, a counselor, or a gloria leader about your feelings is a healthy way to relieve stress. Avoid discussing your feelings with people who make you feel worse.  Write. It may help to write about things that are bothering you. This helps you find out how much stress you feel and what is causing it. When you know this, you can find better ways to cope.  What can you do to prevent stress?  You might try some of these things to help prevent stress:  Manage your time. This helps you find time to do the things you want and need to do.  Get enough sleep. Your body  "recovers from the stresses of the day while you are sleeping.  Get support. Your family, friends, and community can make a difference in how you experience stress.  Limit your news feed. Avoid or limit time on social media or news that may make you feel stressed.  Do something active. Exercise or activity can help reduce stress. Walking is a great way to get started.  Where can you learn more?  Go to https://www.Brainiac TV.net/patiented  Enter N032 in the search box to learn more about \"Learning About Stress.\"  Current as of: October 24, 2024  Content Version: 14.4    3380-8093 [employer].   Care instructions adapted under license by your healthcare professional. If you have questions about a medical condition or this instruction, always ask your healthcare professional. [employer] disclaims any warranty or liability for your use of this information.    Learning About Sleeping Well  What does sleeping well mean?     Sleeping well means getting enough sleep to feel good and stay healthy. How much sleep is enough varies among people.  The number of hours you sleep and how you feel when you wake up are both important. If you do not feel refreshed, you probably need more sleep. Another sign of not getting enough sleep is feeling tired during the day.  Experts recommend that adults get at least 7 or more hours of sleep per day. Children and older adults need more sleep.  Why is getting enough sleep important?  Getting enough quality sleep is a basic part of good health. When your sleep suffers, your physical health, mood, and your thoughts can suffer too. You may find yourself feeling more grumpy or stressed. Not getting enough sleep also can lead to serious problems, including injury, accidents, anxiety, and depression.  What might cause poor sleeping?  Many things can cause sleep problems, including:  Changes to your sleep schedule.  Stress. Stress can be caused by fear about a single event, " "such as giving a speech. Or you may have ongoing stress, such as worry about work or school.  Depression, anxiety, and other mental or emotional conditions.  Changes in your sleep habits or surroundings. This includes changes that happen where you sleep, such as noise, light, or sleeping in a different bed. It also includes changes in your sleep pattern, such as having jet lag or working a late shift.  Health problems, such as pain, breathing problems, and restless legs syndrome.  Lack of regular exercise.  Using alcohol, nicotine, or caffeine before bed.  How can you help yourself?  Here are some tips that may help you sleep more soundly and wake up feeling more refreshed.  Your sleeping area   Use your bedroom only for sleeping and sex. A bit of light reading may help you fall asleep. But if it doesn't, do your reading elsewhere in the house. Try not to use your TV, computer, smartphone, or tablet while you are in bed.  Be sure your bed is big enough to stretch out comfortably, especially if you have a sleep partner.  Keep your bedroom quiet, dark, and cool. Use curtains, blinds, or a sleep mask to block out light. To block out noise, use earplugs, soothing music, or a \"white noise\" machine.  Your evening and bedtime routine   Create a relaxing bedtime routine. You might want to take a warm shower or bath, or listen to soothing music.  Go to bed at the same time every night. And get up at the same time every morning, even if you feel tired.  What to avoid   Limit caffeine (coffee, tea, caffeinated sodas) during the day, and don't have any for at least 6 hours before bedtime.  Avoid drinking alcohol before bedtime. Alcohol can cause you to wake up more often during the night.  Try not to smoke or use tobacco, especially in the evening. Nicotine can keep you awake.  Limit naps during the day, especially close to bedtime.  Avoid lying in bed awake for too long. If you can't fall asleep or if you wake up in the middle " "of the night and can't get back to sleep within about 20 minutes, get out of bed and go to another room until you feel sleepy.  Avoid taking medicine right before bed that may keep you awake or make you feel hyper or energized. Your doctor can tell you if your medicine may do this and if you can take it earlier in the day.  If you can't sleep   Imagine yourself in a peaceful, pleasant scene. Focus on the details and feelings of being in a place that is relaxing.  Get up and do a quiet or boring activity until you feel sleepy.  Avoid drinking any liquids before going to bed to help prevent waking up often to use the bathroom.  Where can you learn more?  Go to https://www.SafeAwake.net/patiented  Enter J942 in the search box to learn more about \"Learning About Sleeping Well.\"  Current as of: July 31, 2024  Content Version: 14.4    8020-7891 [employer].   Care instructions adapted under license by your healthcare professional. If you have questions about a medical condition or this instruction, always ask your healthcare professional. [employer] disclaims any warranty or liability for your use of this information.       "

## 2025-05-28 NOTE — ASSESSMENT & PLAN NOTE
He is having mild hypotensive symptoms.  This is likely result of nutritional change of weight loss.  In the past he has had lower extremity swelling.  - For now, continue current therapy but monitor blood pressure.  If his symptoms worsen he should reach out to cardiology as they are managing his blood pressure medicines.  - Consider dropping losartan altogether (this may be part of his treatment plan because of history of hypokalemia).  - From there, started to work against chlorthalidone.  - Check basic metabolic today based on history of hypokalemia    Orders:    Hemoglobin A1c; Future    Basic metabolic panel  (Ca, Cl, CO2, Creat, Gluc, K, Na, BUN); Future

## 2025-05-28 NOTE — ASSESSMENT & PLAN NOTE
Weight improved.  On (self-funded) Zepbound.     Orders:    tirzepatide-weight management (ZEPBOUND) 7.5 MG/0.5ML vial; Inject 0.5 mLs (7.5 mg) subcutaneously once a week.

## 2025-06-03 ENCOUNTER — TELEPHONE (OUTPATIENT)
Dept: FAMILY MEDICINE | Facility: CLINIC | Age: 70
End: 2025-06-03
Payer: MEDICARE

## 2025-06-03 NOTE — TELEPHONE ENCOUNTER
Lab results 05/28 printed and mailed to patient.    See results follow up encounter 06/01 for additional context regarding medication

## 2025-06-03 NOTE — TELEPHONE ENCOUNTER
General Call    Reason for Call:  Pt requesting to have his labs from his last visit on 5/28/25 be mailed to him. In addition, per the [employer] message from 6/2/25, pt does not want the chlorthalidone 12.5 mg to be sent to pharmacy at this time.    What are your questions or concerns: None     Date of last appointment with provider: 5/28/25    Could we send this information to you in [employer] or would you prefer to receive a phone call?:   No preference   Okay to leave a detailed message?: No at Cell number on file:    Telephone Information:   Mobile [phone]      JESS Turner  [facility]

## 2025-06-17 ENCOUNTER — TELEPHONE (OUTPATIENT)
Dept: FAMILY MEDICINE | Facility: CLINIC | Age: 70
End: 2025-06-17
Payer: MEDICARE

## 2025-06-17 DIAGNOSIS — I10 BENIGN ESSENTIAL HYPERTENSION: ICD-10-CM

## 2025-06-17 NOTE — TELEPHONE ENCOUNTER
Left message to call back for: Patient  Information to relay to patient: Please relay provider message below

## 2025-06-20 ENCOUNTER — TELEPHONE (OUTPATIENT)
Dept: FAMILY MEDICINE | Facility: CLINIC | Age: 70
End: 2025-06-20
Payer: MEDICARE

## 2025-06-20 DIAGNOSIS — I10 BENIGN ESSENTIAL HYPERTENSION: Primary | ICD-10-CM

## 2025-06-20 NOTE — TELEPHONE ENCOUNTER
"  Medication Question or Refill    Contacts       Contact Date/Time Type Contact Phone/Fax    06/20/2025 08:44 AM CDT Phone (Incoming) Tommy De La O (Self) [phone] (H)          What medication are you calling about (include dose and sig)?: chlorthalidone 12.5 MG TABS     Preferred Pharmacy:   [id number] - [place], [place] - [address] [facility] [address]  [address] [address]  Phone: [phone] Fax: [phone]    Controlled Substance Agreement on file:   CSA -- Patient Level:    CSA: None found at the patient level.       Who prescribed the medication?: Dr. Sarmiento    Do you need a refill? No    When did you use the medication last? Today    Patient offered an appointment? No    Do you have any questions or concerns?  Yes: Pt states that the lowest dose for this medication is a 25 mg tablet that the pt has to cut in half with a pill cutter.  Pt reports he doesn't always cut the tablet in half perfectly. He is wondering if there is another diuretic he should/could be taking instead d/t the pill sizes being slightly \"off\" each time.     RN called pharmacy to verify that 25 mg is the lowest does available for this medication.     BP readings -  6/13 - 113/76  6/14 - 122/76 121/76  6/15 - 119/68  6/17 - NA  6/18 - 112/76 130/74  6/19 - 117/79 123/80 107/77 (left arm)  6/20 - 113/80 128/82    Could we send this information to you in [employer] or would you prefer to receive a phone call?:   Patient would prefer a phone call   Okay to leave a detailed message?: Yes at Cell number on file:    Telephone Information:   Mobile [phone]            "

## 2025-06-22 RX ORDER — HYDROCHLOROTHIAZIDE 12.5 MG/1
12.5 TABLET ORAL DAILY
Qty: 90 TABLET | Refills: 1 | Status: SHIPPED | OUTPATIENT
Start: 2025-06-22

## 2025-06-22 NOTE — TELEPHONE ENCOUNTER
Lets move him over to hydrochlorothiazide.  12.5 mg tabs sent to pharmacy.  Let the patient know that this is a relatively less potent medicine and so if he struggles with swelling we may need to go up to the 25 mg HCTZ dosing.

## 2025-06-30 DIAGNOSIS — E66.01 CLASS 2 SEVERE OBESITY DUE TO EXCESS CALORIES WITH SERIOUS COMORBIDITY AND BODY MASS INDEX (BMI) OF 39.0 TO 39.9 IN ADULT (H): Primary | ICD-10-CM

## 2025-06-30 DIAGNOSIS — E66.812 CLASS 2 SEVERE OBESITY DUE TO EXCESS CALORIES WITH SERIOUS COMORBIDITY AND BODY MASS INDEX (BMI) OF 39.0 TO 39.9 IN ADULT (H): Primary | ICD-10-CM

## 2025-06-30 NOTE — TELEPHONE ENCOUNTER
"Medication Request  Medication name: tirzepatide-weight management (ZEPBOUND)   [facility]  When was patient last seen for this?:  05/28/2025  Patient offered appointment:  no  Okay to leave a detailed message: no     Patient states he is ready to increase to the next dose (10 MG). Patient reports that he has lost 5 lbs in the past month and his current weight is 271 lbs. Patient states he \"got stuck there\" and his overall goal is to lose 20 more pounds.    Patient would also like to update Dr. Sarmiento that his blood pressure is \"right on the money\" and that his dizziness and lightheadedness are doing better with the recent medication changes.   "

## 2025-07-05 DIAGNOSIS — I10 BENIGN ESSENTIAL HYPERTENSION: ICD-10-CM

## 2025-07-07 RX ORDER — LOSARTAN POTASSIUM 25 MG/1
25 TABLET ORAL DAILY
Qty: 90 TABLET | Refills: 1 | Status: SHIPPED | OUTPATIENT
Start: 2025-07-07

## 2025-07-28 DIAGNOSIS — E66.812 CLASS 2 SEVERE OBESITY DUE TO EXCESS CALORIES WITH SERIOUS COMORBIDITY AND BODY MASS INDEX (BMI) OF 39.0 TO 39.9 IN ADULT (H): ICD-10-CM

## 2025-07-28 DIAGNOSIS — E66.01 CLASS 2 SEVERE OBESITY DUE TO EXCESS CALORIES WITH SERIOUS COMORBIDITY AND BODY MASS INDEX (BMI) OF 39.0 TO 39.9 IN ADULT (H): ICD-10-CM

## 2025-07-28 NOTE — TELEPHONE ENCOUNTER
Medication Refill    Contacts       Contact Date/Time Type Contact Phone/Fax    07/28/2025 07:56 AM CDT Phone (Incoming) Tommy De La O (Self) [phone] (H)        What medication are you calling about (include dose and sig)?: tirzepatide-weight management (ZEPBOUND) 10 MG/0.5ML vial     Preferred Pharmacy:     [facility] [address]   [address] [place]  [facility] [address]  Phone: [phone] Fax: [phone]    Controlled Substance Agreement on file:   CSA -- Patient Level:    CSA: None found at the patient level.     Who prescribed the medication?: Julio Sarmiento MD     Do you need a refill? Yes    Patient offered an appointment? Yes:     Do you have any questions or concerns?  Yes: Pt last's BP diastolic reading is 82. Also noted in his bloodwork potassium is low. The last year or so Pt said his reading was on the edge, but now it is low. Will be discussing this at his upcoming cardiology appt 8/12/25. Weight is around 266 so down 10 lbs.       Could we send this information to you in [employer] or would you prefer to receive a phone call?:   Patient would like to be contacted via [employer]

## 2025-07-29 NOTE — TELEPHONE ENCOUNTER
Left message to call back for:  Tommy  Information to relay to patient:  please inform patient there should be refills on file at the [facility] for the Zepbound 10 mg vials and he should contact the pharmacy to request the refill.  Please ask if patient is sending update on his vitals (BP/weight), and potassium and upcoming cardiology appointment.  If patient has questions please transfer to RN.      Also sent MyChart message as patient requested preferred communication.      Yue Pizano RN  [facility]

## 2025-08-12 ENCOUNTER — OFFICE VISIT (OUTPATIENT)
Dept: ORTHOPEDICS | Facility: CLINIC | Age: 70
End: 2025-08-12
Payer: MEDICARE

## 2025-08-12 ENCOUNTER — ANCILLARY PROCEDURE (OUTPATIENT)
Dept: GENERAL RADIOLOGY | Facility: CLINIC | Age: 70
End: 2025-08-12
Attending: FAMILY MEDICINE
Payer: MEDICARE

## 2025-08-12 ENCOUNTER — OFFICE VISIT (OUTPATIENT)
Dept: CARDIOLOGY | Facility: CLINIC | Age: 70
End: 2025-08-12
Payer: MEDICARE

## 2025-08-12 VITALS
OXYGEN SATURATION: 98 % | RESPIRATION RATE: 14 BRPM | DIASTOLIC BLOOD PRESSURE: 81 MMHG | HEART RATE: 56 BPM | SYSTOLIC BLOOD PRESSURE: 131 MMHG | WEIGHT: 262 LBS | BODY MASS INDEX: 37.86 KG/M2

## 2025-08-12 DIAGNOSIS — I25.10 CORONARY ARTERY DISEASE INVOLVING NATIVE CORONARY ARTERY OF NATIVE HEART, UNSPECIFIED WHETHER ANGINA PRESENT: ICD-10-CM

## 2025-08-12 DIAGNOSIS — E78.5 HYPERLIPIDEMIA LDL GOAL <70: ICD-10-CM

## 2025-08-12 DIAGNOSIS — I10 BENIGN ESSENTIAL HYPERTENSION: ICD-10-CM

## 2025-08-12 DIAGNOSIS — M54.2 CERVICALGIA: ICD-10-CM

## 2025-08-12 DIAGNOSIS — R94.39 ABNORMAL STRESS TEST: ICD-10-CM

## 2025-08-12 DIAGNOSIS — I49.3 PVC'S (PREMATURE VENTRICULAR CONTRACTIONS): ICD-10-CM

## 2025-08-12 DIAGNOSIS — M79.18 MYALGIA, OTHER SITE: ICD-10-CM

## 2025-08-12 DIAGNOSIS — I47.10 PSVT (PAROXYSMAL SUPRAVENTRICULAR TACHYCARDIA): ICD-10-CM

## 2025-08-12 DIAGNOSIS — S49.91XA INJURY OF RIGHT SHOULDER, INITIAL ENCOUNTER: ICD-10-CM

## 2025-08-12 DIAGNOSIS — I87.2 VENOUS (PERIPHERAL) INSUFFICIENCY: ICD-10-CM

## 2025-08-12 DIAGNOSIS — M75.81 ROTATOR CUFF TENDINITIS, RIGHT: ICD-10-CM

## 2025-08-12 DIAGNOSIS — M19.011 OSTEOARTHRITIS OF GLENOHUMERAL JOINT, RIGHT: ICD-10-CM

## 2025-08-12 DIAGNOSIS — Z82.49 FAMILY HISTORY OF PREMATURE CORONARY ARTERY DISEASE: ICD-10-CM

## 2025-08-12 DIAGNOSIS — S49.91XA INJURY OF RIGHT SHOULDER, INITIAL ENCOUNTER: Primary | ICD-10-CM

## 2025-08-12 PROCEDURE — 73030 X-RAY EXAM OF SHOULDER: CPT | Mod: TC | Performed by: STUDENT IN AN ORGANIZED HEALTH CARE EDUCATION/TRAINING PROGRAM

## 2025-08-12 PROCEDURE — 99214 OFFICE O/P EST MOD 30 MIN: CPT | Mod: 25 | Performed by: FAMILY MEDICINE

## 2025-08-12 PROCEDURE — 20611 DRAIN/INJ JOINT/BURSA W/US: CPT | Mod: RT | Performed by: FAMILY MEDICINE

## 2025-08-12 RX ORDER — ROPIVACAINE HYDROCHLORIDE 5 MG/ML
3 INJECTION, SOLUTION EPIDURAL; INFILTRATION; PERINEURAL
Status: COMPLETED | OUTPATIENT
Start: 2025-08-12 | End: 2025-08-12

## 2025-08-12 RX ORDER — CYCLOBENZAPRINE HCL 10 MG
5-10 TABLET ORAL 3 TIMES DAILY PRN
Qty: 60 TABLET | Refills: 0 | Status: SHIPPED | OUTPATIENT
Start: 2025-08-12

## 2025-08-12 RX ORDER — TRIAMCINOLONE ACETONIDE 40 MG/ML
40 INJECTION, SUSPENSION INTRA-ARTICULAR; INTRAMUSCULAR
Status: COMPLETED | OUTPATIENT
Start: 2025-08-12 | End: 2025-08-12

## 2025-08-12 RX ADMIN — ROPIVACAINE HYDROCHLORIDE 3 ML: 5 INJECTION, SOLUTION EPIDURAL; INFILTRATION; PERINEURAL at 09:10

## 2025-08-12 RX ADMIN — TRIAMCINOLONE ACETONIDE 40 MG: 40 INJECTION, SUSPENSION INTRA-ARTICULAR; INTRAMUSCULAR at 09:10

## 2025-08-12 SDOH — HEALTH STABILITY: PHYSICAL HEALTH: ON AVERAGE, HOW MANY DAYS PER WEEK DO YOU ENGAGE IN MODERATE TO STRENUOUS EXERCISE (LIKE A BRISK WALK)?: 0 DAYS

## 2025-08-12 SDOH — HEALTH STABILITY: PHYSICAL HEALTH: ON AVERAGE, HOW MANY MINUTES DO YOU ENGAGE IN EXERCISE AT THIS LEVEL?: 0 MIN

## 2025-08-21 ENCOUNTER — LAB (OUTPATIENT)
Dept: LAB | Facility: CLINIC | Age: 70
End: 2025-08-21
Payer: MEDICARE

## 2025-08-21 DIAGNOSIS — I10 BENIGN ESSENTIAL HYPERTENSION: ICD-10-CM

## 2025-08-21 LAB
ANION GAP SERPL CALCULATED.3IONS-SCNC: 13 MMOL/L (ref 7–15)
BUN SERPL-MCNC: 19.4 MG/DL (ref 8–23)
CALCIUM SERPL-MCNC: 9.7 MG/DL (ref 8.8–10.4)
CHLORIDE SERPL-SCNC: 102 MMOL/L (ref 98–107)
CREAT SERPL-MCNC: 0.72 MG/DL (ref 0.67–1.17)
EGFRCR SERPLBLD CKD-EPI 2021: >90 ML/MIN/1.73M2
GLUCOSE SERPL-MCNC: 86 MG/DL (ref 70–99)
HCO3 SERPL-SCNC: 24 MMOL/L (ref 22–29)
POTASSIUM SERPL-SCNC: 3.8 MMOL/L (ref 3.4–5.3)
SODIUM SERPL-SCNC: 139 MMOL/L (ref 135–145)

## 2025-09-02 ENCOUNTER — OFFICE VISIT (OUTPATIENT)
Dept: ORTHOPEDICS | Facility: CLINIC | Age: 70
End: 2025-09-02
Payer: MEDICARE

## 2025-09-02 DIAGNOSIS — M79.18 MYALGIA, OTHER SITE: ICD-10-CM

## 2025-09-02 DIAGNOSIS — M19.011 OSTEOARTHRITIS OF GLENOHUMERAL JOINT, RIGHT: ICD-10-CM

## 2025-09-02 DIAGNOSIS — S49.91XA INJURY OF RIGHT SHOULDER, INITIAL ENCOUNTER: Primary | ICD-10-CM

## 2025-09-02 DIAGNOSIS — M54.2 CERVICALGIA: ICD-10-CM

## 2025-09-02 DIAGNOSIS — M47.812 FACET ARTHRITIS OF CERVICAL REGION: ICD-10-CM

## 2025-09-02 DIAGNOSIS — M75.81 ROTATOR CUFF TENDINITIS, RIGHT: ICD-10-CM

## 2025-09-02 PROCEDURE — 20611 DRAIN/INJ JOINT/BURSA W/US: CPT | Mod: RT | Performed by: FAMILY MEDICINE

## 2025-09-02 PROCEDURE — 99214 OFFICE O/P EST MOD 30 MIN: CPT | Mod: 25 | Performed by: FAMILY MEDICINE

## 2025-09-02 RX ORDER — ROPIVACAINE HYDROCHLORIDE 5 MG/ML
3 INJECTION, SOLUTION EPIDURAL; INFILTRATION; PERINEURAL
Status: COMPLETED | OUTPATIENT
Start: 2025-09-02 | End: 2025-09-02

## 2025-09-02 RX ORDER — TRIAMCINOLONE ACETONIDE 40 MG/ML
40 INJECTION, SUSPENSION INTRA-ARTICULAR; INTRAMUSCULAR
Status: COMPLETED | OUTPATIENT
Start: 2025-09-02 | End: 2025-09-02

## 2025-09-02 RX ADMIN — TRIAMCINOLONE ACETONIDE 40 MG: 40 INJECTION, SUSPENSION INTRA-ARTICULAR; INTRAMUSCULAR at 09:36

## 2025-09-02 RX ADMIN — ROPIVACAINE HYDROCHLORIDE 3 ML: 5 INJECTION, SOLUTION EPIDURAL; INFILTRATION; PERINEURAL at 09:36

## 2025-09-02 SDOH — HEALTH STABILITY: PHYSICAL HEALTH: ON AVERAGE, HOW MANY MINUTES DO YOU ENGAGE IN EXERCISE AT THIS LEVEL?: 10 MIN

## 2025-09-02 SDOH — HEALTH STABILITY: PHYSICAL HEALTH: ON AVERAGE, HOW MANY DAYS PER WEEK DO YOU ENGAGE IN MODERATE TO STRENUOUS EXERCISE (LIKE A BRISK WALK)?: 0 DAYS

## (undated) DEVICE — INTRO SHEATH 6FRX10CM PINNACLE RSS602

## (undated) DEVICE — DEFIB PRO-PADZ LVP LQD GEL ADULT 8900-2105-01

## (undated) DEVICE — TOTE ANGIO CORP PC15AT SAN32CC83O

## (undated) DEVICE — CATH ANGIO INFINITI 3DRC 6FRX100CM 534676T

## (undated) DEVICE — CATH ANGIO INFINITI PIGTAIL 145 6 SH 6FRX110CM  534-652S

## (undated) DEVICE — KIT HAND CONTROL ANGIOTOUCH ACIST 65CM AT-P65

## (undated) DEVICE — MANIFOLD KIT ANGIO AUTOMATED 014613

## (undated) DEVICE — CATH ANGIO JUDKINS JL4 6FRX100CM INFINITI 534620T

## (undated) DEVICE — ENDO FORCEP ENDOJAW BIOPSY 3.7MMX230CM FB-222U

## (undated) DEVICE — INTRODUCER CATH VASC 5FRX10CM  MPIS-501-NT-U-SST

## (undated) RX ORDER — FENTANYL CITRATE 50 UG/ML
INJECTION, SOLUTION INTRAMUSCULAR; INTRAVENOUS
Status: DISPENSED
Start: 2024-08-14

## (undated) RX ORDER — HEPARIN SODIUM 200 [USP'U]/100ML
INJECTION, SOLUTION INTRAVENOUS
Status: DISPENSED
Start: 2024-11-19

## (undated) RX ORDER — ONDANSETRON 2 MG/ML
INJECTION INTRAMUSCULAR; INTRAVENOUS
Status: DISPENSED
Start: 2024-08-14

## (undated) RX ORDER — GLYCOPYRROLATE 0.2 MG/ML
INJECTION, SOLUTION INTRAMUSCULAR; INTRAVENOUS
Status: DISPENSED
Start: 2024-08-14

## (undated) RX ORDER — NITROGLYCERIN 0.4 MG/1
TABLET SUBLINGUAL
Status: DISPENSED
Start: 2023-04-12

## (undated) RX ORDER — FENTANYL CITRATE 50 UG/ML
INJECTION, SOLUTION INTRAMUSCULAR; INTRAVENOUS
Status: DISPENSED
Start: 2024-11-19

## (undated) RX ORDER — LIDOCAINE HYDROCHLORIDE 10 MG/ML
INJECTION, SOLUTION EPIDURAL; INFILTRATION; INTRACAUDAL; PERINEURAL
Status: DISPENSED
Start: 2024-11-19

## (undated) RX ORDER — PROPOFOL 10 MG/ML
INJECTION, EMULSION INTRAVENOUS
Status: DISPENSED
Start: 2024-08-14

## (undated) RX ORDER — LIDOCAINE HYDROCHLORIDE 10 MG/ML
INJECTION, SOLUTION EPIDURAL; INFILTRATION; INTRACAUDAL; PERINEURAL
Status: DISPENSED
Start: 2024-08-14